# Patient Record
Sex: MALE | Race: WHITE | NOT HISPANIC OR LATINO | Employment: FULL TIME | URBAN - METROPOLITAN AREA
[De-identification: names, ages, dates, MRNs, and addresses within clinical notes are randomized per-mention and may not be internally consistent; named-entity substitution may affect disease eponyms.]

---

## 2017-06-19 ENCOUNTER — TELEPHONE (OUTPATIENT)
Dept: FAMILY MEDICINE | Facility: CLINIC | Age: 62
End: 2017-06-19

## 2017-06-19 NOTE — TELEPHONE ENCOUNTER
New patient appointment scheduled for Wednesday,notified patient with and sob and diffculty breathing patient needs to go to ER for eval and tx. Appointment schedule to est care with dr spring

## 2017-06-19 NOTE — TELEPHONE ENCOUNTER
----- Message from Wojciech Santiago sent at 6/19/2017  4:13 PM CDT -----  Contact: Wife/Ruben Miranda called in regarding the attached patient (-Elliott).  Ruben stated she is an established patient of Dr. Galindo and would like patient to be seen ASAP.  Patient would be a new patient.  Patient has cellulitis in left leg and it is swollen and can barely get a shoe on.  Patient was offered next available and to be put on waiting list but declined.    Ruben's call back number is [phone])

## 2017-06-23 ENCOUNTER — DOCUMENTATION ONLY (OUTPATIENT)
Dept: FAMILY MEDICINE | Facility: CLINIC | Age: 62
End: 2017-06-23

## 2017-06-23 NOTE — PROGRESS NOTES
Pre-Visit Chart Review  For Appointment Scheduled on 06/28/2017    Health Maintenance Due   Topic Date Due    Hepatitis C Screening  1955    Lipid Panel  1955    TETANUS VACCINE  07/23/1971    Colonoscopy  07/23/2003    Zoster Vaccine  07/23/2013

## 2017-06-26 DIAGNOSIS — Z11.59 NEED FOR HEPATITIS C SCREENING TEST: Primary | ICD-10-CM

## 2017-08-18 ENCOUNTER — DOCUMENTATION ONLY (OUTPATIENT)
Dept: FAMILY MEDICINE | Facility: CLINIC | Age: 62
End: 2017-08-18

## 2017-08-18 NOTE — PROGRESS NOTES
Pre-Visit Chart Review  For Appointment Scheduled on 8/25/17.    Health Maintenance Due   Topic Date Due    Hepatitis C Screening  1955    Lipid Panel  1955    TETANUS VACCINE  07/23/1971    Colonoscopy  07/23/2003    Zoster Vaccine  07/23/2013    Influenza Vaccine  08/01/2017

## 2017-08-25 ENCOUNTER — DOCUMENTATION ONLY (OUTPATIENT)
Dept: FAMILY MEDICINE | Facility: CLINIC | Age: 62
End: 2017-08-25

## 2017-08-25 ENCOUNTER — HOSPITAL ENCOUNTER (OUTPATIENT)
Dept: RADIOLOGY | Facility: CLINIC | Age: 62
Discharge: HOME | End: 2017-08-25
Attending: FAMILY MEDICINE
Payer: OTHER GOVERNMENT

## 2017-08-25 ENCOUNTER — OFFICE VISIT (OUTPATIENT)
Dept: FAMILY MEDICINE | Facility: CLINIC | Age: 62
End: 2017-08-25
Payer: OTHER GOVERNMENT

## 2017-08-25 VITALS
HEIGHT: 69 IN | TEMPERATURE: 98 F | BODY MASS INDEX: 28.8 KG/M2 | HEART RATE: 71 BPM | WEIGHT: 194.44 LBS | SYSTOLIC BLOOD PRESSURE: 138 MMHG | DIASTOLIC BLOOD PRESSURE: 80 MMHG

## 2017-08-25 DIAGNOSIS — M79.671 RIGHT FOOT PAIN: ICD-10-CM

## 2017-08-25 DIAGNOSIS — M79.642 PAIN IN BOTH HANDS: ICD-10-CM

## 2017-08-25 DIAGNOSIS — G89.29 CHRONIC NONINTRACTABLE HEADACHE, UNSPECIFIED HEADACHE TYPE: Primary | ICD-10-CM

## 2017-08-25 DIAGNOSIS — I87.2 VENOUS INSUFFICIENCY: ICD-10-CM

## 2017-08-25 DIAGNOSIS — M79.641 PAIN IN BOTH HANDS: ICD-10-CM

## 2017-08-25 DIAGNOSIS — R60.9 EDEMA, UNSPECIFIED TYPE: ICD-10-CM

## 2017-08-25 DIAGNOSIS — R51.9 CHRONIC NONINTRACTABLE HEADACHE, UNSPECIFIED HEADACHE TYPE: Primary | ICD-10-CM

## 2017-08-25 DIAGNOSIS — K44.9 HIATAL HERNIA: ICD-10-CM

## 2017-08-25 DIAGNOSIS — Z12.11 SCREENING FOR COLON CANCER: ICD-10-CM

## 2017-08-25 DIAGNOSIS — K21.9 GASTROESOPHAGEAL REFLUX DISEASE, ESOPHAGITIS PRESENCE NOT SPECIFIED: ICD-10-CM

## 2017-08-25 DIAGNOSIS — Z11.59 NEED FOR HEPATITIS C SCREENING TEST: ICD-10-CM

## 2017-08-25 DIAGNOSIS — I10 ESSENTIAL HYPERTENSION: ICD-10-CM

## 2017-08-25 DIAGNOSIS — M62.08 DIASTASIS RECTI: ICD-10-CM

## 2017-08-25 DIAGNOSIS — M10.9 GOUT, UNSPECIFIED CAUSE, UNSPECIFIED CHRONICITY, UNSPECIFIED SITE: ICD-10-CM

## 2017-08-25 DIAGNOSIS — R07.9 CHEST PAIN, UNSPECIFIED TYPE: ICD-10-CM

## 2017-08-25 PROBLEM — H26.9 LEFT CATARACT: Status: ACTIVE | Noted: 2017-08-25

## 2017-08-25 PROBLEM — H33.22 LEFT RETINAL DETACHMENT: Status: ACTIVE | Noted: 2017-08-25

## 2017-08-25 PROBLEM — M67.40 GANGLION CYST: Status: ACTIVE | Noted: 2017-08-25

## 2017-08-25 PROCEDURE — 73130 X-RAY EXAM OF HAND: CPT | Mod: 50,TC,PO

## 2017-08-25 PROCEDURE — 99999 PR PBB SHADOW E&M-EST. PATIENT-LVL V: CPT | Mod: PBBFAC,,, | Performed by: FAMILY MEDICINE

## 2017-08-25 PROCEDURE — 99215 OFFICE O/P EST HI 40 MIN: CPT | Mod: PBBFAC,25,PO | Performed by: FAMILY MEDICINE

## 2017-08-25 PROCEDURE — 73130 X-RAY EXAM OF HAND: CPT | Mod: 26,50,S$GLB, | Performed by: RADIOLOGY

## 2017-08-25 PROCEDURE — 73630 X-RAY EXAM OF FOOT: CPT | Mod: 26,RT,S$GLB, | Performed by: RADIOLOGY

## 2017-08-25 PROCEDURE — 99204 OFFICE O/P NEW MOD 45 MIN: CPT | Mod: S$PBB,,, | Performed by: FAMILY MEDICINE

## 2017-08-25 PROCEDURE — 3008F BODY MASS INDEX DOCD: CPT | Mod: ,,, | Performed by: FAMILY MEDICINE

## 2017-08-25 PROCEDURE — 3079F DIAST BP 80-89 MM HG: CPT | Mod: ,,, | Performed by: FAMILY MEDICINE

## 2017-08-25 PROCEDURE — 73630 X-RAY EXAM OF FOOT: CPT | Mod: TC,PO,RT

## 2017-08-25 PROCEDURE — 3075F SYST BP GE 130 - 139MM HG: CPT | Mod: ,,, | Performed by: FAMILY MEDICINE

## 2017-08-25 RX ORDER — NEBIVOLOL 10 MG/1
20 TABLET ORAL DAILY
Qty: 180 TABLET | Refills: 3 | Status: SHIPPED | OUTPATIENT
Start: 2017-08-25 | End: 2017-11-08 | Stop reason: SDUPTHER

## 2017-08-25 RX ORDER — HYDROCHLOROTHIAZIDE 12.5 MG/1
12.5 CAPSULE ORAL 2 TIMES DAILY
Qty: 180 CAPSULE | Refills: 3 | Status: SHIPPED | OUTPATIENT
Start: 2017-08-25 | End: 2018-09-03 | Stop reason: SDUPTHER
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
## 2017-09-07 ENCOUNTER — TELEPHONE (OUTPATIENT)
Dept: FAMILY MEDICINE | Facility: CLINIC | Age: 62
End: 2017-09-07

## 2017-09-07 DIAGNOSIS — Z13.820 SCREENING FOR OSTEOPOROSIS: Primary | ICD-10-CM

## 2017-09-07 DIAGNOSIS — M85.80 DECREASED BONE DENSITY: ICD-10-CM

## 2017-09-07 DIAGNOSIS — R74.8 ELEVATED LIVER ENZYMES: ICD-10-CM

## 2017-09-07 DIAGNOSIS — R51.9 CHRONIC NONINTRACTABLE HEADACHE, UNSPECIFIED HEADACHE TYPE: Primary | ICD-10-CM

## 2017-09-07 DIAGNOSIS — M79.643 PAIN OF HAND, UNSPECIFIED LATERALITY: ICD-10-CM

## 2017-09-07 DIAGNOSIS — G89.29 CHRONIC NONINTRACTABLE HEADACHE, UNSPECIFIED HEADACHE TYPE: Primary | ICD-10-CM

## 2017-09-07 NOTE — TELEPHONE ENCOUNTER
Foot xrays demonstrate bunion formation over the right great toe, no evidence of gouty changes.  Needs to let me know if he wants to see podiatry.    Hand xrays demonstrate arthritic changes. However there is also decreaed bone density concerning for osteopenia or osteoporosis. Needs dexa scan.

## 2017-09-07 NOTE — TELEPHONE ENCOUNTER
Everything was  Ordered for his labs, he was to have them done before the mri.     Creatinine ordered.  Also needs all of the other labs done.

## 2017-09-07 NOTE — TELEPHONE ENCOUNTER
----- Message from Kayli Scott sent at 9/6/2017  2:16 PM CDT -----  Pt is scheduled for MRI and needs stat/ labs to check creatinine prior to mri , or patient will have to be rescheduled , if you can help .    Thanking you in advance

## 2017-09-08 ENCOUNTER — HOSPITAL ENCOUNTER (OUTPATIENT)
Dept: RADIOLOGY | Facility: HOSPITAL | Age: 62
Discharge: HOME | End: 2017-09-08
Attending: FAMILY MEDICINE
Payer: OTHER GOVERNMENT

## 2017-09-08 ENCOUNTER — TELEPHONE (OUTPATIENT)
Dept: FAMILY MEDICINE | Facility: CLINIC | Age: 62
End: 2017-09-08

## 2017-09-08 DIAGNOSIS — I87.2 VENOUS INSUFFICIENCY: ICD-10-CM

## 2017-09-08 DIAGNOSIS — R51.9 PERSISTENT HEADACHES: Primary | ICD-10-CM

## 2017-09-08 DIAGNOSIS — M10.9 ACUTE GOUT, UNSPECIFIED CAUSE, UNSPECIFIED SITE: ICD-10-CM

## 2017-09-08 DIAGNOSIS — J01.00 ACUTE MAXILLARY SINUSITIS, RECURRENCE NOT SPECIFIED: ICD-10-CM

## 2017-09-08 DIAGNOSIS — G89.29 CHRONIC NONINTRACTABLE HEADACHE, UNSPECIFIED HEADACHE TYPE: ICD-10-CM

## 2017-09-08 DIAGNOSIS — J34.1 RETENTION CYST OF PARANASAL SINUS: ICD-10-CM

## 2017-09-08 DIAGNOSIS — R51.9 PERSISTENT HEADACHES: ICD-10-CM

## 2017-09-08 DIAGNOSIS — R51.9 CHRONIC NONINTRACTABLE HEADACHE, UNSPECIFIED HEADACHE TYPE: ICD-10-CM

## 2017-09-08 DIAGNOSIS — R60.9 EDEMA, UNSPECIFIED TYPE: ICD-10-CM

## 2017-09-08 DIAGNOSIS — R93.0 ABNORMAL CT SCAN, SINUS: ICD-10-CM

## 2017-09-08 DIAGNOSIS — D69.6 THROMBOCYTOPENIA: Primary | ICD-10-CM

## 2017-09-08 PROCEDURE — 70553 MRI BRAIN STEM W/O & W/DYE: CPT | Mod: TC

## 2017-09-08 PROCEDURE — A9585 GADOBUTROL INJECTION: HCPCS | Performed by: FAMILY MEDICINE

## 2017-09-08 PROCEDURE — 93970 EXTREMITY STUDY: CPT | Mod: 26,,, | Performed by: RADIOLOGY

## 2017-09-08 PROCEDURE — 93970 EXTREMITY STUDY: CPT | Mod: TC

## 2017-09-08 PROCEDURE — 70553 MRI BRAIN STEM W/O & W/DYE: CPT | Mod: 26,,, | Performed by: RADIOLOGY

## 2017-09-08 PROCEDURE — 25500020 PHARM REV CODE 255: Performed by: FAMILY MEDICINE

## 2017-09-08 RX ORDER — COLCHICINE 0.6 MG/1
TABLET ORAL
Qty: 30 TABLET | Refills: 0 | Status: SHIPPED | OUTPATIENT
Start: 2017-09-08 | End: 2018-03-09

## 2017-09-08 RX ORDER — GADOBUTROL 604.72 MG/ML
INJECTION INTRAVENOUS
Status: DISPENSED
Start: 2017-09-08 | End: 2017-09-08

## 2017-09-08 RX ORDER — GADOBUTROL 604.72 MG/ML
8 INJECTION INTRAVENOUS
Status: COMPLETED | OUTPATIENT
Start: 2017-09-08 | End: 2017-09-08

## 2017-09-08 RX ADMIN — GADOBUTROL 8 ML: 604.72 INJECTION INTRAVENOUS at 07:09

## 2017-09-08 NOTE — TELEPHONE ENCOUNTER
Patient notified and states understanding. Patient states he has never seen hematology and never been told he has low platelet level,patient states he is having gout attack patient does not have any medication. Patient needs referral to Dr Yoder

## 2017-09-08 NOTE — TELEPHONE ENCOUNTER
Patient notified and states understanding. Declines podiatry and dexa scan.     Patient wants to see Dr De Guzman for they cyst on his hand patient needs referral has sathya

## 2017-09-08 NOTE — TELEPHONE ENCOUNTER
Patient notified and states understanding, referrals sent to referral center for authorization ,notified patient hematology staff will call to schedule appointment,states understanding

## 2017-09-08 NOTE — TELEPHONE ENCOUNTER
Referrals in for hem/onc and ENT.    Also, I sent in colchicine for gout attack. However, he may need to be started on something to PREVENT gout attacks if he gets these often.

## 2017-09-08 NOTE — TELEPHONE ENCOUNTER
----- Message from FRANCES Peng sent at 9/8/2017  8:45 AM CDT -----  Platelets are low again. Does he see hematology for this?     Thyroid normal.   Electrolytes are normal with the exception of mildly elevated liver function tests. I would like for him to avoid tylenol intake (which is in exerdrin) and alcohol in take & repeat these in 4 weeks.   Uric acid level also mildly elevated- is he having any gouty arthrtic attacks?

## 2017-09-12 ENCOUNTER — TELEPHONE (OUTPATIENT)
Dept: FAMILY MEDICINE | Facility: CLINIC | Age: 62
End: 2017-09-12

## 2017-09-22 ENCOUNTER — OFFICE VISIT (OUTPATIENT)
Dept: HEMATOLOGY/ONCOLOGY | Facility: CLINIC | Age: 62
End: 2017-09-22
Payer: OTHER GOVERNMENT

## 2017-09-22 VITALS
WEIGHT: 198.88 LBS | SYSTOLIC BLOOD PRESSURE: 164 MMHG | DIASTOLIC BLOOD PRESSURE: 90 MMHG | BODY MASS INDEX: 29.46 KG/M2 | TEMPERATURE: 98 F | HEIGHT: 69 IN | HEART RATE: 83 BPM

## 2017-09-22 DIAGNOSIS — D69.6 THROMBOCYTOPENIA: Primary | ICD-10-CM

## 2017-09-22 PROCEDURE — 99244 OFF/OP CNSLTJ NEW/EST MOD 40: CPT | Mod: S$PBB,,, | Performed by: INTERNAL MEDICINE

## 2017-09-22 PROCEDURE — 99213 OFFICE O/P EST LOW 20 MIN: CPT | Mod: PBBFAC,PO | Performed by: INTERNAL MEDICINE

## 2017-09-22 PROCEDURE — 99999 PR PBB SHADOW E&M-EST. PATIENT-LVL III: CPT | Mod: PBBFAC,,, | Performed by: INTERNAL MEDICINE
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
## 2017-09-22 NOTE — LETTER
September 25, 2017      FRANCES Peng  [address]  [facility] [address]           [facility]  [address], [address]  [facility] [address]  Phone: [phone]          Patient: Elliott Gaspar   MR Number: [id number]   YOB: 1955   Date of Visit: 9/22/2017       Dear Dahiana Anna:    Thank you for referring Elliott Gaspar to me for evaluation. Attached you will find relevant portions of my assessment and plan of care.    If you have questions, please do not hesitate to call me. I look forward to following Elliott Gaspar along with you.    Sincerely,        Enclosure  CC:  No Recipients    If you would like to receive this communication electronically, please contact [email or web address] or [phone] to request more information on [employer] access.    For providers and/or their staff who would like to refer a patient to [facility], please contact us through our one-stop-shop provider referral line, Kaia Muro, at [phone].    If you feel you have received this communication in error or would no longer like to receive these types of communications, please e-mail [email or web address]

## 2017-09-26 ENCOUNTER — LAB VISIT (OUTPATIENT)
Dept: LAB | Facility: HOSPITAL | Age: 62
End: 2017-09-26
Attending: INTERNAL MEDICINE
Payer: OTHER GOVERNMENT

## 2017-09-26 ENCOUNTER — HOSPITAL ENCOUNTER (OUTPATIENT)
Dept: RADIOLOGY | Facility: CLINIC | Age: 62
Discharge: HOME | End: 2017-09-26
Attending: INTERNAL MEDICINE
Payer: OTHER GOVERNMENT

## 2017-09-26 DIAGNOSIS — D69.6 THROMBOCYTOPENIA: ICD-10-CM

## 2017-09-26 LAB — FOLATE SERPL-MCNC: 7.6 NG/ML

## 2017-09-26 PROCEDURE — 86022 PLATELET ANTIBODIES: CPT | Mod: 59

## 2017-09-26 PROCEDURE — 76705 ECHO EXAM OF ABDOMEN: CPT | Mod: TC,PO

## 2017-09-26 PROCEDURE — 86023 IMMUNOGLOBULIN ASSAY: CPT

## 2017-09-26 PROCEDURE — 86677 HELICOBACTER PYLORI ANTIBODY: CPT

## 2017-09-26 PROCEDURE — 76705 ECHO EXAM OF ABDOMEN: CPT | Mod: 26,,, | Performed by: RADIOLOGY

## 2017-09-26 PROCEDURE — 83921 ORGANIC ACID SINGLE QUANT: CPT

## 2017-09-26 PROCEDURE — 82746 ASSAY OF FOLIC ACID SERUM: CPT

## 2017-09-26 PROCEDURE — 36415 COLL VENOUS BLD VENIPUNCTURE: CPT

## 2017-09-27 LAB — H PYLORI IGG SERPL QL IA: POSITIVE

## 2017-09-28 LAB — METHYLMALONATE SERPL-SCNC: 0.18 UMOL/L

## 2017-10-02 LAB
HLA AB SER QL IA: NEGATIVE
PA IGG BLD QL FC: NEGATIVE
PLAT GP IA/IIA AB SER QL IA: NEGATIVE
PLAT GP IB/IX AB SER QL IA: NEGATIVE
PLAT GP IIB/IIIA AB SER QL IA: NEGATIVE
PLATELET AB INTERPRETATION: NORMAL
PLT AB: GP IV: NEGATIVE

## 2017-10-05 DIAGNOSIS — Z12.11 COLON CANCER SCREENING: ICD-10-CM

## 2017-10-06 ENCOUNTER — OFFICE VISIT (OUTPATIENT)
Dept: HEMATOLOGY/ONCOLOGY | Facility: CLINIC | Age: 62
End: 2017-10-06
Payer: OTHER GOVERNMENT

## 2017-10-06 VITALS
BODY MASS INDEX: 29.22 KG/M2 | SYSTOLIC BLOOD PRESSURE: 182 MMHG | HEIGHT: 69 IN | DIASTOLIC BLOOD PRESSURE: 89 MMHG | TEMPERATURE: 98 F | HEART RATE: 69 BPM | RESPIRATION RATE: 20 BRPM | WEIGHT: 197.31 LBS

## 2017-10-06 DIAGNOSIS — D69.6 THROMBOCYTOPENIA: ICD-10-CM

## 2017-10-06 DIAGNOSIS — A04.8 H. PYLORI INFECTION: Primary | ICD-10-CM

## 2017-10-06 PROCEDURE — 99999 PR PBB SHADOW E&M-EST. PATIENT-LVL III: CPT | Mod: PBBFAC,,, | Performed by: INTERNAL MEDICINE

## 2017-10-06 PROCEDURE — 99213 OFFICE O/P EST LOW 20 MIN: CPT | Mod: S$PBB,,, | Performed by: INTERNAL MEDICINE

## 2017-10-06 PROCEDURE — 99213 OFFICE O/P EST LOW 20 MIN: CPT | Mod: PBBFAC,PO | Performed by: INTERNAL MEDICINE

## 2017-10-06 RX ORDER — LANSOPRAZOLE, AMOXICILLIN, CLARITHROMYCIN 30-500-500
KIT ORAL
Qty: 1 KIT | Refills: 0 | Status: SHIPPED | OUTPATIENT
Start: 2017-10-06 | End: 2017-10-20
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
## 2017-11-03 ENCOUNTER — LAB VISIT (OUTPATIENT)
Dept: LAB | Facility: HOSPITAL | Age: 62
End: 2017-11-03
Attending: INTERNAL MEDICINE
Payer: OTHER GOVERNMENT

## 2017-11-03 DIAGNOSIS — A04.8 H. PYLORI INFECTION: ICD-10-CM

## 2017-11-03 DIAGNOSIS — D69.6 THROMBOCYTOPENIA: ICD-10-CM

## 2017-11-03 LAB
BASOPHILS # BLD AUTO: 0 K/UL
BASOPHILS NFR BLD: 0.4 %
DIFFERENTIAL METHOD: ABNORMAL
EOSINOPHIL # BLD AUTO: 0.2 K/UL
EOSINOPHIL NFR BLD: 4.3 %
ERYTHROCYTE [DISTWIDTH] IN BLOOD BY AUTOMATED COUNT: 13.2 %
HCT VFR BLD AUTO: 43.7 %
HGB BLD-MCNC: 15.2 G/DL
LYMPHOCYTES # BLD AUTO: 1.6 K/UL
LYMPHOCYTES NFR BLD: 31.9 %
MCH RBC QN AUTO: 30.2 PG
MCHC RBC AUTO-ENTMCNC: 34.8 G/DL
MCV RBC AUTO: 87 FL
MONOCYTES # BLD AUTO: 0.4 K/UL
MONOCYTES NFR BLD: 8.7 %
NEUTROPHILS # BLD AUTO: 2.7 K/UL
NEUTROPHILS NFR BLD: 54.7 %
PLATELET # BLD AUTO: 135 K/UL
PMV BLD AUTO: 7.8 FL
RBC # BLD AUTO: 5.03 M/UL
WBC # BLD AUTO: 4.9 K/UL

## 2017-11-03 PROCEDURE — 85025 COMPLETE CBC W/AUTO DIFF WBC: CPT

## 2017-11-03 PROCEDURE — 86677 HELICOBACTER PYLORI ANTIBODY: CPT

## 2017-11-03 PROCEDURE — 36415 COLL VENOUS BLD VENIPUNCTURE: CPT

## 2017-11-07 ENCOUNTER — DOCUMENTATION ONLY (OUTPATIENT)
Dept: FAMILY MEDICINE | Facility: CLINIC | Age: 62
End: 2017-11-07

## 2017-11-07 LAB — H PYLORI IGG SERPL QL IA: POSITIVE

## 2017-11-07 NOTE — PROGRESS NOTES
Pre-Visit Chart Review  For Appointment Scheduled on 11/8/17.    Health Maintenance Due   Topic Date Due    TETANUS VACCINE  07/23/1971    Colonoscopy  07/23/2003    Zoster Vaccine  07/23/2013    Influenza Vaccine  08/01/2017

## 2017-11-08 ENCOUNTER — TELEPHONE (OUTPATIENT)
Dept: FAMILY MEDICINE | Facility: CLINIC | Age: 62
End: 2017-11-08

## 2017-11-08 ENCOUNTER — OFFICE VISIT (OUTPATIENT)
Dept: FAMILY MEDICINE | Facility: CLINIC | Age: 62
End: 2017-11-08
Payer: OTHER GOVERNMENT

## 2017-11-08 VITALS
HEIGHT: 69 IN | HEART RATE: 72 BPM | SYSTOLIC BLOOD PRESSURE: 148 MMHG | DIASTOLIC BLOOD PRESSURE: 82 MMHG | TEMPERATURE: 98 F | WEIGHT: 194.69 LBS | BODY MASS INDEX: 28.84 KG/M2

## 2017-11-08 DIAGNOSIS — I10 ESSENTIAL HYPERTENSION: Primary | ICD-10-CM

## 2017-11-08 DIAGNOSIS — N48.6 PEYRONIE'S DISEASE: ICD-10-CM

## 2017-11-08 DIAGNOSIS — K43.9 VENTRAL HERNIA WITHOUT OBSTRUCTION OR GANGRENE: ICD-10-CM

## 2017-11-08 PROCEDURE — 99214 OFFICE O/P EST MOD 30 MIN: CPT | Mod: PBBFAC,PO | Performed by: FAMILY MEDICINE

## 2017-11-08 PROCEDURE — 99999 PR PBB SHADOW E&M-EST. PATIENT-LVL IV: CPT | Mod: PBBFAC,,, | Performed by: FAMILY MEDICINE

## 2017-11-08 PROCEDURE — 99214 OFFICE O/P EST MOD 30 MIN: CPT | Mod: S$PBB,,, | Performed by: FAMILY MEDICINE

## 2017-11-08 RX ORDER — HYDROCHLOROTHIAZIDE 25 MG/1
50 TABLET ORAL DAILY
Qty: 60 TABLET | Refills: 11 | Status: CANCELLED | OUTPATIENT
Start: 2017-11-08 | End: 2018-11-08

## 2017-11-08 RX ORDER — NEBIVOLOL 20 MG/1
40 TABLET ORAL DAILY
Qty: 60 TABLET | Refills: 11 | Status: SHIPPED | OUTPATIENT
Start: 2017-11-08 | End: 2018-01-22 | Stop reason: SDUPTHER
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
## 2017-11-10 ENCOUNTER — OFFICE VISIT (OUTPATIENT)
Dept: HEMATOLOGY/ONCOLOGY | Facility: CLINIC | Age: 62
End: 2017-11-10
Payer: OTHER GOVERNMENT

## 2017-11-10 VITALS
RESPIRATION RATE: 18 BRPM | BODY MASS INDEX: 29.29 KG/M2 | WEIGHT: 197.75 LBS | DIASTOLIC BLOOD PRESSURE: 86 MMHG | HEART RATE: 59 BPM | SYSTOLIC BLOOD PRESSURE: 173 MMHG | HEIGHT: 69 IN

## 2017-11-10 DIAGNOSIS — D69.6 THROMBOCYTOPENIA: Primary | ICD-10-CM

## 2017-11-10 DIAGNOSIS — A04.8 H. PYLORI INFECTION: ICD-10-CM

## 2017-11-10 PROCEDURE — 99999 PR PBB SHADOW E&M-EST. PATIENT-LVL III: CPT | Mod: PBBFAC,,, | Performed by: INTERNAL MEDICINE

## 2017-11-10 PROCEDURE — 99213 OFFICE O/P EST LOW 20 MIN: CPT | Mod: PBBFAC,PO | Performed by: INTERNAL MEDICINE

## 2017-11-10 PROCEDURE — 99213 OFFICE O/P EST LOW 20 MIN: CPT | Mod: S$PBB,,, | Performed by: INTERNAL MEDICINE
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
## 2017-11-14 ENCOUNTER — OFFICE VISIT (OUTPATIENT)
Dept: CARDIOLOGY | Facility: CLINIC | Age: 62
End: 2017-11-14
Payer: OTHER GOVERNMENT

## 2017-11-14 VITALS
WEIGHT: 197.56 LBS | BODY MASS INDEX: 29.26 KG/M2 | HEIGHT: 69 IN | SYSTOLIC BLOOD PRESSURE: 178 MMHG | HEART RATE: 64 BPM | DIASTOLIC BLOOD PRESSURE: 92 MMHG

## 2017-11-14 DIAGNOSIS — R60.9 EDEMA, UNSPECIFIED TYPE: ICD-10-CM

## 2017-11-14 DIAGNOSIS — I10 ESSENTIAL HYPERTENSION: ICD-10-CM

## 2017-11-14 PROCEDURE — 99204 OFFICE O/P NEW MOD 45 MIN: CPT | Mod: S$PBB,,, | Performed by: INTERNAL MEDICINE

## 2017-11-14 PROCEDURE — 93005 ELECTROCARDIOGRAM TRACING: CPT | Mod: PBBFAC,PO | Performed by: INTERNAL MEDICINE

## 2017-11-14 PROCEDURE — 99999 PR PBB SHADOW E&M-EST. PATIENT-LVL III: CPT | Mod: PBBFAC,,, | Performed by: INTERNAL MEDICINE

## 2017-11-14 PROCEDURE — 93010 ELECTROCARDIOGRAM REPORT: CPT | Mod: S$PBB,,, | Performed by: INTERNAL MEDICINE

## 2017-11-14 PROCEDURE — 99213 OFFICE O/P EST LOW 20 MIN: CPT | Mod: PBBFAC,PO,25 | Performed by: INTERNAL MEDICINE

## 2017-11-14 RX ORDER — OLMESARTAN MEDOXOMIL 20 MG/1
20 TABLET ORAL DAILY
Qty: 30 TABLET | Refills: 1 | Status: SHIPPED | OUTPATIENT
Start: 2017-11-14 | End: 2017-12-07 | Stop reason: SDUPTHER

## 2017-11-14 NOTE — PROGRESS NOTES
Subjective:    Patient ID:  Elliott Gaspar is a 62 y.o. male who presents for evaluation of Hypertension (elevated bp readings- angiogram 2016 [place] [facility] Dr. Ro)      Pt here for hypertension management. He has had worsening and harder to control BP. He reports no other CV issues and says he had a normal angiogram last year at the [facility]. Most recently his bystolic was doubled to 40 mg daily and HCTZ increased to 25 mg daily due to LE swelling which he believes is due to the bystolic.  He reports having gained some weight and his a self described couch potato. He reports no significant symptoms at present.         Review of Systems   Constitution: Negative for weight gain and weight loss.   HENT: Negative.    Eyes: Negative.    Cardiovascular: Positive for leg swelling. Negative for chest pain, claudication, cyanosis, dyspnea on exertion, irregular heartbeat, near-syncope, orthopnea (no PND) and palpitations.   Respiratory: Negative for cough, hemoptysis, shortness of breath and snoring.    Endocrine: Negative.    Skin: Negative.    Musculoskeletal: Negative for joint pain, muscle cramps, muscle weakness and myalgias.   Gastrointestinal: Negative for diarrhea, hematemesis, nausea and vomiting.   Genitourinary: Negative.    Neurological: Negative for dizziness, focal weakness, light-headedness, loss of balance, numbness, paresthesias and seizures.   Psychiatric/Behavioral: Negative.         Objective:    Physical Exam   Constitutional: He is oriented to person, place, and time. He appears well-developed and well-nourished.   HENT:   Mouth/Throat: Oropharynx is clear and moist.   Eyes: Pupils are equal, round, and reactive to light.   Neck: Normal range of motion. No thyromegaly present.   Cardiovascular: Normal rate, regular rhythm, S1 normal, S2 normal, normal heart sounds, intact distal pulses and normal pulses.   No extrasystoles are present. PMI is not displaced.  Exam reveals no friction rub.    No  murmur heard.  Pulmonary/Chest: Effort normal and breath sounds normal. He has no wheezes. He has no rales. He exhibits no tenderness.   Abdominal: Soft. Bowel sounds are normal. He exhibits no distension and no mass. There is no tenderness.   Musculoskeletal: Normal range of motion. He exhibits no edema.   Neurological: He is alert and oriented to person, place, and time.   Skin: Skin is warm and dry.   Vitals reviewed.      Test(s) Reviewed  I have reviewed the following in detail:  [] Stress test   [] Angiography   [] Echocardiogram   [x] Labs   [x] Other:  ECG       Assessment:       1. Essential hypertension    2. Edema, unspecified type         Plan:       We discussed his BP and that he had a cough on valsartan in the past.   Will try benicar 20 mg daily and if he develops a cough will assume it is a class effect of ARB  We discussed reducing salt/sodium in the diet to help with the LE edema  We discussed diet, exercise and weight loss  F/u 1 month

## 2017-11-14 NOTE — LETTER
November 14, 2017      Pina Galindo MD  [address] [facility] [place] [address]           [facility]  [address] [facility] [address] [place] [address]  Phone: [phone]          Patient: Elliott Gaspar   MR Number: [id number]   YOB: 1955   Date of Visit: 11/14/2017       Dear Dr. Pina Galindo:    Thank you for referring Elliott Gaspar to me for evaluation. Attached you will find relevant portions of my assessment and plan of care.    If you have questions, please do not hesitate to call me. I look forward to following Elliott Gaspar along with you.    Sincerely,    Dov Brand MD    Enclosure  CC:  No Recipients    If you would like to receive this communication electronically, please contact [email or web address] or [phone] to request more information on [employer] Link access.    For providers and/or their staff who would like to refer a patient to [facility], please contact us through our one-stop-shop provider referral line, [facility], at [phone].    If you feel you have received this communication in error or would no longer like to receive these types of communications, please e-mail [email or web address]

## 2017-11-15 ENCOUNTER — OFFICE VISIT (OUTPATIENT)
Dept: ORTHOPEDICS | Facility: CLINIC | Age: 62
End: 2017-11-15
Payer: OTHER GOVERNMENT

## 2017-11-15 ENCOUNTER — HOSPITAL ENCOUNTER (OUTPATIENT)
Dept: RADIOLOGY | Facility: HOSPITAL | Age: 62
Discharge: HOME | End: 2017-11-15
Attending: ORTHOPAEDIC SURGERY
Payer: OTHER GOVERNMENT

## 2017-11-15 VITALS — BODY MASS INDEX: 29.18 KG/M2 | WEIGHT: 197 LBS | HEIGHT: 69 IN

## 2017-11-15 DIAGNOSIS — M25.562 ACUTE PAIN OF LEFT KNEE: Primary | ICD-10-CM

## 2017-11-15 DIAGNOSIS — M94.262 CHONDROMALACIA, KNEE, LEFT: ICD-10-CM

## 2017-11-15 DIAGNOSIS — M25.562 ACUTE PAIN OF LEFT KNEE: ICD-10-CM

## 2017-11-15 PROCEDURE — 73562 X-RAY EXAM OF KNEE 3: CPT | Mod: 26,59,RT, | Performed by: RADIOLOGY

## 2017-11-15 PROCEDURE — 73564 X-RAY EXAM KNEE 4 OR MORE: CPT | Mod: TC,PN,LT

## 2017-11-15 PROCEDURE — 99213 OFFICE O/P EST LOW 20 MIN: CPT | Mod: PBBFAC,25,PN | Performed by: ORTHOPAEDIC SURGERY

## 2017-11-15 PROCEDURE — 99214 OFFICE O/P EST MOD 30 MIN: CPT | Mod: S$PBB,,, | Performed by: ORTHOPAEDIC SURGERY

## 2017-11-15 PROCEDURE — 73564 X-RAY EXAM KNEE 4 OR MORE: CPT | Mod: 26,LT,, | Performed by: RADIOLOGY

## 2017-11-15 PROCEDURE — 99999 PR PBB SHADOW E&M-EST. PATIENT-LVL III: CPT | Mod: PBBFAC,,, | Performed by: ORTHOPAEDIC SURGERY

## 2017-11-15 NOTE — LETTER
November 15, 2017      FRANCES Becker  [address]  [facility] [address]           [address] [address]  Phone: [phone]          Patient: Elliott Gaspar   MR Number: [id number]   YOB: 1955   Date of Visit: 11/15/2017       Dear Saige Wallace:    Thank you for referring Elliott Gaspar to me for evaluation. Attached you will find relevant portions of my assessment and plan of care.    If you have questions, please do not hesitate to call me. I look forward to following Elliott Gaspar along with you.    Sincerely,    Dov Salvador MD    Enclosure  CC:  No Recipients    If you would like to receive this communication electronically, please contact [email or web address] or [phone] to request more information on [employer] Link access.    For providers and/or their staff who would like to refer a patient to [facility], please contact us through our one-stop-shop provider referral line, [facility], at [phone].    If you feel you have received this communication in error or would no longer like to receive these types of communications, please e-mail [email or web address]

## 2017-11-15 NOTE — PROGRESS NOTES
"DATE: 11/15/2017  PATIENT: Elliott Gaspar    Attending Physician: Dov Salvador M.D.    HISTORY:  Elliott Gaspar is a 62 y.o. male who returns for follow up evaluation of  his left knee.  He was seen in 2015 and underwent an MRI which showed bone contusion in the medial tibial plateau.  He states his symptoms have slowly progress.  He notes buckling giving way with certain activities.  He denies any significant swelling.  Pain is reported at 1/10 at rest.  He has had no treatment.  He presents for further evaluation.    PMH/PSH/FamHx/SocHx:  Reviewed and unchanged from prior visit    ROS:  Constitution: Negative for chills, fever, and sweats. Negative for unexplained weight loss.  HENT: Negative for headaches and blurry vision.   Cardiovascular: Negative for chest pain, irregular heartbeat, leg swelling and palpitations.   Respiratory: Negative for cough and shortness of breath.   Gastrointestinal: Negative for abdominal pain, heartburn, nausea and vomiting.   Genitourinary: Negative for bladder incontinence and dysuria.   Musculoskeletal: Negative for systemic arthritis, joint swelling, muscle weakness and myalgias.   Neurological: Negative for numbness.   Psychiatric/Behavioral: Negative for depression.   Endocrine: Negative for polyuria.   Hematologic/Lymphatic: Negative for bleeding disorders.  Skin: Negative for poor wound healing.       EXAM:  Ht 5' 9" (1.753 m)   Wt 89.4 kg (197 lb)   BMI 29.09 kg/m²   Elliott Gaspar is a well developed, well nourished male in no acute distress. Physical examination of the left knee evaluated the following:    Gait and Alignment  Inspection for ecchymosis, swelling, atrophy, or deformity  Inspection for intra-articular and/or bursal effusions  Tenderness to palpation over the bony and soft tissue structures around the knee  Range of Motion and presence of extensor lag/contractures  Sensation and motor strength  Varus/valgus or anterior/posterior/rotatory " instability  Flexion pinch and Sarah's Tests  Patellar alignment/tracking/pain to palpation  Vascular exam to include skin temperature/color/capillary refill    Remarkable findings included:  Normal alignment.  No effusion noted.  Mild medial joint line tenderness.  Range of motion 0-130°.  No instability on exam.  Flexion pinch equivocal.  Sarah's test nonspecific      IMAGING:   X-rays a left knee are performed and personally reviewed today.  No acute fractures or dislocations are noted.  Minimal medial compartment joint space narrowing identified.  There does appear to be a small subchondral cyst in the medial tibial plateau.    ASSESSMENT:  Chondromalacia medial tibial plateau versus medial meniscus tear left knee      PLAN:  The implications of the patient's evolution of symptoms and findings were explained to the patient in detail..  as his symptoms have slowly progressed and x-rays show no significant degenerative changes, I have recommended repeat MRI to evaluate the bone bruising in the medial tibial plateau to see if it is improved, worsen or stay the same.  Further treatment recommendations to follow after the MRI has been performed.  Follow-up after MRI to discuss further treatment recommendations..          This note was dictated using voice recognition software. Please excuse any grammatical or typographical errors.

## 2017-11-17 ENCOUNTER — HOSPITAL ENCOUNTER (OUTPATIENT)
Dept: RADIOLOGY | Facility: HOSPITAL | Age: 62
Discharge: HOME | End: 2017-11-17
Attending: ORTHOPAEDIC SURGERY
Payer: OTHER GOVERNMENT

## 2017-11-17 DIAGNOSIS — M25.562 ACUTE PAIN OF LEFT KNEE: ICD-10-CM

## 2017-11-17 PROCEDURE — 73721 MRI JNT OF LWR EXTRE W/O DYE: CPT | Mod: 26,LT,, | Performed by: RADIOLOGY

## 2017-11-17 PROCEDURE — 73721 MRI JNT OF LWR EXTRE W/O DYE: CPT | Mod: TC,LT

## 2017-11-22 ENCOUNTER — OFFICE VISIT (OUTPATIENT)
Dept: SURGERY | Facility: CLINIC | Age: 62
End: 2017-11-22
Payer: OTHER GOVERNMENT

## 2017-11-22 VITALS
HEART RATE: 57 BPM | WEIGHT: 194.69 LBS | BODY MASS INDEX: 28.75 KG/M2 | DIASTOLIC BLOOD PRESSURE: 73 MMHG | SYSTOLIC BLOOD PRESSURE: 164 MMHG | TEMPERATURE: 98 F

## 2017-11-22 DIAGNOSIS — M62.08 DIASTASIS RECTI: Primary | ICD-10-CM

## 2017-11-22 PROCEDURE — 99203 OFFICE O/P NEW LOW 30 MIN: CPT | Mod: S$PBB,,, | Performed by: SURGERY

## 2017-11-22 PROCEDURE — 99213 OFFICE O/P EST LOW 20 MIN: CPT | Mod: PBBFAC,PO | Performed by: SURGERY

## 2017-11-22 PROCEDURE — 99999 PR PBB SHADOW E&M-EST. PATIENT-LVL III: CPT | Mod: PBBFAC,,, | Performed by: SURGERY

## 2017-11-22 NOTE — LETTER
December 5, 2017      Pina Galindo MD  [address]  [place] [address]           [place] [facility]  [address]  [place] [address]  Phone: [phone]          Patient: Elliott Gaspar   MR Number: [id number]   YOB: 1955   Date of Visit: 11/22/2017       Dear Dr. Pina Galindo:    Thank you for referring Elliott Gaspar to me for evaluation. Attached you will find relevant portions of my assessment and plan of care.    If you have questions, please do not hesitate to call me. I look forward to following Elliott Gaspar along with you.    Sincerely,    Hannah Villalba  CC:  No Recipients    If you would like to receive this communication electronically, please contact [email or web address] or [phone] to request more information on [employer] Link access.    For providers and/or their staff who would like to refer a patient to [facility], please contact us through our one-stop-shop provider referral line, [facility], at [phone].    If you feel you have received this communication in error or would no longer like to receive these types of communications, please e-mail [email or web address]

## 2017-11-29 ENCOUNTER — OFFICE VISIT (OUTPATIENT)
Dept: ORTHOPEDICS | Facility: CLINIC | Age: 62
End: 2017-11-29
Payer: OTHER GOVERNMENT

## 2017-11-29 VITALS — WEIGHT: 197 LBS | BODY MASS INDEX: 29.18 KG/M2 | HEIGHT: 69 IN

## 2017-11-29 DIAGNOSIS — M94.262 CHONDROMALACIA, KNEE, LEFT: Primary | ICD-10-CM

## 2017-11-29 PROCEDURE — 99214 OFFICE O/P EST MOD 30 MIN: CPT | Mod: S$PBB,,, | Performed by: ORTHOPAEDIC SURGERY

## 2017-11-29 PROCEDURE — 99212 OFFICE O/P EST SF 10 MIN: CPT | Mod: PBBFAC,PN | Performed by: ORTHOPAEDIC SURGERY

## 2017-11-29 PROCEDURE — 99999 PR PBB SHADOW E&M-EST. PATIENT-LVL II: CPT | Mod: PBBFAC,,, | Performed by: ORTHOPAEDIC SURGERY

## 2017-11-29 NOTE — PROGRESS NOTES
"DATE: 11/29/2017  PATIENT: Elliott Gaspar    Attending Physician: Dov Salvador M.D.    HISTORY:  Elliott Gaspar is a 62 y.o. male who returns for follow up evaluation of  his left knee.  He didn't go and MRI showed there was still small area of edema in the medial tibial plateau.  However this is decreased from 2015.  He again reports intermittent pain but not on a daily basis.  Pain is reported at 0/10 today.    PMH/PSH/FamHx/SocHx:  Reviewed and unchanged from prior visit    ROS:  Constitution: Negative for chills, fever, and sweats. Negative for unexplained weight loss.  HENT: Negative for headaches and blurry vision.   Cardiovascular: Negative for chest pain, irregular heartbeat, leg swelling and palpitations.   Respiratory: Negative for cough and shortness of breath.   Gastrointestinal: Negative for abdominal pain, heartburn, nausea and vomiting.   Genitourinary: Negative for bladder incontinence and dysuria.   Musculoskeletal: Negative for systemic arthritis, joint swelling, muscle weakness and myalgias.   Neurological: Negative for numbness.   Psychiatric/Behavioral: Negative for depression.   Endocrine: Negative for polyuria.   Hematologic/Lymphatic: Negative for bleeding disorders.  Skin: Negative for poor wound healing.       EXAM:  Ht 5' 9" (1.753 m)   Wt 89.4 kg (197 lb)   BMI 29.09 kg/m²   Elliott Gaspar is a well developed, well nourished male in no acute distress. Physical examination of the left knee evaluated the following:     Gait and Alignment  Inspection for ecchymosis, swelling, atrophy, or deformity  Inspection for intra-articular and/or bursal effusions  Tenderness to palpation over the bony and soft tissue structures around the knee  Range of Motion and presence of extensor lag/contractures  Sensation and motor strength  Varus/valgus or anterior/posterior/rotatory instability  Flexion pinch and Sarah's Tests  Patellar alignment/tracking/pain to palpation  Vascular exam to " include skin temperature/color/capillary refill     Remarkable findings included:  Normal alignment.  No effusion noted.  Mild medial joint line tenderness.  Range of motion 0-130°.  No instability on exam.  Flexion pinch equivocal.  Sarah's test nonspecific       IMAGING:   MRI is personally reviewed and compared to the radiologist's report.  No meniscal tear seen.  There is a much smaller area of edema in the medial tibial plateau.  This appears to be well consolidated and likely secondary to degenerative changes    ASSESSMENT:  Chondromalacia left knee    PLAN:  The implications of the patient's evolution of symptoms and findings were explained to the patient in detail.. I went over the MRI in detail.  I've explained that treatment options depend on the degree of symptoms.  Treatment options discussed included observation, occasional cortisone injections, and arthroscopy. All questions answered and the patient wishes to  observe his symptoms as they're tolerable at the present time.  Should his symptoms worsen, he'll return for treatment recommendations.  Follow-up as needed.          This note was dictated using voice recognition software. Please excuse any grammatical or typographical errors.

## 2017-12-04 ENCOUNTER — DOCUMENTATION ONLY (OUTPATIENT)
Dept: FAMILY MEDICINE | Facility: CLINIC | Age: 62
End: 2017-12-04

## 2017-12-04 ENCOUNTER — OFFICE VISIT (OUTPATIENT)
Dept: UROLOGY | Facility: CLINIC | Age: 62
End: 2017-12-04
Payer: OTHER GOVERNMENT

## 2017-12-04 VITALS
RESPIRATION RATE: 18 BRPM | HEART RATE: 65 BPM | BODY MASS INDEX: 29.36 KG/M2 | HEIGHT: 69 IN | WEIGHT: 198.19 LBS | DIASTOLIC BLOOD PRESSURE: 91 MMHG | SYSTOLIC BLOOD PRESSURE: 161 MMHG

## 2017-12-04 DIAGNOSIS — Z12.5 PROSTATE CANCER SCREENING: ICD-10-CM

## 2017-12-04 DIAGNOSIS — N48.6 PEYRONIE'S DISEASE: Primary | ICD-10-CM

## 2017-12-04 PROCEDURE — 99999 PR PBB SHADOW E&M-EST. PATIENT-LVL III: CPT | Mod: PBBFAC,,, | Performed by: UROLOGY

## 2017-12-04 PROCEDURE — 99244 OFF/OP CNSLTJ NEW/EST MOD 40: CPT | Mod: S$PBB,,, | Performed by: UROLOGY

## 2017-12-04 PROCEDURE — 99213 OFFICE O/P EST LOW 20 MIN: CPT | Mod: PBBFAC,PN | Performed by: UROLOGY

## 2017-12-04 NOTE — Clinical Note
The wrap plan was to have him return in 1 year with PSA prior, and orders were placed as such. However, he has not had a PSA since 2012, and I neglected to get a current screening PSA. Please discussed with patient and schedule (to avoid duplication) a PSA at this time.  We will review results and contact with any further recommendations

## 2017-12-04 NOTE — PROGRESS NOTES
Pre-Visit Chart Review  For Appointment Scheduled on 12/6/17.    Health Maintenance Due   Topic Date Due    Fecal Occult Blood Test (FOBT)/FitKit  1955    TETANUS VACCINE  07/23/1971    Zoster Vaccine  07/23/2013

## 2017-12-04 NOTE — LETTER
December 9, 2017      Pina Galindo MD  [address]  [place] [address]           [place] [facility]  [address] [place] [address] [address]  [place] [address]  Phone: [phone]  Fax: [phone]          Patient: Elliott Gaspar   MR Number: [id number]   YOB: 1955   Date of Visit: 12/4/2017       Dear Dr. Pina Galindo:    Thank you for referring Elliott Gaspar to me for evaluation. Attached you will find relevant portions of my assessment and plan of care.    If you have questions, please do not hesitate to call me. I look forward to following Elliott Gaspar along with you.    Sincerely,    Dov Hanson MD    Enclosure  CC:  No Recipients    If you would like to receive this communication electronically, please contact [email or web address] or [phone] to request more information on [employer] Link access.    For providers and/or their staff who would like to refer a patient to [facility], please contact us through our one-stop-shop provider referral line, [facility], at [phone].    If you feel you have received this communication in error or would no longer like to receive these types of communications, please e-mail [email or web address]
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
## 2017-12-06 ENCOUNTER — TELEPHONE (OUTPATIENT)
Dept: FAMILY MEDICINE | Facility: CLINIC | Age: 62
End: 2017-12-06

## 2017-12-06 ENCOUNTER — OFFICE VISIT (OUTPATIENT)
Dept: FAMILY MEDICINE | Facility: CLINIC | Age: 62
End: 2017-12-06
Payer: OTHER GOVERNMENT

## 2017-12-06 VITALS
DIASTOLIC BLOOD PRESSURE: 87 MMHG | HEIGHT: 69 IN | HEART RATE: 56 BPM | BODY MASS INDEX: 29.29 KG/M2 | WEIGHT: 197.75 LBS | SYSTOLIC BLOOD PRESSURE: 164 MMHG | TEMPERATURE: 98 F

## 2017-12-06 DIAGNOSIS — R10.84 GENERALIZED ABDOMINAL PAIN: ICD-10-CM

## 2017-12-06 DIAGNOSIS — I10 ESSENTIAL HYPERTENSION: Primary | ICD-10-CM

## 2017-12-06 PROCEDURE — 99214 OFFICE O/P EST MOD 30 MIN: CPT | Mod: S$PBB,,, | Performed by: FAMILY MEDICINE

## 2017-12-06 PROCEDURE — 99213 OFFICE O/P EST LOW 20 MIN: CPT | Mod: PBBFAC,PO | Performed by: FAMILY MEDICINE

## 2017-12-06 PROCEDURE — 99999 PR PBB SHADOW E&M-EST. PATIENT-LVL III: CPT | Mod: PBBFAC,,, | Performed by: FAMILY MEDICINE

## 2017-12-07 NOTE — TELEPHONE ENCOUNTER
Please call patient's pharmacy and find out what delay in filling the benicar that dr. colon prescribed is. This was prescribed on 11/14 and he has still not been able to start it.

## 2017-12-07 NOTE — TELEPHONE ENCOUNTER
Spoke to [facility] pharmacy   Patient needs PA on Benicar will forward message to Dr Brand staff

## 2017-12-07 NOTE — TELEPHONE ENCOUNTER
Ok, please let his staff know as it has been over a month and he has not started it and has an appointment with cardiology next week.

## 2017-12-07 NOTE — TELEPHONE ENCOUNTER
This is the first message I have received about this will send to our pharmacy for the PA approval to be obtained.  Patient notified

## 2017-12-08 ENCOUNTER — HOSPITAL ENCOUNTER (OUTPATIENT)
Dept: RADIOLOGY | Facility: HOSPITAL | Age: 62
Discharge: HOME | End: 2017-12-08
Attending: FAMILY MEDICINE
Payer: OTHER GOVERNMENT

## 2017-12-08 DIAGNOSIS — R10.84 GENERALIZED ABDOMINAL PAIN: ICD-10-CM

## 2017-12-08 PROCEDURE — 74176 CT ABD & PELVIS W/O CONTRAST: CPT | Mod: TC

## 2017-12-08 PROCEDURE — 74176 CT ABD & PELVIS W/O CONTRAST: CPT | Mod: 26,,, | Performed by: RADIOLOGY

## 2017-12-08 RX ORDER — OLMESARTAN MEDOXOMIL 20 MG/1
20 TABLET ORAL DAILY
Qty: 30 TABLET | Refills: 5 | Status: SHIPPED | OUTPATIENT
Start: 2017-12-08 | End: 2018-01-22 | Stop reason: SDUPTHER
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
## 2017-12-13 ENCOUNTER — LAB VISIT (OUTPATIENT)
Dept: LAB | Facility: HOSPITAL | Age: 62
End: 2017-12-13
Attending: UROLOGY
Payer: OTHER GOVERNMENT

## 2017-12-13 DIAGNOSIS — Z12.5 PROSTATE CANCER SCREENING: ICD-10-CM

## 2017-12-13 LAB — COMPLEXED PSA SERPL-MCNC: 0.96 NG/ML

## 2017-12-13 PROCEDURE — 84153 ASSAY OF PSA TOTAL: CPT

## 2017-12-13 PROCEDURE — 36415 COLL VENOUS BLD VENIPUNCTURE: CPT

## 2017-12-14 ENCOUNTER — OFFICE VISIT (OUTPATIENT)
Dept: CARDIOLOGY | Facility: CLINIC | Age: 62
End: 2017-12-14
Payer: OTHER GOVERNMENT

## 2017-12-14 VITALS
DIASTOLIC BLOOD PRESSURE: 85 MMHG | HEART RATE: 52 BPM | BODY MASS INDEX: 29.19 KG/M2 | HEIGHT: 69 IN | WEIGHT: 197.06 LBS | SYSTOLIC BLOOD PRESSURE: 168 MMHG

## 2017-12-14 DIAGNOSIS — I10 ESSENTIAL HYPERTENSION: ICD-10-CM

## 2017-12-14 PROCEDURE — 99213 OFFICE O/P EST LOW 20 MIN: CPT | Mod: S$PBB,,, | Performed by: INTERNAL MEDICINE

## 2017-12-14 PROCEDURE — 99213 OFFICE O/P EST LOW 20 MIN: CPT | Mod: PBBFAC,PO | Performed by: INTERNAL MEDICINE

## 2017-12-14 PROCEDURE — 99999 PR PBB SHADOW E&M-EST. PATIENT-LVL III: CPT | Mod: PBBFAC,,, | Performed by: INTERNAL MEDICINE

## 2017-12-14 NOTE — PROGRESS NOTES
Pt here for f/u. Seen a month ago for hypertension management and benicar 20 mg was added.   He apparently had some pre-authorization issues with [facility] and only received the medication yesterday.   Obviously, this visit is not very useful for reassessment.  We discussed starting an exercise program as he had a normal angiogram a year ago.   Will make him a f/u appointment for next month after he has been on the new medication for a while.

## 2017-12-22 ENCOUNTER — TELEPHONE (OUTPATIENT)
Dept: FAMILY MEDICINE | Facility: CLINIC | Age: 62
End: 2017-12-22

## 2017-12-22 NOTE — TELEPHONE ENCOUNTER
----- Message from Pina Galindo MD sent at 12/11/2017  8:03 PM CST -----  CT of abdomen demonstrates fatty liver disease, needs to eat low fat diet, work on weight loss. If abdominal pain persists and he wants to see gi, let me know.

## 2017-12-22 NOTE — TELEPHONE ENCOUNTER
Results given to patient.  Patient verbalized understanding of information given and will call back if abdominal pain persists

## 2018-01-22 ENCOUNTER — OFFICE VISIT (OUTPATIENT)
Dept: CARDIOLOGY | Facility: CLINIC | Age: 63
End: 2018-01-22
Payer: OTHER GOVERNMENT

## 2018-01-22 VITALS
HEART RATE: 70 BPM | WEIGHT: 194.88 LBS | SYSTOLIC BLOOD PRESSURE: 144 MMHG | DIASTOLIC BLOOD PRESSURE: 78 MMHG | BODY MASS INDEX: 28.87 KG/M2 | HEIGHT: 69 IN

## 2018-01-22 DIAGNOSIS — I10 ESSENTIAL HYPERTENSION: Primary | ICD-10-CM

## 2018-01-22 PROCEDURE — 99213 OFFICE O/P EST LOW 20 MIN: CPT | Mod: S$PBB,,, | Performed by: INTERNAL MEDICINE

## 2018-01-22 PROCEDURE — 99213 OFFICE O/P EST LOW 20 MIN: CPT | Mod: PBBFAC,PO | Performed by: INTERNAL MEDICINE

## 2018-01-22 PROCEDURE — 99999 PR PBB SHADOW E&M-EST. PATIENT-LVL III: CPT | Mod: PBBFAC,,, | Performed by: INTERNAL MEDICINE

## 2018-01-22 RX ORDER — OLMESARTAN MEDOXOMIL 40 MG/1
40 TABLET ORAL DAILY
Qty: 90 TABLET | Refills: 3 | Status: SHIPPED | OUTPATIENT
Start: 2018-01-22 | End: 2018-05-18 | Stop reason: SDUPTHER

## 2018-01-22 RX ORDER — NEBIVOLOL 20 MG/1
40 TABLET ORAL DAILY
Qty: 180 TABLET | Refills: 3 | Status: SHIPPED | OUTPATIENT
Start: 2018-01-22 | End: 2018-01-30 | Stop reason: SDUPTHER

## 2018-01-22 NOTE — PROGRESS NOTES
Subjective:    Patient ID:  Elliott Gaspar is a 62 y.o. male who presents for follow-up of Hypertension (1 month follow up)      Since last visit Pt reports no new issues. BP readings at home mostly 150's-160's.         Review of Systems   Constitution: Negative for weight gain and weight loss.   HENT: Negative.    Eyes: Negative.    Cardiovascular: Negative for chest pain, claudication, cyanosis, dyspnea on exertion, irregular heartbeat, leg swelling, near-syncope, orthopnea (no PND), palpitations and syncope.   Respiratory: Negative for cough, hemoptysis, shortness of breath and snoring.    Endocrine: Negative.    Skin: Negative.    Musculoskeletal: Negative for joint pain, muscle cramps, muscle weakness and myalgias.   Gastrointestinal: Negative for diarrhea, hematemesis, nausea and vomiting.   Genitourinary: Negative.    Neurological: Negative for dizziness, focal weakness, light-headedness, loss of balance, numbness, paresthesias and seizures.   Psychiatric/Behavioral: Negative.         Objective:    Physical Exam   Constitutional: He is oriented to person, place, and time. He appears well-developed and well-nourished.   HENT:   Mouth/Throat: Oropharynx is clear and moist.   Eyes: Pupils are equal, round, and reactive to light.   Neck: Normal range of motion. No thyromegaly present.   Cardiovascular: Normal rate, regular rhythm, S1 normal, S2 normal, normal heart sounds, intact distal pulses and normal pulses.   No extrasystoles are present. PMI is not displaced.  Exam reveals no friction rub.    No murmur heard.  Pulmonary/Chest: Effort normal and breath sounds normal. He has no wheezes. He has no rales. He exhibits no tenderness.   Abdominal: Soft. Bowel sounds are normal. He exhibits no distension and no mass. There is no tenderness.   Musculoskeletal: Normal range of motion. He exhibits no edema.   Neurological: He is alert and oriented to person, place, and time.   Skin: Skin is warm and dry.   Vitals  reviewed.        Assessment:       1. Essential hypertension         Plan:       Increase olmesartan to 40 mg daily  Echo  Continue to monitor BP  F/u 4 months

## 2018-01-23 NOTE — TELEPHONE ENCOUNTER
Patient filled a 30 day prescription of bystolic at [facility] on 1/23/18. Patient is requesting a 90 day script be sent to [facility] for the remaining refills. Thanks  Patti [phone]

## 2018-01-26 ENCOUNTER — TELEPHONE (OUTPATIENT)
Dept: FAMILY MEDICINE | Facility: CLINIC | Age: 63
End: 2018-01-26

## 2018-01-26 ENCOUNTER — OFFICE VISIT (OUTPATIENT)
Dept: FAMILY MEDICINE | Facility: CLINIC | Age: 63
End: 2018-01-26
Payer: OTHER GOVERNMENT

## 2018-01-26 ENCOUNTER — TELEPHONE (OUTPATIENT)
Dept: CARDIOLOGY | Facility: CLINIC | Age: 63
End: 2018-01-26

## 2018-01-26 ENCOUNTER — DOCUMENTATION ONLY (OUTPATIENT)
Dept: FAMILY MEDICINE | Facility: CLINIC | Age: 63
End: 2018-01-26

## 2018-01-26 VITALS
HEIGHT: 69 IN | SYSTOLIC BLOOD PRESSURE: 166 MMHG | DIASTOLIC BLOOD PRESSURE: 91 MMHG | HEART RATE: 72 BPM | BODY MASS INDEX: 28.44 KG/M2 | WEIGHT: 192 LBS | TEMPERATURE: 98 F

## 2018-01-26 DIAGNOSIS — I10 ESSENTIAL HYPERTENSION: ICD-10-CM

## 2018-01-26 DIAGNOSIS — B35.4 TINEA CORPORIS: Primary | ICD-10-CM

## 2018-01-26 PROCEDURE — 99213 OFFICE O/P EST LOW 20 MIN: CPT | Mod: S$PBB,,, | Performed by: PHYSICIAN ASSISTANT

## 2018-01-26 PROCEDURE — 99999 PR PBB SHADOW E&M-EST. PATIENT-LVL IV: CPT | Mod: PBBFAC,,, | Performed by: PHYSICIAN ASSISTANT

## 2018-01-26 PROCEDURE — 99214 OFFICE O/P EST MOD 30 MIN: CPT | Mod: PBBFAC,PO | Performed by: PHYSICIAN ASSISTANT

## 2018-01-26 RX ORDER — NYSTATIN AND TRIAMCINOLONE ACETONIDE 100000; 1 [USP'U]/G; MG/G
CREAM TOPICAL 2 TIMES DAILY
Qty: 60 G | Refills: 2 | Status: SHIPPED | OUTPATIENT
Start: 2018-01-26 | End: 2018-03-09

## 2018-01-26 NOTE — TELEPHONE ENCOUNTER
----- Message from Michelle Chandra sent at 1/26/2018 10:04 AM CST -----  Contact: Wife - Elle Gaspar  States that the DOPPLER ECHO that is scheduled for today they had to cancel due to the patient having a bad rash on his arm.  They would like to re-schedule the ECHO in [place].  Please call Elle back at [phone].  Thank you

## 2018-01-26 NOTE — PROGRESS NOTES
Pre-Visit Chart Review  For Appointment Scheduled on 01/26/18    Health Maintenance Due   Topic Date Due    Fecal Occult Blood Test (FOBT)/[employer]  1955    TETANUS VACCINE  07/23/1971    Zoster Vaccine  07/23/2013

## 2018-01-26 NOTE — TELEPHONE ENCOUNTER
----- Message from Michelle Chandra sent at 1/26/2018 10:10 AM CST -----  Contact: Wife - Elle Gaspar  States that the patient is breaking out with a bad rash from one arm and now it's going to another arm and has to be seen today.  They feel that it is URGENT!   They cancelled an ECHO TEST that was scheduled for today because of this rash and can you please call them back, Elle, at [phone].  Thank you

## 2018-01-26 NOTE — PROGRESS NOTES
Subjective:       Patient ID: Elliott Gaspar is a 62 y.o. male.    Chief Complaint: Rash    Rash   This is a new problem. Episode onset: in the past 3 days. The problem has been gradually worsening since onset. Location: bilateral antecubital area. The rash is characterized by blistering, itchiness, pain and redness. Pertinent negatives include no anorexia, congestion, cough, diarrhea, fatigue, fever, shortness of breath or vomiting. Past treatments include nothing. The treatment provided no relief.     Review of Systems   Constitutional: Negative for activity change, appetite change, chills, fatigue and fever.   HENT: Negative for congestion.    Respiratory: Negative for cough, shortness of breath and wheezing.    Cardiovascular: Negative for chest pain and palpitations.   Gastrointestinal: Negative for abdominal pain, anorexia, diarrhea, nausea and vomiting.   Skin: Positive for color change and rash. Negative for pallor and wound.       Objective:      Physical Exam   Constitutional: Vital signs are normal. He appears well-developed and well-nourished. No distress.   Cardiovascular: Normal rate, regular rhythm, S1 normal, S2 normal and normal heart sounds.  Exam reveals no gallop.    No murmur heard.  Pulses:       Radial pulses are 2+ on the right side, and 2+ on the left side.   <2sec cap refill fingers bilat     Pulmonary/Chest: Effort normal and breath sounds normal. No respiratory distress. He has no wheezes. He has no rhonchi.   Skin: Skin is warm and dry. He is not diaphoretic.        Appropriate skin turgor   Psychiatric: He has a normal mood and affect. His speech is normal and behavior is normal. Judgment and thought content normal. Cognition and memory are normal.       Assessment:       1. Tinea corporis    2. Essential hypertension        Plan:       Elliott was seen today for rash.    Diagnoses and all orders for this visit:    Tinea corporis  -     nystatin-triamcinolone (MYCOLOG II) cream; Apply  topically 2 (two) times daily.  Return to clinic if symptoms worsen or do not improve with treatment    Essential hypertension  -Initially elevated; we did not recheck blood pressure due to rash being in antecubital areas bilaterally  -his cardiologist Dr. Amador recently adjusted his mediation on 1/22/18    Patient readiness: acceptance and barriers:none    During the course of the visit the patient was educated and counseled about the following:     Hypertension:   Medication: no change.  Obesity:   Regular aerobic exercise program discussed.    Goals: Hypertension: Reduce Blood Pressure and Obesity: Reduce calorie intake and BMI    Did patient meet goals/outcomes: No    The following self management tools provided: declined    Patient Instructions (the written plan) was given to the patient/family.     Time spent with patient: 20 minutes    Barriers to medications present (no )    Adverse reactions to current medications (no)    Over the counter medications reviewed (Yes)

## 2018-01-30 RX ORDER — NEBIVOLOL 20 MG/1
40 TABLET ORAL DAILY
Qty: 180 TABLET | Refills: 3 | Status: SHIPPED | OUTPATIENT
Start: 2018-01-30 | End: 2019-03-12 | Stop reason: SDUPTHER

## 2018-02-02 ENCOUNTER — LAB VISIT (OUTPATIENT)
Dept: LAB | Facility: HOSPITAL | Age: 63
End: 2018-02-02
Attending: INTERNAL MEDICINE
Payer: OTHER GOVERNMENT

## 2018-02-02 DIAGNOSIS — A04.8 H. PYLORI INFECTION: ICD-10-CM

## 2018-02-02 DIAGNOSIS — D69.6 THROMBOCYTOPENIA: ICD-10-CM

## 2018-02-02 LAB
BASOPHILS # BLD AUTO: 0.01 K/UL
BASOPHILS NFR BLD: 0.2 %
DIFFERENTIAL METHOD: NORMAL
EOSINOPHIL # BLD AUTO: 0.2 K/UL
EOSINOPHIL NFR BLD: 3.6 %
ERYTHROCYTE [DISTWIDTH] IN BLOOD BY AUTOMATED COUNT: 13 %
HCT VFR BLD AUTO: 42.6 %
HGB BLD-MCNC: 15.3 G/DL
LYMPHOCYTES # BLD AUTO: 1.8 K/UL
LYMPHOCYTES NFR BLD: 31.6 %
MCH RBC QN AUTO: 30.3 PG
MCHC RBC AUTO-ENTMCNC: 35.9 G/DL
MCV RBC AUTO: 84 FL
MONOCYTES # BLD AUTO: 0.5 K/UL
MONOCYTES NFR BLD: 9.2 %
NEUTROPHILS # BLD AUTO: 3.1 K/UL
NEUTROPHILS NFR BLD: 55.4 %
PLATELET # BLD AUTO: 152 K/UL
PMV BLD AUTO: 9.6 FL
RBC # BLD AUTO: 5.05 M/UL
WBC # BLD AUTO: 5.57 K/UL

## 2018-02-02 PROCEDURE — 86677 HELICOBACTER PYLORI ANTIBODY: CPT

## 2018-02-02 PROCEDURE — 36415 COLL VENOUS BLD VENIPUNCTURE: CPT | Mod: PO

## 2018-02-02 PROCEDURE — 85025 COMPLETE CBC W/AUTO DIFF WBC: CPT | Mod: PO

## 2018-02-05 LAB — H PYLORI IGG SERPL QL IA: POSITIVE

## 2018-02-07 ENCOUNTER — OFFICE VISIT (OUTPATIENT)
Dept: HEMATOLOGY/ONCOLOGY | Facility: CLINIC | Age: 63
End: 2018-02-07
Payer: OTHER GOVERNMENT

## 2018-02-07 VITALS
WEIGHT: 192.88 LBS | SYSTOLIC BLOOD PRESSURE: 190 MMHG | DIASTOLIC BLOOD PRESSURE: 92 MMHG | HEART RATE: 57 BPM | BODY MASS INDEX: 28.57 KG/M2 | RESPIRATION RATE: 17 BRPM | HEIGHT: 69 IN | TEMPERATURE: 98 F

## 2018-02-07 DIAGNOSIS — Z86.19 HISTORY OF HELICOBACTER PYLORI INFECTION: ICD-10-CM

## 2018-02-07 DIAGNOSIS — Z86.2 HISTORY OF THROMBOCYTOPENIA: Primary | ICD-10-CM

## 2018-02-07 PROCEDURE — 99213 OFFICE O/P EST LOW 20 MIN: CPT | Mod: S$PBB,,, | Performed by: INTERNAL MEDICINE

## 2018-02-07 PROCEDURE — 3008F BODY MASS INDEX DOCD: CPT | Mod: ,,, | Performed by: INTERNAL MEDICINE

## 2018-02-07 PROCEDURE — 99999 PR PBB SHADOW E&M-EST. PATIENT-LVL III: CPT | Mod: PBBFAC,,, | Performed by: INTERNAL MEDICINE

## 2018-02-07 PROCEDURE — 99213 OFFICE O/P EST LOW 20 MIN: CPT | Mod: PBBFAC,PO | Performed by: INTERNAL MEDICINE
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
## 2018-03-06 ENCOUNTER — DOCUMENTATION ONLY (OUTPATIENT)
Dept: FAMILY MEDICINE | Facility: CLINIC | Age: 63
End: 2018-03-06

## 2018-03-06 NOTE — PROGRESS NOTES
Pre-Visit Chart Review  For Appointment Scheduled on 3/9/18.    Health Maintenance Due   Topic Date Due    Fecal Occult Blood Test (FOBT)/[employer]  1955    TETANUS VACCINE  07/23/1971    Zoster Vaccine  07/23/2013

## 2018-03-07 ENCOUNTER — TELEPHONE (OUTPATIENT)
Dept: FAMILY MEDICINE | Facility: CLINIC | Age: 63
End: 2018-03-07

## 2018-03-07 NOTE — TELEPHONE ENCOUNTER
Called pt--Dr. Galindo was reviewing her schedule for Friday. Pt is on at 10 am for a 6 month follow up. Dr. Galindo states that pt is not due for his 6 month follow up until June, as he was last seen in December. Tried rescheduling pt, but pt states that he wants to keep his appt Friday. Pt states that he wants to discuss diverticulitis that was found on last tests and he wants to discuss sinus headaches. Thanks, Luz

## 2018-03-09 ENCOUNTER — OFFICE VISIT (OUTPATIENT)
Dept: FAMILY MEDICINE | Facility: CLINIC | Age: 63
End: 2018-03-09
Payer: OTHER GOVERNMENT

## 2018-03-09 VITALS
HEIGHT: 69 IN | BODY MASS INDEX: 28.84 KG/M2 | SYSTOLIC BLOOD PRESSURE: 158 MMHG | DIASTOLIC BLOOD PRESSURE: 90 MMHG | HEART RATE: 63 BPM | WEIGHT: 194.69 LBS | TEMPERATURE: 98 F

## 2018-03-09 DIAGNOSIS — I10 ESSENTIAL HYPERTENSION: ICD-10-CM

## 2018-03-09 DIAGNOSIS — L91.8 SKIN TAG: Primary | ICD-10-CM

## 2018-03-09 DIAGNOSIS — G89.29 CHRONIC ABDOMINAL PAIN: ICD-10-CM

## 2018-03-09 DIAGNOSIS — B36.0 TINEA VERSICOLOR: ICD-10-CM

## 2018-03-09 DIAGNOSIS — R10.9 CHRONIC ABDOMINAL PAIN: ICD-10-CM

## 2018-03-09 DIAGNOSIS — L82.1 SK (SEBORRHEIC KERATOSIS): ICD-10-CM

## 2018-03-09 PROCEDURE — 99214 OFFICE O/P EST MOD 30 MIN: CPT | Mod: S$PBB,,, | Performed by: FAMILY MEDICINE

## 2018-03-09 PROCEDURE — 99214 OFFICE O/P EST MOD 30 MIN: CPT | Mod: PBBFAC,PO | Performed by: FAMILY MEDICINE

## 2018-03-09 PROCEDURE — 99999 PR PBB SHADOW E&M-EST. PATIENT-LVL IV: CPT | Mod: PBBFAC,,, | Performed by: FAMILY MEDICINE

## 2018-03-09 RX ORDER — TRIAMCINOLONE ACETONIDE 1 MG/G
CREAM TOPICAL
Qty: 80 G | Refills: 3 | Status: SHIPPED | OUTPATIENT
Start: 2018-03-09 | End: 2020-07-21

## 2018-03-09 RX ORDER — KETOCONAZOLE 20 MG/G
CREAM TOPICAL DAILY
Qty: 30 G | Refills: 1 | Status: SHIPPED | OUTPATIENT
Start: 2018-03-09 | End: 2019-05-02

## 2018-03-09 RX ORDER — TRIAMCINOLONE ACETONIDE 1 MG/G
CREAM TOPICAL
Qty: 80 G | Refills: 3 | Status: SHIPPED | OUTPATIENT
Start: 2018-03-09 | End: 2018-03-09 | Stop reason: SDUPTHER
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
## 2018-03-16 ENCOUNTER — OFFICE VISIT (OUTPATIENT)
Dept: DERMATOLOGY | Facility: CLINIC | Age: 63
End: 2018-03-16
Payer: OTHER GOVERNMENT

## 2018-03-16 ENCOUNTER — CLINICAL SUPPORT (OUTPATIENT)
Dept: CARDIOLOGY | Facility: CLINIC | Age: 63
End: 2018-03-16
Attending: INTERNAL MEDICINE
Payer: OTHER GOVERNMENT

## 2018-03-16 VITALS — HEIGHT: 69 IN | WEIGHT: 194 LBS | BODY MASS INDEX: 28.73 KG/M2

## 2018-03-16 DIAGNOSIS — L82.1 SEBORRHEIC KERATOSES: ICD-10-CM

## 2018-03-16 DIAGNOSIS — L21.9 SEBORRHEIC DERMATITIS: Primary | ICD-10-CM

## 2018-03-16 DIAGNOSIS — L81.4 SOLAR LENTIGO: ICD-10-CM

## 2018-03-16 DIAGNOSIS — I10 ESSENTIAL HYPERTENSION: ICD-10-CM

## 2018-03-16 DIAGNOSIS — D18.01 CHERRY ANGIOMA: ICD-10-CM

## 2018-03-16 DIAGNOSIS — L91.8 SKIN TAGS, MULTIPLE ACQUIRED: ICD-10-CM

## 2018-03-16 DIAGNOSIS — D23.9 DERMATOFIBROMA: ICD-10-CM

## 2018-03-16 PROCEDURE — 99999 PR PBB SHADOW E&M-EST. PATIENT-LVL II: CPT | Mod: PBBFAC,,, | Performed by: DERMATOLOGY

## 2018-03-16 PROCEDURE — 11200 RMVL SKIN TAGS UP TO&INC 15: CPT | Mod: CSM,PBBFAC,PO | Performed by: DERMATOLOGY

## 2018-03-16 PROCEDURE — 11200 RMVL SKIN TAGS UP TO&INC 15: CPT | Mod: CSM,S$PBB,, | Performed by: DERMATOLOGY

## 2018-03-16 PROCEDURE — 99213 OFFICE O/P EST LOW 20 MIN: CPT | Mod: 25,S$PBB,, | Performed by: DERMATOLOGY

## 2018-03-16 PROCEDURE — 99212 OFFICE O/P EST SF 10 MIN: CPT | Mod: PBBFAC,PO | Performed by: DERMATOLOGY

## 2018-03-16 PROCEDURE — 93306 TTE W/DOPPLER COMPLETE: CPT | Mod: PBBFAC,PO | Performed by: INTERNAL MEDICINE

## 2018-03-16 RX ORDER — KETOCONAZOLE 20 MG/ML
SHAMPOO, SUSPENSION TOPICAL
Qty: 120 ML | Refills: 5 | Status: SHIPPED | OUTPATIENT
Start: 2018-03-16 | End: 2019-05-02 | Stop reason: ALTCHOICE

## 2018-03-16 RX ORDER — FLUOCINONIDE TOPICAL SOLUTION USP, 0.05% 0.5 MG/ML
SOLUTION TOPICAL
Qty: 60 ML | Refills: 3 | Status: SHIPPED | OUTPATIENT
Start: 2018-03-16 | End: 2019-06-19

## 2018-03-16 NOTE — PROGRESS NOTES
Subjective:       Patient ID:  Elliott Gaspar is a 62 y.o. male who presents for   Chief Complaint   Patient presents with    Skin Check     UBSE    Spot     back      Patient last seen 11/2015 with eczematous dermatitis of shins  Requests UBSE for cancer screening and new complaints  Denies intense sun exposure, no outdoor hobbies          Spot  - Initial  Affected locations: back  Duration: years.  Signs / symptoms: growing and itching  Severity: mild  Timing: constant  Aggravated by: nothing  Relieving factors/Treatments tried: nothing        Review of Systems   Constitutional: Negative for fever, chills, weight loss, weight gain, fatigue and malaise.   Skin: Positive for activity-related sunscreen use and wears hat. Negative for daily sunscreen use.        Objective:    Physical Exam   Constitutional: He appears well-developed and well-nourished. No distress.   Neurological: He is alert and oriented to person, place, and time. He is not disoriented.   Psychiatric: He has a normal mood and affect.   Skin:   Areas Examined (abnormalities noted in diagram):   Scalp / Hair Palpated and Inspected  Head / Face Inspection Performed  Neck Inspection Performed  Chest / Axilla Inspection Performed  Abdomen Inspection Performed  Back Inspection Performed  RUE Inspected  LUE Inspection Performed  Nails and Digits Inspection Performed                   Diagram Legend     Erythematous scaling macule/papule c/w actinic keratosis       Vascular papule c/w angioma      Pigmented verrucoid papule/plaque c/w seborrheic keratosis      Yellow umbilicated papule c/w sebaceous hyperplasia      Irregularly shaped tan macule c/w lentigo     1-2 mm smooth white papules consistent with Milia      Movable subcutaneous cyst with punctum c/w epidermal inclusion cyst      Subcutaneous movable cyst c/w pilar cyst      Firm pink to brown papule c/w dermatofibroma      Pedunculated fleshy papule(s) c/w skin tag(s)      Evenly pigmented  macule c/w junctional nevus     Mildly variegated pigmented, slightly irregular-bordered macule c/w mildly atypical nevus      Flesh colored to evenly pigmented papule c/w intradermal nevus       Pink pearly papule/plaque c/w basal cell carcinoma      Erythematous hyperkeratotic cursted plaque c/w SCC      Surgical scar with no sign of skin cancer recurrence      Open and closed comedones      Inflammatory papules and pustules      Verrucoid papule consistent consistent with wart     Erythematous eczematous patches and plaques     Dystrophic onycholytic nail with subungual debris c/w onychomycosis     Umbilicated papule    Erythematous-base heme-crusted tan verrucoid plaque consistent with inflamed seborrheic keratosis     Erythematous Silvery Scaling Plaque c/w Psoriasis     See annotation      Assessment / Plan:        Seborrheic dermatitis  -     ketoconazole (NIZORAL) 2 % shampoo; Wash hair with medicated shampoo at least 2x/week - let sit on scalp at least 5 minutes prior to rinsing  Dispense: 120 mL; Refill: 5  -     fluocinonide (LIDEX) 0.05 % external solution; AAA scalp qday - bid prn pruritus  Dispense: 60 mL; Refill: 3    Skin tags, multiple acquired  Verbal consent obtained. 14 lesions removed with scissor snip removal after anesthesia with 1% lidocaine with epinephrine. Hemostasis achieved with aluminum chloride and hyfrecation. No complications.  Cosmetic procedure, patient agrees to $232 flat rate    Seborrheic keratoses  These are benign inherited growths without a malignant potential. Reassurance given to patient. No treatment is necessary.     Solar lentigo  This is a benign hyperpigmented sun induced lesion. Daily sun protection will reduce the number of new lesions. Treatment of these benign lesions are considered cosmetic.    Cherry angioma  This is a benign vascular lesion. Reassurance given. No treatment required.     Dermatofibroma  Reassurance    Patient instructed in importance in daily sun  protection of at least spf 30. Sun avoidance and topical protection discussed.   Recommend Elta MD (physician office) COTZ sensitive (available online) for daily use on face and neck.  Patient encouraged to wear hat for all outdoor exposure.   Also discussed sun protective clothing.           Follow-up if symptoms worsen or fail to improve.

## 2018-03-20 LAB
DIASTOLIC DYSFUNCTION: NO
ESTIMATED PA SYSTOLIC PRESSURE: 28
MITRAL VALVE REGURGITATION: NORMAL
RETIRED EF AND QEF - SEE NOTES: 60 (ref 55–65)
TRICUSPID VALVE REGURGITATION: NORMAL

## 2018-03-21 ENCOUNTER — TELEPHONE (OUTPATIENT)
Dept: CARDIOLOGY | Facility: CLINIC | Age: 63
End: 2018-03-21

## 2018-03-21 ENCOUNTER — PATIENT MESSAGE (OUTPATIENT)
Dept: CARDIOLOGY | Facility: CLINIC | Age: 63
End: 2018-03-21

## 2018-03-21 NOTE — TELEPHONE ENCOUNTER
Test(s) Reviewed  I have reviewed the following in detail:  [] Stress test   [] Angiography   [x] Echocardiogram   [] Labs   [] Other:       Call Pt and tell him tests are ok

## 2018-03-22 ENCOUNTER — CLINICAL SUPPORT (OUTPATIENT)
Dept: FAMILY MEDICINE | Facility: CLINIC | Age: 63
End: 2018-03-22
Payer: OTHER GOVERNMENT

## 2018-03-22 ENCOUNTER — TELEPHONE (OUTPATIENT)
Dept: FAMILY MEDICINE | Facility: CLINIC | Age: 63
End: 2018-03-22

## 2018-03-22 VITALS — DIASTOLIC BLOOD PRESSURE: 72 MMHG | HEART RATE: 57 BPM | SYSTOLIC BLOOD PRESSURE: 152 MMHG

## 2018-03-22 PROCEDURE — 99212 OFFICE O/P EST SF 10 MIN: CPT | Mod: PBBFAC,PO | Performed by: FAMILY MEDICINE

## 2018-03-22 PROCEDURE — 99999 PR PBB SHADOW E&M-EST. PATIENT-LVL II: CPT | Mod: PBBFAC,,, | Performed by: FAMILY MEDICINE

## 2018-03-22 NOTE — TELEPHONE ENCOUNTER
----- Message from Vanessa Foreman sent at 3/22/2018 11:28 AM CDT -----  Contact patient to reschedule his nurse visit he states he forgot about the appointment contact # [phone].    Thank you

## 2018-03-22 NOTE — PROGRESS NOTES
Patient came to clinic for bp check.  Manual  right arm 170/84  Manual  Right arm 152/72    Takes medication as prescribed.

## 2018-03-23 ENCOUNTER — TELEPHONE (OUTPATIENT)
Dept: FAMILY MEDICINE | Facility: CLINIC | Age: 63
End: 2018-03-23

## 2018-03-23 NOTE — TELEPHONE ENCOUNTER
Patient came in yesterday for BP check. BP elevated. Needs to follow up with cardiology for medication adjustment.    He also asked about adacel and zostavax, he needs to contact  and find out if they are covered to be administered in the clinic or the pharmacy.

## 2018-05-18 ENCOUNTER — OFFICE VISIT (OUTPATIENT)
Dept: CARDIOLOGY | Facility: CLINIC | Age: 63
End: 2018-05-18
Payer: OTHER GOVERNMENT

## 2018-05-18 VITALS
HEART RATE: 54 BPM | BODY MASS INDEX: 28.84 KG/M2 | SYSTOLIC BLOOD PRESSURE: 166 MMHG | HEIGHT: 69 IN | RESPIRATION RATE: 20 BRPM | DIASTOLIC BLOOD PRESSURE: 86 MMHG | WEIGHT: 194.69 LBS

## 2018-05-18 DIAGNOSIS — I10 ESSENTIAL HYPERTENSION: Primary | ICD-10-CM

## 2018-05-18 PROCEDURE — 99214 OFFICE O/P EST MOD 30 MIN: CPT | Mod: S$PBB,,, | Performed by: INTERNAL MEDICINE

## 2018-05-18 PROCEDURE — 99213 OFFICE O/P EST LOW 20 MIN: CPT | Mod: PBBFAC,PO | Performed by: INTERNAL MEDICINE

## 2018-05-18 PROCEDURE — 99999 PR PBB SHADOW E&M-EST. PATIENT-LVL III: CPT | Mod: PBBFAC,,, | Performed by: INTERNAL MEDICINE

## 2018-05-18 RX ORDER — GLUCOSAMINE/CHONDRO SU A 500-400 MG
1 TABLET ORAL 2 TIMES DAILY
COMMUNITY

## 2018-05-18 RX ORDER — AMLODIPINE BESYLATE 5 MG/1
5 TABLET ORAL DAILY
Qty: 90 TABLET | Refills: 3 | Status: SHIPPED | OUTPATIENT
Start: 2018-05-18 | End: 2018-10-25 | Stop reason: ALTCHOICE

## 2018-05-18 RX ORDER — OLMESARTAN MEDOXOMIL 40 MG/1
40 TABLET ORAL DAILY
Qty: 90 TABLET | Refills: 3 | Status: SHIPPED | OUTPATIENT
Start: 2018-05-18 | End: 2018-10-25 | Stop reason: ALTCHOICE

## 2018-05-18 NOTE — PROGRESS NOTES
Subjective:    Patient ID:  Elliott Gaspar is a 62 y.o. male who presents for follow-up of Hypertension      Pt here for f/u. Last visit we increased his benicar and he reports no significant change. He has no new complaints. He has started to exercise. Feels stressed over his job search.        Review of Systems   Constitution: Negative for weight gain and weight loss.   HENT: Negative.    Eyes: Negative.    Cardiovascular: Negative for chest pain, claudication, cyanosis, dyspnea on exertion, irregular heartbeat, leg swelling, near-syncope, orthopnea (no PND), palpitations and syncope.   Respiratory: Negative for cough, hemoptysis, shortness of breath and snoring.    Endocrine: Negative.    Skin: Negative.    Musculoskeletal: Negative for joint pain, muscle cramps, muscle weakness and myalgias.   Gastrointestinal: Negative for diarrhea, hematemesis, nausea and vomiting.   Genitourinary: Negative.    Neurological: Negative for dizziness, focal weakness, light-headedness, loss of balance, numbness, paresthesias and seizures.   Psychiatric/Behavioral: Negative.         Objective:    Physical Exam   Constitutional: He is oriented to person, place, and time. He appears well-developed and well-nourished.   HENT:   Mouth/Throat: Oropharynx is clear and moist.   Eyes: Pupils are equal, round, and reactive to light.   Neck: Normal range of motion. No thyromegaly present.   Cardiovascular: Normal rate, regular rhythm, S1 normal, S2 normal, normal heart sounds, intact distal pulses and normal pulses.   No extrasystoles are present. PMI is not displaced.  Exam reveals no friction rub.    No murmur heard.  Pulmonary/Chest: Effort normal and breath sounds normal. He has no wheezes. He has no rales. He exhibits no tenderness.   Abdominal: Soft. Bowel sounds are normal. He exhibits no distension and no mass. There is no tenderness.   Musculoskeletal: Normal range of motion. He exhibits no edema.   Neurological: He is alert and  oriented to person, place, and time.   Skin: Skin is warm and dry.   Vitals reviewed.      Test(s) Reviewed  I have reviewed the following in detail:  [] Stress test   [] Angiography   [x] Echocardiogram   [] Labs   [] Other:         Assessment:       1. Essential hypertension         Plan:       Add amlodipine 5 mg daily  Continue with exercise and weight loss  Continue to monitor BP  F/u 6 months

## 2018-06-28 ENCOUNTER — TELEPHONE (OUTPATIENT)
Dept: FAMILY MEDICINE | Facility: CLINIC | Age: 63
End: 2018-06-28

## 2018-06-28 DIAGNOSIS — Z01.00 ROUTINE EYE EXAM: Primary | ICD-10-CM

## 2018-06-28 NOTE — TELEPHONE ENCOUNTER
----- Message from Yadira Piresza sent at 6/28/2018  1:59 PM CDT -----  Contact: Cece coyle/Dr Sheree Devine Opthamologist [phone]  She is requesting a referral he has an appt tomorrow for a routine eye exam, please fax it to: [phone].  Thank you!

## 2018-06-28 NOTE — TELEPHONE ENCOUNTER
Referral faxed.   Referral department notified of authorization  Spoke to caroline yeung she will be leaving for the day and will complete tomorrow

## 2018-08-26 ENCOUNTER — HOSPITAL ENCOUNTER (EMERGENCY)
Facility: HOSPITAL | Age: 63
Discharge: HOME | End: 2018-08-26
Attending: EMERGENCY MEDICINE
Payer: MEDICARE

## 2018-08-26 VITALS
HEIGHT: 69 IN | WEIGHT: 194 LBS | RESPIRATION RATE: 20 BRPM | OXYGEN SATURATION: 96 % | BODY MASS INDEX: 28.73 KG/M2 | TEMPERATURE: 98 F | DIASTOLIC BLOOD PRESSURE: 88 MMHG | HEART RATE: 61 BPM | SYSTOLIC BLOOD PRESSURE: 171 MMHG

## 2018-08-26 DIAGNOSIS — M72.2 PLANTAR FASCIITIS OF LEFT FOOT: Primary | ICD-10-CM

## 2018-08-26 DIAGNOSIS — M79.672 LEFT FOOT PAIN: ICD-10-CM

## 2018-08-26 PROCEDURE — 99283 EMERGENCY DEPT VISIT LOW MDM: CPT

## 2018-08-26 NOTE — ED NOTES
"Presents to the ER with c/o right foot pain that started a few hours ago. Patient states "It feels like I am stepping on rocks." Associated complaints are left leg pain that he associates with two nodules on the back of left leg. Ambulation exacerbates his pain. Denies any fall or injury. Mucous membranes are pink and moist. Skin is warm, dry and intact. Lungs are clear bilaterally, respirations are regular and unlabored. Denies cough, congestion, rhinorrhea or SOB. BS active x4, no tenderness with palpation, abd is soft and not distended. Denies any appetite or activity change. S1S2, capillary refill is < 2 seconds. Denies dysuria, difficulty urinating, frequency, numbness, tingling or weakness. NOEL AUGUSTES    "
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
## 2018-08-26 NOTE — ED NOTES
Upon discharge, patient is AAOx4, no cardiac or respiratory complications. Follow up care reviewed with patient and has been instructed to return to the ER if needed. Patient verbalized understanding and ambulated to the lobby without difficulty. FANNY COHEN

## 2018-09-04 RX ORDER — HYDROCHLOROTHIAZIDE 12.5 MG/1
CAPSULE ORAL
Qty: 180 CAPSULE | Refills: 0 | Status: SHIPPED | OUTPATIENT
Start: 2018-09-04 | End: 2019-03-12 | Stop reason: SDUPTHER

## 2018-09-12 ENCOUNTER — DOCUMENTATION ONLY (OUTPATIENT)
Dept: FAMILY MEDICINE | Facility: CLINIC | Age: 63
End: 2018-09-12

## 2018-09-12 NOTE — PROGRESS NOTES
Pre-Visit Chart Review  For Appointment Scheduled on (date)    Health Maintenance Due   Topic Date Due    Fecal Occult Blood Test (FOBT)/FitKit  1955    TETANUS VACCINE  07/23/1971    Zoster Vaccine  07/23/2013    Pneumococcal (65+) (1 of 2 - PCV13) 07/23/2018    Influenza Vaccine  08/01/2018

## 2018-09-18 ENCOUNTER — OFFICE VISIT (OUTPATIENT)
Dept: FAMILY MEDICINE | Facility: CLINIC | Age: 63
End: 2018-09-18
Payer: MEDICARE

## 2018-09-18 ENCOUNTER — HOSPITAL ENCOUNTER (OUTPATIENT)
Dept: RADIOLOGY | Facility: CLINIC | Age: 63
Discharge: HOME | End: 2018-09-18
Attending: FAMILY MEDICINE
Payer: MEDICARE

## 2018-09-18 ENCOUNTER — TELEPHONE (OUTPATIENT)
Dept: FAMILY MEDICINE | Facility: CLINIC | Age: 63
End: 2018-09-18

## 2018-09-18 ENCOUNTER — DOCUMENTATION ONLY (OUTPATIENT)
Dept: FAMILY MEDICINE | Facility: CLINIC | Age: 63
End: 2018-09-18

## 2018-09-18 VITALS
DIASTOLIC BLOOD PRESSURE: 87 MMHG | WEIGHT: 196.19 LBS | TEMPERATURE: 98 F | SYSTOLIC BLOOD PRESSURE: 150 MMHG | HEIGHT: 69 IN | BODY MASS INDEX: 29.06 KG/M2 | HEART RATE: 68 BPM

## 2018-09-18 DIAGNOSIS — S63.694A OTHER SPRAIN OF RIGHT RING FINGER, INITIAL ENCOUNTER: Primary | ICD-10-CM

## 2018-09-18 DIAGNOSIS — S63.694A OTHER SPRAIN OF RIGHT RING FINGER, INITIAL ENCOUNTER: ICD-10-CM

## 2018-09-18 DIAGNOSIS — G44.201 ACUTE INTRACTABLE TENSION-TYPE HEADACHE: ICD-10-CM

## 2018-09-18 PROCEDURE — 73140 X-RAY EXAM OF FINGER(S): CPT | Mod: 26,RT,S$GLB, | Performed by: RADIOLOGY

## 2018-09-18 PROCEDURE — 99999 PR PBB SHADOW E&M-EST. PATIENT-LVL IV: CPT | Mod: PBBFAC,,, | Performed by: FAMILY MEDICINE

## 2018-09-18 PROCEDURE — 73140 X-RAY EXAM OF FINGER(S): CPT | Mod: TC,FY,PO,RT

## 2018-09-18 PROCEDURE — 99214 OFFICE O/P EST MOD 30 MIN: CPT | Mod: S$PBB,,, | Performed by: FAMILY MEDICINE

## 2018-09-18 PROCEDURE — 99214 OFFICE O/P EST MOD 30 MIN: CPT | Mod: PBBFAC,PO,25 | Performed by: FAMILY MEDICINE

## 2018-09-18 RX ORDER — BUTALBITAL, ACETAMINOPHEN AND CAFFEINE 50; 325; 40 MG/1; MG/1; MG/1
1 TABLET ORAL EVERY 4 HOURS PRN
Qty: 40 TABLET | Refills: 1 | Status: SHIPPED | OUTPATIENT
Start: 2018-09-18 | End: 2018-10-18

## 2018-09-18 NOTE — PROGRESS NOTES
Subjective:       Patient ID: Elliott Gaspar is a 63 y.o. male.    Chief Complaint: Hand Pain (right, ring finger) and Headache    Pt's PCP had to cancel so he was scheduled to see me - New to me patient here for UC visit.  However he has additional c/o headaches for 6 months      Hand Pain    The incident occurred more than 1 week ago. Incident location: [facility]. The injury mechanism was a fall. The pain is present in the right hand (Right ring finger - PIP mainly). The quality of the pain is described as aching. The pain is moderate. The pain has been constant since the incident. Pertinent negatives include no chest pain, numbness or tingling. The symptoms are aggravated by movement, lifting and palpation. He has tried NSAIDs for the symptoms. The treatment provided no relief.   Headache    This is a recurrent problem. Episode onset: 6 months. The problem occurs daily. The problem has been unchanged. The pain is located in the right unilateral, retro-orbital and parietal region. The pain does not radiate. The pain quality is similar to prior headaches. The quality of the pain is described as aching. The pain is moderate. Pertinent negatives include no abdominal pain, blurred vision, drainage, eye pain, eye watering, fever, loss of balance, nausea, numbness, rhinorrhea, scalp tenderness, tingling or weakness. Exacerbated by: eating. Treatments tried: Excedrin Migraine. The treatment provided mild relief.     Review of Systems   Constitutional: Negative for fever.   HENT: Negative for rhinorrhea.    Eyes: Negative for blurred vision and pain.   Respiratory: Negative for shortness of breath.    Cardiovascular: Negative for chest pain.   Gastrointestinal: Negative for abdominal pain and nausea.   Skin: Negative for rash.   Neurological: Positive for headaches. Negative for tingling, speech difficulty, weakness, numbness and loss of balance.   All other systems reviewed and are negative.      Objective:      Physical  Exam   Constitutional: He appears well-developed. No distress.   HENT:   Mouth/Throat: Oropharynx is clear and moist.   Neck: Neck supple.   Cardiovascular: Normal rate and regular rhythm.   No murmur heard.  Pulmonary/Chest: Effort normal and breath sounds normal.   Lymphadenopathy:     He has no cervical adenopathy.   Neurological: He is alert. He has normal strength. No cranial nerve deficit or sensory deficit. He displays a negative Romberg sign.   Reflex Scores:       Patellar reflexes are 2+ on the right side and 2+ on the left side.  Skin: Skin is warm and dry.       Assessment:       1. Other sprain of right ring finger, initial encounter    2. Acute intractable tension-type headache        Plan:       Elliott was seen today for hand pain and headache.    Diagnoses and all orders for this visit:    Other sprain of right ring finger, initial encounter  -     X-Ray Finger 2 or More Views Right; Future    Acute intractable tension-type headache    Other orders  -     butalbital-acetaminophen-caffeine -40 mg (FIORICET, ESGIC) -40 mg per tablet; Take 1 tablet by mouth every 4 (four) hours as needed for Pain.    Push water intake during the day.    F/u with his PCP as needed if headaches don't respond to above.  Splint finger; Xray ordered.

## 2018-09-18 NOTE — TELEPHONE ENCOUNTER
Called pt. Need to reschedule appt with Dr. Galindo today. Dr. Galindo called out sick.  Rescheduled to 10/25/18. Pt also states that he has pain in his ring finger, would like to see someone today. Scheduled with Dr. Nunez. Thanks, Luz

## 2018-09-18 NOTE — PROGRESS NOTES
Pre-Visit Chart Review  For Appointment Scheduled on 09/18/2018    Health Maintenance Due   Topic Date Due    TETANUS VACCINE  07/23/1971    Colonoscopy  07/23/1971    Zoster Vaccine  07/23/2013    Pneumococcal (65+) (1 of 2 - PCV13) 07/23/2018    Influenza Vaccine  08/01/2018

## 2018-10-11 ENCOUNTER — PATIENT OUTREACH (OUTPATIENT)
Dept: ADMINISTRATIVE | Facility: HOSPITAL | Age: 63
End: 2018-10-11

## 2018-10-11 DIAGNOSIS — Z12.11 COLON CANCER SCREENING: Primary | ICD-10-CM

## 2018-10-11 NOTE — LETTER
"October 19, 2018    Elliott Gaspar  [address] [place] [address] [address] [place] [address]             [facility]  [address]  [facility] [address]  Phone: [phone]   Meghna Du LPN To  Frankie Gaspar Sent  10/11/2018  1:44 PM   [facility] is committed to your overall health.  To help you get the most out of each of your visits, we will review your information to make sure you are up to date on all of your recommended tests and/or procedures.       Dr. Pina Galindo MD has found that your chart shows you may be due for a Colorectal Cancer Sceening.       If you have had any of the above done at another facility, please bring the records or information with you so that your record at [facility] will be complete.  If you would like to schedule any of these, please contact the clinic at [phone].          If you have already had your screening, or you have made an appointment for your screening, congratulations!  You're on the road to good health. If you haven't signed up for a colorectal screening please accept this invitation to be screened.       According to the American Cancer Society, colon cancer is the third most common cancer for people in the [place].  A simple screening test "Fit Kit" - done in your own home - can help find colon cancer at an e abril stage when it can be treated, even before any signs or symptoms develop.     Testing for blood in your stool (feces or bowel movement) is the first step. If you have an upcoming visit with your Primary Care Physician you can  a Fit Kit during your visit or you can  a Fit Kit at your Primary Care Clinic prior to your visit.     The Fit Kit includes:     Instructions on how to perform the test   (1) Sheet of tissue paper   (1) Small Absorption pad   (1) Bottle to hold the sample and a small probe to help you take the sample   (1) Small plastic bio-hazard bag   (1) Postage-paid return envelope     Please do your test (the " "instructions will tell you how) and then return your sample in the postage-paid return envelope within 24 hours of collection.     If your test results are negative, you won't need testing again for another year.  If results show you need more testing, we will call you with next steps.     Regular colorectal cancer screening is one of the most powerful weapons for preventing colon cancer.  The website https://www.cancer.org/cancer/colon-rectal-cancer.html can answer many of your questions about this cancer and its prevention.  Just search for "colorectal cancer".     If you are currently taking medication, please bring it with you to your appointment for review.     Also, if you have any type of Advanced Directives, please bring them with you to your office visit so we may scan them into your chart.     Your [facility] Team wants to make sure we help you prevent illness and make the healthiest choices possible.       I wish you continued good health!     Sincerely,     Liz Du LPN Clinical Care Coordinator   [facility] [address] [place] [address]   Phone [phone]   Fax [phone]        "

## 2018-10-22 ENCOUNTER — DOCUMENTATION ONLY (OUTPATIENT)
Dept: FAMILY MEDICINE | Facility: CLINIC | Age: 63
End: 2018-10-22

## 2018-10-22 NOTE — PROGRESS NOTES
Pre-Visit Chart Review  For Appointment Scheduled on 10/25/2018    Health Maintenance Due   Topic Date Due    TETANUS VACCINE  07/23/1971    Colonoscopy  07/23/1971    Zoster Vaccine  07/23/2013    Pneumococcal (65+) (1 of 2 - PCV13) 07/23/2018    Influenza Vaccine  08/01/2018

## 2018-10-25 ENCOUNTER — OFFICE VISIT (OUTPATIENT)
Dept: FAMILY MEDICINE | Facility: CLINIC | Age: 63
End: 2018-10-25
Payer: MEDICARE

## 2018-10-25 VITALS
BODY MASS INDEX: 29.06 KG/M2 | SYSTOLIC BLOOD PRESSURE: 156 MMHG | TEMPERATURE: 98 F | WEIGHT: 196.19 LBS | DIASTOLIC BLOOD PRESSURE: 82 MMHG | HEIGHT: 69 IN | HEART RATE: 55 BPM

## 2018-10-25 DIAGNOSIS — Z23 IMMUNIZATION DUE: ICD-10-CM

## 2018-10-25 DIAGNOSIS — I10 ESSENTIAL HYPERTENSION: Primary | ICD-10-CM

## 2018-10-25 PROCEDURE — 99214 OFFICE O/P EST MOD 30 MIN: CPT | Mod: PBBFAC,PO | Performed by: FAMILY MEDICINE

## 2018-10-25 PROCEDURE — 99999 PR PBB SHADOW E&M-EST. PATIENT-LVL IV: CPT | Mod: PBBFAC,,, | Performed by: FAMILY MEDICINE

## 2018-10-25 PROCEDURE — 99214 OFFICE O/P EST MOD 30 MIN: CPT | Mod: 25,S$PBB,, | Performed by: FAMILY MEDICINE

## 2018-10-25 PROCEDURE — 90670 PCV13 VACCINE IM: CPT | Mod: PBBFAC,PO

## 2018-10-25 RX ORDER — OLMESARTAN MEDOXOMIL 40 MG/1
40 TABLET ORAL DAILY
Qty: 90 TABLET | Refills: 3 | Status: SHIPPED | OUTPATIENT
Start: 2018-10-25 | End: 2019-10-08 | Stop reason: SDUPTHER

## 2018-10-25 RX ORDER — HYDROCHLOROTHIAZIDE 12.5 MG/1
12.5 TABLET ORAL
COMMUNITY
End: 2018-10-25 | Stop reason: ALTCHOICE

## 2018-10-25 RX ORDER — OLMESARTAN MEDOXOMIL 40 MG/1
40 TABLET ORAL DAILY
Qty: 14 TABLET | Refills: 0 | Status: SHIPPED | OUTPATIENT
Start: 2018-10-25 | End: 2018-11-16 | Stop reason: SDUPTHER

## 2018-10-25 RX ORDER — NEBIVOLOL 20 MG/1
20 TABLET ORAL
COMMUNITY
End: 2018-10-25

## 2018-10-25 RX ORDER — CLINDAMYCIN HYDROCHLORIDE 300 MG/1
300 CAPSULE ORAL
COMMUNITY
End: 2018-10-25 | Stop reason: ALTCHOICE

## 2018-10-25 NOTE — PROGRESS NOTES
2 patient identifiers used ( name &  ).  Administered Prevnar 13 IM right deltoid.  Patient tolerated well, no bleeding at insertion site noted.  Pain scale 0/10.  Aseptic technique maintained. Advised patient to remain in clinic for 15 minutes to monitor for reaction.  No AR noted.

## 2018-10-25 NOTE — PATIENT INSTRUCTIONS
Start olmesartan/benicar 40 mg daily, decrease bystolic to 2 tablets or 40 mg total daily.     Can take hydrochlorothiazide/HCTZ 2 tablets daily, can add additional third or 4th tablet as needed for swelling.
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
## 2018-11-16 ENCOUNTER — OFFICE VISIT (OUTPATIENT)
Dept: CARDIOLOGY | Facility: CLINIC | Age: 63
End: 2018-11-16
Payer: MEDICARE

## 2018-11-16 VITALS
SYSTOLIC BLOOD PRESSURE: 138 MMHG | HEART RATE: 76 BPM | BODY MASS INDEX: 29.78 KG/M2 | HEIGHT: 69 IN | DIASTOLIC BLOOD PRESSURE: 84 MMHG | WEIGHT: 201.06 LBS

## 2018-11-16 DIAGNOSIS — I10 ESSENTIAL HYPERTENSION: Primary | ICD-10-CM

## 2018-11-16 PROCEDURE — 99214 OFFICE O/P EST MOD 30 MIN: CPT | Mod: S$PBB,,, | Performed by: INTERNAL MEDICINE

## 2018-11-16 PROCEDURE — 99213 OFFICE O/P EST LOW 20 MIN: CPT | Mod: PBBFAC,PO | Performed by: INTERNAL MEDICINE

## 2018-11-16 PROCEDURE — 99999 PR PBB SHADOW E&M-EST. PATIENT-LVL III: CPT | Mod: PBBFAC,,, | Performed by: INTERNAL MEDICINE

## 2018-11-16 NOTE — PROGRESS NOTES
Subjective:    Patient ID:  Elliott Gaspar is a 63 y.o. male who presents for follow-up of Hypertension (6 month follow up)      Last visit we added amlodipine 5 mg daily. He did not recall and when he got a new med from [facility] he did not take it. He is still stressed at home and BP is up and down. Otherwise no new symptoms.        Review of Systems   Constitution: Negative for weight gain and weight loss.   HENT: Negative.    Eyes: Negative.    Cardiovascular: Negative for chest pain, claudication, cyanosis, dyspnea on exertion, irregular heartbeat, leg swelling, near-syncope, orthopnea (no PND), palpitations and syncope.   Respiratory: Negative for cough, hemoptysis, shortness of breath and snoring.    Endocrine: Negative.    Skin: Negative.    Musculoskeletal: Negative for joint pain, muscle cramps, muscle weakness and myalgias.   Gastrointestinal: Negative for diarrhea, hematemesis, nausea and vomiting.   Genitourinary: Negative.    Neurological: Negative for dizziness, focal weakness, light-headedness, loss of balance, numbness, paresthesias and seizures.   Psychiatric/Behavioral: Negative.         Objective:    Physical Exam   Constitutional: He is oriented to person, place, and time. He appears well-developed and well-nourished.   HENT:   Mouth/Throat: Oropharynx is clear and moist.   Eyes: Pupils are equal, round, and reactive to light.   Neck: Normal range of motion. No thyromegaly present.   Cardiovascular: Normal rate, regular rhythm, S1 normal, S2 normal, normal heart sounds, intact distal pulses and normal pulses.  No extrasystoles are present. PMI is not displaced. Exam reveals no friction rub.   No murmur heard.  Pulmonary/Chest: Effort normal and breath sounds normal. He has no wheezes. He has no rales. He exhibits no tenderness.   Abdominal: Soft. Bowel sounds are normal. He exhibits no distension and no mass. There is no tenderness.   Musculoskeletal: Normal range of motion. He exhibits  no edema.   Neurological: He is alert and oriented to person, place, and time.   Skin: Skin is warm and dry.   Vitals reviewed.      Test(s) Reviewed  I have reviewed the following in detail:  [] Stress test   [] Angiography   [x] Echocardiogram   [] Labs   [] Other:         Assessment:       1. Essential hypertension         Plan:       We discusses his BP  He will continue to monitor and if BP stays elevated he will start the amlodipine we prescribed las visit  F/u 6 months

## 2018-12-01 ENCOUNTER — HOSPITAL ENCOUNTER (EMERGENCY)
Facility: HOSPITAL | Age: 63
Discharge: HOME | End: 2018-12-01
Attending: EMERGENCY MEDICINE
Payer: MEDICARE

## 2018-12-01 VITALS
SYSTOLIC BLOOD PRESSURE: 170 MMHG | BODY MASS INDEX: 29.62 KG/M2 | DIASTOLIC BLOOD PRESSURE: 92 MMHG | RESPIRATION RATE: 16 BRPM | HEIGHT: 69 IN | TEMPERATURE: 98 F | HEART RATE: 78 BPM | OXYGEN SATURATION: 98 % | WEIGHT: 200 LBS

## 2018-12-01 DIAGNOSIS — S61.012A THUMB LACERATION, LEFT, INITIAL ENCOUNTER: Primary | ICD-10-CM

## 2018-12-01 PROCEDURE — 90715 TDAP VACCINE 7 YRS/> IM: CPT | Performed by: EMERGENCY MEDICINE

## 2018-12-01 PROCEDURE — 63600175 PHARM REV CODE 636 W HCPCS: Performed by: EMERGENCY MEDICINE

## 2018-12-01 PROCEDURE — 12001 RPR S/N/AX/GEN/TRNK 2.5CM/<: CPT

## 2018-12-01 PROCEDURE — 99283 EMERGENCY DEPT VISIT LOW MDM: CPT | Mod: 25

## 2018-12-01 PROCEDURE — 90471 IMMUNIZATION ADMIN: CPT | Performed by: EMERGENCY MEDICINE

## 2018-12-01 RX ADMIN — CLOSTRIDIUM TETANI TOXOID ANTIGEN (FORMALDEHYDE INACTIVATED), CORYNEBACTERIUM DIPHTHERIAE TOXOID ANTIGEN (FORMALDEHYDE INACTIVATED), BORDETELLA PERTUSSIS TOXOID ANTIGEN (GLUTARALDEHYDE INACTIVATED), BORDETELLA PERTUSSIS FILAMENTOUS HEMAGGLUTININ ANTIGEN (FORMALDEHYDE INACTIVATED), BORDETELLA PERTUSSIS PERTACTIN ANTIGEN, AND BORDETELLA PERTUSSIS FIMBRIAE 2/3 ANTIGEN 0.5 ML: 5; 2; 2.5; 5; 3; 5 INJECTION, SUSPENSION INTRAMUSCULAR at 10:12

## 2018-12-02 NOTE — ED NOTES
Pt presents to ED c/o laceration to left thumb after cutting it with kitchen knife a few hours ago. Tetanus shot not UTD, last in 2005. 1 cm laceration to thumb noted. Bleeding controlled. AAOx4. Skin otherwise warm, pink, and dry. Ambulatory. Updated on POC and reports understanding. Call bell placed at bedside.
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
## 2018-12-11 DIAGNOSIS — B35.4 TINEA CORPORIS: ICD-10-CM

## 2018-12-11 RX ORDER — NYSTATIN AND TRIAMCINOLONE ACETONIDE 100000; 1 [USP'U]/G; MG/G
CREAM TOPICAL
Qty: 60 G | Refills: 2 | Status: SHIPPED | OUTPATIENT
Start: 2018-12-11 | End: 2019-12-16 | Stop reason: SDUPTHER

## 2018-12-21 ENCOUNTER — LAB VISIT (OUTPATIENT)
Dept: LAB | Facility: HOSPITAL | Age: 63
End: 2018-12-21
Attending: UROLOGY
Payer: MEDICARE

## 2018-12-21 DIAGNOSIS — Z12.5 PROSTATE CANCER SCREENING: ICD-10-CM

## 2018-12-21 LAB — COMPLEXED PSA SERPL-MCNC: 1.3 NG/ML

## 2018-12-21 PROCEDURE — 36415 COLL VENOUS BLD VENIPUNCTURE: CPT | Mod: PO

## 2018-12-21 PROCEDURE — 84153 ASSAY OF PSA TOTAL: CPT

## 2018-12-28 ENCOUNTER — OFFICE VISIT (OUTPATIENT)
Dept: UROLOGY | Facility: CLINIC | Age: 63
End: 2018-12-28
Payer: MEDICARE

## 2018-12-28 ENCOUNTER — APPOINTMENT (OUTPATIENT)
Dept: LAB | Facility: HOSPITAL | Age: 63
End: 2018-12-28
Attending: UROLOGY
Payer: MEDICARE

## 2018-12-28 VITALS
HEART RATE: 59 BPM | DIASTOLIC BLOOD PRESSURE: 78 MMHG | BODY MASS INDEX: 29.39 KG/M2 | RESPIRATION RATE: 18 BRPM | TEMPERATURE: 98 F | HEIGHT: 69 IN | SYSTOLIC BLOOD PRESSURE: 130 MMHG | WEIGHT: 198.44 LBS

## 2018-12-28 DIAGNOSIS — Z87.442 HISTORY OF KIDNEY STONES: ICD-10-CM

## 2018-12-28 DIAGNOSIS — N48.6 PEYRONIE'S DISEASE: Primary | ICD-10-CM

## 2018-12-28 DIAGNOSIS — Z12.5 PROSTATE CANCER SCREENING: ICD-10-CM

## 2018-12-28 DIAGNOSIS — R31.29 MICROHEMATURIA: ICD-10-CM

## 2018-12-28 DIAGNOSIS — F52.21 ERECTILE DISORDER, ACQUIRED, SITUATIONAL, MODERATE: ICD-10-CM

## 2018-12-28 LAB
BACTERIA #/AREA URNS HPF: NORMAL /HPF
BILIRUB SERPL-MCNC: ABNORMAL MG/DL
BLOOD URINE, POC: ABNORMAL
COLOR, POC UA: YELLOW
GLUCOSE UR QL STRIP: ABNORMAL
KETONES UR QL STRIP: ABNORMAL
LEUKOCYTE ESTERASE URINE, POC: ABNORMAL
MICROSCOPIC COMMENT: NORMAL
NITRITE, POC UA: ABNORMAL
PH, POC UA: 7
PROTEIN, POC: ABNORMAL
SPECIFIC GRAVITY, POC UA: 1.01
SQUAMOUS #/AREA URNS HPF: 1 /HPF
UROBILINOGEN, POC UA: ABNORMAL

## 2018-12-28 PROCEDURE — 99215 PR OFFICE/OUTPT VISIT, EST, LEVL V, 40-54 MIN: ICD-10-PCS | Mod: S$PBB,,, | Performed by: UROLOGY

## 2018-12-28 PROCEDURE — 99999 PR PBB SHADOW E&M-EST. PATIENT-LVL III: CPT | Mod: PBBFAC,,, | Performed by: UROLOGY

## 2018-12-28 PROCEDURE — 99213 OFFICE O/P EST LOW 20 MIN: CPT | Mod: PBBFAC,PN | Performed by: UROLOGY

## 2018-12-28 PROCEDURE — 87086 URINE CULTURE/COLONY COUNT: CPT

## 2018-12-28 PROCEDURE — 81000 URINALYSIS NONAUTO W/SCOPE: CPT

## 2018-12-28 PROCEDURE — 81002 URINALYSIS NONAUTO W/O SCOPE: CPT | Mod: PBBFAC,PN | Performed by: UROLOGY

## 2018-12-28 PROCEDURE — 99999 PR PBB SHADOW E&M-EST. PATIENT-LVL III: ICD-10-PCS | Mod: PBBFAC,,, | Performed by: UROLOGY

## 2018-12-28 PROCEDURE — 99215 OFFICE O/P EST HI 40 MIN: CPT | Mod: S$PBB,,, | Performed by: UROLOGY
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
## 2018-12-29 LAB — BACTERIA UR CULT: NORMAL

## 2019-01-05 ENCOUNTER — TELEPHONE (OUTPATIENT)
Dept: UROLOGY | Facility: CLINIC | Age: 64
End: 2019-01-05

## 2019-01-05 DIAGNOSIS — N20.0 KIDNEY STONES: Primary | ICD-10-CM

## 2019-01-05 NOTE — TELEPHONE ENCOUNTER
As per plan at last office visit, given his history of kidney stones and vague right flank pain with red blood cells and white blood cells on urine dip, though in the absence of true lab defined microscopic hematuria with only 1 red blood cell per high-power field, discussed proceeding with KUB and renal ultrasound as a screening measure for stones.    Please arrange the imaging, and I will chart check the results and provide further recommendations

## 2019-01-25 ENCOUNTER — HOSPITAL ENCOUNTER (OUTPATIENT)
Dept: RADIOLOGY | Facility: HOSPITAL | Age: 64
Discharge: HOME | End: 2019-01-25
Attending: UROLOGY
Payer: MEDICARE

## 2019-01-25 DIAGNOSIS — N20.0 KIDNEY STONES: ICD-10-CM

## 2019-01-25 PROCEDURE — 74018 XR ABDOMEN AP 1 VIEW: ICD-10-PCS | Mod: 26,,, | Performed by: RADIOLOGY

## 2019-01-25 PROCEDURE — 76770 US RETROPERITONEAL COMPLETE: ICD-10-PCS | Mod: 26,,, | Performed by: RADIOLOGY

## 2019-01-25 PROCEDURE — 76770 US EXAM ABDO BACK WALL COMP: CPT | Mod: 26,,, | Performed by: RADIOLOGY

## 2019-01-25 PROCEDURE — 76770 US EXAM ABDO BACK WALL COMP: CPT | Mod: TC

## 2019-01-25 PROCEDURE — 74018 RADEX ABDOMEN 1 VIEW: CPT | Mod: 26,,, | Performed by: RADIOLOGY

## 2019-01-25 PROCEDURE — 74018 RADEX ABDOMEN 1 VIEW: CPT | Mod: TC,FY

## 2019-03-12 RX ORDER — HYDROCHLOROTHIAZIDE 12.5 MG/1
CAPSULE ORAL
Qty: 180 CAPSULE | Refills: 0 | Status: SHIPPED | OUTPATIENT
Start: 2019-03-12 | End: 2019-05-02 | Stop reason: SDUPTHER

## 2019-03-13 RX ORDER — NEBIVOLOL HYDROCHLORIDE 20 MG/1
TABLET ORAL
Qty: 180 TABLET | Refills: 3 | Status: SHIPPED | OUTPATIENT
Start: 2019-03-13 | End: 2020-05-11 | Stop reason: SDUPTHER

## 2019-04-17 ENCOUNTER — PATIENT OUTREACH (OUTPATIENT)
Dept: ADMINISTRATIVE | Facility: HOSPITAL | Age: 64
End: 2019-04-17

## 2019-04-17 NOTE — LETTER
"April 26, 2019    Elliott Gaspar  [address] [place] [address]             [facility]  [address]  [facility] [address]  Phone: [phone] Dear [facility] is committed to your overall health and would like to ensure that you are up to date on your recommended test and/or procedures.   Pina Galindo MD  has found that your chart shows you may be due for the following:     COLORECTAL CANCER SCREENING     If you have already had your screening, or you have made an appointment for your screening, congratulations!  You're on the road to good health. If you haven't signed up for a colorectal screening please accept this invitation to be screened.       According to the American Cancer Society, colon cancer is the third most common cancer for people in the [place].  A simple screening test "Fit Kit" - done in your own home - can help find colon cancer at an e abril stage when it can be treated, even before any signs or symptoms develop. THIS IS A YEARLY TEST.     Testing for blood in your stool (feces or bowel movement) is the first step. If you have an upcoming visit with your Primary Care Physician you can  a Fit Kit during your visit or you can  a Fit Kit at your Primary Care Clinic prior to your visit.     The Fit Kit includes:     Instructions on how to perform the test   (1) Sheet of tissue paper   (1) Small Absorption pad   (1) Bottle to hold the sample and a small probe to help you take the sample   (1) Small plastic bio-hazard bag   (1) Postage-paid return envelope     Please do your test (the instructions will tell you how) and then return your sample in the postage-paid return envelope within 24 hours of collection.     If your test results are negative, you won't need testing again for another year.  If results show you need more testing, we will call you with next steps.     Regular colorectal cancer screening is one of the most powerful weapons " "for preventing colon cancer.  The website https://www.cancer.org/cancer/colon-rectal-cancer.html can answer many of your questions about this cancer and its prevention.  Just search for "colorectal cancer".   If you have any questions please call [facility] [phone] for assistance.     If you have had any of the above done at another facility, please let us know so that we may obtain copies from that facility.  If you have a copy of these records, please provide a copy for us to scan into your chart.  You are welcome to request that the report be faxed to us at  ([phone]).     Otherwise, please schedule these appointments at your earliest convenience by calling [phone] or going to [email or web address].     If you have an upcoming scheduled appointment for the item above, please disregard this letter.     Sincerely,   Your [facility]   MD Liz Gerber LPN Clinical Care Coordinator   [facility] [address] [place] [address]   Phone [phone]   Fax [phone]        "

## 2019-05-02 ENCOUNTER — OFFICE VISIT (OUTPATIENT)
Dept: FAMILY MEDICINE | Facility: CLINIC | Age: 64
End: 2019-05-02
Payer: MEDICARE

## 2019-05-02 VITALS
WEIGHT: 196 LBS | DIASTOLIC BLOOD PRESSURE: 72 MMHG | HEIGHT: 69 IN | HEART RATE: 67 BPM | BODY MASS INDEX: 29.03 KG/M2 | SYSTOLIC BLOOD PRESSURE: 134 MMHG | TEMPERATURE: 98 F

## 2019-05-02 DIAGNOSIS — N52.9 ERECTILE DYSFUNCTION, UNSPECIFIED ERECTILE DYSFUNCTION TYPE: ICD-10-CM

## 2019-05-02 DIAGNOSIS — R10.9 CHRONIC ABDOMINAL PAIN: ICD-10-CM

## 2019-05-02 DIAGNOSIS — I10 ESSENTIAL HYPERTENSION: Primary | ICD-10-CM

## 2019-05-02 DIAGNOSIS — G89.29 CHRONIC ABDOMINAL PAIN: ICD-10-CM

## 2019-05-02 PROCEDURE — 99214 PR OFFICE/OUTPT VISIT, EST, LEVL IV, 30-39 MIN: ICD-10-PCS | Mod: S$PBB,,, | Performed by: FAMILY MEDICINE

## 2019-05-02 PROCEDURE — 99213 OFFICE O/P EST LOW 20 MIN: CPT | Mod: PBBFAC,PO | Performed by: FAMILY MEDICINE

## 2019-05-02 PROCEDURE — 99214 OFFICE O/P EST MOD 30 MIN: CPT | Mod: S$PBB,,, | Performed by: FAMILY MEDICINE

## 2019-05-02 PROCEDURE — 99999 PR PBB SHADOW E&M-EST. PATIENT-LVL III: ICD-10-PCS | Mod: PBBFAC,,, | Performed by: FAMILY MEDICINE

## 2019-05-02 PROCEDURE — 99999 PR PBB SHADOW E&M-EST. PATIENT-LVL III: CPT | Mod: PBBFAC,,, | Performed by: FAMILY MEDICINE

## 2019-05-02 RX ORDER — HYDROCHLOROTHIAZIDE 12.5 MG/1
12.5 CAPSULE ORAL 2 TIMES DAILY
Qty: 180 CAPSULE | Refills: 3 | Status: SHIPPED | OUTPATIENT
Start: 2019-05-02 | End: 2020-05-11 | Stop reason: SDUPTHER

## 2019-05-02 RX ORDER — OLMESARTAN MEDOXOMIL 40 MG/1
40 TABLET ORAL DAILY
Qty: 90 TABLET | Refills: 3 | Status: CANCELLED | OUTPATIENT
Start: 2019-05-02 | End: 2020-05-01

## 2019-05-02 RX ORDER — NEBIVOLOL 20 MG/1
TABLET ORAL
Qty: 180 TABLET | Refills: 3 | Status: CANCELLED | OUTPATIENT
Start: 2019-05-02

## 2019-05-02 RX ORDER — AMLODIPINE BESYLATE 5 MG/1
5 TABLET ORAL DAILY
Qty: 90 TABLET | Refills: 3 | Status: SHIPPED | OUTPATIENT
Start: 2019-05-02 | End: 2021-01-22 | Stop reason: SDUPTHER

## 2019-05-02 RX ORDER — SILDENAFIL 100 MG/1
TABLET, FILM COATED ORAL
Qty: 9 TABLET | Refills: 11 | Status: SHIPPED | OUTPATIENT
Start: 2019-05-02 | End: 2020-01-13
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
## 2019-05-02 NOTE — PATIENT INSTRUCTIONS
Continue with current blood pressure medications. Add norvasc/amlodipine if blood pressure consistently greater than 140/90.    Hold bystolic on days when you take the viagra.

## 2019-05-07 ENCOUNTER — LAB VISIT (OUTPATIENT)
Dept: LAB | Facility: HOSPITAL | Age: 64
End: 2019-05-07
Attending: FAMILY MEDICINE
Payer: MEDICARE

## 2019-05-07 DIAGNOSIS — I10 ESSENTIAL HYPERTENSION: ICD-10-CM

## 2019-05-07 LAB
ALBUMIN SERPL BCP-MCNC: 3.7 G/DL (ref 3.5–5.2)
ALP SERPL-CCNC: 75 U/L (ref 55–135)
ALT SERPL W/O P-5'-P-CCNC: 66 U/L (ref 10–44)
ANION GAP SERPL CALC-SCNC: 9 MMOL/L (ref 8–16)
AST SERPL-CCNC: 44 U/L (ref 10–40)
BASOPHILS # BLD AUTO: 0.03 K/UL (ref 0–0.2)
BASOPHILS NFR BLD: 0.5 % (ref 0–1.9)
BILIRUB SERPL-MCNC: 0.9 MG/DL (ref 0.1–1)
BUN SERPL-MCNC: 17 MG/DL (ref 8–23)
CALCIUM SERPL-MCNC: 9.8 MG/DL (ref 8.7–10.5)
CHLORIDE SERPL-SCNC: 102 MMOL/L (ref 95–110)
CHOLEST SERPL-MCNC: 173 MG/DL (ref 120–199)
CHOLEST/HDLC SERPL: 6.4 {RATIO} (ref 2–5)
CO2 SERPL-SCNC: 31 MMOL/L (ref 23–29)
CREAT SERPL-MCNC: 1.1 MG/DL (ref 0.5–1.4)
DIFFERENTIAL METHOD: ABNORMAL
EOSINOPHIL # BLD AUTO: 0.4 K/UL (ref 0–0.5)
EOSINOPHIL NFR BLD: 6.9 % (ref 0–8)
ERYTHROCYTE [DISTWIDTH] IN BLOOD BY AUTOMATED COUNT: 12.7 % (ref 11.5–14.5)
EST. GFR  (AFRICAN AMERICAN): >60 ML/MIN/1.73 M^2
EST. GFR  (NON AFRICAN AMERICAN): >60 ML/MIN/1.73 M^2
GLUCOSE SERPL-MCNC: 106 MG/DL (ref 70–110)
HCT VFR BLD AUTO: 43.6 % (ref 40–54)
HDLC SERPL-MCNC: 27 MG/DL (ref 40–75)
HDLC SERPL: 15.6 % (ref 20–50)
HGB BLD-MCNC: 14.8 G/DL (ref 14–18)
IMM GRANULOCYTES # BLD AUTO: 0.04 K/UL (ref 0–0.04)
IMM GRANULOCYTES NFR BLD AUTO: 0.7 % (ref 0–0.5)
LDLC SERPL CALC-MCNC: 100 MG/DL (ref 63–159)
LYMPHOCYTES # BLD AUTO: 1.6 K/UL (ref 1–4.8)
LYMPHOCYTES NFR BLD: 29.9 % (ref 18–48)
MCH RBC QN AUTO: 30.2 PG (ref 27–31)
MCHC RBC AUTO-ENTMCNC: 33.9 G/DL (ref 32–36)
MCV RBC AUTO: 89 FL (ref 82–98)
MONOCYTES # BLD AUTO: 0.5 K/UL (ref 0.3–1)
MONOCYTES NFR BLD: 9.7 % (ref 4–15)
NEUTROPHILS # BLD AUTO: 2.9 K/UL (ref 1.8–7.7)
NEUTROPHILS NFR BLD: 52.3 % (ref 38–73)
NONHDLC SERPL-MCNC: 146 MG/DL
NRBC BLD-RTO: 0 /100 WBC
PLATELET # BLD AUTO: 136 K/UL (ref 150–350)
PMV BLD AUTO: 10.7 FL (ref 9.2–12.9)
POTASSIUM SERPL-SCNC: 4.1 MMOL/L (ref 3.5–5.1)
PROT SERPL-MCNC: 6.4 G/DL (ref 6–8.4)
RBC # BLD AUTO: 4.9 M/UL (ref 4.6–6.2)
SODIUM SERPL-SCNC: 142 MMOL/L (ref 136–145)
TRIGL SERPL-MCNC: 230 MG/DL (ref 30–150)
TSH SERPL DL<=0.005 MIU/L-ACNC: 3 UIU/ML (ref 0.4–4)
WBC # BLD AUTO: 5.49 K/UL (ref 3.9–12.7)

## 2019-05-07 PROCEDURE — 80053 COMPREHEN METABOLIC PANEL: CPT

## 2019-05-07 PROCEDURE — 84443 ASSAY THYROID STIM HORMONE: CPT

## 2019-05-07 PROCEDURE — 80061 LIPID PANEL: CPT

## 2019-05-07 PROCEDURE — 85025 COMPLETE CBC W/AUTO DIFF WBC: CPT

## 2019-05-07 PROCEDURE — 36415 COLL VENOUS BLD VENIPUNCTURE: CPT | Mod: PO

## 2019-06-19 ENCOUNTER — OFFICE VISIT (OUTPATIENT)
Dept: CARDIOLOGY | Facility: CLINIC | Age: 64
End: 2019-06-19
Payer: MEDICARE

## 2019-06-19 VITALS
WEIGHT: 195.75 LBS | HEIGHT: 69 IN | DIASTOLIC BLOOD PRESSURE: 74 MMHG | HEART RATE: 56 BPM | SYSTOLIC BLOOD PRESSURE: 130 MMHG | BODY MASS INDEX: 28.99 KG/M2

## 2019-06-19 DIAGNOSIS — I10 ESSENTIAL HYPERTENSION: Primary | ICD-10-CM

## 2019-06-19 PROCEDURE — 99999 PR PBB SHADOW E&M-EST. PATIENT-LVL III: CPT | Mod: PBBFAC,,, | Performed by: INTERNAL MEDICINE

## 2019-06-19 PROCEDURE — 99999 PR PBB SHADOW E&M-EST. PATIENT-LVL III: ICD-10-PCS | Mod: PBBFAC,,, | Performed by: INTERNAL MEDICINE

## 2019-06-19 PROCEDURE — 99213 OFFICE O/P EST LOW 20 MIN: CPT | Mod: PBBFAC,PO | Performed by: INTERNAL MEDICINE

## 2019-06-19 PROCEDURE — 99214 PR OFFICE/OUTPT VISIT, EST, LEVL IV, 30-39 MIN: ICD-10-PCS | Mod: S$PBB,,, | Performed by: INTERNAL MEDICINE

## 2019-06-19 PROCEDURE — 99214 OFFICE O/P EST MOD 30 MIN: CPT | Mod: S$PBB,,, | Performed by: INTERNAL MEDICINE

## 2019-06-19 RX ORDER — FUROSEMIDE 20 MG/1
20 TABLET ORAL DAILY PRN
Qty: 30 TABLET | Refills: 3 | Status: SHIPPED | OUTPATIENT
Start: 2019-06-19 | End: 2019-12-10

## 2019-06-19 NOTE — PROGRESS NOTES
Subjective:    Patient ID:  Elliott Gaspar is a 63 y.o. male who presents for follow-up of Hypertension (6 mo f/u)      Pt here for f/u. Since last visit he started on the amlodipine 5 mg daily and is pleased with his BP control. He has noted some increased LE swelling which goes down over night.       Review of Systems   Constitution: Negative for weight gain and weight loss.   HENT: Negative.    Eyes: Negative.    Cardiovascular: Negative for chest pain, claudication, cyanosis, dyspnea on exertion, irregular heartbeat, leg swelling, near-syncope, orthopnea (no PND), palpitations and syncope.   Respiratory: Negative for cough, hemoptysis, shortness of breath and snoring.    Endocrine: Negative.    Skin: Negative.    Musculoskeletal: Negative for joint pain, muscle cramps, muscle weakness and myalgias.   Gastrointestinal: Negative for diarrhea, hematemesis, nausea and vomiting.   Genitourinary: Negative.    Neurological: Negative for dizziness, focal weakness, light-headedness, loss of balance, numbness, paresthesias and seizures.   Psychiatric/Behavioral: Negative.         Objective:    Physical Exam   Constitutional: He is oriented to person, place, and time. He appears well-developed and well-nourished.   HENT:   Mouth/Throat: Oropharynx is clear and moist.   Eyes: Pupils are equal, round, and reactive to light.   Neck: Normal range of motion. No thyromegaly present.   Cardiovascular: Normal rate, regular rhythm, S1 normal, S2 normal, normal heart sounds, intact distal pulses and normal pulses.  No extrasystoles are present. PMI is not displaced. Exam reveals no friction rub.   No murmur heard.  Pulmonary/Chest: Effort normal and breath sounds normal. He has no wheezes. He has no rales. He exhibits no tenderness.   Abdominal: Soft. Bowel sounds are normal. He exhibits no distension and no mass. There is no tenderness.   Musculoskeletal: Normal range of motion. He exhibits edema (1-2+).   Neurological: He is  alert and oriented to person, place, and time.   Skin: Skin is warm and dry.   Vitals reviewed.      Test(s) Reviewed  I have reviewed the following in detail:  [] Stress test   [] Angiography   [x] Echocardiogram   [] Labs   [] Other:         Assessment:       1. Essential hypertension         Plan:       Pt's BP well controlled   We discussed his LE swelling and that it may be partly due to the amlodipine  He wishes to continue with the med  Add furosemide 20 mg as needed  We discussed reducing dietary sodium and using compression hose  F/u 6 months or sooner if swelling worsens

## 2019-06-21 ENCOUNTER — OFFICE VISIT (OUTPATIENT)
Dept: GASTROENTEROLOGY | Facility: CLINIC | Age: 64
End: 2019-06-21
Payer: MEDICARE

## 2019-06-21 VITALS
WEIGHT: 195.31 LBS | HEART RATE: 63 BPM | SYSTOLIC BLOOD PRESSURE: 126 MMHG | DIASTOLIC BLOOD PRESSURE: 73 MMHG | BODY MASS INDEX: 28.84 KG/M2

## 2019-06-21 DIAGNOSIS — R79.9 ABNORMAL FINDING OF BLOOD CHEMISTRY: ICD-10-CM

## 2019-06-21 DIAGNOSIS — R10.84 GENERALIZED ABDOMINAL PAIN: Primary | ICD-10-CM

## 2019-06-21 DIAGNOSIS — R79.89 ELEVATED LFTS: ICD-10-CM

## 2019-06-21 DIAGNOSIS — D69.6 THROMBOCYTOPENIA: ICD-10-CM

## 2019-06-21 PROCEDURE — 99205 OFFICE O/P NEW HI 60 MIN: CPT | Mod: S$PBB,,, | Performed by: INTERNAL MEDICINE

## 2019-06-21 PROCEDURE — 99213 OFFICE O/P EST LOW 20 MIN: CPT | Mod: PBBFAC,PO | Performed by: INTERNAL MEDICINE

## 2019-06-21 PROCEDURE — 99205 PR OFFICE/OUTPT VISIT, NEW, LEVL V, 60-74 MIN: ICD-10-PCS | Mod: S$PBB,,, | Performed by: INTERNAL MEDICINE

## 2019-06-21 PROCEDURE — 99999 PR PBB SHADOW E&M-EST. PATIENT-LVL III: CPT | Mod: PBBFAC,,, | Performed by: INTERNAL MEDICINE

## 2019-06-21 PROCEDURE — 99999 PR PBB SHADOW E&M-EST. PATIENT-LVL III: ICD-10-PCS | Mod: PBBFAC,,, | Performed by: INTERNAL MEDICINE
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
## 2019-06-21 NOTE — LETTER
June 21, 2019      Pina Galindo MD  [address]  [place] [address]           [place] [facility]  [address]  [place] [address]  Phone: [phone]          Patient: Elliott Gaspar   MR Number: [id number]   YOB: 1955   Date of Visit: 6/21/2019       Dear Dr. Pina Galindo:    Thank you for referring Elliott Gaspar to me for evaluation. Attached you will find relevant portions of my assessment and plan of care.    If you have questions, please do not hesitate to call me. I look forward to following Elliott Gaspar along with you.    Sincerely,    Shelley Lyons MD    Enclosure  CC:  No Recipients    If you would like to receive this communication electronically, please contact [email or web address] or [phone] to request more information on [facility] access.    For providers and/or their staff who would like to refer a patient to [facility], please contact us through our one-stop-shop provider referral line, [facility], at [phone].    If you feel you have received this communication in error or would no longer like to receive these types of communications, please e-mail [email or web address]

## 2019-06-27 DIAGNOSIS — R10.84 GENERALIZED ABDOMINAL PAIN: Primary | ICD-10-CM

## 2019-06-28 ENCOUNTER — HOSPITAL ENCOUNTER (OUTPATIENT)
Dept: RADIOLOGY | Facility: HOSPITAL | Age: 64
Discharge: HOME | End: 2019-06-28
Attending: INTERNAL MEDICINE
Payer: MEDICARE

## 2019-06-28 DIAGNOSIS — R10.84 GENERALIZED ABDOMINAL PAIN: ICD-10-CM

## 2019-06-28 PROCEDURE — 74176 CT ABD & PELVIS W/O CONTRAST: CPT | Mod: TC

## 2019-06-28 PROCEDURE — 74176 CT ABDOMEN PELVIS WITHOUT CONTRAST: ICD-10-PCS | Mod: 26,,, | Performed by: RADIOLOGY

## 2019-06-28 PROCEDURE — 74176 CT ABD & PELVIS W/O CONTRAST: CPT | Mod: 26,,, | Performed by: RADIOLOGY

## 2019-09-12 DIAGNOSIS — M79.643 PAIN OF HAND, UNSPECIFIED LATERALITY: Primary | ICD-10-CM

## 2019-09-16 ENCOUNTER — HOSPITAL ENCOUNTER (OUTPATIENT)
Dept: RADIOLOGY | Facility: HOSPITAL | Age: 64
Discharge: HOME | End: 2019-09-16
Attending: ORTHOPAEDIC SURGERY
Payer: MEDICARE

## 2019-09-16 ENCOUNTER — NURSE TRIAGE (OUTPATIENT)
Dept: ADMINISTRATIVE | Facility: CLINIC | Age: 64
End: 2019-09-16

## 2019-09-16 ENCOUNTER — OFFICE VISIT (OUTPATIENT)
Dept: ORTHOPEDICS | Facility: CLINIC | Age: 64
End: 2019-09-16
Payer: MEDICARE

## 2019-09-16 VITALS
DIASTOLIC BLOOD PRESSURE: 70 MMHG | BODY MASS INDEX: 28.88 KG/M2 | WEIGHT: 195 LBS | SYSTOLIC BLOOD PRESSURE: 122 MMHG | HEART RATE: 66 BPM | HEIGHT: 69 IN

## 2019-09-16 DIAGNOSIS — M79.643 PAIN OF HAND, UNSPECIFIED LATERALITY: ICD-10-CM

## 2019-09-16 DIAGNOSIS — M72.0 DUPUYTREN'S CONTRACTURE OF BOTH HANDS: Primary | ICD-10-CM

## 2019-09-16 PROCEDURE — 20550 NJX 1 TENDON SHEATH/LIGAMENT: CPT | Mod: PBBFAC,PN | Performed by: ORTHOPAEDIC SURGERY

## 2019-09-16 PROCEDURE — 99999 PR PBB SHADOW E&M-EST. PATIENT-LVL III: ICD-10-PCS | Mod: PBBFAC,,, | Performed by: ORTHOPAEDIC SURGERY

## 2019-09-16 PROCEDURE — 73130 X-RAY EXAM OF HAND: CPT | Mod: 26,LT,, | Performed by: RADIOLOGY

## 2019-09-16 PROCEDURE — 73130 X-RAY EXAM OF HAND: CPT | Mod: TC,PN,LT

## 2019-09-16 PROCEDURE — 99203 OFFICE O/P NEW LOW 30 MIN: CPT | Mod: 25,S$PBB,, | Performed by: ORTHOPAEDIC SURGERY

## 2019-09-16 PROCEDURE — 99203 PR OFFICE/OUTPT VISIT, NEW, LEVL III, 30-44 MIN: ICD-10-PCS | Mod: 25,S$PBB,, | Performed by: ORTHOPAEDIC SURGERY

## 2019-09-16 PROCEDURE — 73130 XR HAND COMPLETE 3 VIEW LEFT: ICD-10-PCS | Mod: 26,LT,, | Performed by: RADIOLOGY

## 2019-09-16 PROCEDURE — 20550 TENDON SHEATH: L RING MCP: ICD-10-PCS | Mod: S$PBB,F3,, | Performed by: ORTHOPAEDIC SURGERY

## 2019-09-16 PROCEDURE — 99213 OFFICE O/P EST LOW 20 MIN: CPT | Mod: PBBFAC,25,PN | Performed by: ORTHOPAEDIC SURGERY

## 2019-09-16 PROCEDURE — 99999 PR PBB SHADOW E&M-EST. PATIENT-LVL III: CPT | Mod: PBBFAC,,, | Performed by: ORTHOPAEDIC SURGERY

## 2019-09-16 RX ORDER — TRIAMCINOLONE ACETONIDE 40 MG/ML
40 INJECTION, SUSPENSION INTRA-ARTICULAR; INTRAMUSCULAR
Status: DISCONTINUED | OUTPATIENT
Start: 2019-09-16 | End: 2019-09-16 | Stop reason: HOSPADM

## 2019-09-16 RX ADMIN — TRIAMCINOLONE ACETONIDE 40 MG: 40 INJECTION, SUSPENSION INTRA-ARTICULAR; INTRAMUSCULAR at 08:09
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
## 2019-09-16 NOTE — TELEPHONE ENCOUNTER
Reason for Disposition   Nursing judgment    Protocols used: ST NO PROTOCOL CALL: SICK ADULT-A-[place]    Elliott states he had injection to his left hand trigger finger by Dr Richardson.  Today.  Would like a call back from Dr Richardson or his nurse re: numbness and tingling of three fingers up to his elbow.  No change in color, can move all digits and wrist / arm.  Just wants to know if this is ok.  Message to Dr Richardson.  Please contact Elliott directly with any additional care advice.   Per [facility] triage protocol, recommend call back to patient within one hour, to [phone].

## 2019-09-16 NOTE — PROCEDURES
Tendon Sheath: L ring MCP  Date/Time: 9/16/2019 8:35 AM  Performed by: Rodrigo Richardson MD  Authorized by: Rodrigo Richardson MD     Consent Done?: Yes (Verbal)  Timeout: prior to procedure the correct patient, procedure, and site was verified   Indications:  Pain  Site marked: the procedure site was marked    Timeout: prior to procedure the correct patient, procedure, and site was verified    Location:  Ring finger  Site:  L ring MCP  Prep: patient was prepped and draped in usual sterile fashion    Ultrasonic guidance for needle placement?: No    Needle size:  22 G  Approach:  Volar  Medications:  40 mg triamcinolone acetonide 40 mg/mL  Patient tolerance:  Patient tolerated the procedure well with no immediate complications

## 2019-09-29 ENCOUNTER — HOSPITAL ENCOUNTER (OUTPATIENT)
Facility: HOSPITAL | Age: 64
Discharge: HOME | End: 2019-09-30
Attending: EMERGENCY MEDICINE | Admitting: INTERNAL MEDICINE
Payer: MEDICARE

## 2019-09-29 DIAGNOSIS — R07.9 CHEST PAIN: ICD-10-CM

## 2019-09-29 DIAGNOSIS — I10 ESSENTIAL HYPERTENSION: ICD-10-CM

## 2019-09-29 DIAGNOSIS — I20.0 UNSTABLE ANGINA: Primary | ICD-10-CM

## 2019-09-29 DIAGNOSIS — R07.9 CHEST PAIN IN ADULT: ICD-10-CM

## 2019-09-29 PROBLEM — R60.0 LEG EDEMA: Status: ACTIVE | Noted: 2019-09-29

## 2019-09-29 LAB
ALBUMIN SERPL BCP-MCNC: 4.1 G/DL (ref 3.5–5.2)
ALP SERPL-CCNC: 71 U/L (ref 55–135)
ALT SERPL W/O P-5'-P-CCNC: 68 U/L (ref 10–44)
ANION GAP SERPL CALC-SCNC: 12 MMOL/L (ref 8–16)
AST SERPL-CCNC: 37 U/L (ref 10–40)
BASOPHILS # BLD AUTO: 0.01 K/UL (ref 0–0.2)
BASOPHILS NFR BLD: 0.2 % (ref 0–1.9)
BILIRUB SERPL-MCNC: 1 MG/DL (ref 0.1–1)
BUN SERPL-MCNC: 16 MG/DL (ref 8–23)
CALCIUM SERPL-MCNC: 9.8 MG/DL (ref 8.7–10.5)
CHLORIDE SERPL-SCNC: 102 MMOL/L (ref 95–110)
CO2 SERPL-SCNC: 30 MMOL/L (ref 23–29)
CREAT SERPL-MCNC: 1.2 MG/DL (ref 0.5–1.4)
DIFFERENTIAL METHOD: NORMAL
EOSINOPHIL # BLD AUTO: 0.1 K/UL (ref 0–0.5)
EOSINOPHIL NFR BLD: 1.3 % (ref 0–8)
ERYTHROCYTE [DISTWIDTH] IN BLOOD BY AUTOMATED COUNT: 12.7 % (ref 11.5–14.5)
EST. GFR  (AFRICAN AMERICAN): >60 ML/MIN/1.73 M^2
EST. GFR  (NON AFRICAN AMERICAN): >60 ML/MIN/1.73 M^2
GLUCOSE SERPL-MCNC: 111 MG/DL (ref 70–110)
HCT VFR BLD AUTO: 43.5 % (ref 40–54)
HGB BLD-MCNC: 14.6 G/DL (ref 14–18)
IMM GRANULOCYTES # BLD AUTO: 0.02 K/UL (ref 0–0.04)
LYMPHOCYTES # BLD AUTO: 1.3 K/UL (ref 1–4.8)
LYMPHOCYTES NFR BLD: 24.5 % (ref 18–48)
MCH RBC QN AUTO: 30.2 PG (ref 27–31)
MCHC RBC AUTO-ENTMCNC: 33.6 G/DL (ref 32–36)
MCV RBC AUTO: 90 FL (ref 82–98)
MONOCYTES # BLD AUTO: 0.3 K/UL (ref 0.3–1)
MONOCYTES NFR BLD: 6.1 % (ref 4–15)
NEUTROPHILS # BLD AUTO: 3.6 K/UL (ref 1.8–7.7)
NEUTROPHILS NFR BLD: 67.5 % (ref 38–73)
NRBC BLD-RTO: 0 /100 WBC
PLATELET # BLD AUTO: 152 K/UL (ref 150–350)
PMV BLD AUTO: 9.9 FL (ref 9.2–12.9)
POTASSIUM SERPL-SCNC: 3.7 MMOL/L (ref 3.5–5.1)
PROT SERPL-MCNC: 6.5 G/DL (ref 6–8.4)
RBC # BLD AUTO: 4.84 M/UL (ref 4.6–6.2)
SODIUM SERPL-SCNC: 144 MMOL/L (ref 136–145)
TROPONIN I SERPL DL<=0.01 NG/ML-MCNC: 0.01 NG/ML (ref 0–0.03)
TROPONIN I SERPL DL<=0.01 NG/ML-MCNC: <0.006 NG/ML (ref 0–0.03)
WBC # BLD AUTO: 5.39 K/UL (ref 3.9–12.7)

## 2019-09-29 PROCEDURE — 94760 N-INVAS EAR/PLS OXIMETRY 1: CPT

## 2019-09-29 PROCEDURE — 84484 ASSAY OF TROPONIN QUANT: CPT

## 2019-09-29 PROCEDURE — G0378 HOSPITAL OBSERVATION PER HR: HCPCS

## 2019-09-29 PROCEDURE — 99285 EMERGENCY DEPT VISIT HI MDM: CPT | Mod: 25

## 2019-09-29 PROCEDURE — 84484 ASSAY OF TROPONIN QUANT: CPT | Mod: 91

## 2019-09-29 PROCEDURE — 36415 COLL VENOUS BLD VENIPUNCTURE: CPT

## 2019-09-29 PROCEDURE — 93005 ELECTROCARDIOGRAM TRACING: CPT

## 2019-09-29 PROCEDURE — 80053 COMPREHEN METABOLIC PANEL: CPT

## 2019-09-29 PROCEDURE — 85025 COMPLETE CBC W/AUTO DIFF WBC: CPT

## 2019-09-29 RX ORDER — ASPIRIN 325 MG
325 TABLET ORAL DAILY
Status: DISCONTINUED | OUTPATIENT
Start: 2019-09-30 | End: 2019-09-30 | Stop reason: HOSPADM

## 2019-09-29 RX ORDER — NITROGLYCERIN 0.4 MG/1
0.4 TABLET SUBLINGUAL EVERY 5 MIN PRN
Status: DISCONTINUED | OUTPATIENT
Start: 2019-09-29 | End: 2019-09-30 | Stop reason: HOSPADM

## 2019-09-29 RX ORDER — AMLODIPINE BESYLATE 5 MG/1
5 TABLET ORAL DAILY
Status: DISCONTINUED | OUTPATIENT
Start: 2019-09-30 | End: 2019-09-30 | Stop reason: HOSPADM

## 2019-09-29 RX ORDER — OLMESARTAN MEDOXOMIL 20 MG/1
40 TABLET ORAL DAILY
Status: DISCONTINUED | OUTPATIENT
Start: 2019-09-30 | End: 2019-09-30 | Stop reason: HOSPADM

## 2019-09-29 RX ORDER — SODIUM CHLORIDE 0.9 % (FLUSH) 0.9 %
10 SYRINGE (ML) INJECTION
Status: DISCONTINUED | OUTPATIENT
Start: 2019-09-29 | End: 2019-09-30 | Stop reason: HOSPADM

## 2019-09-29 RX ORDER — ACETAMINOPHEN 325 MG/1
650 TABLET ORAL EVERY 4 HOURS PRN
Status: DISCONTINUED | OUTPATIENT
Start: 2019-09-29 | End: 2019-09-30 | Stop reason: HOSPADM

## 2019-09-29 RX ORDER — HYDROCHLOROTHIAZIDE 25 MG/1
25 TABLET ORAL DAILY
Status: DISCONTINUED | OUTPATIENT
Start: 2019-09-30 | End: 2019-09-30 | Stop reason: HOSPADM

## 2019-09-29 RX ORDER — ENOXAPARIN SODIUM 100 MG/ML
40 INJECTION SUBCUTANEOUS EVERY 24 HOURS
Status: DISCONTINUED | OUTPATIENT
Start: 2019-09-29 | End: 2019-09-30 | Stop reason: HOSPADM

## 2019-09-29 NOTE — ASSESSMENT & PLAN NOTE
The patient has had lower extremity edema for a couple of years.  His cardiologist is aware of this.  He has not been told that he has congestive heart failure.
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
## 2019-09-29 NOTE — ED NOTES
Pt awake alert oriented reports chest pain intermittent since yesterday denies n/v/d, denies SOB, denies abd pain

## 2019-09-29 NOTE — HPI
The patient is a 64-year-old gentleman who is after 4 episodes of chest pain over the weekend.  They have occurred while he was exerting himself.  He describes the pain as a pinching sensation on the left side of the chest with radiation down the left arm.  The pain only lasted a couple of minutes.  It was not associated with shortness of breath, nausea, diaphoresis, cough or pleurisy.  The patient does not have a history of coronary disease and had a heart catheterization in 2012 which was normal.    He does not have a history of peptic ulcer disease, gastritis, DVT or PE.    He does not smoke and does not have diabetes mellitus or hyperlipidemia.
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
## 2019-09-29 NOTE — PLAN OF CARE
POC reviewed with patient. Patient verbalizes understanding. Safety and tele maintained since admission. Patient sitting up in chair. Call light within reach.
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
## 2019-09-29 NOTE — ASSESSMENT & PLAN NOTE
The patient will be admitted to a telemetry unit.  We will cycle his cardiac enzymes and he will undergo risk stratification with a stress echocardiogram in the morning.

## 2019-09-30 VITALS
HEIGHT: 69 IN | RESPIRATION RATE: 18 BRPM | OXYGEN SATURATION: 98 % | DIASTOLIC BLOOD PRESSURE: 91 MMHG | WEIGHT: 189.81 LBS | HEART RATE: 75 BPM | TEMPERATURE: 96 F | SYSTOLIC BLOOD PRESSURE: 152 MMHG | BODY MASS INDEX: 28.11 KG/M2

## 2019-09-30 LAB
BSA FOR ECHO PROCEDURE: 2.05 M2
CHOLEST SERPL-MCNC: 206 MG/DL (ref 120–199)
CHOLEST/HDLC SERPL: 5.7 {RATIO} (ref 2–5)
CV ECHO LV RWT: 0.47 CM
CV STRESS BASE HR: 69 BPM
DIASTOLIC BLOOD PRESSURE: 99 MMHG
ECHO LV POSTERIOR WALL: 0.97 CM (ref 0.6–1.1)
FRACTIONAL SHORTENING: 40 % (ref 28–44)
HDLC SERPL-MCNC: 36 MG/DL (ref 40–75)
HDLC SERPL: 17.5 % (ref 20–50)
INTERVENTRICULAR SEPTUM: 0.96 CM (ref 0.6–1.1)
LDLC SERPL CALC-MCNC: 127.6 MG/DL (ref 63–159)
LEFT INTERNAL DIMENSION IN SYSTOLE: 2.45 CM (ref 2.1–4)
LEFT VENTRICLE MASS INDEX: 62 G/M2
LEFT VENTRICULAR INTERNAL DIMENSION IN DIASTOLE: 4.1 CM (ref 3.5–6)
LEFT VENTRICULAR MASS: 125.68 G
NONHDLC SERPL-MCNC: 170 MG/DL
OHS CV CPX 1 MINUTE RECOVERY HEART RATE: 127 BPM
OHS CV CPX 85 PERCENT MAX PREDICTED HEART RATE MALE: 131
OHS CV CPX ESTIMATED METS: 10
OHS CV CPX MAX PREDICTED HEART RATE: 154
OHS CV CPX PATIENT IS FEMALE: 0
OHS CV CPX PATIENT IS MALE: 1
OHS CV CPX PEAK DIASTOLIC BLOOD PRESSURE: 71 MMHG
OHS CV CPX PEAK HEAR RATE: 146 BPM
OHS CV CPX PEAK RATE PRESSURE PRODUCT: NORMAL
OHS CV CPX PEAK SYSTOLIC BLOOD PRESSURE: 182 MMHG
OHS CV CPX PERCENT MAX PREDICTED HEART RATE ACHIEVED: 95
OHS CV CPX RATE PRESSURE PRODUCT PRESENTING: NORMAL
STRESS ANGINA INDEX: 1
STRESS DUKE TREADMILL SCORE: -1
STRESS ECHO POST EXERCISE DUR MIN: 7 MINUTES
STRESS ECHO POST EXERCISE DUR SEC: 31 SECONDS
STRESS ST DEPRESSION: 1 MM
SYSTOLIC BLOOD PRESSURE: 155 MMHG
TRIGL SERPL-MCNC: 212 MG/DL (ref 30–150)

## 2019-09-30 PROCEDURE — 25000003 PHARM REV CODE 250: Performed by: INTERNAL MEDICINE

## 2019-09-30 PROCEDURE — 94760 N-INVAS EAR/PLS OXIMETRY 1: CPT

## 2019-09-30 PROCEDURE — 36415 COLL VENOUS BLD VENIPUNCTURE: CPT

## 2019-09-30 PROCEDURE — G0378 HOSPITAL OBSERVATION PER HR: HCPCS

## 2019-09-30 PROCEDURE — 80061 LIPID PANEL: CPT

## 2019-09-30 RX ADMIN — ASPIRIN 325 MG ORAL TABLET 325 MG: 325 PILL ORAL at 09:09

## 2019-09-30 RX ADMIN — HYDROCHLOROTHIAZIDE 25 MG: 25 TABLET ORAL at 09:09

## 2019-09-30 RX ADMIN — OLMESARTAN MEDOXOMIL 40 MG: 20 TABLET, FILM COATED ORAL at 09:09

## 2019-09-30 RX ADMIN — AMLODIPINE BESYLATE 5 MG: 5 TABLET ORAL at 09:09

## 2019-09-30 NOTE — NURSING
Pt requesting vitals to be done at 2 am when next troponin is due for sleep promotion. Pat is also requesting Enoxaparin. Stressed to patient the importance of the medication to prevent blood clots and patient still denies. Will continue to monitor.

## 2019-09-30 NOTE — NURSING
Met with Dr. Vizcarra and pt in pt's room. Dr. Vizcarra told pt his recommendations and pt stated he wanted to go home, no heart cath, and would rather change his diet and begin exercising over taking medications. Pt asked for information about diet and exercise recommendations.     Notified Dr. Ferreira of conversation with pt and Dr. Vizcarra. Okay to proceed with discharge.
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
## 2019-09-30 NOTE — PLAN OF CARE
Patient alert and oriented resting in bed. NAD. Denies pain or SOB. VSS. NPO after MN. Normal SR on monitor. Plan of care reviewed with patient. Verbalizes understanding.Call light in reach. Pt free from fall or injury. Will monitor.

## 2019-09-30 NOTE — DISCHARGE SUMMARY
[facility] Hospital Medicine  Discharge Summary      Patient Name: Elliott Gaspar  MRN: [id number]  Admission Date: 9/29/2019  Hospital Length of Stay: 0 days  Discharge Date and Time:  09/30/2019 1:15 PM  Attending Physician: Lisa Ferreira MD   Discharging Provider: Lisa Ferreira MD  Primary Care Provider: Leatha New MD      HPI:   The patient is a 64-year-old gentleman who is after 4 episodes of chest pain over the weekend.  They have occurred while he was exerting himself.  He describes the pain as a pinching sensation on the left side of the chest with radiation down the left arm.  The pain only lasted a couple of minutes.  It was not associated with shortness of breath, nausea, diaphoresis, cough or pleurisy.  The patient does not have a history of coronary disease and had a heart catheterization in 2012 which was normal.    He does not have a history of peptic ulcer disease, gastritis, DVT or PE.    He does not smoke and does not have diabetes mellitus or hyperlipidemia.    * No surgery found *      Hospital Course:   Patient was monitored on telemetry medical floor, serial cardiac enzymes remained negative. Patient was evaluated by cardiologist and had further cardiac workup as mentioned below, which was positive for EKG changes on exercise stress echo.  Patient declined left heart catheterization offered by Dr. Vizcarra.  Patient also declined use of lipid-lowering agents.  Patient understands the risks related to not undergoing further cardiac workup which include ongoing angina symptoms, heart attack or even cardiac death.  Patient agreed for lifestyle modification including weight loss status GA and daily exercise plan along with low-cholesterol diet follow-up.  Patient is given information regarding Dr. Vizcarra follow-up in case if he changes his mind.       Consults:   Consults (From admission, onward)        Status Ordering Provider     Inpatient consult to Cardiology  Once     Provider:   Denae Vizcarra MD    Completed SULTAN, AQIB        Final Active Diagnoses:    Diagnosis Date Noted POA    PRINCIPAL PROBLEM:  Unstable angina [I20.0] 09/29/2019 Yes    Leg edema [R60.0] 09/29/2019 Yes    Essential hypertension [I10] 04/20/2015 Yes      Problems Resolved During this Admission:       Discharged Condition: good    Disposition: Home or Self Care    Follow Up:  Follow-up Information     Denae Vizcarra MD In 2 weeks.    Specialties:  Cardiovascular Disease, Cardiology  Contact information:  [address] [place] [address]  [address]  [facility]  [facility] [id number]  [phone]                 Patient Instructions:      Diet Cardiac     Other restrictions (specify):   Order Comments: PLEASE OBSERVE FALL PRECAUTIONS     Call MD for:   Order Comments: For worsening symptoms, chest pain, shortness of breath, increased abdominal pain, high grade fever, stroke or stroke like symptoms, immediately go to the nearest Emergency Room or call 911 as soon as possible.       Significant Diagnostic Studies: Labs:   CMP   Recent Labs   Lab 09/29/19  1450      K 3.7      CO2 30*   *   BUN 16   CREATININE 1.2   CALCIUM 9.8   PROT 6.5   ALBUMIN 4.1   BILITOT 1.0   ALKPHOS 71   AST 37   ALT 68*   ANIONGAP 12   ESTGFRAFRICA >60   EGFRNONAA >60   , CBC   Recent Labs   Lab 09/29/19  1450   WBC 5.39   HGB 14.6   HCT 43.5       and Troponin   Recent Labs   Lab 09/29/19 2007   TROPONINI <0.006       Echocardiogram exercise stress test:  · The stress echo portion of this study is negative for myocardial ischemia.  · The EKG portion of this study is positive for myocardial ischemia (downslop ST depression in anterolateral leads).  · Duke treadmill score -1, indicates intermediate risk.  · The patient's exercise capacity was average.  · Pt complained of non limiting chest pain during treadmill test.    Medications:  Reconciled Home Medications:      Medication List      CONTINUE taking these medications    amLODIPine  5 MG tablet  Commonly known as:  NORVASC  Take 1 tablet (5 mg total) by mouth once daily.     b complex vitamins capsule  Take 1 capsule by mouth once daily.     BYSTOLIC 20 mg Tab  Generic drug:  nebivolol  TAKE 2 TABLETS (40 MG) ONCE DAILY     furosemide 20 MG tablet  Commonly known as:  LASIX  Take 1 tablet (20 mg total) by mouth daily as needed.     glucosamine-chondroitin 500-400 mg tablet  Take 1 tablet by mouth 2 (two) times daily.     hydroCHLOROthiazide 12.5 mg capsule  Commonly known as:  MICROZIDE  Take 1 capsule (12.5 mg total) by mouth 2 (two) times daily.     nystatin-triamcinolone cream  Commonly known as:  MYCOLOG II  APPLY  CREAM TOPICALLY TWICE DAILY     olmesartan 40 MG tablet  Commonly known as:  BENICAR  Take 1 tablet (40 mg total) by mouth once daily. For blood pressure     sildenafil 100 MG tablet  Commonly known as:  VIAGRA  1 tablet PO 30 minutes as needed prior to sexual activity     triamcinolone acetonide 0.1% 0.1 % cream  Commonly known as:  KENALOG  Apply to itchy rash on legs or back twice a day as needed.            Indwelling Lines/Drains at time of discharge:   Lines/Drains/Airways     None                 Time spent on the discharge of patient: 28 minutes  Patient was seen and examined on the date of discharge and determined to be suitable for discharge.         Lisa Ferreira MD  Department of Hospital Medicine  [facility]

## 2019-09-30 NOTE — PLAN OF CARE
09/30/19 1401   Final Note   Assessment Type Final Discharge Note   Anticipated Discharge Disposition Home   Hospital Follow Up  Appt(s) scheduled? Yes

## 2019-09-30 NOTE — PLAN OF CARE
CM met with pt and pt's spouse bedside to complete discharge assessment. Pt verified information as correct on facesheet. Denies POA/LW. PCP is Dr. New. Pharm is [facility] on [address]. Pt denies hh/hd. DME- CPAP. Pt reports being independent with all ADLs and is able to drive himself to appts. DC plan is home with no needs. CM following.        09/30/19 1015   Discharge Assessment   Assessment Type Discharge Planning Assessment   Confirmed/corrected address and phone number on facesheet? Yes   Assessment information obtained from? Patient   Communicated expected length of stay with patient/caregiver yes   Prior to hospitilization cognitive status: Alert/Oriented   Prior to hospitalization functional status: Independent   Current cognitive status: Alert/Oriented   Current Functional Status: Independent   Lives With spouse   Able to Return to Prior Arrangements yes   Is patient able to care for self after discharge? Yes   Patient's perception of discharge disposition home or selfcare   Readmission Within the Last 30 Days no previous admission in last 30 days   Patient currently being followed by outpatient case management? No   Patient currently receives any other outside agency services? No   Equipment Currently Used at Home none   Do you have any problems affording any of your prescribed medications? No   Is the patient taking medications as prescribed? yes   Does the patient have transportation home? Yes   Transportation Anticipated family or friend will provide   Does the patient receive services at the [facility]? No   Discharge Plan A Home   DME Needed Upon Discharge  none   Patient/Family in Agreement with Plan yes

## 2019-09-30 NOTE — PLAN OF CARE
09/29/19 2000   Patient Assessment/Suction   Level of Consciousness (AVPU) alert   PRE-TX-O2   O2 Device (Oxygen Therapy) room air   SpO2 96 %   Pulse Oximetry Type Intermittent   $ Pulse Oximetry - Single Charge Pulse Oximetry - Single   Pulse 72   Resp 16

## 2019-09-30 NOTE — NURSING
Stress echo completed with Dr. Vizcarra at bedside. Report to follow. Patient back to room 205B and nurse notified.

## 2019-09-30 NOTE — NURSING
D/C instructions provided and explained to pt and pt's spouse, understanding of D/C instructions verbalized. IV removed, catheter intact. Tolerated well. Telemetry monitor removed and returned to monitor room. VSS. Pt denies complaints. Transported off unit via wheelchair.

## 2019-09-30 NOTE — HOSPITAL COURSE
Patient was monitored on telemetry medical floor, serial cardiac enzymes remained negative. Patient was evaluated by cardiologist and had further cardiac workup as mentioned below, which was positive for EKG changes on exercise stress echo.  Patient declined left heart catheterization offered by Dr. Vizcarra.  Patient also declined use of lipid-lowering agents.  Patient understands the risks related to not undergoing further cardiac workup which include ongoing angina symptoms, heart attack or even cardiac death.  Patient agreed for lifestyle modification including weight loss status GA and daily exercise plan along with low-cholesterol diet follow-up.  Patient is given information regarding Dr. Vizcarra follow-up in case if he changes his mind.

## 2019-10-08 ENCOUNTER — OFFICE VISIT (OUTPATIENT)
Dept: FAMILY MEDICINE | Facility: CLINIC | Age: 64
End: 2019-10-08
Payer: MEDICARE

## 2019-10-08 VITALS
SYSTOLIC BLOOD PRESSURE: 122 MMHG | BODY MASS INDEX: 28.73 KG/M2 | OXYGEN SATURATION: 96 % | DIASTOLIC BLOOD PRESSURE: 60 MMHG | RESPIRATION RATE: 16 BRPM | TEMPERATURE: 98 F | HEIGHT: 69 IN | HEART RATE: 58 BPM | WEIGHT: 194 LBS

## 2019-10-08 DIAGNOSIS — Z09 HOSPITAL DISCHARGE FOLLOW-UP: Primary | ICD-10-CM

## 2019-10-08 DIAGNOSIS — I20.0 UNSTABLE ANGINA: ICD-10-CM

## 2019-10-08 DIAGNOSIS — R73.09 ELEVATED GLUCOSE: ICD-10-CM

## 2019-10-08 DIAGNOSIS — I10 ESSENTIAL HYPERTENSION: ICD-10-CM

## 2019-10-08 PROCEDURE — 99999 PR PBB SHADOW E&M-EST. PATIENT-LVL IV: ICD-10-PCS | Mod: PBBFAC,,, | Performed by: NURSE PRACTITIONER

## 2019-10-08 PROCEDURE — 99214 OFFICE O/P EST MOD 30 MIN: CPT | Mod: S$PBB,,, | Performed by: NURSE PRACTITIONER

## 2019-10-08 PROCEDURE — 99999 PR PBB SHADOW E&M-EST. PATIENT-LVL IV: CPT | Mod: PBBFAC,,, | Performed by: NURSE PRACTITIONER

## 2019-10-08 PROCEDURE — 99214 PR OFFICE/OUTPT VISIT, EST, LEVL IV, 30-39 MIN: ICD-10-PCS | Mod: S$PBB,,, | Performed by: NURSE PRACTITIONER

## 2019-10-08 PROCEDURE — 99214 OFFICE O/P EST MOD 30 MIN: CPT | Mod: PBBFAC,PO | Performed by: NURSE PRACTITIONER

## 2019-10-08 RX ORDER — OLMESARTAN MEDOXOMIL 40 MG/1
40 TABLET ORAL DAILY
Qty: 90 TABLET | Refills: 1 | Status: SHIPPED | OUTPATIENT
Start: 2019-10-08 | End: 2020-07-08 | Stop reason: SDUPTHER

## 2019-10-08 NOTE — PROGRESS NOTES
Subjective:       Patient ID: Elliott Gaspar is a 64 y.o. male.    Chief Complaint: Follow-up ([facility] )    Elliott Gaspar, 64 year old patient of Dr. New, who presents today at hospital follow up appointment.    Pt seen today is new to me.      Hospital records, including laboratory, radiologic, and other tests performed during the stay, have been obtained and reviewed.  Pt was discharged from the hospital on 9/29/2019.  Appointment is within 8 days of the discharge.    Pt was hospitalized 1 days for unstable angina, leg edema, hypertension reason(s).  Factors preceding the reason for hospitalization were chest pain.  Pt's status with regard to this problem is good.  He has been sedentary for the past 8 months. He endorses he needs to improve his diet. He feels well overall. No acute complaints.   HPI:   The patient is a 64-year-old gentleman who is after 4 episodes of chest pain over the weekend.  They have occurred while he was exerting himself.  He describes the pain as a pinching sensation on the left side of the chest with radiation down the left arm.  The pain only lasted a couple of minutes.  It was not associated with shortness of breath, nausea, diaphoresis, cough or pleurisy.  The patient does not have a history of coronary disease and had a heart catheterization in 2012 which was normal.     He does not have a history of peptic ulcer disease, gastritis, DVT or PE.     He does not smoke and does not have diabetes mellitus or hyperlipidemia.     * No surgery found *       Hospital Course:   Patient was monitored on telemetry medical floor, serial cardiac enzymes remained negative. Patient was evaluated by cardiologist and had further cardiac workup as mentioned below, which was positive for EKG changes on exercise stress echo.  Patient declined left heart catheterization offered by Dr. Vizcarra.  Patient also declined use of lipid-lowering agents.  Patient understands the risks related to not undergoing  further cardiac workup which include ongoing angina symptoms, heart attack or even cardiac death.  Patient agreed for lifestyle modification including weight loss status GA and daily exercise plan along with low-cholesterol diet follow-up.  Patient is given information regarding Dr. Vizcarra follow-up in case if he changes his mind.      Follow Up:  Follow-up Information       Denae Vizcarra MD In 2 weeks.   Specialties:  Cardiovascular Disease, Cardiology  Contact information:  [address]  [address]  [facility]  [place] [id number] [phone]     Patient Instructions:      Diet Cardiac     Other restrictions (specify):  Order Comments: PLEASE OBSERVE FALL PRECAUTIONS     Call MD for:  Order Comments: For worsening symptoms, chest pain, shortness of breath, increased abdominal pain, high grade fever, stroke or stroke like symptoms, immediately go to the nearest Emergency Room or call 911 as soon as possible.        Significant Diagnostic Studies: Labs:   CMP   Recent Labs  Lab 09/29/19  1450    K 3.7    CO2 30*  *  BUN 16  CREATININE 1.2  CALCIUM 9.8  PROT 6.5  ALBUMIN 4.1  BILITOT 1.0  ALKPHOS 71  AST 37  ALT 68*  ANIONGAP 12  ESTGFRAFRICA >60  EGFRNONAA >60  , CBC   Recent Labs  Lab 09/29/19  1450  WBC 5.39  HGB 14.6  HCT 43.5     and Troponin   Recent Labs  Lab 09/29/19 2007  TROPONINI <0.006        Echocardiogram exercise stress test:  · The stress echo portion of this study is negative for myocardial ischemia.  · The EKG portion of this study is positive for myocardial ischemia (downslop ST depression in anterolateral leads).  · Duke treadmill score -1, indicates intermediate risk.  · The patient's exercise capacity was average.  · Pt complained of non limiting chest pain during treadmill test.     Medications:  Reconciled Home Medications:      Medication List     CONTINUE taking these medications   amLODIPine 5 MG tablet  Commonly known as:  NORVASC  Take 1 tablet (5 mg total)  by mouth once daily.     b complex vitamins capsule  Take 1 capsule by mouth once daily.     BYSTOLIC 20 mg Tab  Generic drug:  nebivolol  TAKE 2 TABLETS (40 MG) ONCE DAILY     furosemide 20 MG tablet  Commonly known as:  LASIX  Take 1 tablet (20 mg total) by mouth daily as needed.     glucosamine-chondroitin 500-400 mg tablet  Take 1 tablet by mouth 2 (two) times daily.     hydroCHLOROthiazide 12.5 mg capsule  Commonly known as:  MICROZIDE  Take 1 capsule (12.5 mg total) by mouth 2 (two) times daily.     nystatin-triamcinolone cream  Commonly known as:  MYCOLOG II  APPLY  CREAM TOPICALLY TWICE DAILY     olmesartan 40 MG tablet  Commonly known as:  BENICAR  Take 1 tablet (40 mg total) by mouth once daily. For blood pressure     sildenafil 100 MG tablet  Commonly known as:  VIAGRA  1 tablet PO 30 minutes as needed prior to sexual activity     triamcinolone acetonide 0.1% 0.1 % cream  Commonly known as:  KENALOG  Apply to itchy rash on legs or back twice a day as needed.            Review of Systems   Constitutional: Negative for chills, fatigue and fever.   HENT: Negative for congestion, ear pain, sinus pressure and sore throat.    Respiratory: Negative for shortness of breath and wheezing.    Cardiovascular: Negative for chest pain and leg swelling.   Gastrointestinal: Negative for abdominal distention and abdominal pain.   Genitourinary: Negative for dysuria and flank pain.   Skin: Negative for color change and pallor.   Neurological: Negative for light-headedness, numbness and headaches.       Objective:      Physical Exam   Constitutional: He is oriented to person, place, and time. He appears well-developed and well-nourished.   HENT:   Head: Normocephalic and atraumatic.   Right Ear: External ear normal.   Left Ear: External ear normal.   Eyes: Pupils are equal, round, and reactive to light. Conjunctivae and EOM are normal.   Neck: Normal range of motion. Neck supple.   Cardiovascular: Normal rate and regular  rhythm.   Pulmonary/Chest: Effort normal and breath sounds normal.   Abdominal: Soft. Bowel sounds are normal.   Musculoskeletal: Normal range of motion.   Neurological: He is alert and oriented to person, place, and time.   Skin: Skin is warm and dry.   Nursing note and vitals reviewed.      Assessment:       1. Hospital discharge follow-up    2. Essential hypertension    3. Unstable angina    4. Elevated glucose    5. BMI 28.0-28.9,adult        Plan:       Elliott was seen today for follow-up.    Diagnoses and all orders for this visit:    Hospital discharge follow-up  -     Comprehensive metabolic panel; Future    Essential hypertension  -     olmesartan (BENICAR) 40 MG tablet; Take 1 tablet (40 mg total) by mouth once daily. For blood pressure  -     Comprehensive metabolic panel; Future    Unstable angina  -     Comprehensive metabolic panel; Future    Elevated glucose  -     Hemoglobin A1c; Future    BMI 28.0-28.9,adult  -     Comprehensive metabolic panel; Future  -     Hemoglobin A1c; Future  Weight loss recommended with well balanced diet and portion controlled meals and increased activity      Patient scheduled for cardiology and pcp follow up. Discussed worsening signs/symptoms and when to return to clinic or go to ED.   Patient expresses understanding and agrees with treatment plan.

## 2019-10-08 NOTE — PATIENT INSTRUCTIONS

## 2019-10-11 ENCOUNTER — OFFICE VISIT (OUTPATIENT)
Dept: CARDIOLOGY | Facility: CLINIC | Age: 64
End: 2019-10-11
Payer: MEDICARE

## 2019-10-11 ENCOUNTER — LAB VISIT (OUTPATIENT)
Dept: LAB | Facility: HOSPITAL | Age: 64
End: 2019-10-11
Attending: PHYSICIAN ASSISTANT
Payer: MEDICARE

## 2019-10-11 VITALS
HEIGHT: 69 IN | WEIGHT: 192.69 LBS | BODY MASS INDEX: 28.54 KG/M2 | SYSTOLIC BLOOD PRESSURE: 133 MMHG | HEART RATE: 76 BPM | DIASTOLIC BLOOD PRESSURE: 74 MMHG

## 2019-10-11 DIAGNOSIS — Z09 HOSPITAL DISCHARGE FOLLOW-UP: ICD-10-CM

## 2019-10-11 DIAGNOSIS — I10 ESSENTIAL HYPERTENSION: Primary | ICD-10-CM

## 2019-10-11 DIAGNOSIS — I20.0 UNSTABLE ANGINA: ICD-10-CM

## 2019-10-11 DIAGNOSIS — I10 ESSENTIAL HYPERTENSION: ICD-10-CM

## 2019-10-11 DIAGNOSIS — R73.09 ELEVATED GLUCOSE: ICD-10-CM

## 2019-10-11 LAB
ALBUMIN SERPL BCP-MCNC: 4.3 G/DL (ref 3.5–5.2)
ALP SERPL-CCNC: 74 U/L (ref 55–135)
ALT SERPL W/O P-5'-P-CCNC: 78 U/L (ref 10–44)
ANION GAP SERPL CALC-SCNC: 10 MMOL/L (ref 8–16)
AST SERPL-CCNC: 43 U/L (ref 10–40)
BILIRUB SERPL-MCNC: 0.8 MG/DL (ref 0.1–1)
BUN SERPL-MCNC: 22 MG/DL (ref 8–23)
CALCIUM SERPL-MCNC: 10.1 MG/DL (ref 8.7–10.5)
CHLORIDE SERPL-SCNC: 104 MMOL/L (ref 95–110)
CO2 SERPL-SCNC: 29 MMOL/L (ref 23–29)
CREAT SERPL-MCNC: 1.2 MG/DL (ref 0.5–1.4)
EST. GFR  (AFRICAN AMERICAN): >60 ML/MIN/1.73 M^2
EST. GFR  (NON AFRICAN AMERICAN): >60 ML/MIN/1.73 M^2
ESTIMATED AVG GLUCOSE: 100 MG/DL (ref 68–131)
GLUCOSE SERPL-MCNC: 89 MG/DL (ref 70–110)
HBA1C MFR BLD HPLC: 5.1 % (ref 4–5.6)
POTASSIUM SERPL-SCNC: 3.8 MMOL/L (ref 3.5–5.1)
PROT SERPL-MCNC: 7.2 G/DL (ref 6–8.4)
SODIUM SERPL-SCNC: 143 MMOL/L (ref 136–145)

## 2019-10-11 PROCEDURE — 36415 COLL VENOUS BLD VENIPUNCTURE: CPT | Mod: PO

## 2019-10-11 PROCEDURE — 99999 PR PBB SHADOW E&M-EST. PATIENT-LVL III: CPT | Mod: PBBFAC,,, | Performed by: INTERNAL MEDICINE

## 2019-10-11 PROCEDURE — 83036 HEMOGLOBIN GLYCOSYLATED A1C: CPT

## 2019-10-11 PROCEDURE — 99214 OFFICE O/P EST MOD 30 MIN: CPT | Mod: S$PBB,,, | Performed by: INTERNAL MEDICINE

## 2019-10-11 PROCEDURE — 99999 PR PBB SHADOW E&M-EST. PATIENT-LVL III: ICD-10-PCS | Mod: PBBFAC,,, | Performed by: INTERNAL MEDICINE

## 2019-10-11 PROCEDURE — 80053 COMPREHEN METABOLIC PANEL: CPT

## 2019-10-11 PROCEDURE — 99214 PR OFFICE/OUTPT VISIT, EST, LEVL IV, 30-39 MIN: ICD-10-PCS | Mod: S$PBB,,, | Performed by: INTERNAL MEDICINE

## 2019-10-11 PROCEDURE — 99213 OFFICE O/P EST LOW 20 MIN: CPT | Mod: PBBFAC,PO | Performed by: INTERNAL MEDICINE

## 2019-10-11 NOTE — PROGRESS NOTES
Subjective:    Patient ID:  Elliott Gaspar is a 64 y.o. male who presents for follow-up of Chest Pain (Er follow up)      Pt reports he was under a lot of stress and was having chest pains. He was seen in the ER in [place] and had an exercise stress echo which was normal. He reports symptoms have resolved and his stress level is reduced with finding a job.       Review of Systems   Constitution: Negative for weight gain and weight loss.   HENT: Negative.    Eyes: Negative.    Cardiovascular: Negative for chest pain, claudication, cyanosis, dyspnea on exertion, irregular heartbeat, leg swelling, near-syncope, orthopnea (no PND), palpitations and syncope.   Respiratory: Negative for cough, hemoptysis, shortness of breath and snoring.    Endocrine: Negative.    Skin: Negative.    Musculoskeletal: Negative for joint pain, muscle cramps, muscle weakness and myalgias.   Gastrointestinal: Negative for diarrhea, hematemesis, nausea and vomiting.   Genitourinary: Negative.    Neurological: Negative for dizziness, focal weakness, light-headedness, loss of balance, numbness, paresthesias and seizures.   Psychiatric/Behavioral: Negative.         Objective:    Physical Exam   Constitutional: He is oriented to person, place, and time. He appears well-developed and well-nourished.   HENT:   Mouth/Throat: Oropharynx is clear and moist.   Eyes: Pupils are equal, round, and reactive to light.   Neck: Normal range of motion. No thyromegaly present.   Cardiovascular: Normal rate, regular rhythm, S1 normal, S2 normal, normal heart sounds, intact distal pulses and normal pulses.  No extrasystoles are present. PMI is not displaced. Exam reveals no friction rub.   No murmur heard.  Pulmonary/Chest: Effort normal and breath sounds normal. He has no wheezes. He has no rales. He exhibits no tenderness.   Abdominal: Soft. Bowel sounds are normal. He exhibits no distension and no mass. There is no tenderness.   Musculoskeletal: Normal range of  motion. He exhibits no edema.   Neurological: He is alert and oriented to person, place, and time.   Skin: Skin is warm and dry.   Vitals reviewed.      Test(s) Reviewed  I have reviewed the following in detail:  [x] Stress test   [] Angiography   [x] Echocardiogram   [] Labs   [] Other:         Assessment:       1. Essential hypertension         Plan:       We discussed his symptoms which have resolved and we discussed his normal Stress echo imaging  Will continue present Rx  F/u 4 months

## 2019-11-19 ENCOUNTER — IMMUNIZATION (OUTPATIENT)
Dept: FAMILY MEDICINE | Facility: CLINIC | Age: 64
End: 2019-11-19
Payer: MEDICARE

## 2019-11-19 PROCEDURE — 90662 IIV NO PRSV INCREASED AG IM: CPT | Mod: PBBFAC,PO

## 2019-11-27 ENCOUNTER — TELEPHONE (OUTPATIENT)
Dept: UROLOGY | Facility: CLINIC | Age: 64
End: 2019-11-27

## 2019-11-27 NOTE — TELEPHONE ENCOUNTER
He is having some problems he would like to discuss b/c in public store.  Does not want to be scheduled further out, but will be available sooner.  Please approve sooner appt.

## 2019-12-02 NOTE — TELEPHONE ENCOUNTER
Scheduled.  Left message on recorder of date/time of appt.  Left message to call back to confirm.

## 2019-12-10 ENCOUNTER — OFFICE VISIT (OUTPATIENT)
Dept: FAMILY MEDICINE | Facility: CLINIC | Age: 64
End: 2019-12-10
Payer: MEDICARE

## 2019-12-10 VITALS
HEIGHT: 69 IN | TEMPERATURE: 98 F | DIASTOLIC BLOOD PRESSURE: 64 MMHG | WEIGHT: 193.81 LBS | SYSTOLIC BLOOD PRESSURE: 118 MMHG | BODY MASS INDEX: 28.71 KG/M2 | HEART RATE: 75 BPM | OXYGEN SATURATION: 96 %

## 2019-12-10 DIAGNOSIS — L03.116 CELLULITIS OF LEFT LOWER EXTREMITY: Primary | ICD-10-CM

## 2019-12-10 DIAGNOSIS — J32.1 CHRONIC FRONTAL SINUSITIS: ICD-10-CM

## 2019-12-10 PROCEDURE — 99213 OFFICE O/P EST LOW 20 MIN: CPT | Mod: PBBFAC,PO | Performed by: FAMILY MEDICINE

## 2019-12-10 PROCEDURE — 99214 OFFICE O/P EST MOD 30 MIN: CPT | Mod: S$PBB,,, | Performed by: FAMILY MEDICINE

## 2019-12-10 PROCEDURE — 99214 PR OFFICE/OUTPT VISIT, EST, LEVL IV, 30-39 MIN: ICD-10-PCS | Mod: S$PBB,,, | Performed by: FAMILY MEDICINE

## 2019-12-10 PROCEDURE — 1159F MED LIST DOCD IN RCRD: CPT | Mod: ,,, | Performed by: FAMILY MEDICINE

## 2019-12-10 PROCEDURE — 1159F PR MEDICATION LIST DOCUMENTED IN MEDICAL RECORD: ICD-10-PCS | Mod: ,,, | Performed by: FAMILY MEDICINE

## 2019-12-10 PROCEDURE — 1125F PR PAIN SEVERITY QUANTIFIED, PAIN PRESENT: ICD-10-PCS | Mod: ,,, | Performed by: FAMILY MEDICINE

## 2019-12-10 PROCEDURE — 1125F AMNT PAIN NOTED PAIN PRSNT: CPT | Mod: ,,, | Performed by: FAMILY MEDICINE

## 2019-12-10 PROCEDURE — 99999 PR PBB SHADOW E&M-EST. PATIENT-LVL III: ICD-10-PCS | Mod: PBBFAC,,, | Performed by: FAMILY MEDICINE

## 2019-12-10 PROCEDURE — 99999 PR PBB SHADOW E&M-EST. PATIENT-LVL III: CPT | Mod: PBBFAC,,, | Performed by: FAMILY MEDICINE

## 2019-12-10 RX ORDER — CLINDAMYCIN HYDROCHLORIDE 300 MG/1
300 CAPSULE ORAL 3 TIMES DAILY
Qty: 30 CAPSULE | Refills: 0 | Status: SHIPPED | OUTPATIENT
Start: 2019-12-10 | End: 2019-12-20

## 2019-12-10 RX ORDER — FLUTICASONE PROPIONATE 50 MCG
1 SPRAY, SUSPENSION (ML) NASAL DAILY
Qty: 1 BOTTLE | Refills: 11 | Status: SHIPPED | OUTPATIENT
Start: 2019-12-10 | End: 2020-05-29 | Stop reason: SDUPTHER
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
## 2019-12-16 DIAGNOSIS — B35.4 TINEA CORPORIS: ICD-10-CM

## 2019-12-16 RX ORDER — NYSTATIN AND TRIAMCINOLONE ACETONIDE 100000; 1 [USP'U]/G; MG/G
CREAM TOPICAL
Qty: 60 G | Refills: 2 | Status: SHIPPED | OUTPATIENT
Start: 2019-12-16 | End: 2022-01-27 | Stop reason: SDUPTHER

## 2019-12-27 ENCOUNTER — PATIENT MESSAGE (OUTPATIENT)
Dept: FAMILY MEDICINE | Facility: CLINIC | Age: 64
End: 2019-12-27
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
## 2020-01-13 ENCOUNTER — LAB VISIT (OUTPATIENT)
Dept: LAB | Facility: HOSPITAL | Age: 65
End: 2020-01-13
Attending: UROLOGY
Payer: MEDICARE

## 2020-01-13 ENCOUNTER — OFFICE VISIT (OUTPATIENT)
Dept: UROLOGY | Facility: CLINIC | Age: 65
End: 2020-01-13
Payer: MEDICARE

## 2020-01-13 VITALS — HEIGHT: 69 IN | BODY MASS INDEX: 28.98 KG/M2 | WEIGHT: 195.69 LBS

## 2020-01-13 DIAGNOSIS — N48.6 PEYRONIE'S DISEASE: ICD-10-CM

## 2020-01-13 DIAGNOSIS — F52.21 ERECTILE DISORDER, ACQUIRED, SITUATIONAL, MODERATE: Primary | ICD-10-CM

## 2020-01-13 DIAGNOSIS — R31.0 GROSS HEMATURIA: ICD-10-CM

## 2020-01-13 DIAGNOSIS — Z12.5 PROSTATE CANCER SCREENING: ICD-10-CM

## 2020-01-13 DIAGNOSIS — Z87.442 HISTORY OF KIDNEY STONES: ICD-10-CM

## 2020-01-13 LAB — COMPLEXED PSA SERPL-MCNC: 1.1 NG/ML (ref 0–4)

## 2020-01-13 PROCEDURE — 36415 COLL VENOUS BLD VENIPUNCTURE: CPT

## 2020-01-13 PROCEDURE — 99999 PR PBB SHADOW E&M-EST. PATIENT-LVL III: CPT | Mod: PBBFAC,,, | Performed by: UROLOGY

## 2020-01-13 PROCEDURE — 1159F MED LIST DOCD IN RCRD: CPT | Mod: ,,, | Performed by: UROLOGY

## 2020-01-13 PROCEDURE — 88112 CYTOPATH CELL ENHANCE TECH: CPT | Mod: 26,,, | Performed by: PATHOLOGY

## 2020-01-13 PROCEDURE — 1159F PR MEDICATION LIST DOCUMENTED IN MEDICAL RECORD: ICD-10-PCS | Mod: ,,, | Performed by: UROLOGY

## 2020-01-13 PROCEDURE — 99213 OFFICE O/P EST LOW 20 MIN: CPT | Mod: PBBFAC,25,PN | Performed by: UROLOGY

## 2020-01-13 PROCEDURE — 1126F AMNT PAIN NOTED NONE PRSNT: CPT | Mod: ,,, | Performed by: UROLOGY

## 2020-01-13 PROCEDURE — 99215 PR OFFICE/OUTPT VISIT, EST, LEVL V, 40-54 MIN: ICD-10-PCS | Mod: S$PBB,,, | Performed by: UROLOGY

## 2020-01-13 PROCEDURE — 99999 PR PBB SHADOW E&M-EST. PATIENT-LVL III: ICD-10-PCS | Mod: PBBFAC,,, | Performed by: UROLOGY

## 2020-01-13 PROCEDURE — 99215 OFFICE O/P EST HI 40 MIN: CPT | Mod: S$PBB,,, | Performed by: UROLOGY

## 2020-01-13 PROCEDURE — 88112 PR  CYTOPATH, CELL ENHANCE TECH: ICD-10-PCS | Mod: 26,,, | Performed by: PATHOLOGY

## 2020-01-13 PROCEDURE — 87086 URINE CULTURE/COLONY COUNT: CPT

## 2020-01-13 PROCEDURE — 1126F PR PAIN SEVERITY QUANTIFIED, NO PAIN PRESENT: ICD-10-PCS | Mod: ,,, | Performed by: UROLOGY

## 2020-01-13 PROCEDURE — 88112 CYTOPATH CELL ENHANCE TECH: CPT | Performed by: PATHOLOGY

## 2020-01-13 PROCEDURE — 84153 ASSAY OF PSA TOTAL: CPT

## 2020-01-13 RX ORDER — TADALAFIL 20 MG/1
20 TABLET ORAL
Qty: 9 TABLET | Refills: 9 | Status: SHIPPED | OUTPATIENT
Start: 2020-01-13 | End: 2022-01-27 | Stop reason: SDUPTHER

## 2020-01-15 LAB — BACTERIA UR CULT: NORMAL

## 2020-01-17 LAB — FINAL PATHOLOGIC DIAGNOSIS: NORMAL

## 2020-01-20 DIAGNOSIS — R31.0 GROSS HEMATURIA: ICD-10-CM

## 2020-01-23 ENCOUNTER — PATIENT OUTREACH (OUTPATIENT)
Dept: ADMINISTRATIVE | Facility: HOSPITAL | Age: 65
End: 2020-01-23

## 2020-01-23 NOTE — LETTER
February 4, 2020    [address] [place] [address]             [facility]  [address]  [facility] [address]  Phone: [phone] [address] [place] [address]     Dear, Elliott Gaspar     [facility] is committed to your overall health.  To help you get the most out of each of your visits, we will review your information to make sure you are up to date on all of your recommended tests and/or procedures.  Leatha New MD  has found that your chart shows you may be due for the following:     Colonoscopy   Pneumococcal Vaccine (65+ Low/Medium Risk)(2 of 2 - PPSV23)     If you have had any of the above done at another facility, please bring the records with you or Fax them to [phone] so that your record at [facility] will be complete. If you have not had any of these tests or procedures done recently and would like to complete this testing ,  please call [phone] or send a message through your [facility] portal to your provider's office.     If you have an upcoming scheduled appointment for the above test and/or procedures, please disregard this letter.     If you are currently taking medication, please bring it with you to your appointment for review.     Thank you for letting us care for you,     Kenton Ibanez, Care Coordinator   [facility]   Phone: [phone]   Fax: [phone]

## 2020-01-31 ENCOUNTER — DOCUMENTATION ONLY (OUTPATIENT)
Dept: FAMILY MEDICINE | Facility: CLINIC | Age: 65
End: 2020-01-31

## 2020-01-31 NOTE — PROGRESS NOTES
Pre-Visit Chart Review  For Appointment Scheduled on 2/6/2020    Health Maintenance Due   Topic Date Due    Colonoscopy  07/23/1973    Pneumococcal Vaccine (65+ Low/Medium Risk) (2 of 2 - PPSV23) 10/25/2019

## 2020-02-05 ENCOUNTER — PATIENT OUTREACH (OUTPATIENT)
Dept: ADMINISTRATIVE | Facility: OTHER | Age: 65
End: 2020-02-05

## 2020-02-05 DIAGNOSIS — Z12.11 COLON CANCER SCREENING: Primary | ICD-10-CM

## 2020-02-06 ENCOUNTER — OFFICE VISIT (OUTPATIENT)
Dept: FAMILY MEDICINE | Facility: CLINIC | Age: 65
End: 2020-02-06
Payer: MEDICARE

## 2020-02-06 ENCOUNTER — DOCUMENTATION ONLY (OUTPATIENT)
Dept: FAMILY MEDICINE | Facility: CLINIC | Age: 65
End: 2020-02-06

## 2020-02-06 VITALS
HEIGHT: 69 IN | BODY MASS INDEX: 29.06 KG/M2 | TEMPERATURE: 99 F | HEART RATE: 62 BPM | WEIGHT: 196.19 LBS | SYSTOLIC BLOOD PRESSURE: 118 MMHG | DIASTOLIC BLOOD PRESSURE: 64 MMHG | OXYGEN SATURATION: 96 %

## 2020-02-06 DIAGNOSIS — Z12.11 SCREEN FOR COLON CANCER: ICD-10-CM

## 2020-02-06 DIAGNOSIS — K21.9 GASTROESOPHAGEAL REFLUX DISEASE, ESOPHAGITIS PRESENCE NOT SPECIFIED: ICD-10-CM

## 2020-02-06 DIAGNOSIS — Z86.19 HISTORY OF HELICOBACTER PYLORI INFECTION: Primary | ICD-10-CM

## 2020-02-06 PROCEDURE — 99999 PR PBB SHADOW E&M-EST. PATIENT-LVL III: CPT | Mod: PBBFAC,,, | Performed by: FAMILY MEDICINE

## 2020-02-06 PROCEDURE — 99214 PR OFFICE/OUTPT VISIT, EST, LEVL IV, 30-39 MIN: ICD-10-PCS | Mod: S$PBB,,, | Performed by: FAMILY MEDICINE

## 2020-02-06 PROCEDURE — 99214 OFFICE O/P EST MOD 30 MIN: CPT | Mod: S$PBB,,, | Performed by: FAMILY MEDICINE

## 2020-02-06 PROCEDURE — 99999 PR PBB SHADOW E&M-EST. PATIENT-LVL III: ICD-10-PCS | Mod: PBBFAC,,, | Performed by: FAMILY MEDICINE

## 2020-02-06 PROCEDURE — 99213 OFFICE O/P EST LOW 20 MIN: CPT | Mod: PBBFAC,PO | Performed by: FAMILY MEDICINE

## 2020-02-06 RX ORDER — PANTOPRAZOLE SODIUM 40 MG/1
40 TABLET, DELAYED RELEASE ORAL DAILY
Qty: 30 TABLET | Refills: 1 | Status: SHIPPED | OUTPATIENT
Start: 2020-02-06 | End: 2020-07-21
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
## 2020-02-06 NOTE — PATIENT INSTRUCTIONS
Pantoprazole tablets  What is this medicine?  PANTOPRAZOLE (pan TOE pra zole) prevents the production of acid in the stomach. It is used to treat gastroesophageal reflux disease (GERD), inflammation of the esophagus, and Zollinger-Joe syndrome.  How should I use this medicine?  Take this medicine by mouth. Swallow the tablets whole with a drink of water. Follow the directions on the prescription label. Do not crush, break, or chew. Take your medicine at regular intervals. Do not take your medicine more often than directed.  Talk to your pediatrician regarding the use of this medicine in children. While this drug may be prescribed for children as young as 5 years for selected conditions, precautions do apply.  What side effects may I notice from receiving this medicine?  Side effects that you should report to your doctor or health care professional as soon as possible:  · allergic reactions like skin rash, itching or hives, swelling of the face, lips, or tongue  · bone, muscle or joint pain  · breathing problems  · chest pain or chest tightness  · dark yellow or brown urine  · dizziness  · fast, irregular heartbeat  · feeling faint or lightheaded  · fever or sore throat  · muscle spasm  · palpitations  · rash on cheeks or arms that gets worse in the sun  · redness, blistering, peeling or loosening of the skin, including inside the mouth  · seizures  · tremors  · unusual bleeding or bruising  · unusually weak or tired  · yellowing of the eyes or skin  Side effects that usually do not require medical attention (Report these to your doctor or health care professional if they continue or are bothersome.):  · constipation  · diarrhea  · dry mouth  · headache  · nausea  What may interact with this medicine?  Do not take this medicine with any of the following medications:  · atazanavir  · nelfinavir  This medicine may also interact with the following medications:  · ampicillin  · delavirdine  · erlotinib  · iron  salts  · medicines for fungal infections like ketoconazole, itraconazole and voriconazole  · methotrexate  · mycophenolate mofetil  · warfarin  What if I miss a dose?  If you miss a dose, take it as soon as you can. If it is almost time for your next dose, take only that dose. Do not take double or extra doses.  Where should I keep my medicine?  Keep out of the reach of children.  Store at room temperature between 15 and 30 degrees C (59 and 86 degrees F). Protect from light and moisture. Throw away any unused medicine after the expiration date.  What should I tell my health care provider before I take this medicine?  They need to know if you have any of these conditions:  · liver disease  · low levels of magnesium in the blood  · lupus  · an unusual or allergic reaction to omeprazole, lansoprazole, pantoprazole, rabeprazole, other medicines, foods, dyes, or preservatives  · pregnant or trying to get pregnant  · breast-feeding  What should I watch for while using this medicine?  It can take several days before your stomach pain gets better. Check with your doctor or health care professional if your condition does not start to get better, or if it gets worse.  You may need blood work done while you are taking this medicine.  NOTE:This sheet is a summary. It may not cover all possible information. If you have questions about this medicine, talk to your doctor, pharmacist, or health care provider. Copyright© 2017 [employer]

## 2020-02-07 ENCOUNTER — OFFICE VISIT (OUTPATIENT)
Dept: CARDIOLOGY | Facility: CLINIC | Age: 65
End: 2020-02-07
Payer: MEDICARE

## 2020-02-07 VITALS
DIASTOLIC BLOOD PRESSURE: 75 MMHG | SYSTOLIC BLOOD PRESSURE: 125 MMHG | HEART RATE: 78 BPM | HEIGHT: 69 IN | BODY MASS INDEX: 29.42 KG/M2 | WEIGHT: 198.63 LBS

## 2020-02-07 DIAGNOSIS — I10 ESSENTIAL HYPERTENSION: Primary | ICD-10-CM

## 2020-02-07 PROCEDURE — 99213 OFFICE O/P EST LOW 20 MIN: CPT | Mod: PBBFAC,PO | Performed by: INTERNAL MEDICINE

## 2020-02-07 PROCEDURE — 99999 PR PBB SHADOW E&M-EST. PATIENT-LVL III: CPT | Mod: PBBFAC,,, | Performed by: INTERNAL MEDICINE

## 2020-02-07 PROCEDURE — 99214 PR OFFICE/OUTPT VISIT, EST, LEVL IV, 30-39 MIN: ICD-10-PCS | Mod: S$PBB,,, | Performed by: INTERNAL MEDICINE

## 2020-02-07 PROCEDURE — 99999 PR PBB SHADOW E&M-EST. PATIENT-LVL III: ICD-10-PCS | Mod: PBBFAC,,, | Performed by: INTERNAL MEDICINE

## 2020-02-07 PROCEDURE — 99214 OFFICE O/P EST MOD 30 MIN: CPT | Mod: S$PBB,,, | Performed by: INTERNAL MEDICINE

## 2020-02-07 NOTE — PROGRESS NOTES
Subjective:    Patient ID:  Elliott Gaspar is a 64 y.o. male who presents for follow-up of Follow-up (4mth) and Hypertension      Pt here for f/u. Since last visit he reports he is doing well. He denies any chest pain, SOB, PND, orthopnea. He denies any palpitations, dizziness, syncope or pre-syncope. He denies exertional symptoms. He denies claudication, lower extremity edema.      Review of Systems   Constitution: Negative for weight gain and weight loss.   HENT: Negative.    Eyes: Negative.    Cardiovascular: Negative for chest pain, claudication, cyanosis, dyspnea on exertion, irregular heartbeat, leg swelling, near-syncope, orthopnea (no PND), palpitations and syncope.   Respiratory: Negative for cough, hemoptysis, shortness of breath and snoring.    Endocrine: Negative.    Skin: Negative.    Musculoskeletal: Negative for joint pain, muscle cramps, muscle weakness and myalgias.   Gastrointestinal: Negative for diarrhea, hematemesis, nausea and vomiting.   Genitourinary: Negative.    Neurological: Negative for dizziness, focal weakness, light-headedness, loss of balance, numbness, paresthesias and seizures.   Psychiatric/Behavioral: Negative.         Objective:    Physical Exam   Constitutional: He is oriented to person, place, and time. He appears well-developed and well-nourished.   HENT:   Mouth/Throat: Oropharynx is clear and moist.   Eyes: Pupils are equal, round, and reactive to light.   Neck: Normal range of motion. No thyromegaly present.   Cardiovascular: Normal rate, regular rhythm, S1 normal, S2 normal, normal heart sounds, intact distal pulses and normal pulses.  No extrasystoles are present. PMI is not displaced. Exam reveals no friction rub.   No murmur heard.  Pulmonary/Chest: Effort normal and breath sounds normal. He has no wheezes. He has no rales. He exhibits no tenderness.   Abdominal: Soft. Bowel sounds are normal. He exhibits no distension and no mass. There is no tenderness.    Musculoskeletal: Normal range of motion. He exhibits no edema.   Neurological: He is alert and oriented to person, place, and time.   Skin: Skin is warm and dry.   Vitals reviewed.        Assessment:       1. Essential hypertension         Plan:       Pt doing well   BP well controlled  Continue present Rx  F/u 6 months

## 2020-05-05 ENCOUNTER — PATIENT MESSAGE (OUTPATIENT)
Dept: ADMINISTRATIVE | Facility: HOSPITAL | Age: 65
End: 2020-05-05

## 2020-05-10 ENCOUNTER — PATIENT MESSAGE (OUTPATIENT)
Dept: CARDIOLOGY | Facility: CLINIC | Age: 65
End: 2020-05-10

## 2020-05-11 DIAGNOSIS — I10 ESSENTIAL HYPERTENSION: Primary | ICD-10-CM

## 2020-05-11 RX ORDER — NEBIVOLOL 20 MG/1
TABLET ORAL
Qty: 180 TABLET | Refills: 3 | Status: SHIPPED | OUTPATIENT
Start: 2020-05-11 | End: 2021-01-22 | Stop reason: SDUPTHER

## 2020-05-11 RX ORDER — HYDROCHLOROTHIAZIDE 12.5 MG/1
12.5 CAPSULE ORAL 2 TIMES DAILY
Qty: 180 CAPSULE | Refills: 3 | Status: SHIPPED | OUTPATIENT
Start: 2020-05-11 | End: 2021-01-22 | Stop reason: SDUPTHER

## 2020-05-29 DIAGNOSIS — J32.1 CHRONIC FRONTAL SINUSITIS: ICD-10-CM

## 2020-05-29 RX ORDER — FLUTICASONE PROPIONATE 50 MCG
1 SPRAY, SUSPENSION (ML) NASAL DAILY
Qty: 18.2 ML | Refills: 3 | Status: SHIPPED | OUTPATIENT
Start: 2020-05-29 | End: 2021-07-20 | Stop reason: SDUPTHER

## 2020-06-30 DIAGNOSIS — I10 ESSENTIAL HYPERTENSION: ICD-10-CM

## 2020-06-30 RX ORDER — OLMESARTAN MEDOXOMIL 40 MG/1
40 TABLET ORAL DAILY
Qty: 90 TABLET | Refills: 1 | OUTPATIENT
Start: 2020-06-30 | End: 2021-06-30

## 2020-07-08 DIAGNOSIS — I10 ESSENTIAL HYPERTENSION: ICD-10-CM

## 2020-07-08 RX ORDER — OLMESARTAN MEDOXOMIL 40 MG/1
40 TABLET ORAL DAILY
Qty: 90 TABLET | Refills: 1 | Status: SHIPPED | OUTPATIENT
Start: 2020-07-08 | End: 2021-01-22 | Stop reason: SDUPTHER

## 2020-07-16 DIAGNOSIS — M25.569 KNEE PAIN, UNSPECIFIED CHRONICITY, UNSPECIFIED LATERALITY: Primary | ICD-10-CM

## 2020-07-17 ENCOUNTER — PATIENT OUTREACH (OUTPATIENT)
Dept: ADMINISTRATIVE | Facility: OTHER | Age: 65
End: 2020-07-17

## 2020-07-17 DIAGNOSIS — Z71.89 COMPLEX CARE COORDINATION: ICD-10-CM

## 2020-07-20 ENCOUNTER — HOSPITAL ENCOUNTER (OUTPATIENT)
Dept: RADIOLOGY | Facility: HOSPITAL | Age: 65
Discharge: HOME | End: 2020-07-20
Attending: ORTHOPAEDIC SURGERY
Payer: MEDICARE

## 2020-07-20 ENCOUNTER — OFFICE VISIT (OUTPATIENT)
Dept: ORTHOPEDICS | Facility: CLINIC | Age: 65
End: 2020-07-20
Payer: MEDICARE

## 2020-07-20 VITALS
WEIGHT: 198.63 LBS | SYSTOLIC BLOOD PRESSURE: 132 MMHG | BODY MASS INDEX: 29.42 KG/M2 | DIASTOLIC BLOOD PRESSURE: 80 MMHG | HEART RATE: 62 BPM | HEIGHT: 69 IN | TEMPERATURE: 99 F

## 2020-07-20 VITALS — RESPIRATION RATE: 18 BRPM | BODY MASS INDEX: 29.33 KG/M2 | WEIGHT: 198 LBS | TEMPERATURE: 98 F | HEIGHT: 69 IN

## 2020-07-20 DIAGNOSIS — M72.0 DUPUYTREN'S CONTRACTURE OF BOTH HANDS: Primary | ICD-10-CM

## 2020-07-20 DIAGNOSIS — M25.551 RIGHT HIP PAIN: Primary | ICD-10-CM

## 2020-07-20 DIAGNOSIS — M17.10 ARTHRITIS OF KNEE: ICD-10-CM

## 2020-07-20 DIAGNOSIS — M25.551 RIGHT HIP PAIN: ICD-10-CM

## 2020-07-20 DIAGNOSIS — M25.569 KNEE PAIN, UNSPECIFIED CHRONICITY, UNSPECIFIED LATERALITY: ICD-10-CM

## 2020-07-20 DIAGNOSIS — G56.02 CARPAL TUNNEL SYNDROME ON LEFT: ICD-10-CM

## 2020-07-20 PROCEDURE — 99214 OFFICE O/P EST MOD 30 MIN: CPT | Mod: 25,S$PBB,, | Performed by: ORTHOPAEDIC SURGERY

## 2020-07-20 PROCEDURE — 99214 PR OFFICE/OUTPT VISIT, EST, LEVL IV, 30-39 MIN: ICD-10-PCS | Mod: 25,S$PBB,, | Performed by: ORTHOPAEDIC SURGERY

## 2020-07-20 PROCEDURE — 73562 X-RAY EXAM OF KNEE 3: CPT | Mod: TC,PN,LT,59

## 2020-07-20 PROCEDURE — 99203 OFFICE O/P NEW LOW 30 MIN: CPT | Mod: S$PBB,,, | Performed by: ORTHOPAEDIC SURGERY

## 2020-07-20 PROCEDURE — 99999 PR PBB SHADOW E&M-EST. PATIENT-LVL IV: ICD-10-PCS | Mod: PBBFAC,,, | Performed by: ORTHOPAEDIC SURGERY

## 2020-07-20 PROCEDURE — 99213 OFFICE O/P EST LOW 20 MIN: CPT | Mod: PBBFAC,25,27,PN | Performed by: ORTHOPAEDIC SURGERY

## 2020-07-20 PROCEDURE — 73562 X-RAY EXAM OF KNEE 3: CPT | Mod: 26,59,LT, | Performed by: RADIOLOGY

## 2020-07-20 PROCEDURE — 20610 LARGE JOINT ASPIRATION/INJECTION: R KNEE: ICD-10-PCS | Mod: S$PBB,RT,, | Performed by: ORTHOPAEDIC SURGERY

## 2020-07-20 PROCEDURE — 73502 X-RAY EXAM HIP UNI 2-3 VIEWS: CPT | Mod: 26,RT,, | Performed by: RADIOLOGY

## 2020-07-20 PROCEDURE — 99999 PR PBB SHADOW E&M-EST. PATIENT-LVL III: ICD-10-PCS | Mod: PBBFAC,,, | Performed by: ORTHOPAEDIC SURGERY

## 2020-07-20 PROCEDURE — 99999 PR PBB SHADOW E&M-EST. PATIENT-LVL IV: CPT | Mod: PBBFAC,,, | Performed by: ORTHOPAEDIC SURGERY

## 2020-07-20 PROCEDURE — 73502 X-RAY EXAM HIP UNI 2-3 VIEWS: CPT | Mod: TC,PN,RT

## 2020-07-20 PROCEDURE — 73562 XR KNEE ORTHO RIGHT WITH FLEXION: ICD-10-PCS | Mod: 26,59,LT, | Performed by: RADIOLOGY

## 2020-07-20 PROCEDURE — 20610 DRAIN/INJ JOINT/BURSA W/O US: CPT | Mod: PBBFAC,PN | Performed by: ORTHOPAEDIC SURGERY

## 2020-07-20 PROCEDURE — 73502 XR HIP 2 VIEW RIGHT: ICD-10-PCS | Mod: 26,RT,, | Performed by: RADIOLOGY

## 2020-07-20 PROCEDURE — 99203 PR OFFICE/OUTPT VISIT, NEW, LEVL III, 30-44 MIN: ICD-10-PCS | Mod: S$PBB,,, | Performed by: ORTHOPAEDIC SURGERY

## 2020-07-20 PROCEDURE — 73564 X-RAY EXAM KNEE 4 OR MORE: CPT | Mod: 26,RT,, | Performed by: RADIOLOGY

## 2020-07-20 PROCEDURE — 99214 OFFICE O/P EST MOD 30 MIN: CPT | Mod: PBBFAC,PN,25 | Performed by: ORTHOPAEDIC SURGERY

## 2020-07-20 PROCEDURE — 99999 PR PBB SHADOW E&M-EST. PATIENT-LVL III: CPT | Mod: PBBFAC,,, | Performed by: ORTHOPAEDIC SURGERY

## 2020-07-20 PROCEDURE — 73564 XR KNEE ORTHO RIGHT WITH FLEXION: ICD-10-PCS | Mod: 26,RT,, | Performed by: RADIOLOGY

## 2020-07-20 RX ADMIN — TRIAMCINOLONE ACETONIDE 40 MG: 40 INJECTION, SUSPENSION INTRA-ARTICULAR; INTRAMUSCULAR at 03:07
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
## 2020-07-21 ENCOUNTER — OFFICE VISIT (OUTPATIENT)
Dept: CARDIOLOGY | Facility: CLINIC | Age: 65
End: 2020-07-21
Payer: MEDICARE

## 2020-07-21 VITALS
SYSTOLIC BLOOD PRESSURE: 130 MMHG | HEIGHT: 69 IN | HEART RATE: 72 BPM | DIASTOLIC BLOOD PRESSURE: 62 MMHG | BODY MASS INDEX: 29.78 KG/M2 | WEIGHT: 201.06 LBS

## 2020-07-21 DIAGNOSIS — R60.0 BILATERAL LEG EDEMA: ICD-10-CM

## 2020-07-21 DIAGNOSIS — I10 ESSENTIAL HYPERTENSION: Primary | ICD-10-CM

## 2020-07-21 PROCEDURE — 99999 PR PBB SHADOW E&M-EST. PATIENT-LVL III: CPT | Mod: PBBFAC,,, | Performed by: INTERNAL MEDICINE

## 2020-07-21 PROCEDURE — 99214 OFFICE O/P EST MOD 30 MIN: CPT | Mod: S$PBB,,, | Performed by: INTERNAL MEDICINE

## 2020-07-21 PROCEDURE — 99999 PR PBB SHADOW E&M-EST. PATIENT-LVL III: ICD-10-PCS | Mod: PBBFAC,,, | Performed by: INTERNAL MEDICINE

## 2020-07-21 PROCEDURE — 99213 OFFICE O/P EST LOW 20 MIN: CPT | Mod: PBBFAC,PO | Performed by: INTERNAL MEDICINE

## 2020-07-21 PROCEDURE — 99214 PR OFFICE/OUTPT VISIT, EST, LEVL IV, 30-39 MIN: ICD-10-PCS | Mod: S$PBB,,, | Performed by: INTERNAL MEDICINE

## 2020-07-21 NOTE — PROGRESS NOTES
Subjective:    Patient ID:  Elliott Gaspar is a 64 y.o. male who presents for follow-up of Hypertension (follow up)      Pt here for f/u. He reports LE swelling which is intermittent. He otherwise feels well and has no other complaints. His weight is up ~3# since last visit.      Review of Systems   Constitution: Negative for weight gain and weight loss.   HENT: Negative.    Eyes: Negative.    Cardiovascular: Positive for leg swelling. Negative for chest pain, claudication, cyanosis, dyspnea on exertion, irregular heartbeat, near-syncope, orthopnea (no PND) and palpitations.   Respiratory: Negative for cough, hemoptysis, shortness of breath and snoring.    Endocrine: Negative.    Skin: Negative.    Musculoskeletal: Negative for joint pain, muscle cramps, muscle weakness and myalgias.   Gastrointestinal: Negative for diarrhea, hematemesis, nausea and vomiting.   Genitourinary: Negative.    Neurological: Negative for dizziness, focal weakness, light-headedness, loss of balance, numbness, paresthesias and seizures.   Psychiatric/Behavioral: Negative.         Objective:    Physical Exam   Constitutional: He is oriented to person, place, and time. He appears well-developed and well-nourished.   HENT:   Mouth/Throat: Oropharynx is clear and moist.   Eyes: Pupils are equal, round, and reactive to light.   Neck: Normal range of motion. No thyromegaly present.   Cardiovascular: Normal rate, regular rhythm, S1 normal, S2 normal, normal heart sounds, intact distal pulses and normal pulses.  No extrasystoles are present. PMI is not displaced. Exam reveals no friction rub.   No murmur heard.  Pulmonary/Chest: Effort normal and breath sounds normal. He has no wheezes. He has no rales. He exhibits no tenderness.   Abdominal: Soft. Bowel sounds are normal. He exhibits no distension and no mass. There is no abdominal tenderness.   Musculoskeletal: Normal range of motion.         General: No edema.   Neurological: He is alert and  oriented to person, place, and time.   Skin: Skin is warm and dry.   Vitals reviewed.      Test(s) Reviewed  I have reviewed the following in detail:  [] Stress test   [] Angiography   [] Echocardiogram   [x] Labs   [] Other:         Assessment:       1. Essential hypertension    2. Bilateral leg edema         Plan:       We discussed his LE edema  We discussed reducing dietary sodium as an initial step  Will consider reducing or stopping amlodipine but he has been on this med for a while and the edema is intermittent  Can also consider increasing diuretic if needed  F/u 6 months

## 2020-07-22 RX ORDER — TRIAMCINOLONE ACETONIDE 40 MG/ML
40 INJECTION, SUSPENSION INTRA-ARTICULAR; INTRAMUSCULAR
Status: DISCONTINUED | OUTPATIENT
Start: 2020-07-20 | End: 2020-07-22 | Stop reason: HOSPADM

## 2020-07-22 NOTE — PROCEDURES
Large Joint Aspiration/Injection: R knee    Date/Time: 7/20/2020 3:30 PM  Performed by: Rodrigo Richardson MD  Authorized by: Rodrigo Richardson MD     Consent Done?:  Yes (Verbal)  Indications:  Pain  Site marked: the procedure site was marked    Timeout: prior to procedure the correct patient, procedure, and site was verified    Local anesthetic:  Lidocaine 1% without epinephrine and bupivacaine 0.25% without epinephrine  Anesthetic total (ml):  6      Details:  Needle Size:  20 G  Approach:  Anterolateral  Location:  Knee  Site:  R knee  Medications:  40 mg triamcinolone acetonide 40 mg/mL  Patient tolerance:  Patient tolerated the procedure well with no immediate complications

## 2020-07-30 ENCOUNTER — TELEPHONE (OUTPATIENT)
Dept: ORTHOPEDICS | Facility: CLINIC | Age: 65
End: 2020-07-30

## 2020-07-30 NOTE — TELEPHONE ENCOUNTER
Informed pt his Xiaflex Benefits Investigation has been completed.  Per the Investigation pt has no Copay for the drug or for the procedure codes.  Pt has no out of pocket expense with his co-insurance.  I asked pt if he would like me to order his medication and schedule appointments for Xiaflex treatment.  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  Informed pt that I will speak to the therapist and call him back with more information.    Per Delaney Glasgow in OT:  Custom splint will be fashioned to allow thumb, index, and middle finger to be able to perform tasks.  Ring and small fingers will in splint.  Pt will wear splint all day for 3 weeks then will wear splint at night for 6 weeks.

## 2020-08-03 ENCOUNTER — TELEPHONE (OUTPATIENT)
Dept: ORTHOPEDICS | Facility: CLINIC | Age: 65
End: 2020-08-03

## 2020-08-03 NOTE — TELEPHONE ENCOUNTER
Pt asked if Xiaflex treatment can be performed on Tuesday/Thursday.  Informed pt Dr. Nicolas is only in clinic on Mondays and Wednesdays and the Xiaflex would need to be scheduled for these days.  Pt stated he would like to hold off on ordering Xiaflex until he speaks to his boss.  Pt requested that I call him back on 8/4 in the afternoon to discuss scheduling and ordering Xiaflex.

## 2020-08-03 NOTE — TELEPHONE ENCOUNTER
----- Message from Branden Lopes sent at 8/3/2020  3:55 PM CDT -----  Regarding: returning, call again  Contact: pt

## 2020-08-04 NOTE — TELEPHONE ENCOUNTER
Pt stated he wanted Xiaflex ordered.  Pt verbalized understanding that Xiaflex cannot be returned and he will be billed for the medication whether he receives it or not.  Billing will go through [facility].  Pt scheduled 8/24/20 at 4:20 PM for injection.  Pt scheduled 8/26/20 at 2:20 PM for manipulation.  Pt agreed to above appointment dates and times.

## 2020-08-05 DIAGNOSIS — M72.0 DUPUYTREN'S CONTRACTURE OF BOTH HANDS: Primary | ICD-10-CM

## 2020-08-21 ENCOUNTER — PATIENT OUTREACH (OUTPATIENT)
Dept: ADMINISTRATIVE | Facility: OTHER | Age: 65
End: 2020-08-21

## 2020-08-21 NOTE — PROGRESS NOTES
LINKS immunization registry updated  Care Everywhere updated  Health Maintenance updated  Chart reviewed for overdue Proactive [facility] ([facility]) health maintenance testing (CRS, Breast Ca, Diabetic Eye Exam)   Orders entered:N/A

## 2020-08-24 ENCOUNTER — OFFICE VISIT (OUTPATIENT)
Dept: ORTHOPEDICS | Facility: CLINIC | Age: 65
End: 2020-08-24
Payer: MEDICARE

## 2020-08-24 VITALS
HEIGHT: 69 IN | HEART RATE: 67 BPM | BODY MASS INDEX: 29.78 KG/M2 | DIASTOLIC BLOOD PRESSURE: 76 MMHG | WEIGHT: 201.06 LBS | SYSTOLIC BLOOD PRESSURE: 144 MMHG

## 2020-08-24 DIAGNOSIS — M72.0 DUPUYTREN'S CONTRACTURE OF LEFT HAND: Primary | ICD-10-CM

## 2020-08-24 PROCEDURE — 99213 PR OFFICE/OUTPT VISIT, EST, LEVL III, 20-29 MIN: ICD-10-PCS | Mod: S$PBB,25,, | Performed by: ORTHOPAEDIC SURGERY

## 2020-08-24 PROCEDURE — 99999 PR PBB SHADOW E&M-EST. PATIENT-LVL III: ICD-10-PCS | Mod: PBBFAC,,, | Performed by: ORTHOPAEDIC SURGERY

## 2020-08-24 PROCEDURE — 99213 OFFICE O/P EST LOW 20 MIN: CPT | Mod: S$PBB,25,, | Performed by: ORTHOPAEDIC SURGERY

## 2020-08-24 PROCEDURE — 20527 NJX NZM PALMAR FASCIAL CORD: CPT | Mod: S$PBB,LT,, | Performed by: ORTHOPAEDIC SURGERY

## 2020-08-24 PROCEDURE — 20527 PR INJ DUPUYTREN CORD W/ENZYME: ICD-10-PCS | Mod: S$PBB,LT,, | Performed by: ORTHOPAEDIC SURGERY

## 2020-08-24 PROCEDURE — 20527 NJX NZM PALMAR FASCIAL CORD: CPT | Mod: PBBFAC,PN | Performed by: ORTHOPAEDIC SURGERY

## 2020-08-24 PROCEDURE — 99213 OFFICE O/P EST LOW 20 MIN: CPT | Mod: PBBFAC,PN,25 | Performed by: ORTHOPAEDIC SURGERY

## 2020-08-24 PROCEDURE — 99999 PR PBB SHADOW E&M-EST. PATIENT-LVL III: CPT | Mod: PBBFAC,,, | Performed by: ORTHOPAEDIC SURGERY

## 2020-08-24 RX ADMIN — COLLAGENASE CLOSTRIDIUM HISTOLYTICUM 0.58 MG: KIT at 05:08

## 2020-08-24 NOTE — PROGRESS NOTES
Mr Gaspar returns to clinic today.  Has a history of left ring finger Dupuytren's contracture.  He is here today for Xiaflex injection.  He states that he is not having pain but continuing to have dysfunction with straightening the finger    Physical exam:  Examination left hand and ring finger reveals that there is an obvious Dupuytren's cord noted.  There is contracture of the MCP joint of the ring finger.  This is approximately 40 or 45 degree contracture.  He has no significant PIP contracture.  He does have sensation intact in the median radial ulnar distribution.  He has a 2+ radial pulse    Assessment:  Left ring finger Dupuytren's contracture    Plan:    1.  I did have a discussion with the patient about the process as well as the risks associated with the injection.  After discussion of the risks and benefits the patient has provided verbal informed consent to proceed with the Xiaflex injection.  I did discuss the risks of tendon rupture.    2.  After informed consent was obtained the left hand was prepped sterilely.  0.58 mg of Xiaflex was injected in a fan-shaped injection directly into the cord in the palm of the hand.  Care was taken to stay superficial to the flexor tendon sheath.  The patient tolerated the injection well.    3.  I did give him instructions for ibuprofen for pain as well as ice for edema.  Patient states an understanding of the possibility of swelling bruising and soreness to the hand    4.  Will follow up with me in 2 days for the manipulation

## 2020-08-26 ENCOUNTER — OFFICE VISIT (OUTPATIENT)
Dept: ORTHOPEDICS | Facility: CLINIC | Age: 65
End: 2020-08-26
Payer: MEDICARE

## 2020-08-26 ENCOUNTER — CLINICAL SUPPORT (OUTPATIENT)
Dept: REHABILITATION | Facility: HOSPITAL | Age: 65
End: 2020-08-26
Attending: ORTHOPAEDIC SURGERY
Payer: MEDICARE

## 2020-08-26 VITALS
WEIGHT: 201.06 LBS | DIASTOLIC BLOOD PRESSURE: 98 MMHG | HEART RATE: 66 BPM | SYSTOLIC BLOOD PRESSURE: 156 MMHG | BODY MASS INDEX: 29.78 KG/M2 | HEIGHT: 69 IN

## 2020-08-26 DIAGNOSIS — M72.0 DUPUYTREN'S CONTRACTURE OF BOTH HANDS: ICD-10-CM

## 2020-08-26 DIAGNOSIS — M25.642 STIFFNESS OF FINGER JOINT OF LEFT HAND: Primary | ICD-10-CM

## 2020-08-26 DIAGNOSIS — M72.0 DUPUYTREN'S CONTRACTURE OF BOTH HANDS: Primary | ICD-10-CM

## 2020-08-26 PROCEDURE — L3913 HFO W/O JOINTS CF: HCPCS | Mod: PO

## 2020-08-26 PROCEDURE — 99499 UNLISTED E&M SERVICE: CPT | Mod: S$PBB,,, | Performed by: ORTHOPAEDIC SURGERY

## 2020-08-26 PROCEDURE — 99999 PR PBB SHADOW E&M-EST. PATIENT-LVL III: ICD-10-PCS | Mod: PBBFAC,,, | Performed by: ORTHOPAEDIC SURGERY

## 2020-08-26 PROCEDURE — 97110 THERAPEUTIC EXERCISES: CPT | Mod: PO

## 2020-08-26 PROCEDURE — 99499 NO LOS: ICD-10-PCS | Mod: S$PBB,,, | Performed by: ORTHOPAEDIC SURGERY

## 2020-08-26 PROCEDURE — 26341 MANIPULAT PALM CORD POST INJ: CPT | Mod: S$PBB,F3,, | Performed by: ORTHOPAEDIC SURGERY

## 2020-08-26 PROCEDURE — 97166 OT EVAL MOD COMPLEX 45 MIN: CPT | Mod: PO

## 2020-08-26 PROCEDURE — 99213 OFFICE O/P EST LOW 20 MIN: CPT | Mod: PBBFAC,PN | Performed by: ORTHOPAEDIC SURGERY

## 2020-08-26 PROCEDURE — 99999 PR PBB SHADOW E&M-EST. PATIENT-LVL III: CPT | Mod: PBBFAC,,, | Performed by: ORTHOPAEDIC SURGERY

## 2020-08-26 PROCEDURE — 26341 PR MANIPULAT PALM CORD POST INJ: ICD-10-PCS | Mod: S$PBB,F3,, | Performed by: ORTHOPAEDIC SURGERY

## 2020-08-26 PROCEDURE — 26341 MANIPULAT PALM CORD POST INJ: CPT | Mod: PBBFAC,PN | Performed by: ORTHOPAEDIC SURGERY

## 2020-08-26 RX ORDER — LIDOCAINE HYDROCHLORIDE 10 MG/ML
6 INJECTION INFILTRATION; PERINEURAL
Status: COMPLETED | OUTPATIENT
Start: 2020-08-26 | End: 2020-08-26

## 2020-08-26 RX ADMIN — LIDOCAINE HYDROCHLORIDE 6 ML: 10 INJECTION, SOLUTION INFILTRATION; PERINEURAL at 03:08
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
## 2020-09-09 ENCOUNTER — OFFICE VISIT (OUTPATIENT)
Dept: ORTHOPEDICS | Facility: CLINIC | Age: 65
End: 2020-09-09
Payer: MEDICARE

## 2020-09-09 VITALS
SYSTOLIC BLOOD PRESSURE: 153 MMHG | HEIGHT: 69 IN | HEART RATE: 60 BPM | BODY MASS INDEX: 29.78 KG/M2 | DIASTOLIC BLOOD PRESSURE: 87 MMHG | WEIGHT: 201.06 LBS

## 2020-09-09 DIAGNOSIS — M72.0 DUPUYTREN'S CONTRACTURE OF BOTH HANDS: Primary | ICD-10-CM

## 2020-09-09 PROCEDURE — 99213 OFFICE O/P EST LOW 20 MIN: CPT | Mod: S$PBB,,, | Performed by: ORTHOPAEDIC SURGERY

## 2020-09-09 PROCEDURE — 99999 PR PBB SHADOW E&M-EST. PATIENT-LVL III: ICD-10-PCS | Mod: PBBFAC,,, | Performed by: ORTHOPAEDIC SURGERY

## 2020-09-09 PROCEDURE — 99999 PR PBB SHADOW E&M-EST. PATIENT-LVL III: CPT | Mod: PBBFAC,,, | Performed by: ORTHOPAEDIC SURGERY

## 2020-09-09 PROCEDURE — 99213 OFFICE O/P EST LOW 20 MIN: CPT | Mod: PBBFAC,PN | Performed by: ORTHOPAEDIC SURGERY

## 2020-09-09 PROCEDURE — 99213 PR OFFICE/OUTPT VISIT, EST, LEVL III, 20-29 MIN: ICD-10-PCS | Mod: S$PBB,,, | Performed by: ORTHOPAEDIC SURGERY

## 2020-09-12 NOTE — PROGRESS NOTES
Mr Gaspar returns to clinic today.  He is approximately 2 weeks status post left ring finger Xiaflex injection.  States that he still has a small amount of swelling and a small amount of pain but that he has continued to be able to fully extend and flex the finger which is significantly improved from the pre-injection state.    Physical exam:  Examination the left hand reveals that there are no open wounds.  He has minimal edema.  There is still very mild tenderness to palpation in the palm the hand.  He is able make a full composite fist and fully extend the fingers without a contracture noted.  He does report intact sensation in the median radial ulnar distribution and capillary refill is less than 2 sec in all digits.    Assessment:  Status post left hand Xiaflex injection    Plan:    1.  Patient is allowed to increase activity as tolerated    2.  He will wean out of the custom splint    3.  Will follow up with me on a p.r.n. basis

## 2020-09-29 ENCOUNTER — PATIENT MESSAGE (OUTPATIENT)
Dept: OTHER | Facility: OTHER | Age: 65
End: 2020-09-29

## 2020-10-05 ENCOUNTER — PATIENT MESSAGE (OUTPATIENT)
Dept: ADMINISTRATIVE | Facility: HOSPITAL | Age: 65
End: 2020-10-05

## 2020-12-11 ENCOUNTER — PATIENT MESSAGE (OUTPATIENT)
Dept: OTHER | Facility: OTHER | Age: 65
End: 2020-12-11

## 2020-12-29 ENCOUNTER — IMMUNIZATION (OUTPATIENT)
Dept: PRIMARY CARE CLINIC | Facility: CLINIC | Age: 65
End: 2020-12-29
Payer: MEDICARE

## 2020-12-29 DIAGNOSIS — Z23 NEED FOR VACCINATION: ICD-10-CM

## 2020-12-29 PROCEDURE — 0001A COVID-19, MRNA, LNP-S, PF, 30 MCG/0.3 ML DOSE VACCINE: CPT | Mod: CV19,S$GLB,, | Performed by: FAMILY MEDICINE

## 2020-12-29 PROCEDURE — 0001A COVID-19, MRNA, LNP-S, PF, 30 MCG/0.3 ML DOSE VACCINE: ICD-10-PCS | Mod: CV19,S$GLB,, | Performed by: FAMILY MEDICINE

## 2020-12-29 PROCEDURE — 91300 COVID-19, MRNA, LNP-S, PF, 30 MCG/0.3 ML DOSE VACCINE: CPT | Mod: S$GLB,,, | Performed by: FAMILY MEDICINE

## 2020-12-29 PROCEDURE — 91300 COVID-19, MRNA, LNP-S, PF, 30 MCG/0.3 ML DOSE VACCINE: ICD-10-PCS | Mod: S$GLB,,, | Performed by: FAMILY MEDICINE

## 2021-01-04 ENCOUNTER — PATIENT MESSAGE (OUTPATIENT)
Dept: ADMINISTRATIVE | Facility: HOSPITAL | Age: 66
End: 2021-01-04

## 2021-01-19 ENCOUNTER — PATIENT OUTREACH (OUTPATIENT)
Dept: ADMINISTRATIVE | Facility: OTHER | Age: 66
End: 2021-01-19

## 2021-01-19 ENCOUNTER — IMMUNIZATION (OUTPATIENT)
Dept: PRIMARY CARE CLINIC | Facility: CLINIC | Age: 66
End: 2021-01-19
Payer: MEDICARE

## 2021-01-19 DIAGNOSIS — Z23 NEED FOR VACCINATION: Primary | ICD-10-CM

## 2021-01-19 PROCEDURE — 91300 COVID-19, MRNA, LNP-S, PF, 30 MCG/0.3 ML DOSE VACCINE: CPT | Mod: S$GLB,,, | Performed by: FAMILY MEDICINE

## 2021-01-19 PROCEDURE — 0002A COVID-19, MRNA, LNP-S, PF, 30 MCG/0.3 ML DOSE VACCINE: ICD-10-PCS | Mod: S$GLB,,, | Performed by: FAMILY MEDICINE

## 2021-01-19 PROCEDURE — 0002A COVID-19, MRNA, LNP-S, PF, 30 MCG/0.3 ML DOSE VACCINE: CPT | Mod: S$GLB,,, | Performed by: FAMILY MEDICINE

## 2021-01-19 PROCEDURE — 91300 COVID-19, MRNA, LNP-S, PF, 30 MCG/0.3 ML DOSE VACCINE: ICD-10-PCS | Mod: S$GLB,,, | Performed by: FAMILY MEDICINE

## 2021-01-22 ENCOUNTER — OFFICE VISIT (OUTPATIENT)
Dept: CARDIOLOGY | Facility: CLINIC | Age: 66
End: 2021-01-22
Payer: MEDICARE

## 2021-01-22 VITALS
BODY MASS INDEX: 29.68 KG/M2 | HEART RATE: 63 BPM | DIASTOLIC BLOOD PRESSURE: 78 MMHG | SYSTOLIC BLOOD PRESSURE: 128 MMHG | WEIGHT: 200.38 LBS | HEIGHT: 69 IN

## 2021-01-22 DIAGNOSIS — I10 ESSENTIAL HYPERTENSION: ICD-10-CM

## 2021-01-22 PROCEDURE — 99214 PR OFFICE/OUTPT VISIT, EST, LEVL IV, 30-39 MIN: ICD-10-PCS | Mod: S$PBB,,, | Performed by: INTERNAL MEDICINE

## 2021-01-22 PROCEDURE — 99214 OFFICE O/P EST MOD 30 MIN: CPT | Mod: S$PBB,,, | Performed by: INTERNAL MEDICINE

## 2021-01-22 PROCEDURE — 99213 OFFICE O/P EST LOW 20 MIN: CPT | Mod: PBBFAC,PO | Performed by: INTERNAL MEDICINE

## 2021-01-22 PROCEDURE — 99999 PR PBB SHADOW E&M-EST. PATIENT-LVL III: CPT | Mod: PBBFAC,,, | Performed by: INTERNAL MEDICINE

## 2021-01-22 PROCEDURE — 99999 PR PBB SHADOW E&M-EST. PATIENT-LVL III: ICD-10-PCS | Mod: PBBFAC,,, | Performed by: INTERNAL MEDICINE

## 2021-01-22 RX ORDER — AMLODIPINE BESYLATE 5 MG/1
5 TABLET ORAL DAILY
Qty: 90 TABLET | Refills: 3 | Status: SHIPPED | OUTPATIENT
Start: 2021-01-22 | End: 2021-07-20

## 2021-01-22 RX ORDER — OLMESARTAN MEDOXOMIL 40 MG/1
40 TABLET ORAL DAILY
Qty: 90 TABLET | Refills: 3 | Status: SHIPPED | OUTPATIENT
Start: 2021-01-22 | End: 2021-07-20

## 2021-01-22 RX ORDER — NEBIVOLOL HYDROCHLORIDE 20 MG/1
TABLET ORAL
Qty: 180 TABLET | Refills: 3 | Status: SHIPPED | OUTPATIENT
Start: 2021-01-22 | End: 2021-07-20 | Stop reason: SDUPTHER

## 2021-01-22 RX ORDER — HYDROCHLOROTHIAZIDE 12.5 MG/1
12.5 CAPSULE ORAL 2 TIMES DAILY
Qty: 180 CAPSULE | Refills: 3 | Status: SHIPPED | OUTPATIENT
Start: 2021-01-22 | End: 2021-07-20 | Stop reason: SDUPTHER

## 2021-02-23 ENCOUNTER — OFFICE VISIT (OUTPATIENT)
Dept: FAMILY MEDICINE | Facility: CLINIC | Age: 66
End: 2021-02-23
Payer: MEDICARE

## 2021-02-23 VITALS
RESPIRATION RATE: 16 BRPM | HEIGHT: 69 IN | DIASTOLIC BLOOD PRESSURE: 80 MMHG | TEMPERATURE: 98 F | SYSTOLIC BLOOD PRESSURE: 134 MMHG | OXYGEN SATURATION: 98 % | WEIGHT: 196.44 LBS | BODY MASS INDEX: 29.09 KG/M2 | HEART RATE: 63 BPM

## 2021-02-23 DIAGNOSIS — J02.9 SORE THROAT: ICD-10-CM

## 2021-02-23 DIAGNOSIS — J01.01 ACUTE RECURRENT MAXILLARY SINUSITIS: Primary | ICD-10-CM

## 2021-02-23 DIAGNOSIS — J30.2 SEASONAL ALLERGIC RHINITIS, UNSPECIFIED TRIGGER: ICD-10-CM

## 2021-02-23 DIAGNOSIS — R05.9 COUGH: ICD-10-CM

## 2021-02-23 DIAGNOSIS — K76.0 FATTY LIVER DISEASE, NONALCOHOLIC: ICD-10-CM

## 2021-02-23 PROCEDURE — 99213 PR OFFICE/OUTPT VISIT, EST, LEVL III, 20-29 MIN: ICD-10-PCS | Mod: S$PBB,,, | Performed by: STUDENT IN AN ORGANIZED HEALTH CARE EDUCATION/TRAINING PROGRAM

## 2021-02-23 PROCEDURE — 99999 PR PBB SHADOW E&M-EST. PATIENT-LVL IV: ICD-10-PCS | Mod: PBBFAC,,, | Performed by: STUDENT IN AN ORGANIZED HEALTH CARE EDUCATION/TRAINING PROGRAM

## 2021-02-23 PROCEDURE — 99999 PR PBB SHADOW E&M-EST. PATIENT-LVL IV: CPT | Mod: PBBFAC,,, | Performed by: STUDENT IN AN ORGANIZED HEALTH CARE EDUCATION/TRAINING PROGRAM

## 2021-02-23 PROCEDURE — 99213 OFFICE O/P EST LOW 20 MIN: CPT | Mod: S$PBB,,, | Performed by: STUDENT IN AN ORGANIZED HEALTH CARE EDUCATION/TRAINING PROGRAM

## 2021-02-23 PROCEDURE — 99214 OFFICE O/P EST MOD 30 MIN: CPT | Mod: PBBFAC,PO | Performed by: STUDENT IN AN ORGANIZED HEALTH CARE EDUCATION/TRAINING PROGRAM

## 2021-02-23 RX ORDER — CETIRIZINE HYDROCHLORIDE 10 MG/1
10 TABLET ORAL DAILY
Qty: 30 TABLET | Refills: 0 | Status: SHIPPED | OUTPATIENT
Start: 2021-02-23 | End: 2021-03-25

## 2021-02-23 RX ORDER — DOXYCYCLINE HYCLATE 100 MG
100 TABLET ORAL 2 TIMES DAILY
Qty: 20 TABLET | Refills: 0 | Status: SHIPPED | OUTPATIENT
Start: 2021-02-23 | End: 2021-03-05

## 2021-02-23 RX ORDER — PROMETHAZINE HYDROCHLORIDE AND DEXTROMETHORPHAN HYDROBROMIDE 6.25; 15 MG/5ML; MG/5ML
5 SYRUP ORAL EVERY 4 HOURS PRN
Qty: 118 ML | Refills: 1 | Status: SHIPPED | OUTPATIENT
Start: 2021-02-23 | End: 2021-03-05

## 2021-02-23 RX ORDER — CELECOXIB 200 MG/1
200 CAPSULE ORAL 2 TIMES DAILY PRN
Qty: 28 CAPSULE | Refills: 0 | Status: SHIPPED | OUTPATIENT
Start: 2021-02-23 | End: 2021-03-09

## 2021-02-26 ENCOUNTER — TELEPHONE (OUTPATIENT)
Dept: FAMILY MEDICINE | Facility: CLINIC | Age: 66
End: 2021-02-26

## 2021-02-26 ENCOUNTER — HOSPITAL ENCOUNTER (EMERGENCY)
Facility: HOSPITAL | Age: 66
Discharge: HOME | End: 2021-02-26
Attending: EMERGENCY MEDICINE
Payer: MEDICARE

## 2021-02-26 VITALS
SYSTOLIC BLOOD PRESSURE: 160 MMHG | HEART RATE: 81 BPM | HEIGHT: 69 IN | BODY MASS INDEX: 29.62 KG/M2 | DIASTOLIC BLOOD PRESSURE: 86 MMHG | RESPIRATION RATE: 20 BRPM | WEIGHT: 200 LBS | OXYGEN SATURATION: 98 %

## 2021-02-26 DIAGNOSIS — J01.00 ACUTE MAXILLARY SINUSITIS, RECURRENCE NOT SPECIFIED: Primary | ICD-10-CM

## 2021-02-26 DIAGNOSIS — J20.9 ACUTE BRONCHITIS DUE TO INFECTION: ICD-10-CM

## 2021-02-26 LAB
INFLUENZA A, MOLECULAR: NEGATIVE
INFLUENZA B, MOLECULAR: NEGATIVE
SARS-COV-2 RDRP RESP QL NAA+PROBE: NEGATIVE
SPECIMEN SOURCE: NORMAL

## 2021-02-26 PROCEDURE — 87502 INFLUENZA DNA AMP PROBE: CPT

## 2021-02-26 PROCEDURE — 99284 EMERGENCY DEPT VISIT MOD MDM: CPT | Mod: 25

## 2021-02-26 PROCEDURE — U0002 COVID-19 LAB TEST NON-CDC: HCPCS

## 2021-02-26 RX ORDER — CETIRIZINE HYDROCHLORIDE 10 MG/1
10 TABLET ORAL DAILY
Qty: 30 TABLET | Refills: 0 | Status: SHIPPED | OUTPATIENT
Start: 2021-02-26 | End: 2021-07-20

## 2021-02-26 RX ORDER — FLUTICASONE PROPIONATE 50 MCG
2 SPRAY, SUSPENSION (ML) NASAL 2 TIMES DAILY PRN
Qty: 15 G | Refills: 0 | Status: SHIPPED | OUTPATIENT
Start: 2021-02-26 | End: 2022-01-27 | Stop reason: SDUPTHER

## 2021-02-26 RX ORDER — ALBUTEROL SULFATE 90 UG/1
1-2 AEROSOL, METERED RESPIRATORY (INHALATION) EVERY 6 HOURS PRN
Qty: 18 G | Refills: 0 | Status: SHIPPED | OUTPATIENT
Start: 2021-02-26 | End: 2021-07-20

## 2021-02-26 RX ORDER — METHYLPREDNISOLONE 4 MG/1
TABLET ORAL
Qty: 1 PACKAGE | Refills: 0 | Status: SHIPPED | OUTPATIENT
Start: 2021-02-26 | End: 2021-03-19

## 2021-03-31 ENCOUNTER — PATIENT MESSAGE (OUTPATIENT)
Dept: FAMILY MEDICINE | Facility: CLINIC | Age: 66
End: 2021-03-31

## 2021-04-01 ENCOUNTER — PES CALL (OUTPATIENT)
Dept: ADMINISTRATIVE | Facility: CLINIC | Age: 66
End: 2021-04-01

## 2021-04-05 ENCOUNTER — PATIENT MESSAGE (OUTPATIENT)
Dept: ADMINISTRATIVE | Facility: HOSPITAL | Age: 66
End: 2021-04-05

## 2021-05-02 ENCOUNTER — PATIENT MESSAGE (OUTPATIENT)
Dept: ADMINISTRATIVE | Facility: HOSPITAL | Age: 66
End: 2021-05-02

## 2021-06-30 DIAGNOSIS — Z12.11 COLON CANCER SCREENING: ICD-10-CM

## 2021-07-07 ENCOUNTER — PATIENT MESSAGE (OUTPATIENT)
Dept: ADMINISTRATIVE | Facility: HOSPITAL | Age: 66
End: 2021-07-07

## 2021-07-20 ENCOUNTER — OFFICE VISIT (OUTPATIENT)
Dept: FAMILY MEDICINE | Facility: CLINIC | Age: 66
End: 2021-07-20
Payer: MEDICARE

## 2021-07-20 VITALS
WEIGHT: 192.88 LBS | DIASTOLIC BLOOD PRESSURE: 70 MMHG | HEART RATE: 69 BPM | BODY MASS INDEX: 28.57 KG/M2 | OXYGEN SATURATION: 97 % | HEIGHT: 69 IN | SYSTOLIC BLOOD PRESSURE: 124 MMHG | TEMPERATURE: 99 F

## 2021-07-20 DIAGNOSIS — I10 ESSENTIAL HYPERTENSION: ICD-10-CM

## 2021-07-20 DIAGNOSIS — N52.8 OTHER MALE ERECTILE DYSFUNCTION: ICD-10-CM

## 2021-07-20 DIAGNOSIS — Z12.5 SCREENING FOR PROSTATE CANCER: ICD-10-CM

## 2021-07-20 DIAGNOSIS — J01.01 ACUTE RECURRENT MAXILLARY SINUSITIS: Primary | ICD-10-CM

## 2021-07-20 PROCEDURE — 99214 OFFICE O/P EST MOD 30 MIN: CPT | Mod: S$PBB,,, | Performed by: FAMILY MEDICINE

## 2021-07-20 PROCEDURE — 99999 PR PBB SHADOW E&M-EST. PATIENT-LVL III: CPT | Mod: PBBFAC,,, | Performed by: FAMILY MEDICINE

## 2021-07-20 PROCEDURE — 99999 PR PBB SHADOW E&M-EST. PATIENT-LVL III: ICD-10-PCS | Mod: PBBFAC,,, | Performed by: FAMILY MEDICINE

## 2021-07-20 PROCEDURE — 99213 OFFICE O/P EST LOW 20 MIN: CPT | Mod: PBBFAC,PN | Performed by: FAMILY MEDICINE

## 2021-07-20 PROCEDURE — 99214 PR OFFICE/OUTPT VISIT, EST, LEVL IV, 30-39 MIN: ICD-10-PCS | Mod: S$PBB,,, | Performed by: FAMILY MEDICINE

## 2021-07-20 RX ORDER — HYDROCHLOROTHIAZIDE 12.5 MG/1
12.5 CAPSULE ORAL 2 TIMES DAILY
Qty: 180 CAPSULE | Refills: 3 | Status: SHIPPED | OUTPATIENT
Start: 2021-07-20 | End: 2022-01-27

## 2021-07-20 RX ORDER — CEFUROXIME AXETIL 250 MG/1
250 TABLET ORAL 2 TIMES DAILY
Qty: 20 TABLET | Refills: 0 | Status: SHIPPED | OUTPATIENT
Start: 2021-07-20 | End: 2021-08-19 | Stop reason: ALTCHOICE

## 2021-07-20 RX ORDER — NEBIVOLOL HYDROCHLORIDE 20 MG/1
TABLET ORAL
Qty: 180 TABLET | Refills: 3 | Status: SHIPPED | OUTPATIENT
Start: 2021-07-20 | End: 2022-01-27 | Stop reason: SDUPTHER

## 2021-07-22 ENCOUNTER — PATIENT OUTREACH (OUTPATIENT)
Dept: ADMINISTRATIVE | Facility: HOSPITAL | Age: 66
End: 2021-07-22

## 2021-07-31 ENCOUNTER — LAB VISIT (OUTPATIENT)
Dept: LAB | Facility: HOSPITAL | Age: 66
End: 2021-07-31
Attending: FAMILY MEDICINE
Payer: MEDICARE

## 2021-07-31 DIAGNOSIS — N52.8 OTHER MALE ERECTILE DYSFUNCTION: ICD-10-CM

## 2021-07-31 DIAGNOSIS — Z12.5 SCREENING FOR PROSTATE CANCER: ICD-10-CM

## 2021-07-31 DIAGNOSIS — I10 ESSENTIAL HYPERTENSION: ICD-10-CM

## 2021-07-31 LAB
ALBUMIN SERPL BCP-MCNC: 3.7 G/DL (ref 3.5–5.2)
ALP SERPL-CCNC: 96 U/L (ref 55–135)
ALT SERPL W/O P-5'-P-CCNC: 105 U/L (ref 10–44)
ANION GAP SERPL CALC-SCNC: 12 MMOL/L (ref 8–16)
AST SERPL-CCNC: 58 U/L (ref 10–40)
BASOPHILS # BLD AUTO: 0.02 K/UL (ref 0–0.2)
BASOPHILS NFR BLD: 0.3 % (ref 0–1.9)
BILIRUB SERPL-MCNC: 0.9 MG/DL (ref 0.1–1)
BUN SERPL-MCNC: 16 MG/DL (ref 8–23)
CALCIUM SERPL-MCNC: 9.2 MG/DL (ref 8.7–10.5)
CHLORIDE SERPL-SCNC: 103 MMOL/L (ref 95–110)
CHOLEST SERPL-MCNC: 180 MG/DL (ref 120–199)
CHOLEST/HDLC SERPL: 6.4 {RATIO} (ref 2–5)
CO2 SERPL-SCNC: 27 MMOL/L (ref 23–29)
COMPLEXED PSA SERPL-MCNC: 1.5 NG/ML (ref 0–4)
CREAT SERPL-MCNC: 1.1 MG/DL (ref 0.5–1.4)
DIFFERENTIAL METHOD: ABNORMAL
EOSINOPHIL # BLD AUTO: 0.3 K/UL (ref 0–0.5)
EOSINOPHIL NFR BLD: 4.3 % (ref 0–8)
ERYTHROCYTE [DISTWIDTH] IN BLOOD BY AUTOMATED COUNT: 12.6 % (ref 11.5–14.5)
EST. GFR  (AFRICAN AMERICAN): >60 ML/MIN/1.73 M^2
EST. GFR  (NON AFRICAN AMERICAN): >60 ML/MIN/1.73 M^2
ESTRADIOL SERPL-MCNC: 20 PG/ML (ref 11–44)
GLUCOSE SERPL-MCNC: 118 MG/DL (ref 70–110)
HCT VFR BLD AUTO: 43.4 % (ref 40–54)
HDLC SERPL-MCNC: 28 MG/DL (ref 40–75)
HDLC SERPL: 15.6 % (ref 20–50)
HGB BLD-MCNC: 14.7 G/DL (ref 14–18)
IMM GRANULOCYTES # BLD AUTO: 0.04 K/UL (ref 0–0.04)
IMM GRANULOCYTES NFR BLD AUTO: 0.7 % (ref 0–0.5)
LDLC SERPL CALC-MCNC: 95.8 MG/DL (ref 63–159)
LYMPHOCYTES # BLD AUTO: 1.7 K/UL (ref 1–4.8)
LYMPHOCYTES NFR BLD: 29.7 % (ref 18–48)
MCH RBC QN AUTO: 29.8 PG (ref 27–31)
MCHC RBC AUTO-ENTMCNC: 33.9 G/DL (ref 32–36)
MCV RBC AUTO: 88 FL (ref 82–98)
MONOCYTES # BLD AUTO: 0.6 K/UL (ref 0.3–1)
MONOCYTES NFR BLD: 9.9 % (ref 4–15)
NEUTROPHILS # BLD AUTO: 3.2 K/UL (ref 1.8–7.7)
NEUTROPHILS NFR BLD: 55.1 % (ref 38–73)
NONHDLC SERPL-MCNC: 152 MG/DL
NRBC BLD-RTO: 0 /100 WBC
PLATELET # BLD AUTO: 126 K/UL (ref 150–450)
PMV BLD AUTO: 9.7 FL (ref 9.2–12.9)
POTASSIUM SERPL-SCNC: 3.5 MMOL/L (ref 3.5–5.1)
PROT SERPL-MCNC: 6.3 G/DL (ref 6–8.4)
RBC # BLD AUTO: 4.94 M/UL (ref 4.6–6.2)
SODIUM SERPL-SCNC: 142 MMOL/L (ref 136–145)
TESTOST SERPL-MCNC: 778 NG/DL (ref 304–1227)
TRIGL SERPL-MCNC: 281 MG/DL (ref 30–150)
WBC # BLD AUTO: 5.75 K/UL (ref 3.9–12.7)

## 2021-07-31 PROCEDURE — 80061 LIPID PANEL: CPT | Performed by: FAMILY MEDICINE

## 2021-07-31 PROCEDURE — 85025 COMPLETE CBC W/AUTO DIFF WBC: CPT | Performed by: FAMILY MEDICINE

## 2021-07-31 PROCEDURE — 82670 ASSAY OF TOTAL ESTRADIOL: CPT | Performed by: FAMILY MEDICINE

## 2021-07-31 PROCEDURE — 84403 ASSAY OF TOTAL TESTOSTERONE: CPT | Performed by: FAMILY MEDICINE

## 2021-07-31 PROCEDURE — 84402 ASSAY OF FREE TESTOSTERONE: CPT | Performed by: FAMILY MEDICINE

## 2021-07-31 PROCEDURE — 80053 COMPREHEN METABOLIC PANEL: CPT | Performed by: FAMILY MEDICINE

## 2021-07-31 PROCEDURE — 84153 ASSAY OF PSA TOTAL: CPT | Performed by: FAMILY MEDICINE

## 2021-08-03 LAB — TESTOST FREE SERPL-MCNC: 7.6 PG/ML

## 2021-08-19 ENCOUNTER — OFFICE VISIT (OUTPATIENT)
Dept: FAMILY MEDICINE | Facility: CLINIC | Age: 66
End: 2021-08-19
Payer: MEDICARE

## 2021-08-19 VITALS
BODY MASS INDEX: 29.09 KG/M2 | DIASTOLIC BLOOD PRESSURE: 76 MMHG | OXYGEN SATURATION: 98 % | HEART RATE: 71 BPM | TEMPERATURE: 98 F | WEIGHT: 196.44 LBS | HEIGHT: 69 IN | SYSTOLIC BLOOD PRESSURE: 136 MMHG

## 2021-08-19 DIAGNOSIS — K76.0 FATTY LIVER DISEASE, NONALCOHOLIC: Primary | ICD-10-CM

## 2021-08-19 DIAGNOSIS — D69.6 THROMBOCYTOPENIA: ICD-10-CM

## 2021-08-19 DIAGNOSIS — E78.2 MIXED HYPERLIPIDEMIA: ICD-10-CM

## 2021-08-19 PROCEDURE — 99214 OFFICE O/P EST MOD 30 MIN: CPT | Mod: PBBFAC,PN,25 | Performed by: FAMILY MEDICINE

## 2021-08-19 PROCEDURE — 99999 PR PBB SHADOW E&M-EST. PATIENT-LVL IV: CPT | Mod: PBBFAC,,, | Performed by: FAMILY MEDICINE

## 2021-08-19 PROCEDURE — G0009 ADMIN PNEUMOCOCCAL VACCINE: HCPCS | Mod: PBBFAC,PN

## 2021-08-19 PROCEDURE — 99214 OFFICE O/P EST MOD 30 MIN: CPT | Mod: S$PBB,,, | Performed by: FAMILY MEDICINE

## 2021-08-19 PROCEDURE — 90732 PPSV23 VACC 2 YRS+ SUBQ/IM: CPT | Mod: PBBFAC,PN

## 2021-08-19 PROCEDURE — 99214 PR OFFICE/OUTPT VISIT, EST, LEVL IV, 30-39 MIN: ICD-10-PCS | Mod: S$PBB,,, | Performed by: FAMILY MEDICINE

## 2021-08-19 PROCEDURE — 99999 PR PBB SHADOW E&M-EST. PATIENT-LVL IV: ICD-10-PCS | Mod: PBBFAC,,, | Performed by: FAMILY MEDICINE

## 2021-08-19 RX ORDER — COLCHICINE 0.6 MG/1
TABLET ORAL
Qty: 60 TABLET | Refills: 3 | Status: SHIPPED | OUTPATIENT
Start: 2021-08-19

## 2021-09-10 ENCOUNTER — TELEPHONE (OUTPATIENT)
Dept: DERMATOLOGY | Facility: CLINIC | Age: 66
End: 2021-09-10

## 2021-10-07 ENCOUNTER — PATIENT MESSAGE (OUTPATIENT)
Dept: ADMINISTRATIVE | Facility: HOSPITAL | Age: 66
End: 2021-10-07

## 2021-11-12 ENCOUNTER — TELEPHONE (OUTPATIENT)
Dept: ORTHOPEDICS | Facility: CLINIC | Age: 66
End: 2021-11-12
Payer: MEDICARE

## 2021-11-17 DIAGNOSIS — M25.562 LEFT KNEE PAIN, UNSPECIFIED CHRONICITY: Primary | ICD-10-CM

## 2021-11-18 ENCOUNTER — OFFICE VISIT (OUTPATIENT)
Dept: ORTHOPEDICS | Facility: CLINIC | Age: 66
End: 2021-11-18
Payer: MEDICARE

## 2021-11-18 ENCOUNTER — PATIENT OUTREACH (OUTPATIENT)
Dept: ADMINISTRATIVE | Facility: OTHER | Age: 66
End: 2021-11-18
Payer: MEDICARE

## 2021-11-18 ENCOUNTER — HOSPITAL ENCOUNTER (OUTPATIENT)
Dept: RADIOLOGY | Facility: HOSPITAL | Age: 66
Discharge: HOME | End: 2021-11-18
Attending: ORTHOPAEDIC SURGERY
Payer: MEDICARE

## 2021-11-18 VITALS — BODY MASS INDEX: 29.03 KG/M2 | WEIGHT: 196 LBS | RESPIRATION RATE: 18 BRPM | HEIGHT: 69 IN

## 2021-11-18 DIAGNOSIS — M17.10 ARTHRITIS OF KNEE: ICD-10-CM

## 2021-11-18 DIAGNOSIS — M25.562 LEFT KNEE PAIN, UNSPECIFIED CHRONICITY: Primary | ICD-10-CM

## 2021-11-18 DIAGNOSIS — M25.562 LEFT KNEE PAIN, UNSPECIFIED CHRONICITY: ICD-10-CM

## 2021-11-18 PROCEDURE — 99213 PR OFFICE/OUTPT VISIT, EST, LEVL III, 20-29 MIN: ICD-10-PCS | Mod: 25,S$PBB,, | Performed by: ORTHOPAEDIC SURGERY

## 2021-11-18 PROCEDURE — 99999 PR PBB SHADOW E&M-EST. PATIENT-LVL III: ICD-10-PCS | Mod: PBBFAC,,, | Performed by: ORTHOPAEDIC SURGERY

## 2021-11-18 PROCEDURE — 99213 OFFICE O/P EST LOW 20 MIN: CPT | Mod: PBBFAC,PN,25 | Performed by: ORTHOPAEDIC SURGERY

## 2021-11-18 PROCEDURE — 20610 LARGE JOINT ASPIRATION/INJECTION: L KNEE: ICD-10-PCS | Mod: S$PBB,LT,, | Performed by: ORTHOPAEDIC SURGERY

## 2021-11-18 PROCEDURE — 99999 PR PBB SHADOW E&M-EST. PATIENT-LVL III: CPT | Mod: PBBFAC,,, | Performed by: ORTHOPAEDIC SURGERY

## 2021-11-18 PROCEDURE — 20610 DRAIN/INJ JOINT/BURSA W/O US: CPT | Mod: PBBFAC,PN | Performed by: ORTHOPAEDIC SURGERY

## 2021-11-18 PROCEDURE — 99213 OFFICE O/P EST LOW 20 MIN: CPT | Mod: 25,S$PBB,, | Performed by: ORTHOPAEDIC SURGERY

## 2021-11-18 RX ORDER — TRIAMCINOLONE ACETONIDE 40 MG/ML
40 INJECTION, SUSPENSION INTRA-ARTICULAR; INTRAMUSCULAR
Status: DISCONTINUED | OUTPATIENT
Start: 2021-11-18 | End: 2021-11-18 | Stop reason: HOSPADM

## 2021-11-18 RX ADMIN — TRIAMCINOLONE ACETONIDE 40 MG: 40 INJECTION, SUSPENSION INTRA-ARTICULAR; INTRAMUSCULAR at 10:11

## 2021-12-01 DIAGNOSIS — Z12.11 COLON CANCER SCREENING: ICD-10-CM

## 2021-12-02 ENCOUNTER — OFFICE VISIT (OUTPATIENT)
Dept: SPINE | Facility: CLINIC | Age: 66
End: 2021-12-02
Payer: MEDICARE

## 2021-12-02 VITALS — BODY MASS INDEX: 29.03 KG/M2 | HEIGHT: 69 IN | WEIGHT: 196 LBS

## 2021-12-02 DIAGNOSIS — M54.50 ACUTE BILATERAL LOW BACK PAIN WITHOUT SCIATICA: Primary | ICD-10-CM

## 2021-12-02 DIAGNOSIS — M54.9 DORSALGIA, UNSPECIFIED: ICD-10-CM

## 2021-12-02 PROCEDURE — 99204 OFFICE O/P NEW MOD 45 MIN: CPT | Mod: S$GLB,,, | Performed by: PHYSICAL MEDICINE & REHABILITATION

## 2021-12-02 PROCEDURE — 99204 PR OFFICE/OUTPT VISIT, NEW, LEVL IV, 45-59 MIN: ICD-10-PCS | Mod: S$GLB,,, | Performed by: PHYSICAL MEDICINE & REHABILITATION

## 2021-12-14 ENCOUNTER — HOSPITAL ENCOUNTER (OUTPATIENT)
Dept: RADIOLOGY | Facility: HOSPITAL | Age: 66
Discharge: HOME | End: 2021-12-14
Attending: PHYSICAL MEDICINE & REHABILITATION
Payer: MEDICARE

## 2021-12-14 DIAGNOSIS — M54.9 DORSALGIA, UNSPECIFIED: ICD-10-CM

## 2021-12-14 PROCEDURE — 72148 MRI LUMBAR SPINE W/O DYE: CPT | Mod: TC,PO

## 2021-12-19 ENCOUNTER — PATIENT OUTREACH (OUTPATIENT)
Dept: ADMINISTRATIVE | Facility: OTHER | Age: 66
End: 2021-12-19
Payer: MEDICARE

## 2021-12-20 ENCOUNTER — OFFICE VISIT (OUTPATIENT)
Dept: SPINE | Facility: CLINIC | Age: 66
End: 2021-12-20
Payer: MEDICARE

## 2021-12-20 DIAGNOSIS — M54.50 ACUTE BILATERAL LOW BACK PAIN WITHOUT SCIATICA: Primary | ICD-10-CM

## 2021-12-20 PROCEDURE — 99213 PR OFFICE/OUTPT VISIT, EST, LEVL III, 20-29 MIN: ICD-10-PCS | Mod: S$GLB,,, | Performed by: PHYSICAL MEDICINE & REHABILITATION

## 2021-12-20 PROCEDURE — 99213 OFFICE O/P EST LOW 20 MIN: CPT | Mod: S$GLB,,, | Performed by: PHYSICAL MEDICINE & REHABILITATION

## 2021-12-20 RX ORDER — METHYLPREDNISOLONE 4 MG/1
TABLET ORAL
Qty: 1 EACH | Refills: 0 | Status: SHIPPED | OUTPATIENT
Start: 2021-12-20 | End: 2022-01-10

## 2022-01-22 ENCOUNTER — LAB VISIT (OUTPATIENT)
Dept: LAB | Facility: HOSPITAL | Age: 67
End: 2022-01-22
Attending: FAMILY MEDICINE
Payer: MEDICARE

## 2022-01-22 DIAGNOSIS — E78.2 MIXED HYPERLIPIDEMIA: ICD-10-CM

## 2022-01-22 DIAGNOSIS — K76.0 FATTY LIVER DISEASE, NONALCOHOLIC: ICD-10-CM

## 2022-01-22 DIAGNOSIS — D69.6 THROMBOCYTOPENIA: ICD-10-CM

## 2022-01-22 LAB
ALBUMIN SERPL BCP-MCNC: 3.9 G/DL (ref 3.5–5.2)
ALP SERPL-CCNC: 94 U/L (ref 55–135)
ALT SERPL W/O P-5'-P-CCNC: 85 U/L (ref 10–44)
ANION GAP SERPL CALC-SCNC: 13 MMOL/L (ref 8–16)
AST SERPL-CCNC: 45 U/L (ref 10–40)
BASOPHILS # BLD AUTO: 0.01 K/UL (ref 0–0.2)
BASOPHILS NFR BLD: 0.1 % (ref 0–1.9)
BILIRUB SERPL-MCNC: 1.1 MG/DL (ref 0.1–1)
BUN SERPL-MCNC: 15 MG/DL (ref 8–23)
CALCIUM SERPL-MCNC: 9.6 MG/DL (ref 8.7–10.5)
CHLORIDE SERPL-SCNC: 101 MMOL/L (ref 95–110)
CHOLEST SERPL-MCNC: 193 MG/DL (ref 120–199)
CHOLEST/HDLC SERPL: 5.8 {RATIO} (ref 2–5)
CO2 SERPL-SCNC: 28 MMOL/L (ref 23–29)
CREAT SERPL-MCNC: 1.2 MG/DL (ref 0.5–1.4)
DIFFERENTIAL METHOD: ABNORMAL
EOSINOPHIL # BLD AUTO: 0.3 K/UL (ref 0–0.5)
EOSINOPHIL NFR BLD: 4.6 % (ref 0–8)
ERYTHROCYTE [DISTWIDTH] IN BLOOD BY AUTOMATED COUNT: 12.6 % (ref 11.5–14.5)
EST. GFR  (AFRICAN AMERICAN): >60 ML/MIN/1.73 M^2
EST. GFR  (NON AFRICAN AMERICAN): >60 ML/MIN/1.73 M^2
GLUCOSE SERPL-MCNC: 121 MG/DL (ref 70–110)
HCT VFR BLD AUTO: 45.5 % (ref 40–54)
HDLC SERPL-MCNC: 33 MG/DL (ref 40–75)
HDLC SERPL: 17.1 % (ref 20–50)
HGB BLD-MCNC: 15.5 G/DL (ref 14–18)
IMM GRANULOCYTES # BLD AUTO: 0.04 K/UL (ref 0–0.04)
IMM GRANULOCYTES NFR BLD AUTO: 0.6 % (ref 0–0.5)
LDLC SERPL CALC-MCNC: 108 MG/DL (ref 63–159)
LYMPHOCYTES # BLD AUTO: 1.8 K/UL (ref 1–4.8)
LYMPHOCYTES NFR BLD: 27.1 % (ref 18–48)
MCH RBC QN AUTO: 29.9 PG (ref 27–31)
MCHC RBC AUTO-ENTMCNC: 34.1 G/DL (ref 32–36)
MCV RBC AUTO: 88 FL (ref 82–98)
MONOCYTES # BLD AUTO: 0.6 K/UL (ref 0.3–1)
MONOCYTES NFR BLD: 9.5 % (ref 4–15)
NEUTROPHILS # BLD AUTO: 3.9 K/UL (ref 1.8–7.7)
NEUTROPHILS NFR BLD: 58.1 % (ref 38–73)
NONHDLC SERPL-MCNC: 160 MG/DL
NRBC BLD-RTO: 0 /100 WBC
PLATELET # BLD AUTO: 150 K/UL (ref 150–450)
PMV BLD AUTO: 9.9 FL (ref 9.2–12.9)
POTASSIUM SERPL-SCNC: 3.4 MMOL/L (ref 3.5–5.1)
PROT SERPL-MCNC: 6.7 G/DL (ref 6–8.4)
RBC # BLD AUTO: 5.18 M/UL (ref 4.6–6.2)
SODIUM SERPL-SCNC: 142 MMOL/L (ref 136–145)
TRIGL SERPL-MCNC: 260 MG/DL (ref 30–150)
WBC # BLD AUTO: 6.71 K/UL (ref 3.9–12.7)

## 2022-01-22 PROCEDURE — 36415 COLL VENOUS BLD VENIPUNCTURE: CPT | Performed by: FAMILY MEDICINE

## 2022-01-22 PROCEDURE — 80053 COMPREHEN METABOLIC PANEL: CPT | Performed by: FAMILY MEDICINE

## 2022-01-22 PROCEDURE — 80061 LIPID PANEL: CPT | Performed by: FAMILY MEDICINE

## 2022-01-22 PROCEDURE — 85025 COMPLETE CBC W/AUTO DIFF WBC: CPT | Performed by: FAMILY MEDICINE

## 2022-01-25 NOTE — PROGRESS NOTES
Spoke with patient in regards to his results. Patient informed of providers message. Patient verbalized understanding.    Patient is scheduled to re-establish with Dr. Nelson on 01/27/2022.

## 2022-01-27 ENCOUNTER — DOCUMENTATION ONLY (OUTPATIENT)
Dept: FAMILY MEDICINE | Facility: CLINIC | Age: 67
End: 2022-01-27
Payer: MEDICARE

## 2022-01-27 ENCOUNTER — OFFICE VISIT (OUTPATIENT)
Dept: FAMILY MEDICINE | Facility: CLINIC | Age: 67
End: 2022-01-27
Payer: MEDICARE

## 2022-01-27 VITALS
BODY MASS INDEX: 29.71 KG/M2 | RESPIRATION RATE: 16 BRPM | WEIGHT: 200.63 LBS | DIASTOLIC BLOOD PRESSURE: 80 MMHG | HEIGHT: 69 IN | SYSTOLIC BLOOD PRESSURE: 134 MMHG | HEART RATE: 67 BPM | TEMPERATURE: 98 F | OXYGEN SATURATION: 96 %

## 2022-01-27 DIAGNOSIS — J32.9 SINUSITIS, UNSPECIFIED CHRONICITY, UNSPECIFIED LOCATION: ICD-10-CM

## 2022-01-27 DIAGNOSIS — R94.5 ABNORMAL RESULTS OF LIVER FUNCTION STUDIES: ICD-10-CM

## 2022-01-27 DIAGNOSIS — B35.4 TINEA CORPORIS: ICD-10-CM

## 2022-01-27 DIAGNOSIS — Z12.11 COLON CANCER SCREENING: ICD-10-CM

## 2022-01-27 DIAGNOSIS — I10 ESSENTIAL HYPERTENSION: Primary | ICD-10-CM

## 2022-01-27 DIAGNOSIS — J01.01 ACUTE RECURRENT MAXILLARY SINUSITIS: ICD-10-CM

## 2022-01-27 DIAGNOSIS — K76.0 FATTY LIVER DISEASE, NONALCOHOLIC: ICD-10-CM

## 2022-01-27 DIAGNOSIS — N52.8 OTHER MALE ERECTILE DYSFUNCTION: ICD-10-CM

## 2022-01-27 DIAGNOSIS — R79.9 ABNORMAL FINDING OF BLOOD CHEMISTRY, UNSPECIFIED: ICD-10-CM

## 2022-01-27 DIAGNOSIS — M10.9 GOUTY ARTHRITIS: ICD-10-CM

## 2022-01-27 PROCEDURE — 99999 PR PBB SHADOW E&M-EST. PATIENT-LVL IV: ICD-10-PCS | Mod: PBBFAC,,, | Performed by: STUDENT IN AN ORGANIZED HEALTH CARE EDUCATION/TRAINING PROGRAM

## 2022-01-27 PROCEDURE — 99214 PR OFFICE/OUTPT VISIT, EST, LEVL IV, 30-39 MIN: ICD-10-PCS | Mod: S$PBB,,, | Performed by: STUDENT IN AN ORGANIZED HEALTH CARE EDUCATION/TRAINING PROGRAM

## 2022-01-27 PROCEDURE — 99999 PR PBB SHADOW E&M-EST. PATIENT-LVL IV: CPT | Mod: PBBFAC,,, | Performed by: STUDENT IN AN ORGANIZED HEALTH CARE EDUCATION/TRAINING PROGRAM

## 2022-01-27 PROCEDURE — 99214 OFFICE O/P EST MOD 30 MIN: CPT | Mod: PBBFAC,PO | Performed by: STUDENT IN AN ORGANIZED HEALTH CARE EDUCATION/TRAINING PROGRAM

## 2022-01-27 PROCEDURE — 99214 OFFICE O/P EST MOD 30 MIN: CPT | Mod: S$PBB,,, | Performed by: STUDENT IN AN ORGANIZED HEALTH CARE EDUCATION/TRAINING PROGRAM

## 2022-01-27 RX ORDER — NEBIVOLOL 20 MG/1
TABLET ORAL
Qty: 180 TABLET | Refills: 3 | Status: SHIPPED | OUTPATIENT
Start: 2022-01-27 | End: 2022-10-18

## 2022-01-27 RX ORDER — LOSARTAN POTASSIUM 100 MG/1
100 TABLET ORAL DAILY
Qty: 90 TABLET | Refills: 1 | Status: SHIPPED | OUTPATIENT
Start: 2022-01-27 | End: 2022-07-28

## 2022-01-27 RX ORDER — TADALAFIL 20 MG/1
20 TABLET ORAL
Qty: 7 TABLET | Refills: 12 | Status: SHIPPED | OUTPATIENT
Start: 2022-01-27 | End: 2022-12-28 | Stop reason: SDUPTHER

## 2022-01-27 RX ORDER — FLUTICASONE PROPIONATE 50 MCG
2 SPRAY, SUSPENSION (ML) NASAL 2 TIMES DAILY PRN
Qty: 15 G | Refills: 0 | Status: SHIPPED | OUTPATIENT
Start: 2022-01-27 | End: 2022-04-27

## 2022-01-27 RX ORDER — METHYLPREDNISOLONE 4 MG/1
TABLET ORAL
Qty: 1 EACH | Refills: 0 | Status: CANCELLED | OUTPATIENT
Start: 2022-01-27 | End: 2022-02-17

## 2022-01-27 RX ORDER — HYDROCHLOROTHIAZIDE 12.5 MG/1
12.5 CAPSULE ORAL 2 TIMES DAILY
Qty: 180 CAPSULE | Refills: 3 | Status: CANCELLED | OUTPATIENT
Start: 2022-01-27

## 2022-01-27 RX ORDER — DOXYCYCLINE HYCLATE 100 MG
100 TABLET ORAL 2 TIMES DAILY
Qty: 20 TABLET | Refills: 0 | Status: CANCELLED | OUTPATIENT
Start: 2022-01-27 | End: 2022-02-06

## 2022-01-27 RX ORDER — NYSTATIN AND TRIAMCINOLONE ACETONIDE 100000; 1 [USP'U]/G; MG/G
CREAM TOPICAL
Qty: 60 G | Refills: 2 | Status: SHIPPED | OUTPATIENT
Start: 2022-01-27 | End: 2024-01-22 | Stop reason: SDUPTHER

## 2022-01-27 RX ORDER — CETIRIZINE HYDROCHLORIDE 10 MG/1
10 TABLET ORAL DAILY
Qty: 90 TABLET | Refills: 0 | Status: SHIPPED | OUTPATIENT
Start: 2022-01-27 | End: 2022-12-15

## 2022-01-27 RX ORDER — ALLOPURINOL 100 MG/1
100 TABLET ORAL DAILY
Qty: 90 TABLET | Refills: 0 | Status: SHIPPED | OUTPATIENT
Start: 2022-01-27 | End: 2022-04-27

## 2022-01-27 NOTE — PROGRESS NOTES
[facility] Note    Subjective:    Chief Complaint:   Chief Complaint   Patient presents with    [facility]    Sinus Problem    Hypertension       274}    66 y.o. male presents for multiple issues.     The HPI and pertinent ROS is included in the Diagnostic Impression Remarks section at the end of the note. Please see below for further details.     The following portions of the patient's history were reviewed and updated as appropriate: allergies, current medications, past family history, past medical history, past social history, past surgical history and problem list.    He  has a past medical history of Contact lens/glasses fitting, Deviated septum, Fatty liver disease, nonalcoholic, Hypertension, Hypokalemia (4/18/2015), Kidney stone, Persistent headaches, Peyronie's disease, and Thrombocytopenia.  He  has a past surgical history that includes Hernia repair; Hernia repair (2008); Sinus surgery (2012); Nasal septum surgery; and Cardiac catheterization (2015).    He  reports that he quit smoking about 50 years ago. His smoking use included cigarettes and cigars. He has a 9.00 pack-year smoking history. He has never used smokeless tobacco. He reports that he does not drink alcohol and does not use drugs.  He family history includes Alzheimer's disease in his maternal grandmother; Atrial fibrillation in his mother; Heart disease in his father and mother; Heart failure in his father; Hypertension in his father; Psoriasis in his father and paternal grandmother.    Review of patient's allergies indicates:   Allergen Reactions    Morphine Other (See Comments)     Family HX-mother went into anaphylactic shock    Contrast media      Family history of renal failure with iodinated contrast    Demerol [meperidine]     Morphine Other (See Comments)     pts mother had anaphylaxis from Morphine so he does not want to take       Physical Examination  /80 (BP Location: Right arm, Patient Position:  "Sitting, BP Method: Large (Manual))   Pulse 67   Temp 97.9 °F (36.6 °C) (Oral)   Resp 16   Ht 5' 9" (1.753 m)   Wt 91 kg (200 lb 9.9 oz)   SpO2 96%   BMI 29.63 kg/m²    274}  Wt Readings from Last 3 Encounters:   01/27/22 91 kg (200 lb 9.9 oz)   12/02/21 88.9 kg (195 lb 15.8 oz)   11/18/21 88.9 kg (196 lb)     BP Readings from Last 3 Encounters:   01/27/22 134/80   08/19/21 136/76   07/20/21 124/70                Estimated body mass index is 29.63 kg/m² as calculated from the following:    Height as of this encounter: 5' 9" (1.753 m).    Weight as of this encounter: 91 kg (200 lb 9.9 oz).     General appearance: alert, cooperative, no distress  Neck: no thyromegaly, no neck stiffness  Lungs: clear to auscultation, no wheezes, rales or rhonchi, symmetric air entry  Heart: normal rate, regular rhythm, normal S1, S2, no murmurs, rubs, clicks or gallops  Abdomen: soft, nontender, nondistended, no rigidity, rebound, or guarding.   Back: no point tenderness over spine  Extremities: peripheral pulses normal, no unilateral leg swelling or calf tenderness   Neurological:alert, oriented, normal speech, no new focal findings or movement disorder noted from baseline    Data reviewed 274}  Previous medical records reviewed and summarized in HPI.   Health Maintenance Due   Topic Date Due    Colorectal Cancer Screening  Never done    Shingles Vaccine (1 of 2) Never done    COVID-19 Vaccine (3 - Booster for Pfizer series) 07/19/2021         Laboratory  274}  I have reviewed old labs below:  Lab Results   Component Value Date    WBC 6.71 01/22/2022    HGB 15.5 01/22/2022    HCT 45.5 01/22/2022    MCV 88 01/22/2022     01/22/2022     01/22/2022    K 3.4 (L) 01/22/2022     01/22/2022    CALCIUM 9.6 01/22/2022    PHOS 2.9 04/18/2015    CO2 28 01/22/2022     (H) 01/22/2022    BUN 15 01/22/2022    CREATININE 1.2 01/22/2022    ANIONGAP 13 01/22/2022    ESTGFRAFRICA >60 01/22/2022    EGFRNONAA >60 " "01/22/2022    PROT 6.7 01/22/2022    ALBUMIN 3.9 01/22/2022    BILITOT 1.1 (H) 01/22/2022    ALKPHOS 94 01/22/2022    ALT 85 (H) 01/22/2022    AST 45 (H) 01/22/2022    INR 1.0 06/28/2019    CHOL 193 01/22/2022    TRIG 260 (H) 01/22/2022    HDL 33 (L) 01/22/2022    LDLCALC 108.0 01/22/2022    TSH 3.003 05/07/2019    PSA 1.5 07/31/2021    HGBA1C 5.1 10/11/2019     Lab reviewed by me: Particular labs of significance that I will monitor, workup, or treat to improve are mentioned below in diagnostic impression remarks.  :[id number]}274}  Imaging/EKG: I have reviewed the pertinent results/findings and my personal findings are noted below in diagnostic impression remarks.  274}    Assessment/Plan  Elliott Gaspar is a 66 y.o. male who presents to clinic with:    1. Essential hypertension    2. Tinea corporis    3. Acute recurrent maxillary sinusitis    4. Fatty liver disease, nonalcoholic    5. Gouty arthritis    6. Sinusitis, unspecified chronicity, unspecified location    7. Other male erectile dysfunction    8. Abnormal finding of blood chemistry, unspecified    9. Colon cancer screening        Diagnostic Impression Remarks + HPI     Documentation entered by me for this encounter may have been done in part using speech-recognition technology. Although I have made an effort to ensure accuracy, "sound like" errors may exist and should be interpreted in context.      Hypertension-well controlled on Bystolic and hydrochlorothiazide but he does have gout along with history low-potassium thus will stop hydrochlorothiazide replace with losartan which will not only help his potassium but improve his gout   Gouty arthritis-needs improvement is taking colchicine will add on allopurinol since his flare has resolved will check uric acid and stop hydrochlorothiazide recommended try to limit seafood and will increase allopurinol based on uric acid levels in the future   Sinusitis-needs improvement will start antihistamine continue " "Flonase will send antibiotic is not improved   Erectile dysfunction-stable on Cialis will continue   Fatty liver disease-elevated liver enzymes recommend limit alcohol healthy diet monitor   Tinea pedis-will send antifungal   Maintenance-will send in fit test for colon cancer screening no family history    This is the extent of the patient's complaints at this present time. He denies chest pain upon exertion, dyspnea, nausea, vomiting, diaphoresis, and syncope. No pleuritic chest pain, unilateral leg swelling, calf tenderness, or calf pain.     Elliott will return to clinic in a few months for further workup and reassessment or sooner as needed. He was instructed to call the clinic or go to the emergency department if his symptoms do not improve, worsens, or if new symptoms develop. As we discussed that symptoms could worsen over the next 24 hours he was advised that if any increased swelling, pain, or numbness arise to go immediately to the ED. Patient knows to call any time if an emergency arises. Shared decision making occurred and he verbalized understanding in agreement with this plan.     274}    BMI Goal    Counseled patient on his ideal body weight, health consequences of being obese and current recommendations including weekly exercise and a heart healthy diet.  He is aware that ideal BMI < 25  Estimated body mass index is 29.63 kg/m² as calculated from the following:    Height as of this encounter: 5' 9" (1.753 m).    Weight as of this encounter: 91 kg (200 lb 9.9 oz).     He was counseled about the importance of healthy dietary habits as well as routine physical activity and exercise for better health outcomes. I also discussed the importance of cancer screening.     Medication Monitoring    In today's visit, monitoring for drug toxicity was accomplished. Proper use of medications was also discussed.     I discussed imaging findings, diagnosis, possibilities, treatment options, medications, risks, and " benefits. He had many questions regarding the options and long-term effects. All questions were answered. He expressed understanding after counseling regarding the diagnosis and recommendations. He was capable and demonstrated competence with understanding of these options. Shared decision making was performed resulting in him choosing the current treatment plan. Patient handout was given with instructions and recommendations. Advised the patient that if they become pregnant to alert us immediately to assess for medication changes.     I also discussed the importance of close follow up to discuss labs, change or modify his medications if needed, monitor side effects, and further evaluation of medical problems.     Additional workup planned: see labs ordered below.    See below for labs and meds ordered with associated diagnosis      1. Essential hypertension  - nebivoloL (BYSTOLIC) 20 mg Tab; TAKE 2 TABLETS (40 MG) ONCE DAILY  Dispense: 180 tablet; Refill: 3  - losartan (COZAAR) 100 MG tablet; Take 1 tablet (100 mg total) by mouth once daily.  Dispense: 90 tablet; Refill: 1    2. Tinea corporis  - nystatin-triamcinolone (MYCOLOG II) cream; APPLY  CREAM TOPICALLY TWICE DAILY  Dispense: 60 g; Refill: 2    3. Acute recurrent maxillary sinusitis    4. Fatty liver disease, nonalcoholic    5. Gouty arthritis  - Uric Acid; Future  - allopurinoL (ZYLOPRIM) 100 MG tablet; Take 1 tablet (100 mg total) by mouth once daily.  Dispense: 90 tablet; Refill: 0    6. Sinusitis, unspecified chronicity, unspecified location  - fluticasone propionate (FLONASE) 50 mcg/actuation nasal spray; 2 sprays (100 mcg total) by Each Nostril route 2 (two) times daily as needed for Rhinitis.  Dispense: 15 g; Refill: 0  - cetirizine (ZYRTEC) 10 MG tablet; Take 1 tablet (10 mg total) by mouth once daily.  Dispense: 90 tablet; Refill: 0    7. Other male erectile dysfunction  - tadalafiL (CIALIS) 20 MG Tab; Take 1 tablet (20 mg total) by mouth as needed  (intercourse).  Dispense: 7 tablet; Refill: 12    8. Abnormal finding of blood chemistry, unspecified  - Hemoglobin A1C; Future    9. Colon cancer screening  - Fecal Immunochemical Test (iFOBT); Future    Medication List with Changes/Refills   New Medications    ALLOPURINOL (ZYLOPRIM) 100 MG TABLET    Take 1 tablet (100 mg total) by mouth once daily.    CETIRIZINE (ZYRTEC) 10 MG TABLET    Take 1 tablet (10 mg total) by mouth once daily.    LOSARTAN (COZAAR) 100 MG TABLET    Take 1 tablet (100 mg total) by mouth once daily.   Current Medications    B COMPLEX VITAMINS CAPSULE    Take 1 capsule by mouth once daily.    COLCHICINE (COLCRYS) 0.6 MG TABLET    Use as directed    GLUCOSAMINE-CHONDROITIN 500-400 MG TABLET    Take 1 tablet by mouth 2 (two) times daily.   Changed and/or Refilled Medications    Modified Medication Previous Medication    FLUTICASONE PROPIONATE (FLONASE) 50 MCG/ACTUATION NASAL SPRAY fluticasone propionate (FLONASE) 50 mcg/actuation nasal spray       2 sprays (100 mcg total) by Each Nostril route 2 (two) times daily as needed for Rhinitis.    2 sprays (100 mcg total) by Each Nostril route 2 (two) times daily as needed for Rhinitis.    NEBIVOLOL (BYSTOLIC) 20 MG TAB nebivoloL (BYSTOLIC) 20 mg Tab       TAKE 2 TABLETS (40 MG) ONCE DAILY    TAKE 2 TABLETS (40 MG) ONCE DAILY    NYSTATIN-TRIAMCINOLONE (MYCOLOG II) CREAM nystatin-triamcinolone (MYCOLOG II) cream       APPLY  CREAM TOPICALLY TWICE DAILY    APPLY  CREAM TOPICALLY TWICE DAILY    TADALAFIL (CIALIS) 20 MG TAB tadalafil (CIALIS) 20 MG Tab       Take 1 tablet (20 mg total) by mouth as needed (intercourse).    Take 1 tablet (20 mg total) by mouth as needed (intercourse).   Discontinued Medications    CETIRIZINE (ZYRTEC) 10 MG TABLET    Take 1 tablet (10 mg total) by mouth once daily.    HYDROCHLOROTHIAZIDE (MICROZIDE) 12.5 MG CAPSULE    Take 1 capsule (12.5 mg total) by mouth 2 (two) times daily.     Modified Medications    Modified Medication  Previous Medication    FLUTICASONE PROPIONATE (FLONASE) 50 MCG/ACTUATION NASAL SPRAY fluticasone propionate (FLONASE) 50 mcg/actuation nasal spray       2 sprays (100 mcg total) by Each Nostril route 2 (two) times daily as needed for Rhinitis.    2 sprays (100 mcg total) by Each Nostril route 2 (two) times daily as needed for Rhinitis.    NEBIVOLOL (BYSTOLIC) 20 MG TAB nebivoloL (BYSTOLIC) 20 mg Tab       TAKE 2 TABLETS (40 MG) ONCE DAILY    TAKE 2 TABLETS (40 MG) ONCE DAILY    NYSTATIN-TRIAMCINOLONE (MYCOLOG II) CREAM nystatin-triamcinolone (MYCOLOG II) cream       APPLY  CREAM TOPICALLY TWICE DAILY    APPLY  CREAM TOPICALLY TWICE DAILY    TADALAFIL (CIALIS) 20 MG TAB tadalafil (CIALIS) 20 MG Tab       Take 1 tablet (20 mg total) by mouth as needed (intercourse).    Take 1 tablet (20 mg total) by mouth as needed (intercourse).       Jasiel Nelson MD   274}  01/27/2022     This note was completed with dictation software and grammatical errors may exist.    If you are due for any health screening(s) below please notify me so we can arrange them to be ordered and scheduled to maintain your health.     Health Maintenance Due   Topic Date Due    Colorectal Cancer Screening  Never done    Shingles Vaccine (1 of 2) Never done    COVID-19 Vaccine (3 - Booster for Pfizer series) 07/19/2021

## 2022-01-27 NOTE — PATIENT INSTRUCTIONS
January 27, 2022        Elliott Gaspar  [address] [place] [address]       Dear Elliott:    Your [facility] wants to make sure we help you prevent illness and make the healthiest choices possible.    Our records show you are due for colon cancer screening.  If you have already had your screening, or you have made an appointment for your screening, congratulations!  Youre on the road to good health. If you havent signed up for a colorectal screening please accept this invitation to be screened.      According to the American Cancer Society, colon cancer is the third most common cancer for people in the [place].  A simple screening test Fit Kit - done in your own home - can help find colon cancer at an early stage when it can be treated, even before any signs or symptoms develop.     Testing for blood in your stool (feces or bowel movement) is the first step. If you have an upcoming visit with your Primary Care Physician you can  a Fit Kit during your visit, or you can  a Fit Kit at your Primary Care Clinic prior to your visit.    The Fit Kit includes:     Instructions on how to perform the test   (1) Sheet of tissue paper   (1) Small Absorption pad   (1) Bottle to hold the sample and a small probe to help you take the sample   (1) Small plastic bio-hazard bag   (1) Postage-paid return envelope    Please do your test (the instructions will tell you how) and then return your sample in the postage-paid return envelope within 24 hours of collection.     If your test results are negative, you wont need testing again for another year.  If results show you need more testing, we will call you with next steps.    Regular colorectal cancer screening is one of the most powerful weapons for preventing colon cancer.  The website https://www.cancer.org/cancer/colon-rectal-cancer.html can answer many of your questions about this cancer and its prevention.  Just search  "for colorectal cancer.    I wish you continued good health!    Sincerely,    Tyler Solis MD  Medical Director        Please read below to learn more on healthy choices, screenings, and useful information related to your health.      "Learning is the only thing the mind never exhausts, never fears, and never regrets." - Rory Ngo    Table of Contents:     1. Overdue health maintenance   2. Cancer prevention  3. Explanation on the different components of your blood work and interpretation  4. Frequently asked questions     Byron Gallagher, if you are due for any health screening(s) below please notify me so we can arrange them to be ordered and scheduled to maintain your health.   Health Maintenance Due   Topic    Colorectal Cancer Screening     Shingles Vaccine (1 of 2)    COVID-19 Vaccine (3 - Booster for Pfizer series)           Colon Cancer Screening    Of cancers affecting both men and women, colorectal cancer is the third leading cancer killer in the [place]. But it doesnt have to be. Screening can prevent colorectal cancer or find it at an early stage when treatment often leads to a cure.    A colonoscopy is the preferred test for detecting colon cancer. It is needed only once every 10 years if results are negative. While sedated, a flexible, lighted tube with a tiny camera is inserted into the rectum and advanced through the colon to look for cancers. An alternative screening test that is used at home and returned to the lab may also be used. It detects hidden blood in bowel movements which could indicate cancer in the colon. If results are positive, you will need a colonoscopy to determine if the blood is a sign of cancer. This type of follow up (diagnostic) colonoscopy usually requires additional copays as required by your insurance provider. Please contact your PCP if you have any questions.    Although you are still overdue for this important screening, due to the COVID-19 pandemic, " we recommend scheduling it in the near future.                      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 Her discovery, at age 15, of a compound that stops fruit-fly brain cells from dying was regarded as a step toward curing Alzheimer's. Now she aims to find better ways to treat -- and avoid -- cancer. [1].     One goal as your physician is to prevent cancer from affecting you. As Chirag Pardo once said, He who cures a disease may be the skillfullest, but he that prevents it is the safest physician. Some cancer screenings can detect precancerous cells and prevent cancer from ever happening. I believe that staying up with your cancer screenings is very important and thus I apologize if I seem persistent on recommending them.    Why should you choose to get screened for cancer? One simple reason is because you are important. You matter and deserve to have the best health so you can fulfill your great potential.     Colon Cancer screening - Most colon cancers can be prevented by screening. In most cases a polyp in the colon can grow and is not cancerous at first, but it can become cancerous years later. A polyp is like a seed that can grow into a weed if it is left in the soil. If the seed is detected and removed, no weed sprouts. A FIT test/Cologuard test and colonoscopy can detect precancerous polyps and lead to the prevention of cancer. A polyp is just like a seed that can be removed before the roots take hold. A colonoscopy can remove these polyps and eliminate the chance that these polyps turn into cancer.     Cervical Cancer screening- A PAP smear can detect precancerous cells that can become cervical cancer and can lead to procedures to remove them. It is very important to get a PAP smear as investing these few minutes can help prevent a lot of trouble down the road. If you want to do the  "most you can do to spend more time with your family and friends', cancer screenings can help with that goal.     Breast Cancer screening- Mammograms can detect breast cancer before it has spread and early detection allows the ability to remove the lesion prior to any spread. It is similar to a small rotten spot on fruit. If the spoiled part is removed quickly it can preserve the rest of the fruit, but if it is left alone it will corrupt the whole peach.     Smoking Cessation- "Smoking is like a chimney. The tar builds up and causes a back draft which leads to a cough. Smoking is like sitting in a car with a blocked exhaust system." Smoking can increase your risk for lung, mouth, throat, nose, esophagus, bladder, kidney, ureter, pancreas, stomach, liver, cervix, ovary, bowel, and leukemia [2]. If you have smoked in the past, you might meet the criteria for a CT lung cancer screening.     Lung Cancer Screening - "The U.S. Preventive Services Task Force (USPSTF) recommendsexternal icon yearly lung cancer screening with LDCT for people who--have a 20 pack-year or more smoking history, and smoke now or have quit within the past 15 years, and are between 50 and 80 years old. A pack-year is smoking an average of one pack of cigarettes per day for one year. For example, a person could have a 20 pack-year history by smoking one pack a day for 20 years or two packs a day for 10 years." [3]    Hypertension - Common high blood pressure meds may lower colorectal cancer risk-[employer], July 6, 2020 - Hypertension Journal Report Medications commonly prescribed to treat high blood pressure may also reduce patients' colorectal cancer risk, according to new research published today in Hypertension, an American Heart Association journal." [4].     Low HbA1c - "Higher HbA1c levels (within both the non?diabetic and diabetic ranges) were associated with the risk of colorectal cancer (Model 2; P linear?=?0.009), especially for colon " "cancer." [5].    A healthy diet, exercise, limitation of alcohol use, certain infections, avoidance of radiation/environmental toxins, and staying lean lowers your cancer risk [6].     I want to empower you to make informed choices for your health. I have a listed below the most common blood components that we check for when you get blood work. I discuss what each component measures along with common reasons for why they can be abnormal. If you are interested in understanding your blood work better, please read the lab explanation attached below.     Explanation of Lab Results    Please note: This information is included as a reference to help you better understand your lab results and is not be used for diagnosis.     Lipid Panel:  Cholesterol is a measure of cardiac risk and stroke risk. A lipid panel measures total cholesterol and provides readings which are broken down into 3 subsections: Triglycerides, HDL and LDL.    Total Cholesterol: Your total blood cholesterol is a measure of LDL cholesterol, HDL cholesterol, and other lipid components.  If your individual lipid level components are in the normal range and you have an elevated total cholesterol level we are generally not to concerned since we primarily look at the LDL cholesterol which is considered the bad cholesterol which can lead to heart attacks and strokes.  Your calculated cardiac risk is the most important factor in deciding if you would benefit from a cholesterol-lowering medication that the total cholesterol level.    Triglycerides are most diet and weight sensitive. They are affected easily by lifestyle changes. Ideally, this reading should be less than 150, although if the remainder of the cholesterol panel is within normal limits, we will tolerate upwards of 250-300. For the most part, if triglycerides are the only part of your cholesterol panel reading as 'abnormal', it is best to lose weight and modify your diet, including less saturated fat " and less simple sugars. We only start medication to lower this is the level is greater than 500 since this can increase ones risk for pancreatitis. This is not the cholesterol type that leads to heart attacks.     HDL is your 'good cholesterol.' Ideally, the reading should be over 40 and is particularly helpful when it is greater than 60. Research indicates that high HDL is extremely protective; and if your HDL level is greater than 60, we tolerate far worse LDL or triglyceride levels. For the most part, HDL is responsive to aerobic activity and ideal weight. We do not have medicines that increase the HDL reading very easily.    LDL is your 'bad cholesterol.' Our goals depend on how many cardiac risk factors you have.     High LDL: If you are diabetic or have had a heart attack, we like the LDL level to be less than 100. If you have 2 cardiac risk factors (such as diabetes, hypertension, family history, a low HDL and smoking), we like your LDL to be less than 130. If you have 0-2 cardiac risk factors, we like your LDL level to be less than 160, but we may not necessarily initiate medications to 190 unless you cannot lower it. The latest guidelines recommend to consider taking a statin only if your ASCVD cardiac risk score is at least greater than 7.5% and a strong recommendation if your risk score is greater than 10%.     Low LDL: Normal or low LDL is considered good and having an LDL below the normal range is not of a concern.     Complete Blood Count (CBC):    White blood cells: These are infection fighting cells. Mild fluctuations may represent minor illness at the time of blood draw. Marked fluctuations can represent immune diseases. This can also be elevated from smoking.      High WBC: Elevation in wbc can commonly be caused by an infection, steroid use, or any cause of inflammation such as many rheumatologic diseases, surgery, or trauma.       Low WBC: Many different things can cause this such as  infections, medications, rheumatologic disorders, malignancies, nutritional deficiencies, and a normal variant in some individuals. If this occurs it is common practice to rule out a few viruses such as HIV, Hep C, B12, folate along with a a peripheral blood smear to look for abnormalities. If you are otherwise healthy with no concerning symptoms and the workup is negative we usually monitor it with yearly blood work.  If there are other abnormal cell line abnormalities sometimes we refer to hematology to further evaluate.    Hemoglobin: A key indicator for anemia. Ranges depend on age. If you are significantly out of this range, we may need to talk with you.     High Hemoglobin: This can be elevated in some blood disorders, sleep apnea, chronic lung disease, kidney disease, testosterone use, dehydration, smoking, along with some other rare causes.      Low Hemoglobin: Many things can lead to this but the most common cause is an iron deficiency in females who lose blood during their periods, decreased absorption due to antacids, poor diet, sickle cell, anemia of chronic disease, malignancy, bleeding from the gut, along with some other less common causes. If you are a young female who has periods it is usually assumed that this is from that blood loss unless other symptoms or abnormal blood work is present. If you are above 45 and have an iron deficiency it is always recommended to get a colonoscopy to ensure that there is no lesion causing blood loss and to exclude malignancy.     Hematocrit: Another way of looking at anemia, much like your hemoglobin. If you are significantly out of this range, we may need to talk with you.    MCV: This looks at the size of your red blood cells.      High MCV:  A large number may indicate a B-12 deficiency, folate deficiency, alcohol use, liver disease, medication-induced phenomenon, or a need for further workup.     Low MCV: A small number may imply iron anemia or genetic  disorder such as alpha-thalassemia. We may check iron studies called to see if you are low.    MCH: Much like MCV above.    Platelets: These are clotting factors. We tolerate a broad range in this. If your numbers are less than 100, we may need to work it up.      High Platelet Count: If your numbers are elevated, this could represent ongoing inflammation within the body which can be caused by any infection, illness, iron deficiency, or other malignancy. We usually recheck it to monitor for improvement and if it does not resolve will work it up with more blood work.      Low Platelet Count: Many things can cause this such as infections, alcohol use, medications, liver disease, vitamin deficiencies, aspleenia, and some other rare blood disorders. If it persists many times we refer to hematology if a cause is not identified.     Iron:  This is not a reliable blood marker since this fluctuates throughout the day and if you are fasting many times your iron level in your blood is low but this does not necessarily mean you have a deficiency.  There are more reliable ways to measure your iron stores and these are discussed below.    Transferrin:  Many things can cause an abnormal result and this is not as important as some other labs mentioned below.    TIBC:  This is the total iron-binding capacity and this measures the total amount of iron that can be bound by proteins in the blood.  If you have an elevated TIBC this is a good marker that you could be low on iron and this is useful blood marker.  A simple way to think of this is seats in a car. The TIBC is the number of open seats that iron can sit in. If you have a high TIBC (number of open seats) that means you have a low iron level.     Ferritin:  A low ferritin is almost always caused from an iron deficiency.  A normal ferritin level does not need necessarily exclude an iron deficiency since many inflammatory conditions such as kidney disease, diabetes, arthritis,  and obesity can raise this level hiding an iron deficiency.  If you are healthy with no inflammatory conditions this is a very reliable test for detecting an iron deficiency since nothing else lowers your ferritin level except a low iron level. Keep in mind that you can have an iron deficiency if your ferritin level is normal but your TIBC is elevated.  If you have a low iron level the next step is always to identify why you have a low iron level.    CMP (Comprehensive Metabolic Panel):  Broadly, this is a test of organ function including the kidneys, liver, and electrolyte levels.    Glucose: This is primarily looking for diabetes. We like your blood glucose level to be less than 100 when fasting. Readings of 100-125 indicate what we call 'pre-diabetes' or 'glucose intolerance.' This does not necessarily indicate diabetes, but we may check another test called a hemoglobin A1C for confirmation. This level puts you at great risk for becoming a true diabetic and we would encourage the reduction of simple sugars and processed white flour as well as appropriate weight loss. If this number is greater than 125, it is likely you are diabetic; we will get an additional hemoglobin A1C test and will likely schedule you for an appointment. If you notice that your blood glucose is above 125 and we have not scheduled a follow-up appointment, please call us. Patients who are known diabetics can have readings greater than 125.    Urea Nitrogen: This is a kidney function and maybe elevated because of mild dehydration or because of excessive muscle breakdown from aggressive exercise habits.    Creatinine: This also is a kidney function test. It may be mildly elevated if you have particularly large muscle bulk or taking a supplement like creatine. It is related to the GFR. It is a muscle product that we track to look at your kidney function. If this is elevated and new, we may need to talk with you. If you have had this mildly  elevated in the past, it is likely that we will just track it to ensure that it does not worsen quickly. Some medications may gently worsen this, namely blood pressure pills called ace inhibitors. To some degree, we will permit levels of up to 1.6.    Sodium: This is essentially the concentration of salt within the body. This may be mildly low because of dehydration, overhydration, diuretics, .    Potassium: This is an electrolyte that can cause muscular cramping or cardiac difficulties. It is sometimes lowered by diuretic medications.    Chloride: For the most part, this is only relevant if the other electrolytes are abnormal.    CO2: This is a function of acid balance within the body. For the most part, mild abnormalities are not important and may represent a starvation or dehydration state when blood was drawn. Many medications can change this level as well such as diuretics, acetazolamide, calcium carbonate, laxatives, aspirin, and many others.     High CO2: Many things can cause this which includes dehydration, sleep apnea, and COPD.      Low CO2: Many things can lead to a low level and if you are generally healthy we are usually  not concerned. Diarrhea, renal disease, diabetes, and many medications can cause this.     Anion Gap: Only relevant if your CO2 is abnormal.    Calcium: This is not related to dietary intake of calcium. It may fluctuate gently based on the amount of protein within your body. If it is above 10.9, we may need to do additional testing. Many times if low the albumin level is low and once the corrected calcium level is calculated the calcium is in a normal range.     Total protein: This looks at protein within the body. Markedly elevated levels can represent an immune response and may require further workup.    Albumin: This may be elevated because of particularly high level of fitness. If it is markedly suppressed, it may represent organ dysfunction, particularly in the liver or  kidneys.    AST and ALT: These are enzymes in the liver and if elevated can indicate liver damage.      Elevated AST/ALT: Many things can caused elevated liver enzymes and the most common reason is viral infections, alcohol use, medications, supplements, autoimmune, along with some other rare causes. One of the most common reasons for a mild elevation in liver enzymes (less than 3 times the upper limit) is fatty liver disease. Many individuals who have extra fat in their diet store this in the liver and this can build up and cause a mild elevation. This is usually diagnosed with a liver ultrasound and exclusion of other causes. The only treatment for this is diet and exercise along with avoidance of liver toxic medications and alcohol. It is standard of care to rule our viral hepatitis and get imaging of the liver if elevated. This is monitored and if I feel concerned will refer to hepatology.     Alk Phos: Part of liver function or bones     High Alk Phos: elevated levels may indicate a liver injury or obstruction of bile flow. Elevated levels can also be seen in vitamin D deficiency, drug induced, or bone disorders.      Low Alk Phos: In most cases having a low Alkaline Phosphatase enzyme activity is not due to any disease and is simply a normal variant.  Having an elevated Alk Phos is more concerning and associated with many diseases and thus I would not be overly concerned with a low level which is seen in many health individuals. The reasons for a low serum alkaline phosphatase activity were reviewed in a 1-yr retrospective study and in this study it was found that no cause was found in most cases.  Low activity values were recorded in several individuals in the absence of any obvious cause. This would suggest that the definition of the lower limit of the reference range for alkaline phosphatase is arbitrary, thus limiting the use of low serum activity as a marker of disease.  In some cases micronutrients  like Zinc (Zn) and Magnesium (Mg) are causes of low ALP activity and you can take zinc or magnesium supplements. Unfortunately, the blood work to measure zinc and magnesium levels are unreliable and not very accurate since it does not test for the intracellular zinc or magnesium levels.     Kenji MAYFIELD, Sherlyn YANG, Matthew CANO. Clinical significance of a low serum alkaline phosphatase. [facility] J Med. 1992 Feb;40(1-2):9-14. PMID: [id number].  Fady BRANTLEY S, Alvaro B, Milana I, Behera S, Fady S. Low Alkaline Phosphatase (ALP) In Adult Population an Indicator of Zinc (Zn) and Magnesium (Mg) Deficiency. Curr Res Nutr Food Sci 2017;5(3). doi : http://dx.doi.org/[email or web address]    Total Bilirubin: This is also part of liver function.     High T Bili: If you have right upper quadrant pain an elevation in total bilirubin can be caused by a gallbladder stone that is blocking the biliary tract that leads to your gastrointestinal tract. If you have fever and right upper quadrant pain this can sometimes be elevated when your gallbladder is infected and most individuals have nausea with vomiting associated with it. If you have no symptoms and are otherwise healthy this can be caused by Gilbert syndrome which is a benign normal variant. There are other causes such as some anemias but there would be abnormal blood counts.      Low T Bili: Nothing to be concerned about.     Thyroid Stimulating Hormone (TSH): This reading is an indicator of your thyroid function. The thyroid regulates energy levels throughout the entire body, affecting almost every organ system. This is an inverse relationship so a high number actually presents a low thyroid. If this is abnormal, we will often check an additional lab called a Free T4 to evaluate this more carefully. Borderline elevations, those of 5-10, can be watched or worked up further. Please do not take the supplement Biotin for at least a week prior to getting your TSH checked since this can  "lead to false measurement levels.     Prostate Specific Antigen (PSA): (Men only.) This is a prostate cancer screening test, and is no longer a routine screening test. Levels are truly a function of age. Being less than 4 is typical for someone more in their 60s. If you are young, it should probably be less than 3. A higher PSA result does not necessarily mean that you have cancer, but may indicate a need for a discussion with your provider. Options include observation to look at rate of rise or prostate biopsy. Not only is the absolute value important, but how much it has changed from previous years. Please ensure that there is not a dramatic rise from previous years.    Please Note: This information is included as a reference to help you better understand your lab results and is not be used for diagnosis.    Frequently asked questions    When should I take my blood pressure medication?    The latest studies show that taking your blood pressure medication at night is the best time. A recent study showed that this prevents more heart attacks and strokes. See the answer below from [place].     "Q. I've taken blood pressure medicines every morning for many years, and they keep my pressure under control. Recently, my doctor recommended taking them at bedtime, instead. Does that make sense?    A. It actually does make sense -- based on recent research. For many years, there have been at least three theoretical reasons for taking blood pressure medicines before bedtime. First, a body system that strongly affects blood pressure, called the renin-angiotensin system, has its peak activity during sleep. Second, circadian rhythms cause differences in the body chemistry at night compared with daytime. Third, most heart attacks occur in the morning, before medicines taken in the morning have a chance to "kick in."" [6].     When should I take my cholesterol medication?    It used to be recommended to take your cholesterol " "medication at night since the original statins that lowered cholesterol did not last 24 hours and most cholesterol synthesis is done at night. The long acting statins such as atorvastatin and rosuvastatin last 24 hours so they can be taken any time during the day. Simvastatin, pravastatin, fluvastatin, and lovastatin are shorter acting and should be taken at bed time.     Can supplements affect my blood work?    Yes they can. A very important supplement to not take for at least a week prior to your blood work is biotin. "Biotin supplement use is common and can lead to the false measurement of thyroid hormone in commonly used assays." [8.]    What are conditions that should not be addressed during a virtual visit?    There are some conditions that should not be evaluated via a virtual visit since optimal care is impossible. Chest pain, shortness of breath, lung conditions, abdominal pain, and any neurological complaint such as headache, dizziness, numbness ect.         When will I get commentary of my blood work?    I review all blood work that you get and I will send out commentary on this via [employer] within 72 hours. In most cases you will get a message from me sooner, but many times not all of the blood work is completed thus I usually wait until all results have returned. If there is a critical abnormality you should be contacted the same day you got blood work.     How frequently do I need to have visits to get controlled substances?    It is standard protocol to have a visit every 3 months if you are taking scheduled substances such as ADHD medications, psychiatric medications, and pain medications. This is to ensure your safety and monitor for any side effects.     When should I bring prior to a visit if I want to lose weight?    I recommend that you make a food diary for a week and fill out what you ate each day. You can bring this form in to your visit and I can look over it to suggest changes that you can " make.     Which over the counter medications should I avoid if I have decreased kidney function?    NSAIDs which includes ibuprofen (Advil, Motrin, Nuprin), naproxen (Aleve), meloxicam (Mobic) and diclofenac(Zorvolex, Voltaren) and ketorolac (Toradol) can damage your kidneys if you take this long term.Tylenol does not affect your kidney and thus is safe as long as you don't have liver disease.     Is there an [facility] pharmacy?     Yes! The [facility] is located on the first floor of the [facility]. The address is listed below. You can get curbside pickup if you call their number at [phone]. One of the many benefits of using the [facility] pharmacy is that the pharmacists can contact me directly if a cheaper alternative is available to save you money. They also see your note to know more about what the medication was prescribed for. I recommend this pharmacy since communication with me is quick in case any confusion arises on your medications.     [address] [place] [address].  [address] [place] [id number]  Phone: [phone]    Hours:  Monday - Friday  8:00 a.m. - 5:30 a.m.    Why is it not in my best interest to call in order to get an antibiotic?    Medicine is a complex field and many times the correct diagnosis is critical in order to provide the correct care. One of the most important goals of a healthcare provider is to ensure that no dangerous condition is masquerading as a mild illness. Specific questions are very important to obtain during an examination that provide a wealth of information to understand your illness. Health care providers are trained to investigate for signs that can be dangerous to your health. Messaging or calling the office in order to get an antibiotic can be very dangerous.     For example, many urinary tract infections can lead to an infection in the kidney that can result in a serious blood stream infection that can lead to hospitalization if not recognized. A cough can be caused by  many different things and not necessarily an infection. It is not uncommon that one assumes a cough is from an infection when it is actually caused by a blood clot in the lungs. This can lead to death. Determining your risk can only be performed after a thorough history and examination. A few sentences through e-mail is not enough.     What are some common symptoms that should be evaluated by the emergency department and not by phone or e-mail?    This does not include every symptom, but common examples of symptoms that should prompt one to go to the emergency department are chest pain, chest pressure, shortness of breath, difficulty breathing, abdominal pain, weakness or numbness or an extremity, sudden weakness or drooping on one side of the body, speaking difficulty, unusual or bad headache (particularly if it started suddenly), head injury, confusion, seizure, passing out, lightheaded, pain in arm or jaw, suddenly not able to speak, see, walk, or move, dizziness, neck stiffness, suicidal thoughts, testicular pain, cuts and wounds, severe pain, along with many others. This is not an inclusive list.     Outside Records    It is common to have an colonoscopy, mammogram, PAP smear, or eye exam done outside the [facility] system. Many times we do not get the records automatically sent to us. Please call your provider's office to notify them to fax us your records so that we can have the most up to date information. Your provider will review your outside results in order to provide you with the complete care that you deserve. We appreciate that you decided to choose us to be serving on your healthcare team and the more information we have about your health is essential.          Wishing you good health,     Jasiel Nelson MD      References:  1-https://www.[employer]/speakers/simeon_joses  2-https://www.[employer].com.au/news/gckov-sew-14-cancers-that-can-bz-gchkxe-kf-smoking/  3-https://www.cdc.gov/cancer/lung/basic_info/screening.htm  4-https://newsroom.heart.org/news/common-hypertension-medications-may-reduce-colorectal-cancer-risk  5-Ninao A, Lorin M, Maheshada N, et al. High hemoglobin A1c levels within the non-diabetic range are associated with the risk of all cancers. Int J Cancer. 2016;138(7):5307-5921. doi:10.1002/ijc.25497  6-https://www.health.[place].edu/newsletter_article/the-10-commandments-of-cancer-prevention  7-https://www.health.[place].edu/diseases-and-conditions/should-i-take-blood-pressure-medications-at-night  8-Gina LEYVA et al 2018 Prevalence of biotin supplement usage in outpatients and plasma biotin concentrations in patients presenting to the emergency department. Clin Biochem. Epub 2018 Jul 20. PMID: [id number].

## 2022-01-29 ENCOUNTER — LAB VISIT (OUTPATIENT)
Dept: LAB | Facility: HOSPITAL | Age: 67
End: 2022-01-29
Attending: STUDENT IN AN ORGANIZED HEALTH CARE EDUCATION/TRAINING PROGRAM
Payer: MEDICARE

## 2022-01-29 DIAGNOSIS — M10.9 GOUTY ARTHRITIS: ICD-10-CM

## 2022-01-29 DIAGNOSIS — R79.9 ABNORMAL FINDING OF BLOOD CHEMISTRY, UNSPECIFIED: ICD-10-CM

## 2022-01-29 DIAGNOSIS — R94.5 ABNORMAL RESULTS OF LIVER FUNCTION STUDIES: ICD-10-CM

## 2022-01-29 LAB
ESTIMATED AVG GLUCOSE: 103 MG/DL (ref 68–131)
HBA1C MFR BLD: 5.2 % (ref 4–5.6)
URATE SERPL-MCNC: 9.2 MG/DL (ref 3.4–7)

## 2022-01-29 PROCEDURE — 83036 HEMOGLOBIN GLYCOSYLATED A1C: CPT | Performed by: STUDENT IN AN ORGANIZED HEALTH CARE EDUCATION/TRAINING PROGRAM

## 2022-01-29 PROCEDURE — 36415 COLL VENOUS BLD VENIPUNCTURE: CPT | Mod: PO | Performed by: STUDENT IN AN ORGANIZED HEALTH CARE EDUCATION/TRAINING PROGRAM

## 2022-01-29 PROCEDURE — 84550 ASSAY OF BLOOD/URIC ACID: CPT | Performed by: STUDENT IN AN ORGANIZED HEALTH CARE EDUCATION/TRAINING PROGRAM

## 2022-01-29 PROCEDURE — 80074 ACUTE HEPATITIS PANEL: CPT | Performed by: STUDENT IN AN ORGANIZED HEALTH CARE EDUCATION/TRAINING PROGRAM

## 2022-02-01 LAB
HAV IGM SERPL QL IA: NEGATIVE
HBV CORE IGM SERPL QL IA: NEGATIVE
HBV SURFACE AG SERPL QL IA: NEGATIVE
HCV AB SERPL QL IA: NEGATIVE

## 2022-03-09 ENCOUNTER — PATIENT OUTREACH (OUTPATIENT)
Dept: ADMINISTRATIVE | Facility: HOSPITAL | Age: 67
End: 2022-03-09
Payer: MEDICARE

## 2022-03-09 ENCOUNTER — PATIENT MESSAGE (OUTPATIENT)
Dept: ADMINISTRATIVE | Facility: HOSPITAL | Age: 67
End: 2022-03-09
Payer: MEDICARE

## 2022-05-03 NOTE — TELEPHONE ENCOUNTER
Hospital Physician Pre-Op Orders    Patient Name: Dolly Auguste      MRN: [id number]   : 1934 Sex: female Surgeon: Trey Hansen. PCP: Damon Avila MD   Patient Phone #: [phone] (cell)   Work Comp:  NO   Age: 78year old   Surgery Date: 2022 Arrival Time: 8:30 a.m. Surgery Time: 10:30  a.m. ALLERGIES:   Allergen Reactions   â¢ Nabumetone DIZZINESS   â¢ Cefdinir NAUSEA   â¢ Diclofenac CONSTIPATION   â¢ Latex HIVES   â¢ Naproxen Photosensitivity     Flushed in face   â¢ Pollen Extract Runny Nose     Other reaction(s): ITCHING, WATERY EYES, RUNNY NOSE   â¢ Sulfa Antibiotics DIARRHEA and Nausea & Vomiting        Additional Information:   [facility]     Procedure(s) with Diagnosis Code(s): Cardiology: Pacemaker Generator Change - Dual 12597; Diagnosis Complete AV block due to AV mónica ablation I97.190, I44.2 ,Pacemaker battery depletion Z45.010    Consent to Read: Pacemaker Generator Change    Current Outpatient Medications   Medication Sig   â¢ metoPROLOL succinate (Toprol XL) 25 MG 24 hr tablet Take 0.5 tablets by mouth daily. â¢ citalopram (CeleXA) 10 MG tablet Take 1 tablet by mouth 2 times daily. â¢ levothyroxine 125 MCG tablet TAKE 1 TABLET EVERY DAY   â¢ HYDROcodone-acetaminophen (NORCO) 7.5-325 MG per tablet Take 1 tablet by mouth 2 times daily as needed for Pain. Do not start before 2022. â¢ mirabegron ER (MYRBETRIQ) 50 MG 24 hr tablet Take 1 tablet by mouth nightly. â¢ ASPIRIN 81 PO Take 81 mg by mouth daily. bid for 6 weeks, then resume daily   â¢ cyanocobalamin 1000 MCG tablet Take 1,000 mcg by mouth daily. â¢ Calcium Carbonate (CALCIUM 600 PO) Take 600 mg by mouth daily. â¢ naLOXone (NARCAN) 4 MG/0.1ML nasal spray Call 911. [place] the content of 1 device into 1 nostril. May repeat with 2nd device in alternate nostril if no response in 2-3 minutes. â¢ pregabalin (LYRICA) 50 MG capsule Take 1 capsule by mouth 2 times daily. Patient is out of this medication and it went to the wrong provider dr colon to be filled im sending a request to him       â¢ fluticasone (Flonase Allergy Relief) 50 MCG/ACT nasal spray Spray 2 sprays in each nostril daily. â¢ loratadine (Claritin) 10 MG tablet Take 1 tablet by mouth daily as needed for Allergies. â¢ polyethylene glycol (GLYCOLAX, MIRALAX) packet Take 17 g by mouth daily. No current facility-administered medications for this visit. Physician Orders:   Cardiology Device Orders NPO  Anesthesia:  MAC   IV: 0.9 NS   @ TKO Rate  Antibiotics: Ancef 1 gram IVPB on call to cath lab - If patients weight is greater than 70 kg give Ancef 2 gram IVPB on call to lab  Antibiotics: (If allergic to PCN) Vancomycin 1 gram IVPB given over one hour prior to procedure.    Please insert IV  in same side as device  Consent to be signed for above procedure    Comments: scheduled with Ravindra Peña 5/3/2022    : Chaparrita Salvador   Date: 5/12/2022       MD Signature: Electronically signed by:  Sierra Paulino MD 5/12/2022    Dictation #: [id number]

## 2022-05-09 DIAGNOSIS — J32.8 OTHER CHRONIC SINUSITIS: Primary | ICD-10-CM

## 2022-05-12 ENCOUNTER — HOSPITAL ENCOUNTER (OUTPATIENT)
Dept: RADIOLOGY | Facility: HOSPITAL | Age: 67
Discharge: HOME | End: 2022-05-12
Attending: OTOLARYNGOLOGY
Payer: MEDICARE

## 2022-05-12 DIAGNOSIS — J32.8 OTHER CHRONIC SINUSITIS: ICD-10-CM

## 2022-05-12 PROCEDURE — 70486 CT MAXILLOFACIAL W/O DYE: CPT | Mod: TC,PO

## 2022-05-18 ENCOUNTER — OFFICE VISIT (OUTPATIENT)
Dept: FAMILY MEDICINE | Facility: CLINIC | Age: 67
End: 2022-05-18
Payer: MEDICARE

## 2022-05-18 VITALS
BODY MASS INDEX: 29.84 KG/M2 | OXYGEN SATURATION: 96 % | DIASTOLIC BLOOD PRESSURE: 80 MMHG | HEART RATE: 65 BPM | HEIGHT: 69 IN | WEIGHT: 201.5 LBS | SYSTOLIC BLOOD PRESSURE: 164 MMHG

## 2022-05-18 DIAGNOSIS — L03.119 CELLULITIS OF LOWER EXTREMITY, UNSPECIFIED LATERALITY: Primary | ICD-10-CM

## 2022-05-18 DIAGNOSIS — I10 ESSENTIAL HYPERTENSION: ICD-10-CM

## 2022-05-18 PROCEDURE — 99999 PR PBB SHADOW E&M-EST. PATIENT-LVL III: CPT | Mod: PBBFAC,,, | Performed by: FAMILY MEDICINE

## 2022-05-18 PROCEDURE — 99999 PR PBB SHADOW E&M-EST. PATIENT-LVL III: ICD-10-PCS | Mod: PBBFAC,,, | Performed by: FAMILY MEDICINE

## 2022-05-18 PROCEDURE — 99213 OFFICE O/P EST LOW 20 MIN: CPT | Mod: PBBFAC,PO | Performed by: FAMILY MEDICINE

## 2022-05-18 PROCEDURE — 99214 PR OFFICE/OUTPT VISIT, EST, LEVL IV, 30-39 MIN: ICD-10-PCS | Mod: S$PBB,,, | Performed by: FAMILY MEDICINE

## 2022-05-18 PROCEDURE — 99214 OFFICE O/P EST MOD 30 MIN: CPT | Mod: S$PBB,,, | Performed by: FAMILY MEDICINE

## 2022-05-18 RX ORDER — DOXYCYCLINE 100 MG/1
100 CAPSULE ORAL EVERY 12 HOURS
Qty: 20 CAPSULE | Refills: 0 | Status: SHIPPED | OUTPATIENT
Start: 2022-05-18 | End: 2022-07-28

## 2022-05-18 NOTE — PROGRESS NOTES
"Subjective:       Patient ID: Elliott Gaspar is a 66 y.o. male.    Chief Complaint: No chief complaint on file.    New to me patient here for UC visit.  CC- cellulitis legs - R>L have more swelling than at baseline and are red.  Hs had same issue in past. No fever or nauasea, some fatigue.    Drinks approx 48 oz water; does not salt restrict; works at home at desk and "not very active" in general.  Systolic BP elevated today; hurrying for appt.     Review of Systems   Constitutional: Negative for fever.   Respiratory: Negative for shortness of breath.    Cardiovascular: Positive for leg swelling. Negative for chest pain.   Gastrointestinal: Negative for abdominal pain and nausea.   Skin: Negative for rash.   Neurological: Negative for numbness.   All other systems reviewed and are negative.      Objective:      Physical Exam  Constitutional:       General: He is not in acute distress.     Appearance: He is well-developed.   Cardiovascular:      Rate and Rhythm: Normal rate and regular rhythm.      Heart sounds: No murmur heard.    No gallop.   Pulmonary:      Effort: Pulmonary effort is normal.      Breath sounds: Normal breath sounds.   Musculoskeletal:      Cervical back: Neck supple.      Right lower leg: Edema (3 plus) present.      Left lower leg: Edema (2 plus) present.   Lymphadenopathy:      Cervical: No cervical adenopathy.   Skin:     General: Skin is warm and dry.      Comments: Mild erythema to both lower legs, not hot.         Assessment:       1. Cellulitis of lower extremity, unspecified laterality    2. Essential hypertension        Plan:       Cellulitis of lower extremity, unspecified laterality  -     doxycycline (VIBRAMYCIN) 100 MG Cap; Take 1 capsule (100 mg total) by mouth every 12 (twelve) hours.  Dispense: 20 capsule; Refill: 0    Essential hypertension      Patient Instructions   AROM frequently during your work day  Increase water intake to 64 oz a day.  Elevate legs above heart level - " twice a day times 30 minutes  Avoid any/all salt.  Compression socks.

## 2022-05-18 NOTE — PATIENT INSTRUCTIONS
AROM frequently during your work day  Increase water intake to 64 oz a day.  Elevate legs above heart level - twice a day times 30 minutes  Avoid any/all salt.  Compression socks.

## 2022-05-31 ENCOUNTER — PATIENT MESSAGE (OUTPATIENT)
Dept: ADMINISTRATIVE | Facility: HOSPITAL | Age: 67
End: 2022-05-31
Payer: MEDICARE

## 2022-05-31 DIAGNOSIS — Z12.11 SCREENING FOR COLON CANCER: ICD-10-CM

## 2022-06-06 ENCOUNTER — TELEPHONE (OUTPATIENT)
Dept: FAMILY MEDICINE | Facility: CLINIC | Age: 67
End: 2022-06-06
Payer: MEDICARE

## 2022-06-10 ENCOUNTER — TELEPHONE (OUTPATIENT)
Dept: FAMILY MEDICINE | Facility: CLINIC | Age: 67
End: 2022-06-10
Payer: MEDICARE

## 2022-06-10 NOTE — LETTER
Taisa 10, 2022        Elliott Gaspar  [address] [place] [address]       Dear Elliott:    Your [facility] wants to make sure we help you prevent illness and make the healthiest choices possible.    Our records show you are due for colon cancer screening.  If you have already had your screening, or you have made an appointment for your screening, congratulations!  Youre on the road to good health. If you havent signed up for a colorectal screening please accept this invitation to be screened.      According to the American Cancer Society, colon cancer is the third most common cancer for people in the [place].  A simple screening test Fit Kit - done in your own home - can help find colon cancer at an early stage when it can be treated, even before any signs or symptoms develop.     Testing for blood in your stool (feces or bowel movement) is the first step. If you have an upcoming visit with your Primary Care Physician you can  a Fit Kit during your visit, or you can  a Fit Kit at your Primary Care Clinic prior to your visit.    The Fit Kit includes:     Instructions on how to perform the test   (1) Sheet of tissue paper   (1) Small Absorption pad   (1) Bottle to hold the sample and a small probe to help you take the sample   (1) Small plastic bio-hazard bag   (1) Postage-paid return envelope    Please do your test (the instructions will tell you how) and then return your sample in the postage-paid return envelope within 24 hours of collection.     If your test results are negative, you wont need testing again for another year.  If results show you need more testing, we will call you with next steps.    Regular colorectal cancer screening is one of the most powerful weapons for preventing colon cancer.  The website https://www.cancer.org/cancer/colon-rectal-cancer.html can answer many of your questions about this cancer and its prevention.  Just search for  colorectal cancer.    I wish you continued good health!    Sincerely,    Jasiel Nelson MD

## 2022-06-10 NOTE — TELEPHONE ENCOUNTER
----- Message from Jasiel Nelson MD sent at 6/9/2022  1:27 PM CDT -----  Regarding: Please call patient to recommend sending back FIT kit  Patient is due for colon cancer screening. Please call patient to recommend that they send back FIT kit for colon cancer screening. Thanks!

## 2022-06-16 ENCOUNTER — PATIENT OUTREACH (OUTPATIENT)
Dept: ADMINISTRATIVE | Facility: HOSPITAL | Age: 67
End: 2022-06-16
Payer: MEDICARE

## 2022-06-16 NOTE — PROGRESS NOTES
Spoke to patient. Fit Kit received. Encouraged patient to collect specimen and return kit. Patient pleasant.

## 2022-07-28 ENCOUNTER — OFFICE VISIT (OUTPATIENT)
Dept: FAMILY MEDICINE | Facility: CLINIC | Age: 67
End: 2022-07-28
Payer: MEDICARE

## 2022-07-28 VITALS
RESPIRATION RATE: 16 BRPM | HEIGHT: 69 IN | OXYGEN SATURATION: 97 % | WEIGHT: 198.44 LBS | TEMPERATURE: 100 F | HEART RATE: 84 BPM | SYSTOLIC BLOOD PRESSURE: 138 MMHG | BODY MASS INDEX: 29.39 KG/M2 | DIASTOLIC BLOOD PRESSURE: 82 MMHG

## 2022-07-28 DIAGNOSIS — J06.9 UPPER RESPIRATORY TRACT INFECTION, UNSPECIFIED TYPE: ICD-10-CM

## 2022-07-28 DIAGNOSIS — U07.1 COVID: ICD-10-CM

## 2022-07-28 DIAGNOSIS — R09.81 NASAL CONGESTION: ICD-10-CM

## 2022-07-28 DIAGNOSIS — M10.9 GOUTY ARTHRITIS: ICD-10-CM

## 2022-07-28 DIAGNOSIS — E87.6 HYPOKALEMIA: ICD-10-CM

## 2022-07-28 DIAGNOSIS — I10 ESSENTIAL HYPERTENSION: Primary | ICD-10-CM

## 2022-07-28 DIAGNOSIS — R94.5 ABNORMAL RESULTS OF LIVER FUNCTION STUDIES: ICD-10-CM

## 2022-07-28 DIAGNOSIS — U07.1 COVID-19 VIRUS DETECTED: ICD-10-CM

## 2022-07-28 LAB
CTP QC/QA: YES
INFLUENZA A, MOLECULAR: NEGATIVE
INFLUENZA B, MOLECULAR: NEGATIVE
SARS-COV-2 RDRP RESP QL NAA+PROBE: POSITIVE
SPECIMEN SOURCE: NORMAL

## 2022-07-28 PROCEDURE — U0002 COVID-19 LAB TEST NON-CDC: HCPCS | Mod: PBBFAC,PO | Performed by: STUDENT IN AN ORGANIZED HEALTH CARE EDUCATION/TRAINING PROGRAM

## 2022-07-28 PROCEDURE — 99214 OFFICE O/P EST MOD 30 MIN: CPT | Mod: S$PBB,CR,, | Performed by: STUDENT IN AN ORGANIZED HEALTH CARE EDUCATION/TRAINING PROGRAM

## 2022-07-28 PROCEDURE — 99999 PR PBB SHADOW E&M-EST. PATIENT-LVL III: CPT | Mod: PBBFAC,CR,, | Performed by: STUDENT IN AN ORGANIZED HEALTH CARE EDUCATION/TRAINING PROGRAM

## 2022-07-28 PROCEDURE — 87502 INFLUENZA DNA AMP PROBE: CPT | Mod: PO | Performed by: STUDENT IN AN ORGANIZED HEALTH CARE EDUCATION/TRAINING PROGRAM

## 2022-07-28 PROCEDURE — 99213 OFFICE O/P EST LOW 20 MIN: CPT | Mod: PBBFAC,PO | Performed by: STUDENT IN AN ORGANIZED HEALTH CARE EDUCATION/TRAINING PROGRAM

## 2022-07-28 PROCEDURE — 99214 PR OFFICE/OUTPT VISIT, EST, LEVL IV, 30-39 MIN: ICD-10-PCS | Mod: S$PBB,CR,, | Performed by: STUDENT IN AN ORGANIZED HEALTH CARE EDUCATION/TRAINING PROGRAM

## 2022-07-28 PROCEDURE — 99999 PR PBB SHADOW E&M-EST. PATIENT-LVL III: ICD-10-PCS | Mod: PBBFAC,CR,, | Performed by: STUDENT IN AN ORGANIZED HEALTH CARE EDUCATION/TRAINING PROGRAM

## 2022-07-28 RX ORDER — OXYMETAZOLINE HCL 0.05 %
1 SPRAY, NON-AEROSOL (ML) NASAL 2 TIMES DAILY
Qty: 22 ML | Refills: 0 | Status: SHIPPED | OUTPATIENT
Start: 2022-07-28 | End: 2022-07-31

## 2022-07-28 NOTE — PROGRESS NOTES
[facility] Note    Subjective:    The HPI and pertinent ROS is included in the Diagnostic Impression Remarks section at the end of the note. Please see below for further details. Chief complaint is at end of note.     Medication List with Changes/Refills   New Medications    NIRMATRELVIR-RITONAVIR 300 MG (150 MG X 2)-100 MG COPACKAGED TABLETS (EUA)    Take 3 tablets by mouth 2 (two) times daily for 5 days. Each dose contains 2 nirmatrelvir (pink tablets) and 1 ritonavir (white tablet). Take all 3 tablets together    OXYMETAZOLINE (AFRIN, OXYMETAZOLINE,) 0.05 % NASAL SPRAY    1 spray by Nasal route 2 (two) times daily. for 3 days   Current Medications    B COMPLEX VITAMINS CAPSULE    Take 1 capsule by mouth once daily.    CETIRIZINE (ZYRTEC) 10 MG TABLET    Take 1 tablet (10 mg total) by mouth once daily.    COLCHICINE (COLCRYS) 0.6 MG TABLET    Use as directed    GLUCOSAMINE-CHONDROITIN 500-400 MG TABLET    Take 1 tablet by mouth 2 (two) times daily.    NEBIVOLOL (BYSTOLIC) 20 MG TAB    TAKE 2 TABLETS (40 MG) ONCE DAILY    NYSTATIN-TRIAMCINOLONE (MYCOLOG II) CREAM    APPLY  CREAM TOPICALLY TWICE DAILY    TADALAFIL (CIALIS) 20 MG TAB    Take 1 tablet (20 mg total) by mouth as needed (intercourse).   Discontinued Medications    DOXYCYCLINE (VIBRAMYCIN) 100 MG CAP    Take 1 capsule (100 mg total) by mouth every 12 (twelve) hours.    LOSARTAN (COZAAR) 100 MG TABLET    Take 1 tablet (100 mg total) by mouth once daily.     Modified Medications    No medications on file       Data reviewed 274}  Previous medical records reviewed and summarized in plan section at end of note.      If you are due for any health screening(s) below please notify me so we can arrange them to be ordered and scheduled to maintain your health.     Tests to Keep You Healthy    Colon Cancer Screening: ORDERED  Blood Pressure <= 139/89: NO    The following portions of the patient's history were reviewed and updated as appropriate:  allergies, current medications, past family history, past medical history, past social history, past surgical history and problem list.    He  has a past medical history of Contact lens/glasses fitting, Deviated septum, Fatty liver disease, nonalcoholic, Hypertension, Hypokalemia (4/18/2015), Kidney stone, Persistent headaches, Peyronie's disease, and Thrombocytopenia.  He  has a past surgical history that includes Hernia repair; Hernia repair (2008); Sinus surgery (2012); Nasal septum surgery; and Cardiac catheterization (2015).    He  reports that he quit smoking about 50 years ago. His smoking use included cigarettes and cigars. He has a 9.00 pack-year smoking history. He has never used smokeless tobacco. He reports that he does not drink alcohol and does not use drugs.  He family history includes Alzheimer's disease in his maternal grandmother; Atrial fibrillation in his mother; Heart disease in his father and mother; Heart failure in his father; Hypertension in his father; Psoriasis in his father and paternal grandmother.    Review of patient's allergies indicates:   Allergen Reactions    Morphine Other (See Comments)     Family HX-mother went into anaphylactic shock    Contrast media      Family history of renal failure with iodinated contrast    Demerol [meperidine]     Morphine Other (See Comments)     pts mother had anaphylaxis from Morphine so he does not want to take       Tobacco Use: Medium Risk    Smoking Tobacco Use: Former Smoker    Smokeless Tobacco Use: Never Used     Physical Examination  General appearance: alert, cooperative, no distress  Neck: no thyromegaly, no neck stiffness  Lungs: clear to auscultation, no wheezes, rales or rhonchi, symmetric air entry  Heart: normal rate, regular rhythm, normal S1, S2, no murmurs, rubs, clicks or gallops  Abdomen: soft, nontender, nondistended, no rigidity, rebound, or guarding.   Back: no point tenderness over spine  Extremities: peripheral pulses  "normal, no unilateral leg swelling or calf tenderness   Neurological:alert, oriented, normal speech, no new focal findings or movement disorder noted from baseline    BP Readings from Last 3 Encounters:   07/28/22 138/82   05/18/22 (!) 164/80   01/27/22 134/80     Wt Readings from Last 3 Encounters:   07/28/22 90 kg (198 lb 6.6 oz)   05/18/22 91.4 kg (201 lb 8 oz)   01/27/22 91 kg (200 lb 9.9 oz)     /82 (BP Location: Right arm, Patient Position: Sitting, BP Method: Large (Manual))   Pulse 84   Temp 100.2 °F (37.9 °C) (Oral)   Resp 16   Ht 5' 9" (1.753 m)   Wt 90 kg (198 lb 6.6 oz)   SpO2 97%   BMI 29.30 kg/m²    274}  Laboratory: I have reviewed old labs below:    274}    Lab Results   Component Value Date    WBC 6.71 01/22/2022    HGB 15.5 01/22/2022    HCT 45.5 01/22/2022    MCV 88 01/22/2022     01/22/2022     01/22/2022    K 3.4 (L) 01/22/2022     01/22/2022    CALCIUM 9.6 01/22/2022    PHOS 2.9 04/18/2015    CO2 28 01/22/2022     (H) 01/22/2022    BUN 15 01/22/2022    CREATININE 1.2 01/22/2022    ANIONGAP 13 01/22/2022    ESTGFRAFRICA >60 01/22/2022    EGFRNONAA >60 01/22/2022    PROT 6.7 01/22/2022    ALBUMIN 3.9 01/22/2022    BILITOT 1.1 (H) 01/22/2022    ALKPHOS 94 01/22/2022    ALT 85 (H) 01/22/2022    AST 45 (H) 01/22/2022    INR 1.0 06/28/2019    CHOL 193 01/22/2022    TRIG 260 (H) 01/22/2022    HDL 33 (L) 01/22/2022    LDLCALC 108.0 01/22/2022    TSH 3.003 05/07/2019    PSA 1.5 07/31/2021    HGBA1C 5.2 01/29/2022     Lab reviewed by me: Particular labs of significance that I will monitor, workup, or treat to improve are mentioned below in diagnostic impression remarks.    Imaging/EKG: I have reviewed the pertinent results and my findings are noted in remarks.  274}    CC:   Chief Complaint   Patient presents with    Follow-up    URI    Cough    Hypertension        274}    Assessment/Plan  Elliott Gaspar is a 67 y.o. male who presents to clinic with:  1. " "Essential hypertension    2. Nasal congestion    3. Upper respiratory tract infection, unspecified type    4. Abnormal results of liver function studies    5. Gouty arthritis    6. COVID    7. Hypokalemia       274}  Diagnostic Impression Remarks + HPI     Documentation entered by me for this encounter may have been done in part using speech-recognition technology. Although I have made an effort to ensure accuracy, "sound like" errors may exist and should be interpreted in context.      COVID-had symptoms starting yesterday has not productive cough myalgias fatigue associated with nasal congestion and his wife was sick recently.  No chest pain or shortness of breath no leg swelling.  No nausea vomiting or diarrhea.  Is tolerating oral fluids had positive COVID test today will send in Paxil of id he is not take colchicine only as needed thus recommend do not take both of these with these to gather and will send a nasal spray monitor symptoms recommend have high potassium foods will recheck blood work in a week   Elevated liver enzymes-etiology unclear recommend avoid alcohol recheck within a week or 2   Hypertension well controlled continue current meds   Hypokalemia-control uncertain repeat blood work will replace if low could consider replacing beta-blocker with a medication like an Ace or Arb which can increased potassium but will hold off until he is healthy again       This is the extent of the patient's concerns at this present time. He did not feel chest pain upon exertion, dyspnea, nausea, vomiting, diaphoresis, or syncope. No pleuritic chest pain, unilateral leg swelling, calf tenderness, or calf pain. Elliott will return to clinic in a few months for further workup and reassessment or sooner as needed. He was instructed to call the clinic or go to the emergency department if his symptoms do not improve, worsens, or if new symptoms develop. As we discussed that symptoms could worsen over the next 24 " hours he was advised that if any increased swelling, pain, or numbness arise to go immediately to the ED. Patient knows to call any time if an emergency arises. Shared decision making occurred and he verbalized understanding in agreement with this plan. I discussed imaging findings, diagnosis, possibilities, treatment options, medications, risks, and benefits. He had many questions regarding the options and long-term effects. All questions were answered. He expressed understanding after counseling regarding the diagnosis and recommendations. He was capable and demonstrated competence with understanding of these options. Shared decision making was performed resulting in him choosing the current treatment plan. Patient handout was given with instructions and recommendations. Advised the patient that if they become pregnant to alert us immediately to assess for medication changes. I also discussed the importance of close follow up to discuss labs, change or modify his medications if needed, monitor side effects, and further evaluation of medical problems.     Additional workup planned: see labs ordered below.    See below for labs and meds ordered with associated diagnosis      1. Essential hypertension    2. Nasal congestion  - oxymetazoline (AFRIN, OXYMETAZOLINE,) 0.05 % nasal spray; 1 spray by Nasal route 2 (two) times daily. for 3 days  Dispense: 22 mL; Refill: 0    3. Upper respiratory tract infection, unspecified type  - POCT COVID-19 Rapid Screening; Future    4. Abnormal results of liver function studies  - Comprehensive Metabolic Panel; Future  - Magnesium; Future    5. Gouty arthritis    6. COVID  - nirmatrelvir-ritonavir 300 mg (150 mg x 2)-100 mg copackaged tablets (EUA); Take 3 tablets by mouth 2 (two) times daily for 5 days. Each dose contains 2 nirmatrelvir (pink tablets) and 1 ritonavir (white tablet). Take all 3 tablets together  Dispense: 30 tablet; Refill: 0    7. Hypokalemia      Jasiel Nelson MD    274}    Tests to Keep You Healthy    Colon Cancer Screening: ORDERED  Blood Pressure <= 139/89: NO

## 2022-08-08 ENCOUNTER — TELEPHONE (OUTPATIENT)
Dept: FAMILY MEDICINE | Facility: CLINIC | Age: 67
End: 2022-08-08
Payer: MEDICARE

## 2022-08-08 ENCOUNTER — OFFICE VISIT (OUTPATIENT)
Dept: FAMILY MEDICINE | Facility: CLINIC | Age: 67
End: 2022-08-08
Payer: MEDICARE

## 2022-08-08 VITALS
BODY MASS INDEX: 28.84 KG/M2 | HEART RATE: 90 BPM | OXYGEN SATURATION: 97 % | HEIGHT: 69 IN | WEIGHT: 194.69 LBS | SYSTOLIC BLOOD PRESSURE: 144 MMHG | RESPIRATION RATE: 18 BRPM | DIASTOLIC BLOOD PRESSURE: 78 MMHG | TEMPERATURE: 99 F

## 2022-08-08 DIAGNOSIS — R06.02 SOB (SHORTNESS OF BREATH): ICD-10-CM

## 2022-08-08 DIAGNOSIS — J01.80 OTHER SUBACUTE SINUSITIS: ICD-10-CM

## 2022-08-08 DIAGNOSIS — R05.9 COUGH: Primary | ICD-10-CM

## 2022-08-08 PROCEDURE — 99999 PR PBB SHADOW E&M-EST. PATIENT-LVL IV: ICD-10-PCS | Mod: PBBFAC,,, | Performed by: NURSE PRACTITIONER

## 2022-08-08 PROCEDURE — 99214 OFFICE O/P EST MOD 30 MIN: CPT | Mod: PBBFAC,PO | Performed by: NURSE PRACTITIONER

## 2022-08-08 PROCEDURE — 99999 PR PBB SHADOW E&M-EST. PATIENT-LVL IV: CPT | Mod: PBBFAC,,, | Performed by: NURSE PRACTITIONER

## 2022-08-08 PROCEDURE — 99213 OFFICE O/P EST LOW 20 MIN: CPT | Mod: S$PBB,,, | Performed by: NURSE PRACTITIONER

## 2022-08-08 PROCEDURE — 99213 PR OFFICE/OUTPT VISIT, EST, LEVL III, 20-29 MIN: ICD-10-PCS | Mod: S$PBB,,, | Performed by: NURSE PRACTITIONER

## 2022-08-08 RX ORDER — AMOXICILLIN AND CLAVULANATE POTASSIUM 875; 125 MG/1; MG/1
1 TABLET, FILM COATED ORAL 2 TIMES DAILY
Qty: 14 TABLET | Refills: 0 | Status: SHIPPED | OUTPATIENT
Start: 2022-08-08 | End: 2022-08-15

## 2022-08-08 RX ORDER — GUAIFENESIN/DEXTROMETHORPHAN 100-10MG/5
5 SYRUP ORAL EVERY 6 HOURS PRN
Qty: 473 ML | Refills: 0 | Status: SHIPPED | OUTPATIENT
Start: 2022-08-08 | End: 2022-08-12

## 2022-08-08 RX ORDER — ALBUTEROL SULFATE 90 UG/1
2 AEROSOL, METERED RESPIRATORY (INHALATION) EVERY 6 HOURS PRN
Qty: 18 G | Refills: 0 | Status: SHIPPED | OUTPATIENT
Start: 2022-08-08 | End: 2022-12-15

## 2022-08-08 NOTE — PROGRESS NOTES
Subjective:       Patient ID: Elliott Gaspar is a 67 y.o. male.    Chief Complaint: Cough and Nasal Congestion    HPI   66 y/o male patient with medical problems listed below presents for sinus pressure, nasal congestion, postnasal dripping, rhinorrhea with yellowish mucus, and productive cough with clear whitish phlegm since 7/27. States took mucinex yesterday with minimal relief. Patient states took doxycycline at the end of June and the beginning of July this year for sinus infection. Patient follows private ENT Dr. Philipp Tubbs. Patient states cough is mainly dry and off and on brings up the phlegm. Associated with SOB but denies chest pain, nausea, fever, chills, generalized body ache.      Of chart, Patient was tested positive for Covid-19 on 7/28 and provided Paxlovid for 5 days.   Hx of Nasal septum surgery done 3 years ago    Patient Active Problem List   Diagnosis    BPH (benign prostatic hypertrophy)    Penile curvature, acquired    History of thrombocytopenia    Essential hypertension    Deviated septum    Peyronie's disease    Gouty arthritis    Ganglion cyst    Left retinal detachment    Left cataract    Hiatal hernia    History of Helicobacter pylori infection    Dupuytren's contracture of both hands    Leg edema    Stiffness of finger joint of left hand    Colon cancer screening        Review of patient's allergies indicates:   Allergen Reactions    Morphine Other (See Comments)     Family HX-mother went into anaphylactic shock    Contrast media      Family history of renal failure with iodinated contrast    Demerol [meperidine]     Morphine Other (See Comments)     pts mother had anaphylaxis from Morphine so he does not want to take       Past Surgical History:   Procedure Laterality Date    CARDIAC CATHETERIZATION  2015    NSA of coronaries    HERNIA REPAIR      groin bilat    HERNIA REPAIR  2008    Dr. Kincaid    NASAL SEPTUM SURGERY      SINUS SURGERY  2012           Current Outpatient Medications:     b complex vitamins capsule, Take 1 capsule by mouth once daily., Disp: , Rfl:     cetirizine (ZYRTEC) 10 MG tablet, Take 1 tablet (10 mg total) by mouth once daily., Disp: 90 tablet, Rfl: 0    colchicine (COLCRYS) 0.6 mg tablet, Use as directed, Disp: 60 tablet, Rfl: 3    glucosamine-chondroitin 500-400 mg tablet, Take 1 tablet by mouth 2 (two) times daily., Disp: , Rfl:     nebivoloL (BYSTOLIC) 20 mg Tab, TAKE 2 TABLETS (40 MG) ONCE DAILY, Disp: 180 tablet, Rfl: 3    nystatin-triamcinolone (MYCOLOG II) cream, APPLY  CREAM TOPICALLY TWICE DAILY, Disp: 60 g, Rfl: 2    tadalafiL (CIALIS) 20 MG Tab, Take 1 tablet (20 mg total) by mouth as needed (intercourse)., Disp: 7 tablet, Rfl: 12    albuterol (PROVENTIL HFA) 90 mcg/actuation inhaler, Inhale 2 puffs into the lungs every 6 (six) hours as needed for Wheezing. Rescue, Disp: 18 g, Rfl: 0    amoxicillin-clavulanate 875-125mg (AUGMENTIN) 875-125 mg per tablet, Take 1 tablet by mouth 2 (two) times daily. for 7 days, Disp: 14 tablet, Rfl: 0    dextromethorphan-guaiFENesin  mg/5 ml (ROBITUSSIN-DM)  mg/5 mL liquid, Take 5 mLs by mouth every 6 (six) hours as needed (cough)., Disp: 473 mL, Rfl: 0    Lab Results   Component Value Date    WBC 6.71 01/22/2022    HGB 15.5 01/22/2022    HCT 45.5 01/22/2022     01/22/2022    CHOL 193 01/22/2022    TRIG 260 (H) 01/22/2022    HDL 33 (L) 01/22/2022    ALT 85 (H) 01/22/2022    AST 45 (H) 01/22/2022     01/22/2022    K 3.4 (L) 01/22/2022     01/22/2022    CREATININE 1.2 01/22/2022    BUN 15 01/22/2022    CO2 28 01/22/2022    TSH 3.003 05/07/2019    PSA 1.5 07/31/2021    INR 1.0 06/28/2019    HGBA1C 5.2 01/29/2022     Above labs reviewed    Review of Systems   Constitutional: Negative for chills and fever.   HENT: Positive for congestion, postnasal drip, rhinorrhea, sinus pressure and sinus pain. Negative for sore throat.    Respiratory: Positive for cough  "and shortness of breath. Negative for chest tightness and wheezing.    Cardiovascular: Negative for chest pain and palpitations.   Gastrointestinal: Negative for abdominal pain.   Neurological: Negative for dizziness and headaches.       Objective:   BP (!) 144/78 (BP Location: Right arm, Patient Position: Sitting, BP Method: Medium (Manual))   Pulse 90   Temp 98.5 °F (36.9 °C) (Oral)   Resp 18   Ht 5' 9" (1.753 m)   Wt 88.3 kg (194 lb 10.7 oz)   SpO2 97%   BMI 28.75 kg/m²       Physical Exam  Vitals reviewed.   Constitutional:       General: He is not in acute distress.     Appearance: Normal appearance.   HENT:      Mouth/Throat:      Mouth: Mucous membranes are moist.   Cardiovascular:      Rate and Rhythm: Normal rate and regular rhythm.      Pulses: Normal pulses.      Heart sounds: Normal heart sounds.   Pulmonary:      Effort: Pulmonary effort is normal.      Breath sounds: Normal breath sounds.   Chest:      Chest wall: No deformity, swelling or edema.   Abdominal:      General: Abdomen is flat. Bowel sounds are normal.      Palpations: Abdomen is soft.   Musculoskeletal:      Cervical back: Normal range of motion.   Skin:     General: Skin is warm and dry.   Neurological:      General: No focal deficit present.      Mental Status: He is alert.   Psychiatric:         Mood and Affect: Mood normal.         Behavior: Behavior normal.         Assessment:       1. Cough    2. Other subacute sinusitis    3. SOB (shortness of breath)        Plan:       1. Cough  - dextromethorphan-guaiFENesin  mg/5 ml (ROBITUSSIN-DM)  mg/5 mL liquid; Take 5 mLs by mouth every 6 (six) hours as needed (cough).  Dispense: 473 mL; Refill: 0    2. Other subacute sinusitis  - +Tenderness in frontal and maxillary sinus on exam  - amoxicillin-clavulanate 875-125mg (AUGMENTIN) 875-125 mg per tablet; Take 1 tablet by mouth 2 (two) times daily. for 7 days  Dispense: 14 tablet; Refill: 0  - Encouraged to have adequate " hydration  - Advised to follow ENT     3. SOB (shortness of breath)  - CTAB on exam  - albuterol (PROVENTIL HFA) 90 mcg/actuation inhaler; Inhale 2 puffs into the lungs every 6 (six) hours as needed for Wheezing. Rescue  Dispense: 18 g; Refill: 0  - advised to call us if no improvement or worsening symptoms  - s&s discussed with patient when to seek emergent care    Jannette BAEZ

## 2022-08-08 NOTE — TELEPHONE ENCOUNTER
----- Message from Casa Patel sent at 8/8/2022  7:22 AM CDT -----  Contact: pt's wife Elle at [phone]  Type:  Same Day Appointment Request    Caller is requesting a same day appointment.  Caller declined first available appointment listed below.      Name of Caller:  pt's wife Elle  When is the first available appointment?  N/A  Symptoms:  congestion and cough with yellow flem  Best Call Back Number:  [phone]  Additional Information: pt's wife Elle is calling the office to see if her  can be worked in to be seen today due to him having congestion and cough with yellow flem but no appts come up in the system. Please call back and advise.

## 2022-09-13 ENCOUNTER — PATIENT MESSAGE (OUTPATIENT)
Dept: FAMILY MEDICINE | Facility: CLINIC | Age: 67
End: 2022-09-13
Payer: MEDICARE

## 2022-09-13 DIAGNOSIS — M10.9 GOUTY ARTHRITIS: ICD-10-CM

## 2022-09-13 DIAGNOSIS — Z12.5 ENCOUNTER FOR PROSTATE CANCER SCREENING: ICD-10-CM

## 2022-09-13 DIAGNOSIS — R79.9 ABNORMAL FINDING OF BLOOD CHEMISTRY, UNSPECIFIED: ICD-10-CM

## 2022-09-13 DIAGNOSIS — I10 ESSENTIAL HYPERTENSION: Primary | ICD-10-CM

## 2022-09-13 DIAGNOSIS — E78.2 MIXED HYPERLIPIDEMIA: ICD-10-CM

## 2022-09-13 DIAGNOSIS — E87.6 HYPOKALEMIA: ICD-10-CM

## 2022-09-13 DIAGNOSIS — Z00.00 ENCOUNTER FOR PREVENTIVE HEALTH EXAMINATION: ICD-10-CM

## 2022-09-27 ENCOUNTER — PATIENT MESSAGE (OUTPATIENT)
Dept: FAMILY MEDICINE | Facility: CLINIC | Age: 67
End: 2022-09-27
Payer: MEDICARE

## 2022-10-03 ENCOUNTER — PATIENT MESSAGE (OUTPATIENT)
Dept: ADMINISTRATIVE | Facility: HOSPITAL | Age: 67
End: 2022-10-03
Payer: MEDICARE

## 2022-10-17 ENCOUNTER — PATIENT MESSAGE (OUTPATIENT)
Dept: FAMILY MEDICINE | Facility: CLINIC | Age: 67
End: 2022-10-17
Payer: MEDICARE

## 2022-10-17 DIAGNOSIS — I10 ESSENTIAL HYPERTENSION: Primary | ICD-10-CM

## 2022-10-18 DIAGNOSIS — I10 ESSENTIAL HYPERTENSION: ICD-10-CM

## 2022-10-18 RX ORDER — NEBIVOLOL 20 MG/1
20 TABLET ORAL 2 TIMES DAILY
Qty: 180 TABLET | Refills: 3 | Status: SHIPPED | OUTPATIENT
Start: 2022-10-18 | End: 2023-10-03 | Stop reason: SDUPTHER

## 2022-10-18 RX ORDER — NEBIVOLOL 20 MG/1
20 TABLET ORAL DAILY
Qty: 90 TABLET | Refills: 1 | Status: SHIPPED | OUTPATIENT
Start: 2022-10-18 | End: 2022-10-18 | Stop reason: SDUPTHER

## 2022-10-18 NOTE — TELEPHONE ENCOUNTER
Last Nebivolol was sent for once daily. Pt states he has been taking twice daily. Please advise and send in corrected script if necessary

## 2022-10-18 NOTE — TELEPHONE ENCOUNTER
No new care gaps identified.  [facility] Embedded Care Gaps. Reference number: [id number]. 10/18/2022   11:26:58 AM ANNAT

## 2022-10-18 NOTE — TELEPHONE ENCOUNTER
----- Message from Radha Dent sent at 10/18/2022 10:08 AM CDT -----  Contact: John  Type:  Pharmacy Calling to Clarify an RX    Name of Caller: John    Pharmacy Name: [facility]    Prescription Name: nebivoloL (BYSTOLIC) 20 mg Tab    What do they need to clarify?: states does not match patient's previous history of patient's dosage and quantity; calling to confirm if Rx was entered correctly; please advise      Additional Information: [phone] ext [phone] John - avail until 3pm today

## 2022-10-23 ENCOUNTER — OFFICE VISIT (OUTPATIENT)
Dept: URGENT CARE | Facility: CLINIC | Age: 67
End: 2022-10-23
Payer: MEDICARE

## 2022-10-23 VITALS
HEART RATE: 60 BPM | WEIGHT: 194 LBS | RESPIRATION RATE: 18 BRPM | OXYGEN SATURATION: 97 % | TEMPERATURE: 97 F | SYSTOLIC BLOOD PRESSURE: 194 MMHG | DIASTOLIC BLOOD PRESSURE: 90 MMHG | HEIGHT: 69 IN | BODY MASS INDEX: 28.73 KG/M2

## 2022-10-23 DIAGNOSIS — J32.9 BACTERIAL SINUSITIS: Primary | ICD-10-CM

## 2022-10-23 DIAGNOSIS — B96.89 BACTERIAL SINUSITIS: Primary | ICD-10-CM

## 2022-10-23 PROCEDURE — 99214 PR OFFICE/OUTPT VISIT, EST, LEVL IV, 30-39 MIN: ICD-10-PCS | Mod: 25,S$GLB,, | Performed by: NURSE PRACTITIONER

## 2022-10-23 PROCEDURE — 99214 OFFICE O/P EST MOD 30 MIN: CPT | Mod: 25,S$GLB,, | Performed by: NURSE PRACTITIONER

## 2022-10-23 PROCEDURE — 96372 PR INJECTION,THERAP/PROPH/DIAG2ST, IM OR SUBCUT: ICD-10-PCS | Mod: S$GLB,,, | Performed by: NURSE PRACTITIONER

## 2022-10-23 PROCEDURE — 96372 THER/PROPH/DIAG INJ SC/IM: CPT | Mod: S$GLB,,, | Performed by: NURSE PRACTITIONER

## 2022-10-23 RX ORDER — DEXAMETHASONE SODIUM PHOSPHATE 4 MG/ML
8 INJECTION, SOLUTION INTRA-ARTICULAR; INTRALESIONAL; INTRAMUSCULAR; INTRAVENOUS; SOFT TISSUE
Status: COMPLETED | OUTPATIENT
Start: 2022-10-23 | End: 2022-10-23

## 2022-10-23 RX ORDER — DOXYCYCLINE 100 MG/1
100 CAPSULE ORAL 2 TIMES DAILY
Qty: 14 CAPSULE | Refills: 0 | Status: SHIPPED | OUTPATIENT
Start: 2022-10-23 | End: 2022-10-30

## 2022-10-23 RX ADMIN — DEXAMETHASONE SODIUM PHOSPHATE 8 MG: 4 INJECTION, SOLUTION INTRA-ARTICULAR; INTRALESIONAL; INTRAMUSCULAR; INTRAVENOUS; SOFT TISSUE at 12:10

## 2022-10-23 NOTE — PROGRESS NOTES
"Subjective:       Patient ID: Elliott Gaspar is a 67 y.o. male.    Vitals:  height is 5' 9" (1.753 m) and weight is 88 kg (194 lb). His oral temperature is 97.1 °F (36.2 °C). His blood pressure is 194/90 (abnormal) and his pulse is 60. His respiration is 18 and oxygen saturation is 97%.     Chief Complaint: Sinus Problem    Sinus Problem  This is a new problem. The current episode started 1 to 4 weeks ago (3 weeks ago). There has been no fever. Associated symptoms include congestion, headaches and sinus pressure. (Post nasal drip) Treatments tried: zyrtec.     Constitution: Negative for fever.   HENT:  Positive for congestion and sinus pressure.    Gastrointestinal:  Negative for abdominal pain.   Neurological:  Positive for headaches.   Psychiatric/Behavioral: Negative.       Objective:      Past Medical History:   Diagnosis Date    Contact lens/glasses fitting     wears contacts    Deviated septum     Fatty liver disease, nonalcoholic     Negative Hep A, B, C in past    Hypertension     Hypokalemia 4/18/2015    Kidney stone     Persistent headaches     related to sinus congestion    Peyronie's disease     Thrombocytopenia     Variable; low normal; Hematology eval in past and no specific f/u advised       Past Surgical History:   Procedure Laterality Date    CARDIAC CATHETERIZATION  2015    NSA of coronaries    HERNIA REPAIR      groin bilat    HERNIA REPAIR  2008    Dr. Kincaid    NASAL SEPTUM SURGERY      SINUS SURGERY  2012       Family History   Problem Relation Age of Onset    Heart disease Mother     Atrial fibrillation Mother     Heart disease Father     Hypertension Father     Heart failure Father     Psoriasis Father     Psoriasis Paternal Grandmother     Alzheimer's disease Maternal Grandmother     Melanoma Neg Hx     Lupus Neg Hx     Eczema Neg Hx        Social History     Socioeconomic History    Marital status:    Tobacco Use    Smoking status: Former     Packs/day: 3.00     Years: 3.00     " Pack years: 9.00     Types: Cigarettes, Cigars     Quit date:      Years since quittin.8    Smokeless tobacco: Never    Tobacco comments:     quit age 18   Substance and Sexual Activity    Alcohol use: No    Drug use: No    Sexual activity: Yes     Partners: Female   Social History Narrative    ** Merged History Encounter **            Current Outpatient Medications   Medication Sig Dispense Refill    b complex vitamins capsule Take 1 capsule by mouth once daily.      colchicine (COLCRYS) 0.6 mg tablet Use as directed 60 tablet 3    glucosamine-chondroitin 500-400 mg tablet Take 1 tablet by mouth 2 (two) times daily.      nebivoloL (BYSTOLIC) 20 mg Tab Take 20 mg by mouth 2 (two) times a day. 180 tablet 3    nystatin-triamcinolone (MYCOLOG II) cream APPLY  CREAM TOPICALLY TWICE DAILY 60 g 2    albuterol (PROVENTIL HFA) 90 mcg/actuation inhaler Inhale 2 puffs into the lungs every 6 (six) hours as needed for Wheezing. Rescue 18 g 0    cetirizine (ZYRTEC) 10 MG tablet Take 1 tablet (10 mg total) by mouth once daily. 90 tablet 0    CHEST CONGESTION RELIEF DM  mg/5 mL Liqd TAKE 5 ML BY MOUTH EVERY 6 HOURS AS NEEDED FOR COUGH 473 mL 0    doxycycline (VIBRAMYCIN) 100 MG Cap Take 1 capsule (100 mg total) by mouth 2 (two) times daily. for 7 days 14 capsule 0    tadalafiL (CIALIS) 20 MG Tab Take 1 tablet (20 mg total) by mouth as needed (intercourse). 7 tablet 12     Current Facility-Administered Medications   Medication Dose Route Frequency Provider Last Rate Last Admin    dexAMETHasone injection 8 mg  8 mg Intramuscular 1 time in Clinic/HOD Jasbir Gould NP           Review of patient's allergies indicates:   Allergen Reactions    Morphine Other (See Comments)     Family HX-mother went into anaphylactic shock    Contrast media      Family history of renal failure with iodinated contrast    Demerol [meperidine]     Morphine Other (See Comments)     pts mother had anaphylaxis from Morphine so he does not  want to take       Physical Exam   Constitutional: He is oriented to person, place, and time.   HENT:   Head: Normocephalic.   Ears:   Right Ear: Tympanic membrane and ear canal normal.   Left Ear: Tympanic membrane and ear canal normal.   Mouth/Throat: No oropharyngeal exudate or posterior oropharyngeal erythema.      Comments: Cobblestone oropharynx   Eyes: Conjunctivae are normal.   Cardiovascular: Normal rate.   Pulmonary/Chest: Effort normal and breath sounds normal.   Abdominal: Normal appearance.   Neurological: no focal deficit. He is alert and oriented to person, place, and time.   Psychiatric: His behavior is normal. Mood, judgment and thought content normal.   Nursing note and vitals reviewed.      Assessment:       1. Bacterial sinusitis          Plan:         Bacterial sinusitis    Other orders  -     dexAMETHasone injection 8 mg  -     doxycycline (VIBRAMYCIN) 100 MG Cap; Take 1 capsule (100 mg total) by mouth 2 (two) times daily. for 7 days  Dispense: 14 capsule; Refill: 0       Mr Gaspar presents for >2 weeks worsening of sinus pressure/pain, PND and productive rhinorrhea/mucus without relief from antihistamine. As symptoms worsening and >2 weeks will treat for bacterial sinusitis with IM steroid, doxycycline and discussed OTC mucinex without DM due to elevated BP. PCP FU advised, return instructions discussed.

## 2022-12-13 ENCOUNTER — TELEPHONE (OUTPATIENT)
Dept: FAMILY MEDICINE | Facility: CLINIC | Age: 67
End: 2022-12-13
Payer: MEDICARE

## 2022-12-13 ENCOUNTER — PATIENT MESSAGE (OUTPATIENT)
Dept: FAMILY MEDICINE | Facility: CLINIC | Age: 67
End: 2022-12-13
Payer: MEDICARE

## 2022-12-13 NOTE — TELEPHONE ENCOUNTER
"Patient reports shoulder pain, states has been lifting heavy boxes, as he is moving into a new house. Requesting to schedule an appointment with Dr. Nunez only. States "I only want to see Dr. Nunez. I want to see someone I am familiar with."  Advised patient first available with Dr. Nunez is on 12-15-22. Offered appointment today in [facility], and tomorrow in [facility] with a different provider. Patient stated he wanted to schedule appointment on 12-15-22 with Dr. Nunez. Appointment scheduled as per patient request. Patient agreed to appointment date, time, and location.    "

## 2022-12-13 NOTE — TELEPHONE ENCOUNTER
----- Message from Lisbeth Worthy sent at 12/13/2022 11:53 AM CST -----  Contact: Patient  Type:  Patient Call          Who Called:patient         Does the patient know what this is regarding?: Requesting a tamera back for a same day appt ; pt said he have severe pain near his shoulder bone ; please advise           Would the patient rather a call back or a response via [facility]? Call           Best Call Back Number: [phone]             Additional Information:

## 2022-12-15 ENCOUNTER — OFFICE VISIT (OUTPATIENT)
Dept: FAMILY MEDICINE | Facility: CLINIC | Age: 67
End: 2022-12-15
Payer: MEDICARE

## 2022-12-15 VITALS
SYSTOLIC BLOOD PRESSURE: 160 MMHG | HEART RATE: 64 BPM | DIASTOLIC BLOOD PRESSURE: 90 MMHG | HEIGHT: 69 IN | WEIGHT: 194.44 LBS | RESPIRATION RATE: 14 BRPM | OXYGEN SATURATION: 97 % | TEMPERATURE: 98 F | BODY MASS INDEX: 28.8 KG/M2

## 2022-12-15 DIAGNOSIS — S46.911A STRAIN OF RIGHT SHOULDER, INITIAL ENCOUNTER: ICD-10-CM

## 2022-12-15 DIAGNOSIS — S46.811A TRAPEZIUS STRAIN, RIGHT, INITIAL ENCOUNTER: Primary | ICD-10-CM

## 2022-12-15 PROCEDURE — 99213 OFFICE O/P EST LOW 20 MIN: CPT | Mod: S$PBB,,, | Performed by: FAMILY MEDICINE

## 2022-12-15 PROCEDURE — 99999 PR PBB SHADOW E&M-EST. PATIENT-LVL IV: ICD-10-PCS | Mod: PBBFAC,,, | Performed by: FAMILY MEDICINE

## 2022-12-15 PROCEDURE — 99999 PR PBB SHADOW E&M-EST. PATIENT-LVL IV: CPT | Mod: PBBFAC,,, | Performed by: FAMILY MEDICINE

## 2022-12-15 PROCEDURE — 99213 PR OFFICE/OUTPT VISIT, EST, LEVL III, 20-29 MIN: ICD-10-PCS | Mod: S$PBB,,, | Performed by: FAMILY MEDICINE

## 2022-12-15 PROCEDURE — 99214 OFFICE O/P EST MOD 30 MIN: CPT | Mod: PBBFAC,PO | Performed by: FAMILY MEDICINE

## 2022-12-15 RX ORDER — PREDNISONE 20 MG/1
TABLET ORAL
Qty: 10 TABLET | Refills: 0 | Status: SHIPPED | OUTPATIENT
Start: 2022-12-15 | End: 2023-02-16

## 2022-12-15 RX ORDER — METHOCARBAMOL 500 MG/1
500 TABLET, FILM COATED ORAL 4 TIMES DAILY
Qty: 40 TABLET | Refills: 0 | Status: SHIPPED | OUTPATIENT
Start: 2022-12-15 | End: 2022-12-25

## 2022-12-15 NOTE — PATIENT INSTRUCTIONS
Heat to areas of pain 3 times a day.    Can resume Aleve after Prednisone and contact Dr Nelson for Ortho referral if not resolved in 2 weeks.    Tylenol is OK to add for pain relief.

## 2022-12-15 NOTE — PROGRESS NOTES
Subjective:       Patient ID: Elliott Gaspar is a 67 y.o. male.    Chief Complaint: Pain (R shoulder and collar bone pain that radiates towards neck and down the arm x 2.5 weeks - injured during a move - aleve gives some relief )    Patient here for UC visit.  Onset in past 2.5 weeks - pain in right shoulder/neck/collar bone.  Onset after moving (furniture/boxes).  Notable at right SCM joint and trap from base of neck to shoulder and ant shoulder areas.  Pain can radiate to elbow.  No numbness or weakness in fingers.      Pain  Pertinent negatives include no abdominal pain, chest pain, fever, nausea, numbness or rash. Review of Systems   Constitutional:  Negative for fever.   Respiratory:  Negative for shortness of breath.    Cardiovascular:  Negative for chest pain.        BP elevated - had been out of meds since moved - resuming meds now.   Gastrointestinal:  Negative for abdominal pain and nausea.   Skin:  Negative for rash.   Neurological:  Negative for numbness.   All other systems reviewed and are negative.    Objective:      Physical Exam  Vitals reviewed.   Constitutional:       General: He is not in acute distress.     Appearance: He is well-developed.   Cardiovascular:      Rate and Rhythm: Normal rate and regular rhythm.      Pulses:           Radial pulses are 2+ on the right side.      Heart sounds: No murmur heard.  Pulmonary:      Effort: Pulmonary effort is normal.      Breath sounds: Normal breath sounds.   Musculoskeletal:        Arms:    Neurological:      Mental Status: He is alert.      Motor: Motor function is intact.      Comments: Full and equal  b/l.       Assessment:       1. Trapezius strain, right, initial encounter    2. Strain of right shoulder, initial encounter        Plan:       Trapezius strain, right, initial encounter  -     predniSONE (DELTASONE) 20 MG tablet; One BID for 3 days then once a day orally  Dispense: 10 tablet; Refill: 0  -     methocarbamoL (ROBAXIN) 500 MG  Tab; Take 1 tablet (500 mg total) by mouth 4 (four) times daily. for 10 days  Dispense: 40 tablet; Refill: 0    Strain of right shoulder, initial encounter      Patient Instructions   Heat to areas of pain 3 times a day.    Can resume Aleve after Prednisone and contact Dr Nelson for Ortho referral if not resolved in 2 weeks.    Tylenol is OK to add for pain relief.

## 2022-12-28 ENCOUNTER — PATIENT MESSAGE (OUTPATIENT)
Dept: FAMILY MEDICINE | Facility: CLINIC | Age: 67
End: 2022-12-28
Payer: MEDICARE

## 2022-12-28 ENCOUNTER — TELEPHONE (OUTPATIENT)
Dept: ORTHOPEDICS | Facility: CLINIC | Age: 67
End: 2022-12-28
Payer: MEDICARE

## 2022-12-28 DIAGNOSIS — N52.8 OTHER MALE ERECTILE DYSFUNCTION: ICD-10-CM

## 2022-12-28 RX ORDER — TADALAFIL 20 MG/1
20 TABLET ORAL
Qty: 7 TABLET | Refills: 12 | Status: SHIPPED | OUTPATIENT
Start: 2022-12-28 | End: 2022-12-30 | Stop reason: SDUPTHER

## 2022-12-28 NOTE — TELEPHONE ENCOUNTER
Care Due:                  Date            Visit Type   Department     Provider  --------------------------------------------------------------------------------                                SAME DAY -                              ESTABLISHED   [facility]  Last Visit: 12-      PATIENT      MEDICINE       Boston Nunez                              EP -                              PRIMARY      [facility]  Next Visit: 02-      CARE ([facility])   MEDICINE       Jasiel Nelson                                                            Last  Test          Frequency    Reason                     Performed    Due Date  --------------------------------------------------------------------------------    Cr..........  12 months..  colchicine...............  01- 01-    Uric Acid...  12 months..  colchicine...............  01- 01-    [facility] Embedded Care Gaps. Reference number: [id number]. 12/28/2022   3:52:01 PM CST

## 2022-12-29 ENCOUNTER — TELEPHONE (OUTPATIENT)
Dept: ORTHOPEDICS | Facility: CLINIC | Age: 67
End: 2022-12-29
Payer: MEDICARE

## 2022-12-29 ENCOUNTER — TELEPHONE (OUTPATIENT)
Dept: FAMILY MEDICINE | Facility: CLINIC | Age: 67
End: 2022-12-29
Payer: MEDICARE

## 2022-12-29 DIAGNOSIS — N52.8 OTHER MALE ERECTILE DYSFUNCTION: ICD-10-CM

## 2022-12-29 NOTE — TELEPHONE ENCOUNTER
----- Message from Yoko Dee sent at 12/29/2022  4:18 PM CST -----  Regarding: pharmacy auth  Name of Who is Calling: JOYCELYN LUONG [[id number]]    What is the request in detail: legih nicolas called. Per insurance they are needing clearer directions. Once daily or one every 3 days. Needs to state max number of tablets.     [id number] [place] - [address] [place] [address]  Phone: [phone] Fax: [phone]      Can the clinic reply by [facility]: no       What Number to Call Back if not in [facility]:

## 2022-12-30 RX ORDER — TADALAFIL 20 MG/1
20 TABLET ORAL DAILY PRN
Qty: 7 TABLET | Refills: 12 | Status: SHIPPED | OUTPATIENT
Start: 2022-12-30 | End: 2023-10-03 | Stop reason: SDUPTHER

## 2023-01-06 ENCOUNTER — TELEPHONE (OUTPATIENT)
Dept: ORTHOPEDICS | Facility: CLINIC | Age: 68
End: 2023-01-06
Payer: MEDICARE

## 2023-01-24 DIAGNOSIS — M25.511 RIGHT SHOULDER PAIN, UNSPECIFIED CHRONICITY: Primary | ICD-10-CM

## 2023-01-27 ENCOUNTER — HOSPITAL ENCOUNTER (OUTPATIENT)
Dept: RADIOLOGY | Facility: HOSPITAL | Age: 68
Discharge: HOME | End: 2023-01-27
Attending: UROLOGY
Payer: MEDICARE

## 2023-01-27 ENCOUNTER — OFFICE VISIT (OUTPATIENT)
Dept: UROLOGY | Facility: CLINIC | Age: 68
End: 2023-01-27
Payer: MEDICARE

## 2023-01-27 VITALS
WEIGHT: 191 LBS | DIASTOLIC BLOOD PRESSURE: 88 MMHG | RESPIRATION RATE: 18 BRPM | HEART RATE: 64 BPM | BODY MASS INDEX: 28.29 KG/M2 | HEIGHT: 69 IN | SYSTOLIC BLOOD PRESSURE: 186 MMHG

## 2023-01-27 DIAGNOSIS — N52.1 ERECTILE DYSFUNCTION DUE TO DISEASES CLASSIFIED ELSEWHERE: Primary | ICD-10-CM

## 2023-01-27 DIAGNOSIS — Z87.442 HISTORY OF KIDNEY STONES: ICD-10-CM

## 2023-01-27 DIAGNOSIS — N48.6 PEYRONIE'S DISEASE: ICD-10-CM

## 2023-01-27 DIAGNOSIS — Z87.898 HISTORY OF GROSS HEMATURIA: ICD-10-CM

## 2023-01-27 LAB
BACTERIA #/AREA URNS HPF: ABNORMAL /HPF
BILIRUBIN, UA POC OHS: NEGATIVE
BLOOD, UA POC OHS: ABNORMAL
CLARITY, UA POC OHS: CLEAR
COLOR, UA POC OHS: YELLOW
GLUCOSE, UA POC OHS: NEGATIVE
KETONES, UA POC OHS: NEGATIVE
LEUKOCYTES, UA POC OHS: NEGATIVE
MICROSCOPIC COMMENT: ABNORMAL
NITRITE, UA POC OHS: NEGATIVE
PH, UA POC OHS: 5.5
PROTEIN, UA POC OHS: NEGATIVE
RBC #/AREA URNS HPF: 3 /HPF (ref 0–4)
SPECIFIC GRAVITY, UA POC OHS: 1.02
SQUAMOUS #/AREA URNS HPF: 1 /HPF
UROBILINOGEN, UA POC OHS: 0.2
WBC #/AREA URNS HPF: 9 /HPF (ref 0–5)

## 2023-01-27 PROCEDURE — 99999 PR PBB SHADOW E&M-EST. PATIENT-LVL III: ICD-10-PCS | Mod: PBBFAC,,, | Performed by: UROLOGY

## 2023-01-27 PROCEDURE — 88112 CYTOPATH CELL ENHANCE TECH: CPT | Performed by: PATHOLOGY

## 2023-01-27 PROCEDURE — 74018 XR ABDOMEN AP 1 VIEW: ICD-10-PCS | Mod: 26,,, | Performed by: RADIOLOGY

## 2023-01-27 PROCEDURE — 99999 PR PBB SHADOW E&M-EST. PATIENT-LVL III: CPT | Mod: PBBFAC,,, | Performed by: UROLOGY

## 2023-01-27 PROCEDURE — 88112 CYTOPATH CELL ENHANCE TECH: CPT | Mod: 26,,, | Performed by: PATHOLOGY

## 2023-01-27 PROCEDURE — 81003 URINALYSIS AUTO W/O SCOPE: CPT | Mod: PBBFAC,PN | Performed by: UROLOGY

## 2023-01-27 PROCEDURE — 74018 RADEX ABDOMEN 1 VIEW: CPT | Mod: 26,,, | Performed by: RADIOLOGY

## 2023-01-27 PROCEDURE — 99204 PR OFFICE/OUTPT VISIT, NEW, LEVL IV, 45-59 MIN: ICD-10-PCS | Mod: S$PBB,,, | Performed by: UROLOGY

## 2023-01-27 PROCEDURE — 88112 PR  CYTOPATH, CELL ENHANCE TECH: ICD-10-PCS | Mod: 26,,, | Performed by: PATHOLOGY

## 2023-01-27 PROCEDURE — 99213 OFFICE O/P EST LOW 20 MIN: CPT | Mod: PBBFAC,PN | Performed by: UROLOGY

## 2023-01-27 PROCEDURE — 99204 OFFICE O/P NEW MOD 45 MIN: CPT | Mod: S$PBB,,, | Performed by: UROLOGY

## 2023-01-27 PROCEDURE — 74018 RADEX ABDOMEN 1 VIEW: CPT | Mod: TC,FY
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
## 2023-01-31 LAB
FINAL PATHOLOGIC DIAGNOSIS: ABNORMAL
Lab: ABNORMAL

## 2023-02-01 ENCOUNTER — PATIENT MESSAGE (OUTPATIENT)
Dept: FAMILY MEDICINE | Facility: CLINIC | Age: 68
End: 2023-02-01
Payer: MEDICARE

## 2023-02-02 ENCOUNTER — LAB VISIT (OUTPATIENT)
Dept: LAB | Facility: HOSPITAL | Age: 68
End: 2023-02-02
Attending: STUDENT IN AN ORGANIZED HEALTH CARE EDUCATION/TRAINING PROGRAM
Payer: MEDICARE

## 2023-02-02 ENCOUNTER — OFFICE VISIT (OUTPATIENT)
Dept: ORTHOPEDICS | Facility: CLINIC | Age: 68
End: 2023-02-02
Payer: MEDICARE

## 2023-02-02 ENCOUNTER — HOSPITAL ENCOUNTER (OUTPATIENT)
Dept: RADIOLOGY | Facility: HOSPITAL | Age: 68
Discharge: HOME | End: 2023-02-02
Attending: ORTHOPAEDIC SURGERY
Payer: MEDICARE

## 2023-02-02 VITALS — RESPIRATION RATE: 18 BRPM | HEIGHT: 69 IN | WEIGHT: 191 LBS | BODY MASS INDEX: 28.29 KG/M2

## 2023-02-02 DIAGNOSIS — E87.6 HYPOKALEMIA: ICD-10-CM

## 2023-02-02 DIAGNOSIS — Z12.5 ENCOUNTER FOR PROSTATE CANCER SCREENING: ICD-10-CM

## 2023-02-02 DIAGNOSIS — M25.511 RIGHT SHOULDER PAIN, UNSPECIFIED CHRONICITY: Primary | ICD-10-CM

## 2023-02-02 DIAGNOSIS — M25.511 RIGHT SHOULDER PAIN, UNSPECIFIED CHRONICITY: ICD-10-CM

## 2023-02-02 DIAGNOSIS — E78.2 MIXED HYPERLIPIDEMIA: ICD-10-CM

## 2023-02-02 DIAGNOSIS — M10.9 GOUTY ARTHRITIS: ICD-10-CM

## 2023-02-02 DIAGNOSIS — Z00.00 ENCOUNTER FOR PREVENTIVE HEALTH EXAMINATION: ICD-10-CM

## 2023-02-02 DIAGNOSIS — R31.0 GROSS HEMATURIA: Primary | ICD-10-CM

## 2023-02-02 DIAGNOSIS — R79.9 ABNORMAL FINDING OF BLOOD CHEMISTRY, UNSPECIFIED: ICD-10-CM

## 2023-02-02 DIAGNOSIS — M89.8X1 PAIN OF RIGHT SCAPULA: Primary | ICD-10-CM

## 2023-02-02 DIAGNOSIS — M89.8X1 PAIN OF RIGHT SCAPULA: ICD-10-CM

## 2023-02-02 LAB
ALBUMIN SERPL BCP-MCNC: 3.8 G/DL (ref 3.5–5.2)
ALP SERPL-CCNC: 122 U/L (ref 55–135)
ALT SERPL W/O P-5'-P-CCNC: 51 U/L (ref 10–44)
ANION GAP SERPL CALC-SCNC: 9 MMOL/L (ref 8–16)
AST SERPL-CCNC: 37 U/L (ref 10–40)
BASOPHILS # BLD AUTO: 0.02 K/UL (ref 0–0.2)
BASOPHILS NFR BLD: 0.4 % (ref 0–1.9)
BILIRUB SERPL-MCNC: 0.8 MG/DL (ref 0.1–1)
BUN SERPL-MCNC: 16 MG/DL (ref 8–23)
CALCIUM SERPL-MCNC: 9.4 MG/DL (ref 8.7–10.5)
CHLORIDE SERPL-SCNC: 103 MMOL/L (ref 95–110)
CHOLEST SERPL-MCNC: 209 MG/DL (ref 120–199)
CHOLEST/HDLC SERPL: 7 {RATIO} (ref 2–5)
CO2 SERPL-SCNC: 29 MMOL/L (ref 23–29)
COMPLEXED PSA SERPL-MCNC: 1.4 NG/ML (ref 0–4)
CREAT SERPL-MCNC: 1 MG/DL (ref 0.5–1.4)
DIFFERENTIAL METHOD: ABNORMAL
EOSINOPHIL # BLD AUTO: 0.3 K/UL (ref 0–0.5)
EOSINOPHIL NFR BLD: 6.8 % (ref 0–8)
ERYTHROCYTE [DISTWIDTH] IN BLOOD BY AUTOMATED COUNT: 12.9 % (ref 11.5–14.5)
EST. GFR  (NO RACE VARIABLE): >60 ML/MIN/1.73 M^2
ESTIMATED AVG GLUCOSE: 105 MG/DL (ref 68–131)
GLUCOSE SERPL-MCNC: 112 MG/DL (ref 70–110)
HBA1C MFR BLD: 5.3 % (ref 4–5.6)
HCT VFR BLD AUTO: 44 % (ref 40–54)
HDLC SERPL-MCNC: 30 MG/DL (ref 40–75)
HDLC SERPL: 14.4 % (ref 20–50)
HGB BLD-MCNC: 14.7 G/DL (ref 14–18)
IMM GRANULOCYTES # BLD AUTO: 0.03 K/UL (ref 0–0.04)
IMM GRANULOCYTES NFR BLD AUTO: 0.6 % (ref 0–0.5)
LDLC SERPL CALC-MCNC: 136.6 MG/DL (ref 63–159)
LYMPHOCYTES # BLD AUTO: 1.4 K/UL (ref 1–4.8)
LYMPHOCYTES NFR BLD: 29.5 % (ref 18–48)
MAGNESIUM SERPL-MCNC: 2.4 MG/DL (ref 1.6–2.6)
MCH RBC QN AUTO: 29.6 PG (ref 27–31)
MCHC RBC AUTO-ENTMCNC: 33.4 G/DL (ref 32–36)
MCV RBC AUTO: 89 FL (ref 82–98)
MONOCYTES # BLD AUTO: 0.5 K/UL (ref 0.3–1)
MONOCYTES NFR BLD: 10.2 % (ref 4–15)
NEUTROPHILS # BLD AUTO: 2.5 K/UL (ref 1.8–7.7)
NEUTROPHILS NFR BLD: 52.5 % (ref 38–73)
NONHDLC SERPL-MCNC: 179 MG/DL
NRBC BLD-RTO: 0 /100 WBC
PLATELET # BLD AUTO: 120 K/UL (ref 150–450)
PMV BLD AUTO: 10 FL (ref 9.2–12.9)
POTASSIUM SERPL-SCNC: 3.6 MMOL/L (ref 3.5–5.1)
PROT SERPL-MCNC: 6.6 G/DL (ref 6–8.4)
RBC # BLD AUTO: 4.97 M/UL (ref 4.6–6.2)
SODIUM SERPL-SCNC: 141 MMOL/L (ref 136–145)
TRIGL SERPL-MCNC: 212 MG/DL (ref 30–150)
URATE SERPL-MCNC: 8.3 MG/DL (ref 3.4–7)
WBC # BLD AUTO: 4.82 K/UL (ref 3.9–12.7)

## 2023-02-02 PROCEDURE — 83735 ASSAY OF MAGNESIUM: CPT | Performed by: STUDENT IN AN ORGANIZED HEALTH CARE EDUCATION/TRAINING PROGRAM

## 2023-02-02 PROCEDURE — 73030 XR SHOULDER TRAUMA 3 VIEW RIGHT: ICD-10-PCS | Mod: 26,RT,, | Performed by: RADIOLOGY

## 2023-02-02 PROCEDURE — 84550 ASSAY OF BLOOD/URIC ACID: CPT | Performed by: STUDENT IN AN ORGANIZED HEALTH CARE EDUCATION/TRAINING PROGRAM

## 2023-02-02 PROCEDURE — 99214 PR OFFICE/OUTPT VISIT, EST, LEVL IV, 30-39 MIN: ICD-10-PCS | Mod: S$PBB,,, | Performed by: ORTHOPAEDIC SURGERY

## 2023-02-02 PROCEDURE — 80053 COMPREHEN METABOLIC PANEL: CPT | Performed by: STUDENT IN AN ORGANIZED HEALTH CARE EDUCATION/TRAINING PROGRAM

## 2023-02-02 PROCEDURE — 73030 X-RAY EXAM OF SHOULDER: CPT | Mod: TC,PN,RT

## 2023-02-02 PROCEDURE — 99999 PR PBB SHADOW E&M-EST. PATIENT-LVL II: ICD-10-PCS | Mod: PBBFAC,,, | Performed by: ORTHOPAEDIC SURGERY

## 2023-02-02 PROCEDURE — 99212 OFFICE O/P EST SF 10 MIN: CPT | Mod: PBBFAC,PN | Performed by: ORTHOPAEDIC SURGERY

## 2023-02-02 PROCEDURE — 73030 X-RAY EXAM OF SHOULDER: CPT | Mod: 26,RT,, | Performed by: RADIOLOGY

## 2023-02-02 PROCEDURE — 83036 HEMOGLOBIN GLYCOSYLATED A1C: CPT | Performed by: STUDENT IN AN ORGANIZED HEALTH CARE EDUCATION/TRAINING PROGRAM

## 2023-02-02 PROCEDURE — 99214 OFFICE O/P EST MOD 30 MIN: CPT | Mod: S$PBB,,, | Performed by: ORTHOPAEDIC SURGERY

## 2023-02-02 PROCEDURE — 99999 PR PBB SHADOW E&M-EST. PATIENT-LVL II: CPT | Mod: PBBFAC,,, | Performed by: ORTHOPAEDIC SURGERY

## 2023-02-02 PROCEDURE — 84153 ASSAY OF PSA TOTAL: CPT | Performed by: STUDENT IN AN ORGANIZED HEALTH CARE EDUCATION/TRAINING PROGRAM

## 2023-02-02 PROCEDURE — 36415 COLL VENOUS BLD VENIPUNCTURE: CPT | Performed by: STUDENT IN AN ORGANIZED HEALTH CARE EDUCATION/TRAINING PROGRAM

## 2023-02-02 PROCEDURE — 80061 LIPID PANEL: CPT | Performed by: STUDENT IN AN ORGANIZED HEALTH CARE EDUCATION/TRAINING PROGRAM

## 2023-02-02 PROCEDURE — 85025 COMPLETE CBC W/AUTO DIFF WBC: CPT | Performed by: STUDENT IN AN ORGANIZED HEALTH CARE EDUCATION/TRAINING PROGRAM
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
## 2023-02-03 ENCOUNTER — LAB VISIT (OUTPATIENT)
Dept: LAB | Facility: HOSPITAL | Age: 68
End: 2023-02-03
Attending: STUDENT IN AN ORGANIZED HEALTH CARE EDUCATION/TRAINING PROGRAM
Payer: MEDICARE

## 2023-02-03 ENCOUNTER — TELEPHONE (OUTPATIENT)
Dept: UROLOGY | Facility: CLINIC | Age: 68
End: 2023-02-03
Payer: MEDICARE

## 2023-02-03 ENCOUNTER — OFFICE VISIT (OUTPATIENT)
Dept: FAMILY MEDICINE | Facility: CLINIC | Age: 68
End: 2023-02-03
Payer: MEDICARE

## 2023-02-03 VITALS
BODY MASS INDEX: 28.73 KG/M2 | WEIGHT: 194 LBS | RESPIRATION RATE: 16 BRPM | TEMPERATURE: 99 F | OXYGEN SATURATION: 98 % | DIASTOLIC BLOOD PRESSURE: 80 MMHG | HEIGHT: 69 IN | SYSTOLIC BLOOD PRESSURE: 138 MMHG | HEART RATE: 62 BPM

## 2023-02-03 DIAGNOSIS — R94.5 ABNORMAL RESULTS OF LIVER FUNCTION STUDIES: ICD-10-CM

## 2023-02-03 DIAGNOSIS — I10 ESSENTIAL HYPERTENSION: ICD-10-CM

## 2023-02-03 DIAGNOSIS — Z91.89 FRAMINGHAM CARDIAC RISK >20% IN NEXT 10 YEARS: ICD-10-CM

## 2023-02-03 DIAGNOSIS — K76.0 FATTY LIVER DISEASE, NONALCOHOLIC: ICD-10-CM

## 2023-02-03 DIAGNOSIS — D69.6 THROMBOCYTOPENIA: ICD-10-CM

## 2023-02-03 DIAGNOSIS — Z00.00 HEALTHCARE MAINTENANCE: Primary | ICD-10-CM

## 2023-02-03 DIAGNOSIS — Z12.11 COLON CANCER SCREENING: ICD-10-CM

## 2023-02-03 PROCEDURE — 84466 ASSAY OF TRANSFERRIN: CPT | Performed by: STUDENT IN AN ORGANIZED HEALTH CARE EDUCATION/TRAINING PROGRAM

## 2023-02-03 PROCEDURE — 83921 ORGANIC ACID SINGLE QUANT: CPT | Performed by: STUDENT IN AN ORGANIZED HEALTH CARE EDUCATION/TRAINING PROGRAM

## 2023-02-03 PROCEDURE — 99999 PR PBB SHADOW E&M-EST. PATIENT-LVL IV: CPT | Mod: PBBFAC,,, | Performed by: STUDENT IN AN ORGANIZED HEALTH CARE EDUCATION/TRAINING PROGRAM

## 2023-02-03 PROCEDURE — 82728 ASSAY OF FERRITIN: CPT | Performed by: STUDENT IN AN ORGANIZED HEALTH CARE EDUCATION/TRAINING PROGRAM

## 2023-02-03 PROCEDURE — 82746 ASSAY OF FOLIC ACID SERUM: CPT | Performed by: STUDENT IN AN ORGANIZED HEALTH CARE EDUCATION/TRAINING PROGRAM

## 2023-02-03 PROCEDURE — 99214 PR OFFICE/OUTPT VISIT, EST, LEVL IV, 30-39 MIN: ICD-10-PCS | Mod: S$PBB,,, | Performed by: STUDENT IN AN ORGANIZED HEALTH CARE EDUCATION/TRAINING PROGRAM

## 2023-02-03 PROCEDURE — 99214 OFFICE O/P EST MOD 30 MIN: CPT | Mod: PBBFAC,PO | Performed by: STUDENT IN AN ORGANIZED HEALTH CARE EDUCATION/TRAINING PROGRAM

## 2023-02-03 PROCEDURE — 99214 OFFICE O/P EST MOD 30 MIN: CPT | Mod: S$PBB,,, | Performed by: STUDENT IN AN ORGANIZED HEALTH CARE EDUCATION/TRAINING PROGRAM

## 2023-02-03 PROCEDURE — 99999 PR PBB SHADOW E&M-EST. PATIENT-LVL IV: ICD-10-PCS | Mod: PBBFAC,,, | Performed by: STUDENT IN AN ORGANIZED HEALTH CARE EDUCATION/TRAINING PROGRAM

## 2023-02-03 NOTE — PROGRESS NOTES
[facility] Note    Subjective:    The HPI and pertinent ROS is included in the Diagnostic Impression Remarks section at the end of the note. Please see below for further details. Chief complaint is at end of note.     Frankie is a pleasant intelligent patient who is here for evaluation.     Modified Medications    No medications on file       Data reviewed 274}  Previous medical records reviewed and summarized in plan section at end of note.      If you are due for any health screening(s) below please notify me so we can arrange them to be ordered and scheduled. Most healthy patients at your age complete them, but you are free to accept or refuse. If you can't do it, I'll definitely understand. If you can, I'd certainly appreciate it!     Tests to Keep You Healthy    Colon Cancer Screening: ORDERED  Last Blood Pressure <= 139/89 (2/3/2023): Yes      The following portions of the patient's history were reviewed and updated as appropriate: allergies, current medications, past family history, past medical history, past social history, past surgical history and problem list.    He  has a past medical history of Contact lens/glasses fitting, Deviated septum, Fatty liver disease, nonalcoholic, Hypertension, Hypokalemia (4/18/2015), Kidney stone, Persistent headaches, Peyronie's disease, and Thrombocytopenia.  He  has a past surgical history that includes Hernia repair; Hernia repair (2008); Sinus surgery (2012); Nasal septum surgery; and Cardiac catheterization (2015).    He  reports that he quit smoking about 51 years ago. His smoking use included cigarettes and cigars. He has a 9.00 pack-year smoking history. He has been exposed to tobacco smoke. He has never used smokeless tobacco. He reports that he does not drink alcohol and does not use drugs.  He family history includes Alzheimer's disease in his maternal grandmother; Atrial fibrillation in his mother; Heart disease in his father and mother; Heart  "failure in his father; Hypertension in his father; Psoriasis in his father and paternal grandmother.    Review of patient's allergies indicates:   Allergen Reactions    Morphine Other (See Comments)     Family HX-mother went into anaphylactic shock    Contrast media      Family history of renal failure with iodinated contrast    Demerol [meperidine]     Morphine Other (See Comments)     pts mother had anaphylaxis from Morphine so he does not want to take       Tobacco Use: Medium Risk    Smoking Tobacco Use: Former    Smokeless Tobacco Use: Never    Passive Exposure: Past     Physical Examination  General appearance: alert, cooperative, no distress  Neck: no thyromegaly, no neck stiffness  Lungs: clear to auscultation, no wheezes, rales or rhonchi, symmetric air entry  Heart: normal rate, regular rhythm, normal S1, S2, no murmurs, rubs, clicks or gallops  Abdomen: soft, nontender, nondistended, no rigidity, rebound, or guarding.   Back: no point tenderness over spine  Extremities: peripheral pulses normal, no unilateral leg swelling or calf tenderness   Neurological:alert, oriented, normal speech, no new focal findings or movement disorder noted from baseline    BP Readings from Last 3 Encounters:   02/03/23 138/80   01/27/23 (!) 186/88   12/15/22 (!) 160/90     Wt Readings from Last 3 Encounters:   02/03/23 88 kg (194 lb 0.1 oz)   02/02/23 86.6 kg (191 lb)   01/27/23 86.6 kg (191 lb)     /80 (BP Location: Right arm, Patient Position: Sitting, BP Method: Large (Manual))   Pulse 62   Temp 98.6 °F (37 °C) (Oral)   Resp 16   Ht 5' 9" (1.753 m)   Wt 88 kg (194 lb 0.1 oz)   SpO2 98%   BMI 28.65 kg/m²    274}  Laboratory: I have reviewed old labs below:    274}    Lab Results   Component Value Date    WBC 4.82 02/02/2023    HGB 14.7 02/02/2023    HCT 44.0 02/02/2023    MCV 89 02/02/2023     (L) 02/02/2023     02/02/2023    K 3.6 02/02/2023     02/02/2023    CALCIUM 9.4 02/02/2023    PHOS " "2.9 04/18/2015    CO2 29 02/02/2023     (H) 02/02/2023    BUN 16 02/02/2023    CREATININE 1.0 02/02/2023    ANIONGAP 9 02/02/2023    PROT 6.6 02/02/2023    ALBUMIN 3.8 02/02/2023    BILITOT 0.8 02/02/2023    ALKPHOS 122 02/02/2023    ALT 51 (H) 02/02/2023    AST 37 02/02/2023    INR 1.0 06/28/2019    CHOL 209 (H) 02/02/2023    TRIG 212 (H) 02/02/2023    HDL 30 (L) 02/02/2023    LDLCALC 136.6 02/02/2023    TSH 3.003 05/07/2019    PSA 1.4 02/02/2023    HGBA1C 5.3 02/02/2023     Lab reviewed by me: Particular labs of significance that I will monitor, workup, or treat to improve are mentioned below in diagnostic impression remarks.    The 10-year ASCVD risk score (Sushma CHRISTIAN, et al., 2019) is: 28.1%    Values used to calculate the score:      Age: 67 years      Sex: Male      Is Non- : No      Diabetic: No      Tobacco smoker: No      Systolic Blood Pressure: 138 mmHg      Is BP treated: Yes      HDL Cholesterol: 30 mg/dL      Total Cholesterol: 209 mg/dL    Imaging/EKG: I have reviewed the pertinent results and my findings are noted in remarks.  274}    CC:   Chief Complaint   Patient presents with    Follow-up    Hypertension        274}    Assessment/Plan  Elliott Gaspar is a 67 y.o. male who presents to clinic with:  1. Healthcare maintenance    2. Essential hypertension    3. Fatty liver disease, nonalcoholic    4. Abnormal results of liver function studies    5. Colon cancer screening    6. Thrombocytopenia    7. [place] cardiac risk >20% in next 10 years       274}  Diagnostic Impression Remarks + HPI     Documentation entered by me for this encounter may have been done in part using speech-recognition technology. Although I have made an effort to ensure accuracy, "sound like" errors may exist and should be interpreted in context.     Maintenance-recommend to do colon cancer screening he wants mail-in kit not colonoscopy will send in Cologuard   Hypertension well controlled " continue current meds   Elevated [place] risk score-patient had a cardiac risk score 20% had negative stress test with his cardiologist went over the benefits of taking a statin for primary prevention recommended a coronary calcium score to get a number to further assess he will talk with his cardiologist understands the risks of cardiovascular events without a statin.    Hyperlipidemia-needs improved recommend improve diet exercise consider statin with Cardiology about calcium scan   Fatty liver disease-recommend healthy diet exercise weight loss monitor limit alcohol   The patient's thrombocytopenia was monitored, evaluated, addressed and/or treated.      This is the extent of this pleasant patient's concerns at this present time. He did not feel chest pain upon exertion, dyspnea, nausea, vomiting, diaphoresis, or syncope. No pleuritic chest pain, unilateral leg swelling, calf tenderness, or calf pain. Negative for unintentional weight loss night sweats and fevers. Elliott will return to clinic in a few months for further workup and reassessment or sooner as needed. He was instructed to call the clinic or go to the emergency department or urgent care immediately if his symptoms do not improve, worsens, or if any new symptoms develop. As we discussed that symptoms could worsen over the next 24 hours he was advised that if any increased swelling, pain, or numbness arise to go immediately to the ED. Patient knows to call any time if an emergency arises. Shared decision making occurred and he verbalized understanding in agreement with this plan. I discussed imaging findings, diagnosis, possibilities, treatment options, medications, risks, and benefits. He had many questions regarding the options and long-term effects. All questions were answered. He expressed understanding after counseling regarding the diagnosis and recommendations. He was capable and demonstrated competence with understanding of these options.  Shared decision making was performed resulting in him choosing the current treatment plan. Patient handout was given with instructions and recommendations. Advised the patient that if they become pregnant to alert us immediately to assess for medication changes. I also discussed the importance of close follow up to discuss labs, change or modify his medications if needed, monitor side effects, and further evaluation of medical problems.     Additional workup planned: see labs ordered below.    See below for labs and meds ordered with associated diagnosis      1. Healthcare maintenance    2. Essential hypertension    3. Fatty liver disease, nonalcoholic    4. Abnormal results of liver function studies    5. Colon cancer screening  - Cologuard Screening (Multitarget Stool DNA); Future  - Cologuard Screening (Multitarget Stool DNA)    6. Thrombocytopenia  - Vitamin B12 Deficiency Panel; Future  - Folate; Future  - Iron and TIBC; Future  - Ferritin; Future    7. [place] cardiac risk >20% in next 10 years      Jasiel Nelson MD   274}    If you are due for any health screening(s) below please notify me so we can arrange them to be ordered and scheduled. Most healthy patients at your age complete them, but you are free to accept or refuse.     If you can't do it, I'll definitely understand. If you can, I'd certainly appreciate it!   Tests to Keep You Healthy    Colon Cancer Screening: ORDERED  Last Blood Pressure <= 139/89 (2/3/2023): Yes

## 2023-02-03 NOTE — PATIENT INSTRUCTIONS
Byron Gallagher, Please read below to learn more on healthy habits.  Most of my patients read it.       If you are due for any health screening(s) below please notify me so we can arrange them to be ordered and scheduled. Most healthy patients at your age complete them, but you are free to accept or refuse.     If you can't do it, I'll definitely understand. If you can, I'd certainly appreciate it!     Tests to Keep You Healthy    Colon Cancer Screening: ORDERED  Last Blood Pressure <= 139/89 (2/3/2023): Yes         Colon Cancer Screening    Of cancers affecting both men and women, colorectal cancer is the third leading cancer killer in the [place]. But it doesnt have to be. Screening can prevent colorectal cancer or find it at an early stage when treatment often leads to a cure.    A colonoscopy is the preferred test for detecting colon cancer. It is needed only once every 10 years if results are negative. While sedated, a flexible, lighted tube with a tiny camera is inserted into the rectum and advanced through the colon to look for cancers. An alternative screening test that is used at home and returned to the lab may also be used. It detects hidden blood in bowel movements which could indicate cancer in the colon. If results are positive, you will need a colonoscopy to determine if the blood is a sign of cancer. This type of follow up (diagnostic) colonoscopy usually requires additional copays as required by your insurance provider. Please contact your PCP if you have any questions.    Although you are still overdue for this important screening, due to the COVID-19 pandemic, we recommend scheduling it in the near future.                    Table of Contents:     Cancer prevention  Explanation on the different components of your blood work and interpretation  Frequently asked questions   Blood pressure log   E-Visits  E-Consults     Cancer Prevention    Why should you choose to get screened for cancer? One  "simple reason is because you are important. You matter and deserve to have the best health so you can fulfill your great potential.     Colon Cancer screening - Most colon cancers can be prevented by screening. In most cases a polyp in the colon can grow and is not cancerous at first, but it can become cancerous years later. A polyp is like a seed that can grow into a weed if it is left in the soil. If the seed is detected and removed, no weed sprouts. A FIT test/Cologuard test and colonoscopy can detect precancerous polyps and lead to the prevention of cancer. A polyp is just like a seed that can be removed before the roots take hold. A colonoscopy can remove these polyps and eliminate the chance that these polyps turn into cancer.     Cervical Cancer screening- A PAP smear can detect precancerous cells that can become cervical cancer and can lead to procedures to remove them. It is very important to get a PAP smear as investing these few minutes can help prevent a lot of trouble down the road. If you want to do the most you can do to spend more time with your family and friends', cancer screenings can help with that goal.     Breast Cancer screening- Mammograms can detect breast cancer before it has spread and early detection allows the ability to remove the lesion prior to any spread. It is similar to a small rotten spot on fruit. If the spoiled part is removed quickly it can preserve the rest of the fruit, but if it is left alone it will corrupt the whole peach.     Smoking Cessation- "Smoking is like a chimney. The tar builds up and causes a back draft which leads to a cough. Smoking is like sitting in a car with a blocked exhaust system." Smoking can increase your risk for lung, mouth, throat, nose, esophagus, bladder, kidney, ureter, pancreas, stomach, liver, cervix, ovary, bowel, and leukemia [2]. If you have smoked in the past, you might meet the criteria for a CT lung cancer screening.     Lung Cancer " "Screening - "The U.S. Preventive Services Task Force (USPSTF) recommendsexternal icon yearly lung cancer screening with LDCT for people who--have a 20 pack-year or more smoking history, and smoke now or have quit within the past 15 years, and are between 50 and 80 years old. A pack-year is smoking an average of one pack of cigarettes per day for one year. For example, a person could have a 20 pack-year history by smoking one pack a day for 20 years or two packs a day for 10 years." [3]    Hypertension - Common high blood pressure meds may lower colorectal cancer risk-[facility], July 6, 2020 - Hypertension Journal Report Medications commonly prescribed to treat high blood pressure may also reduce patients' colorectal cancer risk, according to new research published today in Hypertension, an American Heart Association journal." [4].     Low HbA1c - "Higher HbA1c levels (within both the non?diabetic and diabetic ranges) were associated with the risk of colorectal cancer (Model 2; P linear?=?0.009), especially for colon cancer." [5].    A healthy diet, exercise, limitation of alcohol use, certain infections, avoidance of radiation/environmental toxins, and staying lean lowers your cancer risk [6].     I want to empower you to make informed choices for your health. I have a listed below the most common blood components that we check for when you get blood work. I discuss what each component measures along with common reasons for why they can be abnormal. If you are interested in understanding your blood work better, please read the lab explanation attached below.     Explanation of Lab Results    Please note: This information is included as a reference to help you better understand your lab results and is not be used for diagnosis.     Lipid Panel:  Cholesterol is a measure of cardiac risk and stroke risk. A lipid panel measures total cholesterol and provides readings which are broken down into 3 subsections: " Triglycerides, HDL and LDL.    Total Cholesterol: Your total blood cholesterol is a measure of LDL cholesterol, HDL cholesterol, and other lipid components.  If your individual lipid level components are in the normal range and you have an elevated total cholesterol level we are generally not to concerned since we primarily look at the LDL cholesterol which is considered the bad cholesterol which can lead to heart attacks and strokes.  Your calculated cardiac risk is the most important factor in deciding if you would benefit from a cholesterol-lowering medication that the total cholesterol level.    Triglycerides are most diet and weight sensitive. They are affected easily by lifestyle changes. Ideally, this reading should be less than 150, although if the remainder of the cholesterol panel is within normal limits, we will tolerate upwards of 250-300. For the most part, if triglycerides are the only part of your cholesterol panel reading as 'abnormal', it is best to lose weight and modify your diet, including less saturated fat and less simple sugars. We only start medication to lower this is the level is greater than 500 since this can increase ones risk for pancreatitis. This is not the cholesterol type that leads to heart attacks.     HDL is your 'good cholesterol.' Ideally, the reading should be over 40 and is particularly helpful when it is greater than 60. Research indicates that high HDL is extremely protective; and if your HDL level is greater than 60, we tolerate far worse LDL or triglyceride levels. For the most part, HDL is responsive to aerobic activity and ideal weight. We do not have medicines that increase the HDL reading very easily.    LDL is your 'bad cholesterol.' Our goals depend on how many cardiac risk factors you have.     High LDL: If you are diabetic or have had a heart attack, we like the LDL level to be less than 100. If you have 2 cardiac risk factors (such as diabetes, hypertension,  family history, a low HDL and smoking), we like your LDL to be less than 130. If you have 0-2 cardiac risk factors, we like your LDL level to be less than 160, but we may not necessarily initiate medications to 190 unless you cannot lower it. The latest guidelines recommend to consider taking a statin only if your ASCVD cardiac risk score is at least greater than 7.5% and a strong recommendation if your risk score is greater than 10%.     Low LDL: Normal or low LDL is considered good and having an LDL below the normal range is not of a concern.     Complete Blood Count (CBC):    White blood cells: These are infection fighting cells. Mild fluctuations may represent minor illness at the time of blood draw. Marked fluctuations can represent immune diseases. This can also be elevated from smoking.     High WBC: Elevation in wbc can commonly be caused by an infection, steroid use, or any cause of inflammation such as many rheumatologic diseases, surgery, or trauma.      Low WBC: Many different things can cause this such as infections, medications, rheumatologic disorders, malignancies, nutritional deficiencies, and a normal variant in some individuals. If this occurs it is common practice to rule out a few viruses such as HIV, Hep C, B12, folate along with a a peripheral blood smear to look for abnormalities. If you are otherwise healthy with no concerning symptoms and the workup is negative we usually monitor it with yearly blood work.  If there are other abnormal cell line abnormalities sometimes we refer to hematology to further evaluate.    Hemoglobin: A key indicator for anemia. Ranges depend on age. If you are significantly out of this range, we may need to talk with you.    High Hemoglobin: This can be elevated in some blood disorders, sleep apnea, chronic lung disease, kidney disease, testosterone use, dehydration, smoking, along with some other rare causes.     Low Hemoglobin: Many things can lead to this but  the most common cause is an iron deficiency in females who lose blood during their periods, decreased absorption due to antacids, poor diet, sickle cell, anemia of chronic disease, malignancy, bleeding from the gut, along with some other less common causes. If you are a young female who has periods it is usually assumed that this is from that blood loss unless other symptoms or abnormal blood work is present. If you are above 45 and have an iron deficiency it is always recommended to get a colonoscopy to ensure that there is no lesion causing blood loss and to exclude malignancy.     Hematocrit: Another way of looking at anemia, much like your hemoglobin. If you are significantly out of this range, we may need to talk with you.    MCV: This looks at the size of your red blood cells.     High MCV:  A large number may indicate a B-12 deficiency, folate deficiency, alcohol use, liver disease, medication-induced phenomenon, or a need for further workup.    Low MCV: A small number may imply iron anemia or genetic disorder such as alpha-thalassemia. We may check iron studies called to see if you are low.    MCH: Much like MCV above.    Platelets: These are clotting factors. We tolerate a broad range in this. If your numbers are less than 100, we may need to work it up.     High Platelet Count: If your numbers are elevated, this could represent ongoing inflammation within the body which can be caused by any infection, illness, iron deficiency, or other malignancy. We usually recheck it to monitor for improvement and if it does not resolve will work it up with more blood work.     Low Platelet Count: Many things can cause this such as infections, alcohol use, medications, liver disease, vitamin deficiencies, aspleenia, and some other rare blood disorders. If it persists many times we refer to hematology if a cause is not identified.     Iron:  This is not a reliable blood marker since this fluctuates throughout the day and  if you are fasting many times your iron level in your blood is low but this does not necessarily mean you have a deficiency.  There are more reliable ways to measure your iron stores and these are discussed below.    Transferrin:  Many things can cause an abnormal result and this is not as important as some other labs mentioned below.    TIBC:  This is the total iron-binding capacity and this measures the total amount of iron that can be bound by proteins in the blood.  If you have an elevated TIBC this is a good marker that you could be low on iron and this is useful blood marker.  A simple way to think of this is seats in a car. The TIBC is the number of open seats that iron can sit in. If you have a high TIBC (number of open seats) that means you have a low iron level.     Ferritin:  A low ferritin is almost always caused from an iron deficiency.  A normal ferritin level does not need necessarily exclude an iron deficiency since many inflammatory conditions such as kidney disease, diabetes, arthritis, and obesity can raise this level hiding an iron deficiency.  If you are healthy with no inflammatory conditions this is a very reliable test for detecting an iron deficiency since nothing else lowers your ferritin level except a low iron level. Keep in mind that you can have an iron deficiency if your ferritin level is normal but your TIBC is elevated.  If you have a low iron level the next step is always to identify why you have a low iron level.    CMP (Comprehensive Metabolic Panel):  Broadly, this is a test of organ function including the kidneys, liver, and electrolyte levels.    Glucose: This is primarily looking for diabetes. We like your blood glucose level to be less than 100 when fasting. Readings of 100-125 indicate what we call 'pre-diabetes' or 'glucose intolerance.' This does not necessarily indicate diabetes, but we may check another test called a hemoglobin A1C for confirmation. This level puts you  at great risk for becoming a true diabetic and we would encourage the reduction of simple sugars and processed white flour as well as appropriate weight loss. If this number is greater than 125, it is likely you are diabetic; we will get an additional hemoglobin A1C test and will likely schedule you for an appointment. If you notice that your blood glucose is above 125 and we have not scheduled a follow-up appointment, please call us. Patients who are known diabetics can have readings greater than 125.    Urea Nitrogen: This is a kidney function and maybe elevated because of mild dehydration or because of excessive muscle breakdown from aggressive exercise habits.    Creatinine: This also is a kidney function test. It may be mildly elevated if you have particularly large muscle bulk or taking a supplement like creatine. It is related to the GFR. It is a muscle product that we track to look at your kidney function. If this is elevated and new, we may need to talk with you. If you have had this mildly elevated in the past, it is likely that we will just track it to ensure that it does not worsen quickly. Some medications may gently worsen this, namely blood pressure pills called ace inhibitors. To some degree, we will permit levels of up to 1.6.    Sodium: This is essentially the concentration of salt within the body. This may be mildly low because of dehydration, overhydration, diuretics, .    Potassium: This is an electrolyte that can cause muscular cramping or cardiac difficulties. It is sometimes lowered by diuretic medications.    Chloride: For the most part, this is only relevant if the other electrolytes are abnormal.    CO2: This is a function of acid balance within the body. For the most part, mild abnormalities are not important and may represent a starvation or dehydration state when blood was drawn. Many medications can change this level as well such as diuretics, acetazolamide, calcium carbonate,  laxatives, aspirin, and many others.    High CO2: Many things can cause this which includes dehydration, sleep apnea, and COPD.     Low CO2: Many things can lead to a low level and if you are generally healthy we are usually  not concerned. Diarrhea, renal disease, diabetes, and many medications can cause this.     Anion Gap: Only relevant if your CO2 is abnormal.    Calcium: This is not related to dietary intake of calcium. It may fluctuate gently based on the amount of protein within your body. If it is above 10.9, we may need to do additional testing. Many times if low the albumin level is low and once the corrected calcium level is calculated the calcium is in a normal range.     Total protein: This looks at protein within the body. Markedly elevated levels can represent an immune response and may require further workup.    Albumin: This may be elevated because of particularly high level of fitness. If it is markedly suppressed, it may represent organ dysfunction, particularly in the liver or kidneys.    AST and ALT: These are enzymes in the liver and if elevated can indicate liver damage.     Elevated AST/ALT: Many things can caused elevated liver enzymes and the most common reason is viral infections, alcohol use, medications, supplements, autoimmune, along with some other rare causes. One of the most common reasons for a mild elevation in liver enzymes (less than 3 times the upper limit) is fatty liver disease. Many individuals who have extra fat in their diet store this in the liver and this can build up and cause a mild elevation. This is usually diagnosed with a liver ultrasound and exclusion of other causes. The only treatment for this is diet and exercise along with avoidance of liver toxic medications and alcohol. It is standard of care to rule our viral hepatitis and get imaging of the liver if elevated. This is monitored and if I feel concerned will refer to hepatology.     Alk Phos: Part of liver  function or bones    High Alk Phos: elevated levels may indicate a liver injury or obstruction of bile flow. Elevated levels can also be seen in vitamin D deficiency, drug induced, or bone disorders.     Low Alk Phos: In most cases having a low Alkaline Phosphatase enzyme activity is not due to any disease and is simply a normal variant.  Having an elevated Alk Phos is more concerning and associated with many diseases and thus I would not be overly concerned with a low level which is seen in many health individuals. The reasons for a low serum alkaline phosphatase activity were reviewed in a 1-yr retrospective study and in this study it was found that no cause was found in most cases.  Low activity values were recorded in several individuals in the absence of any obvious cause. This would suggest that the definition of the lower limit of the reference range for alkaline phosphatase is arbitrary, thus limiting the use of low serum activity as a marker of disease.  In some cases micronutrients like Zinc (Zn) and Magnesium (Mg) are causes of low ALP activity and you can take zinc or magnesium supplements. Unfortunately, the blood work to measure zinc and magnesium levels are unreliable and not very accurate since it does not test for the intracellular zinc or magnesium levels.     Kenji DISLAD, Sherlyn JH, Matthew CANO. Clinical significance of a low serum alkaline phosphatase. Neth J Med. 1992 Feb;40(1-2):9-14. PMID: [id number].  Fady LEACH. S, Alvaro B, Milana I, Behera S, Fady S. Low Alkaline Phosphatase (ALP) In Adult Population an Indicator of Zinc (Zn) and Magnesium (Mg) Deficiency. Curr Res Nutr Food Sci 2017;5(3). doi : http://dx.[email or web address]    Total Bilirubin: This is also part of liver function.    High T Bili: If you have right upper quadrant pain an elevation in total bilirubin can be caused by a gallbladder stone that is blocking the biliary tract that leads to your gastrointestinal tract. If you  have fever and right upper quadrant pain this can sometimes be elevated when your gallbladder is infected and most individuals have nausea with vomiting associated with it. If you have no symptoms and are otherwise healthy this can be caused by Gilbert syndrome which is a benign normal variant. There are other causes such as some anemias but there would be abnormal blood counts.     Low T Bili: Nothing to be concerned about.     Thyroid Stimulating Hormone (TSH): This reading is an indicator of your thyroid function. The thyroid regulates energy levels throughout the entire body, affecting almost every organ system. This is an inverse relationship so a high number actually presents a low thyroid. If this is abnormal, we will often check an additional lab called a Free T4 to evaluate this more carefully. Borderline elevations, those of 5-10, can be watched or worked up further. Please do not take the supplement Biotin for at least a week prior to getting your TSH checked since this can lead to false measurement levels.     Prostate Specific Antigen (PSA): (Men only.) This is a prostate cancer screening test, and is no longer a routine screening test. Levels are truly a function of age. Being less than 4 is typical for someone more in their 60s. If you are young, it should probably be less than 3. A higher PSA result does not necessarily mean that you have cancer, but may indicate a need for a discussion with your provider. Options include observation to look at rate of rise or prostate biopsy. Not only is the absolute value important, but how much it has changed from previous years. Please ensure that there is not a dramatic rise from previous years.    Please Note: This information is included as a reference to help you better understand your lab results and is not be used for diagnosis.    Frequently asked questions    When should I take my blood pressure medication?    The latest studies show that taking your  "blood pressure medication at night is the best time. A recent study showed that this prevents more heart attacks and strokes. See the answer below from [facility].     "Q. I've taken blood pressure medicines every morning for many years, and they keep my pressure under control. Recently, my doctor recommended taking them at bedtime, instead. Does that make sense?    A. It actually does make sense -- based on recent research. For many years, there have been at least three theoretical reasons for taking blood pressure medicines before bedtime. First, a body system that strongly affects blood pressure, called the renin-angiotensin system, has its peak activity during sleep. Second, circadian rhythms cause differences in the body chemistry at night compared with daytime. Third, most heart attacks occur in the morning, before medicines taken in the morning have a chance to "kick in."" [6].     When should I take my cholesterol medication?    It used to be recommended to take your cholesterol medication at night since the original statins that lowered cholesterol did not last 24 hours and most cholesterol synthesis is done at night. The long acting statins such as atorvastatin and rosuvastatin last 24 hours so they can be taken any time during the day. Simvastatin, pravastatin, fluvastatin, and lovastatin are shorter acting and should be taken at bed time.     Can supplements affect my blood work?    Yes they can. A very important supplement to not take for at least a week prior to your blood work is biotin. "Biotin supplement use is common and can lead to the false measurement of thyroid hormone in commonly used assays." [8.]    What are conditions that should not be addressed during a virtual visit?    There are some conditions that should not be evaluated via a virtual visit since optimal care is impossible. Chest pain, shortness of breath, lung conditions, abdominal pain, and any neurological complaint such as headache, " dizziness, numbness ect.         When will I get commentary of my blood work?    I review all blood work that you get and I will send out commentary on this via [employer] within 72 hours. In most cases you will get a message from me sooner, but many times not all of the blood work is completed thus I usually wait until all results have returned. If there is a critical abnormality you should be contacted the same day you got blood work.     How frequently do I need to have visits to get controlled substances?    It is standard protocol to have a visit every 3 months if you are taking scheduled substances such as ADHD medications, psychiatric medications, and pain medications. This is to ensure your safety and monitor for any side effects.     When should I bring prior to a visit if I want to lose weight?    I recommend that you make a food diary for a week and fill out what you ate each day. You can bring this form in to your visit and I can look over it to suggest changes that you can make.     Which over the counter medications should I avoid if I have decreased kidney function?    NSAIDs which includes ibuprofen (Advil, Motrin, Nuprin), naproxen (Aleve), meloxicam (Mobic) and diclofenac(Zorvolex, Voltaren) and ketorolac (Toradol) can damage your kidneys if you take this long term.Tylenol does not affect your kidney and thus is safe as long as you don't have liver disease.     Is there an [facility] pharmacy?     Yes! The [facility] is located on the first floor of the [facility]. The address is listed below. You can get curbside pickup if you call their number at [phone]. One of the many benefits of using the [facility] pharmacy is that the pharmacists can contact me directly if a cheaper alternative is available to save you money. They also see your note to know more about what the medication was prescribed for. I recommend this pharmacy since communication with me is quick in case any confusion arises on  your medications.     [address]  [address]  [place] [address]  Phone: [phone]    Hours:  Monday - Friday  8:00 a.m. - 5:30 a.m.    Why is it not in my best interest to call in order to get an antibiotic?    Medicine is a complex field and many times the correct diagnosis is critical in order to provide the correct care. One of the most important goals of a healthcare provider is to ensure that no dangerous condition is masquerading as a mild illness. Specific questions are very important to obtain during an examination that provide a wealth of information to understand your illness. Health care providers are trained to investigate for signs that can be dangerous to your health. Messaging or calling the office in order to get an antibiotic can be very dangerous.     For example, many urinary tract infections can lead to an infection in the kidney that can result in a serious blood stream infection that can lead to hospitalization if not recognized. A cough can be caused by many different things and not necessarily an infection. It is not uncommon that one assumes a cough is from an infection when it is actually caused by a blood clot in the lungs. This can lead to death. Determining your risk can only be performed after a thorough history and examination. A few sentences through e-mail is not enough.     What are some common symptoms that should be evaluated by the emergency department and not by phone or e-mail?    This does not include every symptom, but common examples of symptoms that should prompt one to go to the emergency department are chest pain, chest pressure, shortness of breath, difficulty breathing, abdominal pain, weakness or numbness or an extremity, sudden weakness or drooping on one side of the body, speaking difficulty, unusual or bad headache (particularly if it started suddenly), head injury, confusion, seizure, passing out, lightheaded, pain in arm or jaw, suddenly not able to speak,  see, walk, or move, dizziness, neck stiffness, suicidal thoughts, testicular pain, cuts and wounds, severe pain, along with many others. This is not an inclusive list.     Outside Records    It is common to have an colonoscopy, mammogram, PAP smear, or eye exam done outside the [facility] system. Many times we do not get the records automatically sent to us. Please call your provider's office to notify them to fax us your records so that we can have the most up to date information. Your provider will review your outside results in order to provide you with the complete care that you deserve. We appreciate that you decided to choose us to be serving on your healthcare team and the more information we have about your health is essential.     If I have a psychiatric crisis what should I do?    If you ever feel that there is a risk of a harm to yourself we recommend to go to the emergency room. There is a National Suicide Prevention lifeline number 0-842-594-TALK which route you to the nearest crisis center. There is also a suicide hotline 1-881-COAZSYJ (1-122.657.3367).    988 has been designated as the new three-digit dialing code that will route callers to the National Suicide Prevention Lifeline (now known as the 988 Suicide & Crisis Lifeline), and is now active across the [place].    When people call, text, or chat 158, they will be connected to trained counselors that are part of the existing Lifeline network. These trained counselors will listen, understand how their problems are affecting them, provide support, and connect them to resources if necessary.    The previous Lifeline phone number (1-466.123.2706) will always remain available to people in emotional distress or suicidal crisis.    The [employer] network of over 200 crisis centers has been in operation since 2005, and has been proven to be effective. Its the counselors at these local crisis centers who answer the contacts the Lifeline receives every  day. Numerous studies have shown that callers feel less suicidal, less depressed, less overwhelmed and more hopeful after speaking with a Lifeline counselor.     E-Visits    E-Visits are currently available for three conditions: Urinary tract infections, Sinus symptoms, and rashes.     If you have one of these infections or rash you can fill out a questionnaire that will be sent to your provider who can review and send an appropriate medication. No visit is needed.      What is an E-Visit?    An E-Visit is a way to get care for certain conditions without needing to schedule a virtual visit or to come into the clinic. We'll ask you some clinically based questions about yourself and your symptoms and your provider will evaluate, make a diagnosis and respond with a care plan with recommendations including testing and medications as indicated, just as they would in an in-person setting.  If it becomes clear that you need to be seen virtually or in-person after the E-Visit, we can arrange that.         Should I use an E-Visit?    E-Visits should be used only for non-urgent medical conditions. If you need urgent medical care, please contact your clinic by phone, schedule a same-day appointment online or find a nearby [facility]. For serious medical emergencies, please call 911 immediately.         What to expect during an E-Visit   Once you have agreed to an E-Visit, you will be prompted to complete the E-Visit clinical questionnaire in [employer], which can take 10-20 minutes to complete. You may also be asked to confirm your medications.     Since this is a medical evaluation, it is billable to your insurance. Because this is a billable visit, you may also be asked for your insurance details and to enter your credit card information, just as you would for any other visit or co-pay. Much of this is stored in your account, so it should be easy to access or update if needed. You may receive a bill for this service,  "including any applicable copay amount, after the service is rendered.     Please allow up to 24 business hours for a reply. At any point in the E-Visit process, if you have not received a response within 72 hours, please call your clinic.        To initiate the E-Visit process in [facility], click here.       E-Consults    E-Consults are available when you need to have a specialists opinion on one thing and your PCP agrees to send an E-Consult to answer your question within 48 hours. This not only saves you time but money as well since a visit is not always needed.          Patient Education       Checking Your Blood Pressure at Home   The Basics   Written by the doctors and editors at [facility]   How is blood pressure measured? -- Blood pressure is usually measured with a device that goes around your upper arm. This is often done in a doctor's office. But some people also check their blood pressure themselves, at home or at work.  Blood pressure is explained with 2 numbers. For instance, your blood pressure might be "140 over 90." The first (top) number is the pressure inside your arteries when your heart is shamika. The second (bottom) number is the pressure inside your arteries when your heart is relaxed. The table shows how doctors and nurses define high and normal blood pressure (table 1).  If your blood pressure gets too high, it puts you at risk for heart attack, stroke, and kidney disease. High blood pressure does not usually cause symptoms. But it can be serious.  What is a home blood pressure meter? -- A home blood pressure meter (or "monitor") is a device you can use to check your blood pressure yourself. It has a cuff that goes around your upper arm (figure 1). Some devices have a cuff that goes around your wrist instead. But doctors aren't sure if these work as well. The meter also has a small screen, or dial, that shows your blood pressure numbers.  There are also special meters you can wear for a " day or 2. These are different because they automatically check your blood pressure throughout the day and night, even while you are sleeping. If your doctor thinks you should use one of these devices, they will talk to you about how to wear it.  Why do I need to check my blood pressure at home? -- If your doctor knows or suspects that you have high blood pressure, they might want you to check it at home. There are a few reasons for this. Your doctor might want to look at:  Whether your blood pressure measures the same at home as it did in the doctor's office  How well your blood pressure medicines are working  Changes in your blood pressure, for example, if it goes up and down  People who check their own blood pressure at home usually do better at keeping it low.  How do I choose a home blood pressure meter? -- When choosing a home blood pressure meter, you will probably want to think about:  Cost - Some devices cost more than others. You should also check to see if your insurance will help pay for your device.  Size - It's important to make sure the cuff fits your arm comfortably. Your doctor or nurse can help you with this.  How easy it is to use - You should make sure you understand how to use the device. You also need to be able to read the numbers on the screen.  You do not need a prescription to buy a home blood pressure meter. You can buy them at most pharmacies or over the internet. Your doctor or nurse can help you choose the right device for you.  How do I check my blood pressure at home? -- Once you have a home blood pressure meter, your doctor or nurse should check it to make sure it fits you and works correctly.  When it's time to check your blood pressure:  Go to the bathroom and empty your bladder first. Having a full bladder can temporarily increase your blood pressure, making the results inaccurate.  Sit in a chair with your feet flat on the ground.  Try to breathe normally and stay calm.  Attach  the cuff to your arm. Place the cuff directly on your skin, not over your clothing. The cuff should be tight enough to not slip down, but not uncomfortably tight.  Sit and relax for about 3 to 5 minutes with the cuff on.  Follow the directions that came with your device to start measuring your blood pressure. This might involve squeezing the bulb at the end of the tube to inflate the cuff (fill it with air). With some monitors, you just need to press a button to inflate the cuff. When the cuff fills with air, it feels like someone is squeezing your arm, but it should not hurt. Then you will slowly deflate the cuff (let the air out of it), or it will deflate by itself. The screen or dial will show your blood pressure numbers.  Stay seated and relax for 1 minute, then measure your blood pressure again.  How often should I check my blood pressure? -- It depends. Different people need to follow different schedules. Your doctor or nurse will tell you how often to check your blood pressure, and when. Some people need to check their blood pressure twice a day, in the morning and evening.  Your doctor or nurse will probably tell you to keep track of your blood pressure for at least a few days (table 2). Then they will look at the numbers. The reason for this is that it's normal for your blood pressure to change a bit from day to day. For example, the numbers might change depending on whether you recently had caffeine, just exercised, or feel stressed. Checking your blood pressure over several days - or longer - will give your doctor or nurse a better idea of what is average for you.  How should I keep track of my blood pressure? -- Some blood pressure meters will record your numbers for you, or send them to your computer or smartphone. If yours does not do this, you will need to write them down. Your doctor or nurse can help you figure out the best way to keep track of the numbers.  What if my blood pressure is high? --  "Your doctor or nurse will tell you what to do if your blood pressure is high when you check it at home. If you get a number that is higher than normal, measure it again to see if it is still high. If it is very high (above a certain number, which your doctor or nurse will tell you to watch out for), you should call your doctor right away.  If your blood pressure is only a little high, your doctor or nurse might tell you to keep checking it for a few more days or weeks, and then call if it does not go back down. Then they can help you decide what to do next.  All topics are updated as new evidence becomes available and our peer review process is complete.  This topic retrieved from [employer] on: Sep 21, 2021.  Topic 064953 Version 4.0  Release: 29.4.2 - C29.263  © 2021 UpToDate, Inc. and/or its affiliates. All rights reserved.  table 1: Definition of normal and high blood pressure  Level  Top number  Bottom number    High 130 or above 80 or above   Elevated 120 to 129 79 or below   Normal 119 or below 79 or below   These definitions are from the American College of Cardiology/American Heart Association. Other expert groups might use slightly different definitions.  "Elevated blood pressure" is a term doctor or nurses use as a warning. It means you do not yet have high blood pressure, but your blood pressure is not as low as it should be for good health.  Graphic 22810 Version 6.0  figure 1: Using a home blood pressure meter     This is an example of a person using a home blood pressure meter.  Graphic 091388 Version 1.0    Consumer Information Use and Disclaimer   This information is not specific medical advice and does not replace information you receive from your health care provider. This is only a brief summary of general information. It does NOT include all information about conditions, illnesses, injuries, tests, procedures, treatments, therapies, discharge instructions or life-style choices that may apply to " you. You must talk with your health care provider for complete information about your health and treatment options. This information should not be used to decide whether or not to accept your health care provider's advice, instructions or recommendations. Only your health care provider has the knowledge and training to provide advice that is right for you. The use of this information is governed by the [employer] End User License Agreement, available at https://www.[employer]/en/solutions/Chronicity/about/jojo.The use of [employer] content is governed by the [employer] Terms of Use. ©2021 UpToDate, Inc. All rights reserved.  Copyright   © 2021 UpToDate, Inc. and/or its affiliates. All rights reserved.      Daily Blood Pressure Log    Please print this form to assist you in keeping track of your blood pressure at home.      Name:  Date of Birth:       Average Blood Pressure:           Date: Time  (a.m.) Blood  Pressure: Pulse  Rate: Time  (p.m.) Blood  Pressure : Pulse  Rate: Comments:   Sample 8:37 127/83 84    Stressful morning                                                                                                                                                                                                     Wishing you good health,     Jasiel Nelson MD     References:  1-https://www.[employer]/speakers/eva_vertes  2-https://www.[employer].com.au/news/isssj-kzi-77-cancers-that-can-tc-dbyixz-er-smoking/  3-https://www.cdc.gov/cancer/lung/basic_info/screening.htm  4-https://newsroom.heart.org/news/common-hypertension-medications-may-reduce-colorectal-cancer-risk  5-Ninao A, Lorin M, Sawada N, et al. High hemoglobin A1c levels within the non-diabetic range are associated with the risk of all cancers. Int J Cancer. 2016;138(7):5465-9905.  doi:10.1002/ijc.97134  6-https://www.[facility].[place].edu/newsletter_article/the-10-commandments-of-cancer-prevention  7-https://www.[facility].[place].edu/diseases-and-conditions/should-i-take-blood-pressure-medications-at-night  8-Gina LEYVA et al 2018 Prevalence of biotin supplement usage in outpatients and plasma biotin concentrations in patients presenting to the emergency department. Clin Biochem. Epub 2018 Jul 20. PMID: [id number].

## 2023-02-03 NOTE — PROGRESS NOTES
Can advise patient his cytology is atypical, and would therefore recommend completing hematuria workup given his past gross hematuria and his urgency/frequency    Per discussion  - Certainly will also add on urine cytology given the urgency frequency and remote history of gross hematuria.  If urine cytology or urinalysis microscopic is abnormal, is agreeable to workup for hematuria.  -   as well, could add on transrectal ultrasound to help guide recommendations for prostatic obstruction of present, and rule out any bladder process.    --> needs CT urogram with Cr on arrival prior to cysto/trus  (Can add on 2/20/23 otherwise choose march Tuesday, and complete CT 2 weeks prior)

## 2023-02-04 LAB
FERRITIN SERPL-MCNC: 244 NG/ML (ref 20–300)
FOLATE SERPL-MCNC: 4.6 NG/ML (ref 4–24)
IRON SERPL-MCNC: 110 UG/DL (ref 45–160)
SATURATED IRON: 24 % (ref 20–50)
TOTAL IRON BINDING CAPACITY: 451 UG/DL (ref 250–450)
TRANSFERRIN SERPL-MCNC: 305 MG/DL (ref 200–375)

## 2023-02-06 ENCOUNTER — PATIENT MESSAGE (OUTPATIENT)
Dept: FAMILY MEDICINE | Facility: CLINIC | Age: 68
End: 2023-02-06
Payer: MEDICARE

## 2023-02-06 ENCOUNTER — PATIENT OUTREACH (OUTPATIENT)
Dept: ADMINISTRATIVE | Facility: HOSPITAL | Age: 68
End: 2023-02-06
Payer: MEDICARE

## 2023-02-06 ENCOUNTER — OFFICE VISIT (OUTPATIENT)
Dept: DERMATOLOGY | Facility: CLINIC | Age: 68
End: 2023-02-06
Payer: MEDICARE

## 2023-02-06 DIAGNOSIS — L82.1 STUCCO KERATOSIS: ICD-10-CM

## 2023-02-06 DIAGNOSIS — L82.1 SEBORRHEIC KERATOSES: ICD-10-CM

## 2023-02-06 DIAGNOSIS — D18.01 CHERRY ANGIOMA: ICD-10-CM

## 2023-02-06 DIAGNOSIS — E61.1 IRON DEFICIENCY: Primary | ICD-10-CM

## 2023-02-06 DIAGNOSIS — I83.93 VARICOSE VEINS OF BOTH LOWER EXTREMITIES, UNSPECIFIED WHETHER COMPLICATED: ICD-10-CM

## 2023-02-06 DIAGNOSIS — L85.3 XEROSIS CUTIS: Primary | ICD-10-CM

## 2023-02-06 DIAGNOSIS — L82.0 SEBORRHEIC KERATOSES, INFLAMED: ICD-10-CM

## 2023-02-06 DIAGNOSIS — D22.9 BENIGN NEVUS: ICD-10-CM

## 2023-02-06 DIAGNOSIS — D23.9 DERMATOFIBROMA: ICD-10-CM

## 2023-02-06 PROCEDURE — 99213 OFFICE O/P EST LOW 20 MIN: CPT | Mod: 25,S$PBB,, | Performed by: STUDENT IN AN ORGANIZED HEALTH CARE EDUCATION/TRAINING PROGRAM

## 2023-02-06 PROCEDURE — 99213 OFFICE O/P EST LOW 20 MIN: CPT | Mod: PBBFAC,PO | Performed by: STUDENT IN AN ORGANIZED HEALTH CARE EDUCATION/TRAINING PROGRAM

## 2023-02-06 PROCEDURE — 99999 PR PBB SHADOW E&M-EST. PATIENT-LVL III: ICD-10-PCS | Mod: PBBFAC,,, | Performed by: STUDENT IN AN ORGANIZED HEALTH CARE EDUCATION/TRAINING PROGRAM

## 2023-02-06 PROCEDURE — 17110 DESTRUCTION B9 LES UP TO 14: CPT | Mod: S$PBB,,, | Performed by: STUDENT IN AN ORGANIZED HEALTH CARE EDUCATION/TRAINING PROGRAM

## 2023-02-06 PROCEDURE — 17110 PR DESTRUCTION BENIGN LESIONS UP TO 14: ICD-10-PCS | Mod: S$PBB,,, | Performed by: STUDENT IN AN ORGANIZED HEALTH CARE EDUCATION/TRAINING PROGRAM

## 2023-02-06 PROCEDURE — 99213 PR OFFICE/OUTPT VISIT, EST, LEVL III, 20-29 MIN: ICD-10-PCS | Mod: 25,S$PBB,, | Performed by: STUDENT IN AN ORGANIZED HEALTH CARE EDUCATION/TRAINING PROGRAM

## 2023-02-06 PROCEDURE — 99999 PR PBB SHADOW E&M-EST. PATIENT-LVL III: CPT | Mod: PBBFAC,,, | Performed by: STUDENT IN AN ORGANIZED HEALTH CARE EDUCATION/TRAINING PROGRAM

## 2023-02-06 PROCEDURE — 17110 DESTRUCTION B9 LES UP TO 14: CPT | Mod: PBBFAC,PO | Performed by: STUDENT IN AN ORGANIZED HEALTH CARE EDUCATION/TRAINING PROGRAM

## 2023-02-06 RX ORDER — AMMONIUM LACTATE 12 G/100G
LOTION TOPICAL
Qty: 225 G | Refills: 5 | Status: SHIPPED | OUTPATIENT
Start: 2023-02-06

## 2023-02-06 NOTE — PROGRESS NOTES
"  Subjective:       Patient ID:  Elliott Gaspar is a 67 y.o. male who presents for   Chief Complaint   Patient presents with    Spot     Chest , back, legs     LOV: 3/16/2018 ePr    Patient here today for several spots on chest and back x 3 years. Dry  and prickly sensation to spots on back. No treatment.    Also spots left leg x several years. Tiny, hard, nodules. Goes and comes. Reports" he picks them off but they return". Applying triamcinolone 0.1% cream PRN flaring.      Review of Systems   Constitutional:  Negative for fever and chills.   Respiratory:  Negative for cough and shortness of breath.    Gastrointestinal:  Negative for nausea and vomiting.      Objective:    Physical Exam   Constitutional: He appears well-developed and well-nourished.   Neurological: He is alert and oriented to person, place, and time.   Psychiatric: He has a normal mood and affect.   Skin:   Areas Examined (abnormalities noted in diagram):   Chest / Axilla Inspection Performed  Abdomen Inspection Performed  Back Inspection Performed  RLE Inspected  LLE Inspection Performed            Diagram Legend     Erythematous scaling macule/papule c/w actinic keratosis       Vascular papule c/w angioma      Pigmented verrucoid papule/plaque c/w seborrheic keratosis      Yellow umbilicated papule c/w sebaceous hyperplasia      Irregularly shaped tan macule c/w lentigo     1-2 mm smooth white papules consistent with Milia      Movable subcutaneous cyst with punctum c/w epidermal inclusion cyst      Subcutaneous movable cyst c/w pilar cyst      Firm pink to brown papule c/w dermatofibroma      Pedunculated fleshy papule(s) c/w skin tag(s)      Evenly pigmented macule c/w junctional nevus     Mildly variegated pigmented, slightly irregular-bordered macule c/w mildly atypical nevus      Flesh colored to evenly pigmented papule c/w intradermal nevus       Pink pearly papule/plaque c/w basal cell carcinoma      Erythematous hyperkeratotic " cursted plaque c/w SCC      Surgical scar with no sign of skin cancer recurrence      Open and closed comedones      Inflammatory papules and pustules      Verrucoid papule consistent consistent with wart     Erythematous eczematous patches and plaques     Dystrophic onycholytic nail with subungual debris c/w onychomycosis     Umbilicated papule    Erythematous-base heme-crusted tan verrucoid plaque consistent with inflamed seborrheic keratosis     Erythematous Silvery Scaling Plaque c/w Psoriasis     See annotation      Assessment / Plan:        Xerosis cutis  Stucco keratosis  Varicose veins of both lower extremities, unspecified whether complicated  -     ammonium lactate (LAC-HYDRIN) 12 % lotion; Apply topically as needed for Dry Skin.  Dispense: 225 g; Refill: 5  - recommend using nightly  - discussed that stucco keratoses are chronic and more will likely develop    Seborrheic keratoses  These are benign inherited growths without a malignant potential. Reassurance given to patient. No treatment is necessary.     Cherry angioma  This is a benign vascular lesion. Reassurance given. No treatment required.     Benign nevus  Careful dermoscopy evaluation of nevi performed with none identified as needing biopsy today  Monitor for new mole or moles that are becoming bigger, darker, irritated, or developing irregular borders.   Examined chest and back    Dermatofibroma  This is a benign scar-like lesion secondary to minor trauma. No treatment required.     Seborrheic keratoses, inflamed  - left back  Cryosurgery procedure note:    Verbal consent from the patient is obtained. Liquid nitrogen cryosurgery is applied to 1 lesions to produce a freeze injury. The patient is aware that blisters may form and is instructed on wound care with gentle cleansing and use of vaseline ointment to keep moist until healed. The patient is supplied a handout on cryosurgery and is instructed to call if lesions do not completely  resolve.           PRN  No follow-ups on file.

## 2023-02-06 NOTE — PATIENT INSTRUCTIONS

## 2023-02-06 NOTE — LETTER
AUTHORIZATION FOR RELEASE OF   CONFIDENTIAL INFORMATION    Dear Dr. Ro,    We are seeing Elliott Gaspar, date of birth 1955, in the clinic at [facility]. Jasiel Nelson MD is the patient's PCP. Elliott Gaspar has an outstanding lab/procedure at the time we reviewed his chart. In order to help keep his health information updated, he has authorized us to request the following medical record(s):                                           ( X )  RECENT LAB RESULTS                                        ( X )  STRESS TEST         Please fax records to [facility], Stephen Lee Lambert, MD, [phone]     If you have any questions, please contact [facility] at [phone].           Patient Name: Elliott Gaspar  : 1955  Patient Phone #: [phone]

## 2023-02-07 ENCOUNTER — PATIENT OUTREACH (OUTPATIENT)
Dept: ADMINISTRATIVE | Facility: HOSPITAL | Age: 68
End: 2023-02-07
Payer: MEDICARE

## 2023-02-07 LAB — VIT B12 SERPL-MCNC: 208 NG/L (ref 180–914)

## 2023-02-07 NOTE — PROGRESS NOTES
PT/PTA met face to face to discuss pt's treatment plan and progress towards established goals. Pt will be seen by a physical therapist minimally every 6th visit or every 30 days.     Please see Plan of Care dated 2/8/23 for goals.     *right cervical and shoulder - flexibility, cervical / shoulder ROM, posture, cervical/shoulder/mazin-scap strengthening, manual intervention as tolerated by patient;    VENKATA Frost, PT

## 2023-02-08 ENCOUNTER — CLINICAL SUPPORT (OUTPATIENT)
Dept: REHABILITATION | Facility: HOSPITAL | Age: 68
End: 2023-02-08
Payer: MEDICARE

## 2023-02-08 DIAGNOSIS — M89.8X1 PAIN OF RIGHT SCAPULA: ICD-10-CM

## 2023-02-08 DIAGNOSIS — M54.2 CERVICAL PAIN: ICD-10-CM

## 2023-02-08 DIAGNOSIS — M25.511 RIGHT SHOULDER PAIN, UNSPECIFIED CHRONICITY: Primary | ICD-10-CM

## 2023-02-08 PROCEDURE — 97110 THERAPEUTIC EXERCISES: CPT | Mod: PN

## 2023-02-08 PROCEDURE — 97161 PT EVAL LOW COMPLEX 20 MIN: CPT | Mod: PN

## 2023-02-08 NOTE — PLAN OF CARE
[facility]  Physical Therapy Initial Evaluation    Name: Elliott Gaspar  Clinic Number: [id number]    Therapy Diagnosis:   Encounter Diagnoses   Name Primary?    Right shoulder pain, unspecified chronicity Yes    Cervical pain     Pain of right scapula      Physician: Rodrigo Richardson,*    Physician Orders: PT Eval and Treat   Medical Diagnosis from Referral: M25.511 (ICD-10-CM) - Right shoulder pain, unspecified chronicity M89.8X1 (ICD-10-CM) - Pain of right scapula   Evaluation Date: 2/8/2023  Authorization Period Expiration: 02/02/2024   Plan of Care Expiration: 5/8/23  Visit # / Visits authorized: 1 / 1  FOTO Visit #:  1/3    Time In: 9:07AM  Time Out: 9:45AM  Total Appointment Time (timed & untimed codes): 38 minutes    Precautions: Standard     Subjective   Date of onset: Thanksgiving 2022  History of current condition - Frankie reports: I was moving in/out of a house - Walking down the stairs with a couch, the shekhar dropped his end and the couch came up and hit me across the collar bone. It felt like a deep bruise on the joint between collar and sternum. I expected it to get better, but the pain has not improved at all since the injury. Some days are good. Some days are bad.     Medical History:   Past Medical History:   Diagnosis Date    Contact lens/glasses fitting     wears contacts    Deviated septum     Fatty liver disease, nonalcoholic     Negative Hep A, B, C in past    Hypertension     Hypokalemia 4/18/2015    Kidney stone     Persistent headaches     related to sinus congestion    Peyronie's disease     Thrombocytopenia     Variable; low normal; Hematology eval in past and no specific f/u advised     Surgical History:   Elliott Gaspar  has a past surgical history that includes Hernia repair; Hernia repair (2008); Sinus surgery (2012); Nasal septum surgery; and Cardiac catheterization (2015).    Medications:   Elliott has a current medication list which includes the  following prescription(s): ammonium lactate, b complex vitamins, colchicine, glucosamine-chondroitin, nebivolol, nystatin-triamcinolone, prednisone, and tadalafil.    Allergies:   Review of patient's allergies indicates:   Allergen Reactions    Morphine Other (See Comments)     Family HX-mother went into anaphylactic shock    Contrast media      Family history of renal failure with iodinated contrast    Demerol [meperidine]     Morphine Other (See Comments)     pts mother had anaphylaxis from Morphine so he does not want to take      Imaging, bone scan films: Right Shoulder  Findings:  There is no evidence fracture or malalignment.    There are degenerative changes of the right shoulder and right acromioclavicular joint.    The adjacent soft tissues are unremarkable.  Impression:  There are degenerative changes of the right shoulder and acromioclavicular joint.    Prior Therapy: No prior therapy for current condition  Social History: lives with their spouse  Occupation: Semi-retired;  Prior Level of Function: Independent  Current Level of Function: Independent    Pain:  Current 6/10, worst 10/10, best 4/10   Location: Right SC joint- radiates to right shoulder, upper trap/base of skull, down to the right elbow  Description: tight, constant pain;  Aggravating Factors: Increased use of the UE  Easing Factors: OTC pain relievers    Pts goals: To get rid of the pain;     Objective     Posture: L shoulder elevated, forward head, rounded shoulders    Palpation: Tenderness: R cervical paraspinals, UT, pec (minor and clavicular portion), subclavius, scalenes, SC joint  Tightness: UT, scalenes, SCM, bilaterally    Cervical ROM  Active in Sitting  Flexion: ----------------- 55 degrees  Extension: ------------- 30 degrees **pain right cervical  Right Lateral Flexion: 30 degrees **pain right cervical  Left Lateral Flexion:  6 degrees *pain right cervical  Right Rotation: ------- 45 degrees  Left Rotation: --------- 45 degrees  *pain left cervical    (+) spurling's in extension and right lateral flexion    Upper Extremity Functional Movement Patterns:      Right   Reaching behind head:  T1, *right cervical   Reaching behind back:  T11     Shoulder Active ROM    Right    Flexion: 144degrees, *base of R cervical   Extension: 55degrees, stiffness right shoulder  Abduction: 160degrees   Ext Rotation: 65degrees     Shoulder Passive ROM    Right    Flexion: 145 degrees *  Abduction: 160 degrees *     Shoulder Special Tests: RIGHT  AC Joint Compression: (+)  Painful Arc: (+)    Supraspinatus  Drop Arm: (-)    Infraspinatus and Teres Minor  Ext Rotation: (-)    Subscapularis  Lift-Off: (-)    Upper Extremity MMT    Right Left  Shoulder  Flexion: 4+ 5  Abduction: 4 5  Ext Rot: 4+ 5  Int Rot:  5 5  Germaine-Scapula  Upper Trap: 3 * 5  Middle Trap: NT NT  Lower Trap: NT NT    Limitation/Restriction for FOTO Shoulder Survey    Therapist reviewed FOTO scores for Elliott Gaspar on 2/8/2023.   FOTO documents entered into [facility] - see Media section.    Limitation Score: 33%         TREATMENT   Treatment Time In: 9:35AM  Treatment Time Out: 9:45AM  Total Treatment time (time-based codes) separate from Evaluation: 10 minutes    Frankie received the treatments listed below:    THERAPEUTIC EXERCISES to develop ROM, flexibility, and posture for 10 minutes including chin nods, cervical retraction, and cervical rotation stretch with towel in supine    Home Exercises and Patient Education Provided    Education provided:   - PT POC and HEP    Written Home Exercises Provided: yes.  Exercises were reviewed and Frankie was able to demonstrate them prior to the end of the session.  Frankie demonstrated good  understanding of the education provided.     See EMR under Patient Instructions for exercises provided 2/8/2023.    Assessment   Elliott is a 67 y.o. male referred to outpatient Physical Therapy with a medical diagnosis of M25.511 (ICD-10-CM) - Right shoulder pain, unspecified  chronicity M89.8X1 (ICD-10-CM) - Pain of right scapula. Pt presents with complaints of pain at the right sternoclavicular joint, right-side cervical, and throughout the right upper arm. Evaluation findings reveal postural deviations (bilateral shoulder internal rotation), positive spurling's compression test, significant tenderness to palpation over the right SC joint, painfully limited cervical and right shoulder ROM limitations, tissue extensibility deficits, and strength deficits of shoulder girdle and mazin-scapular musculature. These deficits affect patient's ability to perform ADLs and functional mobility at prior level of function.       Pt prognosis is Fair.   Pt will benefit from skilled outpatient Physical Therapy to address the deficits stated above and in the chart below, provide pt/family education, and to maximize pt's level of independence.     Plan of care discussed with patient: Yes  Pt's spiritual, cultural and educational needs considered and patient is agreeable to the plan of care and goals as stated below:     Anticipated Barriers for therapy: Pain    Medical Necessity is demonstrated by the following  History  Co-morbidities and personal factors that may impact the plan of care Co-morbidities:   HTN    Personal Factors:   no deficits     low   Examination  Body Structures and Functions, activity limitations and participation restrictions that may impact the plan of care Body Regions:   head  neck  upper extremities    Body Systems:    gross symmetry  ROM  strength  gross coordinated movement  transitions  motor control    Participation Restrictions:   None    Activity limitations:   Learning and applying knowledge  no deficits    General Tasks and Commands  no deficits    Communication  no deficits    Mobility  driving (bike, car, motorcycle)    Self care  no deficits    Domestic Life  doing house work (cleaning house, washing dishes, laundry)    Interactions/Relationships  no deficits    Life  "Areas  no deficits    Community and Social Life  no deficits         low   Clinical Presentation stable and uncomplicated low   Decision Making/ Complexity Score: low     Goals:  Short Term Goals: 4 weeks   1. Report decreased cervical / shoulder pain  < / =  8/10 "At Worst" to increase tolerance for ADLs.  2. Demonstrate cervical AROM within 10degrees of full motion and without pain provocation to restore mobility for ADLs.  3. Demonstrate Shoulder AROM within 10degrees of full motion and without pain provocation to restore mobility for ADLs.  4. Demonstrate (-) pain provocation with special testing of cervical and shoulder to demonstrate healing.  5. Increase strength in shoulder and mazin-scapular musculature to >/= 4/5 MMT grade to increase tolerance for ADL and work activities.  6. Pt to tolerate HEP to improve ROM and independence with ADL's    Long Term Goals: 8 weeks   1. Report decreased cervical / shoulder pain  < / =  6/10 "At Worst" to increase tolerance for ADLs.  2. Increase strength in shoulder and mazin-scapular musculature to 5/5 MMT grade to increase tolerance for ADL and work activities.  3. Pt goal: To get rid of the pain.  4. Report no disruption of sleep due to neck or shoulder pain to promote sleep quality for physical restoration.   5. Pt will demonstrate </= 20% limitation on FOTO shoulder in order to demonstrate true functional improvement.    Plan   Plan of care Certification: 2/8/2023 to 5/8/2023.    Outpatient Physical Therapy 2 times weekly for 8 weeks to include the following interventions: Cervical/Lumbar Traction, Electrical Stimulation as appropriate, Manual Therapy, Moist Heat/ Ice, Neuromuscular Re-ed, Patient Education, Self Care, Therapeutic Activities, Therapeutic Exercise, Ultrasound, and Dry needling.     Vannessa Frost, PT    "

## 2023-02-09 LAB — METHYLMALONATE SERPL-SCNC: 0.17 NMOL/ML

## 2023-02-10 ENCOUNTER — CLINICAL SUPPORT (OUTPATIENT)
Dept: REHABILITATION | Facility: HOSPITAL | Age: 68
End: 2023-02-10
Payer: MEDICARE

## 2023-02-10 DIAGNOSIS — M17.10 ARTHRITIS OF KNEE: Primary | ICD-10-CM

## 2023-02-10 DIAGNOSIS — M25.511 RIGHT SHOULDER PAIN, UNSPECIFIED CHRONICITY: Primary | ICD-10-CM

## 2023-02-10 PROCEDURE — 97113 AQUATIC THERAPY/EXERCISES: CPT | Mod: PN,CQ

## 2023-02-10 NOTE — PROGRESS NOTES
[facility]   Physical Therapy Treatment Note     Name: Elliott Gaspar  Clinic Number: [id number]    Therapy Diagnosis:   Encounter Diagnosis   Name Primary?    Right shoulder pain, unspecified chronicity Yes     Physician: Rodrigo Richardson,*    Visit Date: 2/10/2023    Physician Orders: PT Eval and Treat   Medical Diagnosis from Referral: M25.511 (ICD-10-CM) - Right shoulder pain, unspecified chronicity M89.8X1 (ICD-10-CM) - Pain of right scapula   Evaluation Date: 2/8/2023  Authorization Period Expiration: 02/02/2024   Plan of Care Expiration: 5/8/23  Visit # / Visits authorized: 1 / 12  FOTO Visit #:  1/3    PTA Visit #: 1/5     Time In: 8:45 AM  Time Out: 9:45 AM  Total Billable Time: 40 minutes    SUBJECTIVE     Pt reports: I am a little sore today.  He was compliant with home exercise program.  Response to previous treatment: N/A  Functional change: N/A    Pain: 5/10  Location: right neck/shoulder     OBJECTIVE     Objective Measures updated at progress report unless specified.     Treatment     Frankie received the treatments listed below:      therapeutic exercises to develop strength, endurance, ROM, flexibility, and posture for 30 minutes including:  UBE x 8 min(4'/4')  Pulleys(flex) x 3 min  Scapular Squeezes x 20  Shld Shrugs x 15  Cervical ROM: Fl/Ex; Rot; SB x 10 ea  Seated Posture Ex's x 10  Standing Shld Flex w/ 1# bar x 10(Increased pain in R clavicle region)  Serratus Lifts(unable to perform-increased pain in R clavicle region)  S/L Shld ER x 10  S/L Shld Abd x 10  Prone Shld Abd x 10  Prone Shld Rows x 10  Prone Shld Ext x 10    manual therapy techniques:  STM applied to the:  right scapular region for 10 minutes       neuromuscular re-education activities to improve:  for  minutes. The following activities were included:      therapeutic activities to improve functional performance for   minutes, including:      gait training to improve functional mobility and safety  "for   minutes, including:      direct contact modalities after being cleared for contraindications:     supervised modalities after being cleared for contradictions:     hot pack for  minutes to .    cold pack for  minutes to .        Patient Education and Home Exercises     Home Exercises Provided and Patient Education Provided     Education provided:       Written Home Exercises Provided: yes. Exercises were reviewed and Frankie was able to demonstrate them prior to the end of the session.  Frankie demonstrated good  understanding of the education provided. See EMR under Patient Instructions for exercises provided during therapy sessions    ASSESSMENT     Patient did well with exercises; did have some increased pain in right sternoclavicular region with some exercises. His activities continue to be limited by pain.     Frankie Is progressing well towards his goals.   Pt prognosis is Fair.     Pt will continue to benefit from skilled outpatient physical therapy to address the deficits listed in the problem list box on initial evaluation, provide pt/family education and to maximize pt's level of independence in the home and community environment.     Pt's spiritual, cultural and educational needs considered and pt agreeable to plan of care and goals.     Anticipated barriers to physical therapy: Pain    Goals:   Short Term Goals: 4 weeks   1. Report decreased cervical / shoulder pain  < / =  8/10 "At Worst" to increase tolerance for ADLs.  2. Demonstrate cervical AROM within 10degrees of full motion and without pain provocation to restore mobility for ADLs.  3. Demonstrate Shoulder AROM within 10degrees of full motion and without pain provocation to restore mobility for ADLs.  4. Demonstrate (-) pain provocation with special testing of cervical and shoulder to demonstrate healing.  5. Increase strength in shoulder and mazin-scapular musculature to >/= 4/5 MMT grade to increase tolerance for ADL and work activities.  6. Pt to " "tolerate HEP to improve ROM and independence with ADL's     Long Term Goals: 8 weeks   1. Report decreased cervical / shoulder pain  < / =  6/10 "At Worst" to increase tolerance for ADLs.  2. Increase strength in shoulder and mazin-scapular musculature to 5/5 MMT grade to increase tolerance for ADL and work activities.  3. Pt goal: To get rid of the pain.  4. Report no disruption of sleep due to neck or shoulder pain to promote sleep quality for physical restoration.   5. Pt will demonstrate </= 20% limitation on FOTO shoulder in order to demonstrate true functional improvement.    PLAN     Continue per POC and progress with strength/mobility exercises as patient tolerates.    Jonathan Favre, PTA      "

## 2023-02-14 ENCOUNTER — CLINICAL SUPPORT (OUTPATIENT)
Dept: REHABILITATION | Facility: HOSPITAL | Age: 68
End: 2023-02-14
Payer: MEDICARE

## 2023-02-14 DIAGNOSIS — M25.511 RIGHT SHOULDER PAIN, UNSPECIFIED CHRONICITY: Primary | ICD-10-CM

## 2023-02-14 PROCEDURE — 97110 THERAPEUTIC EXERCISES: CPT | Mod: PN,CQ

## 2023-02-14 PROCEDURE — 97140 MANUAL THERAPY 1/> REGIONS: CPT | Mod: PN,CQ

## 2023-02-14 NOTE — PROGRESS NOTES
[facility]   Physical Therapy Treatment Note     Name: Elliott Gaspar  Clinic Number: [id number]    Therapy Diagnosis:   Encounter Diagnosis   Name Primary?    Right shoulder pain, unspecified chronicity Yes     Physician: Rodrigo Richardson,*    Visit Date: 2/14/2023    Physician Orders: PT Eval and Treat   Medical Diagnosis from Referral: M25.511 (ICD-10-CM) - Right shoulder pain, unspecified chronicity M89.8X1 (ICD-10-CM) - Pain of right scapula   Evaluation Date: 2/8/2023  Authorization Period Expiration: 02/02/2024   Plan of Care Expiration: 5/8/23  Visit # / Visits authorized: 2 / 12  FOTO Visit #:  1/3    PTA Visit #: 2/5     Time In: 9:40 AM  Time Out: 10:25 AM  Total Billable Time: 40 minutes    SUBJECTIVE     Pt reports: I am sore today.   He was compliant with home exercise program.  Response to previous treatment: Really sore  Functional change: N/A    Pain: 8/10  Location: right neck/shoulder     OBJECTIVE     Objective Measures updated at progress report unless specified.     Treatment     Frankie received the treatments listed below:      therapeutic exercises to develop strength, endurance, ROM, flexibility, and posture for 15 minutes including:  UBE x 8 min(4'/4')  Pulleys(flex) x 3 min  Scapular Squeezes x 20  Shld Shrugs x 15  Cervical ROM: Fl/Ex; Rot; SB x 10 ea  Seated Posture Ex's x 10  Standing Shld Flex w/ 1# bar x 10(Increased pain in R clavicle region)  Serratus Lifts(unable to perform-increased pain in R clavicle region)  S/L Shld ER x 10  S/L Shld Abd x 10  Prone Shld Abd x 10  Prone Shld Rows x 10  Prone Shld Ext x 10    manual therapy techniques:  STM applied to the:  right scapular region for 25 minutes       neuromuscular re-education activities to improve:  for  minutes. The following activities were included:      therapeutic activities to improve functional performance for   minutes, including:      gait training to improve functional mobility and safety  "for   minutes, including:      direct contact modalities after being cleared for contraindications:     supervised modalities after being cleared for contradictions:     hot pack for  minutes to .    cold pack for  minutes to .        Patient Education and Home Exercises     Home Exercises Provided and Patient Education Provided     Education provided:       Written Home Exercises Provided: yes. Exercises were reviewed and Frankie was able to demonstrate them prior to the end of the session.  Frankie demonstrated good  understanding of the education provided. See EMR under Patient Instructions for exercises provided during therapy sessions    ASSESSMENT     Patient performed limited exercises today secondary to increased soreness last visit. His activities continue to be limited by pain.     Frankie Is progressing well towards his goals.   Pt prognosis is Fair.     Pt will continue to benefit from skilled outpatient physical therapy to address the deficits listed in the problem list box on initial evaluation, provide pt/family education and to maximize pt's level of independence in the home and community environment.     Pt's spiritual, cultural and educational needs considered and pt agreeable to plan of care and goals.     Anticipated barriers to physical therapy: Pain    Goals:   Short Term Goals: 4 weeks   1. Report decreased cervical / shoulder pain  < / =  8/10 "At Worst" to increase tolerance for ADLs.  2. Demonstrate cervical AROM within 10degrees of full motion and without pain provocation to restore mobility for ADLs.  3. Demonstrate Shoulder AROM within 10degrees of full motion and without pain provocation to restore mobility for ADLs.  4. Demonstrate (-) pain provocation with special testing of cervical and shoulder to demonstrate healing.  5. Increase strength in shoulder and mazin-scapular musculature to >/= 4/5 MMT grade to increase tolerance for ADL and work activities.  6. Pt to tolerate HEP to improve ROM and " "independence with ADL's     Long Term Goals: 8 weeks   1. Report decreased cervical / shoulder pain  < / =  6/10 "At Worst" to increase tolerance for ADLs.  2. Increase strength in shoulder and mazin-scapular musculature to 5/5 MMT grade to increase tolerance for ADL and work activities.  3. Pt goal: To get rid of the pain.  4. Report no disruption of sleep due to neck or shoulder pain to promote sleep quality for physical restoration.   5. Pt will demonstrate </= 20% limitation on FOTO shoulder in order to demonstrate true functional improvement.    PLAN     Continue per POC and progress with strength/mobility exercises as patient tolerates.    Jonathan Favre, PTA        "

## 2023-02-16 ENCOUNTER — HOSPITAL ENCOUNTER (OUTPATIENT)
Dept: RADIOLOGY | Facility: HOSPITAL | Age: 68
Discharge: HOME | End: 2023-02-16
Attending: ORTHOPAEDIC SURGERY
Payer: MEDICARE

## 2023-02-16 ENCOUNTER — CLINICAL SUPPORT (OUTPATIENT)
Dept: REHABILITATION | Facility: HOSPITAL | Age: 68
End: 2023-02-16
Payer: MEDICARE

## 2023-02-16 ENCOUNTER — OFFICE VISIT (OUTPATIENT)
Dept: ORTHOPEDICS | Facility: CLINIC | Age: 68
End: 2023-02-16
Payer: MEDICARE

## 2023-02-16 VITALS — HEIGHT: 69 IN | WEIGHT: 194 LBS | RESPIRATION RATE: 18 BRPM | BODY MASS INDEX: 28.73 KG/M2

## 2023-02-16 DIAGNOSIS — M17.10 ARTHRITIS OF KNEE: Primary | ICD-10-CM

## 2023-02-16 DIAGNOSIS — M70.41 PREPATELLAR BURSITIS, RIGHT KNEE: ICD-10-CM

## 2023-02-16 DIAGNOSIS — M17.10 ARTHRITIS OF KNEE: ICD-10-CM

## 2023-02-16 DIAGNOSIS — M25.511 RIGHT SHOULDER PAIN, UNSPECIFIED CHRONICITY: Primary | ICD-10-CM

## 2023-02-16 PROCEDURE — 73564 X-RAY EXAM KNEE 4 OR MORE: CPT | Mod: 26,RT,, | Performed by: RADIOLOGY

## 2023-02-16 PROCEDURE — 97140 MANUAL THERAPY 1/> REGIONS: CPT | Mod: PN,CQ

## 2023-02-16 PROCEDURE — 73564 X-RAY EXAM KNEE 4 OR MORE: CPT | Mod: TC,50,PN

## 2023-02-16 PROCEDURE — 20610 LARGE JOINT ASPIRATION/INJECTION: BILATERAL KNEE: ICD-10-PCS | Mod: 50,S$PBB,, | Performed by: ORTHOPAEDIC SURGERY

## 2023-02-16 PROCEDURE — 99214 PR OFFICE/OUTPT VISIT, EST, LEVL IV, 30-39 MIN: ICD-10-PCS | Mod: 25,S$PBB,, | Performed by: ORTHOPAEDIC SURGERY

## 2023-02-16 PROCEDURE — 99213 OFFICE O/P EST LOW 20 MIN: CPT | Mod: PBBFAC,PN,25 | Performed by: ORTHOPAEDIC SURGERY

## 2023-02-16 PROCEDURE — 20610 DRAIN/INJ JOINT/BURSA W/O US: CPT | Mod: 50,PBBFAC,PN | Performed by: ORTHOPAEDIC SURGERY

## 2023-02-16 PROCEDURE — 73564 XR KNEE ORTHO BILAT WITH FLEXION: ICD-10-PCS | Mod: 26,RT,, | Performed by: RADIOLOGY

## 2023-02-16 PROCEDURE — 99214 OFFICE O/P EST MOD 30 MIN: CPT | Mod: 25,S$PBB,, | Performed by: ORTHOPAEDIC SURGERY

## 2023-02-16 PROCEDURE — 97110 THERAPEUTIC EXERCISES: CPT | Mod: PN,CQ

## 2023-02-16 PROCEDURE — 99999 PR PBB SHADOW E&M-EST. PATIENT-LVL III: CPT | Mod: PBBFAC,,, | Performed by: ORTHOPAEDIC SURGERY

## 2023-02-16 PROCEDURE — 99999 PR PBB SHADOW E&M-EST. PATIENT-LVL III: ICD-10-PCS | Mod: PBBFAC,,, | Performed by: ORTHOPAEDIC SURGERY

## 2023-02-16 PROCEDURE — 73564 X-RAY EXAM KNEE 4 OR MORE: CPT | Mod: 26,LT,, | Performed by: RADIOLOGY

## 2023-02-16 RX ORDER — TRIAMCINOLONE ACETONIDE 40 MG/ML
40 INJECTION, SUSPENSION INTRA-ARTICULAR; INTRAMUSCULAR
Status: DISCONTINUED | OUTPATIENT
Start: 2023-02-16 | End: 2023-02-16 | Stop reason: HOSPADM

## 2023-02-16 RX ADMIN — TRIAMCINOLONE ACETONIDE 40 MG: 40 INJECTION, SUSPENSION INTRA-ARTICULAR; INTRAMUSCULAR at 01:02

## 2023-02-16 NOTE — PROCEDURES
Large Joint Aspiration/Injection: bilateral knee    Date/Time: 2/16/2023 1:30 PM  Performed by: Rodrigo Richardson MD  Authorized by: Rodrigo Richardson MD     Consent Done?:  Yes (Verbal)  Indications:  Pain  Site marked: the procedure site was marked    Timeout: prior to procedure the correct patient, procedure, and site was verified    Prep: patient was prepped and draped in usual sterile fashion      Local anesthesia used?: Yes    Anesthesia:  Local infiltration  Local anesthetic:  Bupivacaine 0.25% without epinephrine and lidocaine 2% without epinephrine  Anesthetic total (ml):  6      Details:  Needle Size:  22 G  Ultrasonic Guidance for needle placement?: No    Approach:  Anterolateral  Location:  Knee  Laterality:  Bilateral  Site:  Bilateral knee (Left knee and right prepatellar bursa)  Medications (Right):  40 mg triamcinolone acetonide 40 mg/mL  Medications (Left):  40 mg triamcinolone acetonide 40 mg/mL  Patient tolerance:  Patient tolerated the procedure well with no immediate complications
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
## 2023-02-16 NOTE — PROGRESS NOTES
[facility]   Physical Therapy Treatment Note     Name: Elliott Gaspar  Clinic Number: [id number]    Therapy Diagnosis:   Encounter Diagnosis   Name Primary?    Right shoulder pain, unspecified chronicity Yes     Physician: Rodrigo Richardson,*    Visit Date: 2/16/2023    Physician Orders: PT Eval and Treat   Medical Diagnosis from Referral: M25.511 (ICD-10-CM) - Right shoulder pain, unspecified chronicity M89.8X1 (ICD-10-CM) - Pain of right scapula   Evaluation Date: 2/8/2023  Authorization Period Expiration: 02/02/2024   Plan of Care Expiration: 5/8/23  Visit # / Visits authorized: 3 / 12  FOTO Visit #:  1/3    PTA Visit #: 3/5     Time In: 8:45 AM  Time Out: 9:50 AM  Total Billable Time: 40 minutes    SUBJECTIVE     Pt reports: I am sore today.   He was compliant with home exercise program.  Response to previous treatment: Really sore  Functional change: N/A    Pain: 8/10  Location: right neck/shoulder     OBJECTIVE     Objective Measures updated at progress report unless specified.     Treatment     Frankie received the treatments listed below:      therapeutic exercises to develop strength, endurance, ROM, flexibility, and posture for 15 minutes including:  UBE x 8 min(4'/4')  Pulleys(flex) x 3 min  Scapular Squeezes x 20  Shld Shrugs x 15  Cervical ROM: Fl/Ex; Rot; SB x 10 ea  Seated Posture Ex's x 10  Upper Trap stretch 3 x 30 sec  Standing Shld Flex w/ 1# bar x 10(Increased pain in R clavicle region)  Serratus Lifts(unable to perform-increased pain in R clavicle region)  S/L Shld ER x 10  S/L Shld Abd x 10  Prone Shld Abd x 10  Prone Shld Rows x 10  Prone Shld Ext x 10    manual therapy techniques:  STM applied to the:  right scapular region for 25 minutes       neuromuscular re-education activities to improve:  for  minutes. The following activities were included:      therapeutic activities to improve functional performance for   minutes, including:      gait training to improve  "functional mobility and safety for   minutes, including:      direct contact modalities after being cleared for contraindications:     supervised modalities after being cleared for contradictions:     hot pack for  minutes to .    cold pack for  minutes to .        Patient Education and Home Exercises     Home Exercises Provided and Patient Education Provided     Education provided:       Written Home Exercises Provided: yes. Exercises were reviewed and Frankie was able to demonstrate them prior to the end of the session.  Frankie demonstrated good  understanding of the education provided. See EMR under Patient Instructions for exercises provided during therapy sessions    ASSESSMENT     Patient performed limited exercises today secondary to increased soreness last visit. His activities continue to be limited by pain.     Frankie Is progressing well towards his goals.   Pt prognosis is Fair.     Pt will continue to benefit from skilled outpatient physical therapy to address the deficits listed in the problem list box on initial evaluation, provide pt/family education and to maximize pt's level of independence in the home and community environment.     Pt's spiritual, cultural and educational needs considered and pt agreeable to plan of care and goals.     Anticipated barriers to physical therapy: Pain    Goals:   Short Term Goals: 4 weeks   1. Report decreased cervical / shoulder pain  < / =  8/10 "At Worst" to increase tolerance for ADLs.  2. Demonstrate cervical AROM within 10degrees of full motion and without pain provocation to restore mobility for ADLs.  3. Demonstrate Shoulder AROM within 10degrees of full motion and without pain provocation to restore mobility for ADLs.  4. Demonstrate (-) pain provocation with special testing of cervical and shoulder to demonstrate healing.  5. Increase strength in shoulder and mazin-scapular musculature to >/= 4/5 MMT grade to increase tolerance for ADL and work activities.  6. Pt to " "tolerate HEP to improve ROM and independence with ADL's     Long Term Goals: 8 weeks   1. Report decreased cervical / shoulder pain  < / =  6/10 "At Worst" to increase tolerance for ADLs.  2. Increase strength in shoulder and mazin-scapular musculature to 5/5 MMT grade to increase tolerance for ADL and work activities.  3. Pt goal: To get rid of the pain.  4. Report no disruption of sleep due to neck or shoulder pain to promote sleep quality for physical restoration.   5. Pt will demonstrate </= 20% limitation on FOTO shoulder in order to demonstrate true functional improvement.    PLAN     Continue per POC and progress with strength/mobility exercises as patient tolerates.    Jonathan Favre, PTA          "

## 2023-02-22 ENCOUNTER — CLINICAL SUPPORT (OUTPATIENT)
Dept: REHABILITATION | Facility: HOSPITAL | Age: 68
End: 2023-02-22
Payer: MEDICARE

## 2023-02-22 DIAGNOSIS — M25.511 RIGHT SHOULDER PAIN, UNSPECIFIED CHRONICITY: Primary | ICD-10-CM

## 2023-02-22 PROCEDURE — 97140 MANUAL THERAPY 1/> REGIONS: CPT | Mod: PN,CQ

## 2023-02-22 PROCEDURE — 97014 ELECTRIC STIMULATION THERAPY: CPT | Mod: PN,CQ

## 2023-02-22 PROCEDURE — 97110 THERAPEUTIC EXERCISES: CPT | Mod: PN,CQ

## 2023-02-22 NOTE — PROGRESS NOTES
[facility]   Physical Therapy Treatment Note     Name: Elliott Gaspar  Clinic Number: [id number]    Therapy Diagnosis:   Encounter Diagnosis   Name Primary?    Right shoulder pain, unspecified chronicity Yes     Physician: Rodrigo Richardson,*    Visit Date: 2/22/2023    Physician Orders: PT Eval and Treat   Medical Diagnosis from Referral: M25.511 (ICD-10-CM) - Right shoulder pain, unspecified chronicity M89.8X1 (ICD-10-CM) - Pain of right scapula   Evaluation Date: 2/8/2023  Authorization Period Expiration: 02/02/2024   Plan of Care Expiration: 5/8/23  Visit # / Visits authorized: 4 / 12  FOTO Visit #:  1/3    PTA Visit #: 4/5     Time In: 9:05 AM  Time Out: 10:15 AM  Total Billable Time: 55 minutes    SUBJECTIVE     Pt reports: I am sore today.   He was compliant with home exercise program.  Response to previous treatment: Really sore  Functional change: N/A    Pain: 6/10  Location: right neck/shoulder     OBJECTIVE     Objective Measures updated at progress report unless specified.     Treatment     Frankie received the treatments listed below:      therapeutic exercises to develop strength, endurance, ROM, flexibility, and posture for 25 minutes including:  UBE x 10 min(5'/5')  Pulleys(flex) x 3 min  Scapular Squeezes x 20  Shld Shrugs x 15  Cervical ROM: Fl/Ex; Rot; SB x 10 ea  Seated Posture Ex's x 10  Upper Trap stretch 3 x 30 sec  Standing Shld Flex w/ 1# bar x 10(Increased pain in R clavicle region)  Serratus Lifts(unable to perform-increased pain in R clavicle region)  S/L Shld ER x 10  S/L Shld Abd x 10  Standing leaning on hi/lo table:  Shld Abd x 10  Shld Rows x 10  Shld Ext x 10    manual therapy techniques:  STM applied to the:  right scapular region for 15 minutes       neuromuscular re-education activities to improve:  for  minutes. The following activities were included:      therapeutic activities to improve functional performance for   minutes, including:      gait  "training to improve functional mobility and safety for   minutes, including:      direct contact modalities after being cleared for contraindications:     supervised modalities after being cleared for contradictions: IFC w/ MHP x 20 min to right shoulder/upper trap    hot pack for  minutes to .    cold pack for  minutes to .        Patient Education and Home Exercises     Home Exercises Provided and Patient Education Provided     Education provided:       Written Home Exercises Provided: yes. Exercises were reviewed and Frankie was able to demonstrate them prior to the end of the session.  Frankie demonstrated good  understanding of the education provided. See EMR under Patient Instructions for exercises provided during therapy sessions    ASSESSMENT     Patient did well with exercises. Some of his activities continue to be limited by pain, so tried IFC w/ MHP to right shoulder/upper trap to try to alleviate the pain.     Frankie Is progressing well towards his goals.   Pt prognosis is Fair.     Pt will continue to benefit from skilled outpatient physical therapy to address the deficits listed in the problem list box on initial evaluation, provide pt/family education and to maximize pt's level of independence in the home and community environment.     Pt's spiritual, cultural and educational needs considered and pt agreeable to plan of care and goals.     Anticipated barriers to physical therapy: Pain    Goals:   Short Term Goals: 4 weeks   1. Report decreased cervical / shoulder pain  < / =  8/10 "At Worst" to increase tolerance for ADLs.  2. Demonstrate cervical AROM within 10degrees of full motion and without pain provocation to restore mobility for ADLs.  3. Demonstrate Shoulder AROM within 10degrees of full motion and without pain provocation to restore mobility for ADLs.  4. Demonstrate (-) pain provocation with special testing of cervical and shoulder to demonstrate healing.  5. Increase strength in shoulder and " "mazin-scapular musculature to >/= 4/5 MMT grade to increase tolerance for ADL and work activities.  6. Pt to tolerate HEP to improve ROM and independence with ADL's     Long Term Goals: 8 weeks   1. Report decreased cervical / shoulder pain  < / =  6/10 "At Worst" to increase tolerance for ADLs.  2. Increase strength in shoulder and mazin-scapular musculature to 5/5 MMT grade to increase tolerance for ADL and work activities.  3. Pt goal: To get rid of the pain.  4. Report no disruption of sleep due to neck or shoulder pain to promote sleep quality for physical restoration.   5. Pt will demonstrate </= 20% limitation on FOTO shoulder in order to demonstrate true functional improvement.    PLAN     Continue per POC and progress with strength/mobility exercises as patient tolerates.    Jonathan Favre, PTA            "

## 2023-02-24 ENCOUNTER — CLINICAL SUPPORT (OUTPATIENT)
Dept: REHABILITATION | Facility: HOSPITAL | Age: 68
End: 2023-02-24
Payer: MEDICARE

## 2023-02-24 DIAGNOSIS — M54.2 CERVICAL PAIN: ICD-10-CM

## 2023-02-24 DIAGNOSIS — M25.511 RIGHT SHOULDER PAIN, UNSPECIFIED CHRONICITY: Primary | ICD-10-CM

## 2023-02-24 DIAGNOSIS — M89.8X1 PAIN OF RIGHT SCAPULA: ICD-10-CM

## 2023-02-24 PROCEDURE — 97110 THERAPEUTIC EXERCISES: CPT | Mod: PN

## 2023-02-24 PROCEDURE — 97140 MANUAL THERAPY 1/> REGIONS: CPT | Mod: PN

## 2023-02-24 NOTE — PROGRESS NOTES
[facility]   Physical Therapy Treatment Note     Name: Elliott Gaspar  Clinic Number: [id number]    Therapy Diagnosis:   Encounter Diagnoses   Name Primary?    Right shoulder pain, unspecified chronicity Yes    Cervical pain     Pain of right scapula      Physician: Rodrigo Richardson,*    Visit Date: 2/24/2023    Physician Orders: PT Eval and Treat   Medical Diagnosis from Referral: M25.511 (ICD-10-CM) - Right shoulder pain, unspecified chronicity M89.8X1 (ICD-10-CM) - Pain of right scapula   Evaluation Date: 2/8/2023  Authorization Period Expiration: 02/02/2024   Plan of Care Expiration: 5/8/23  Visit # / Visits authorized: 5 / 12 (Not including Initial Evaluation)  FOTO Visit #:  1/3    PTA Visit #: 4/5     Time In: 12:30PM  Time Out: 1:25PM  Total Billable Time: 53 minutes    SUBJECTIVE     Pt reports: I felt good following the last session - The TENS seemed to help. Overall it feels the same as it did when I started. The worst part is around the base of the neck (levator scap, UT)    He was compliant with home exercise program.    Response to previous treatment: No adverse response  Functional change: N/A    Pain: 7/10  Location: Right neck/shoulder     OBJECTIVE     Objective Measures updated at progress report unless specified.     Treatment     Frankie received the treatments listed below:      Therapeutic Exercises to develop strength, endurance, ROM, flexibility, and posture for 30 minutes including:  UBE x 10min (5'/5')  Seated Upper Trap stretch [id number]  Following Manual  Performed by PT:  Passive Cervical ROM in all planes of motion 10x  R/L Scalene stretch [id number] each  R/L SCM stretch [id number] each  R/L Upper Trap stretch [id number] each  Hands clasped shoulder flexion 2x10  Serratus punch with dowel x 10 *symptom aggravation  Prone R scap retraction x 15 *symptom aggravation  Prone R Row x 15 *symptom aggravation  Prone R shoulder extension x 15  Seated Shld Shrugs x 15  Seated  Scapular Squeezes x 20    Pulleys(flex) x 3 min  Seated Posture Ex's x 10  Standing Shld Flex w/ 1# bar x 10(Increased pain in R clavicle region)  Serratus Lifts(unable to perform-increased pain in R clavicle region)  S/L Shld ER x 10  S/L Shld Abd x 10  Standing leaning on hi/lo table:  Shld Abd x 10  Shld Rows x 10  Shld Ext x 10    Manual Therapy Techniques: applied to the: for 23 minutes   STM - Cervical paraspinals, upper/middle trap, levator scap, right pec musculature  Suboccipital Release  Bilateral Upper trap depression for cervical distraction, 5rounds sustained x1min  Manual Cervical traction, 5rounds sustained x1min    neuromuscular re-education activities to improve:  for - minutes. The following activities were included:    therapeutic activities to improve functional performance for - minutes, including:    direct contact modalities after being cleared for contraindications:     supervised modalities after being cleared for contradictions: IFC w/ MHP x 20 min to right shoulder/upper trap     hot pack for  minutes to .    cold pack for  minutes to .    Patient Education and Home Exercises     Home Exercises Provided and Patient Education Provided     Education provided: HEP review    Written Home Exercises Provided: yes. Exercises were reviewed and Frankie was able to demonstrate them prior to the end of the session.  Frankie demonstrated good  understanding of the education provided. See EMR under Patient Instructions for exercises provided during therapy sessions    ASSESSMENT     Patient demonstrates fair tolerance with today's session. Session focused on pain-free cervical ROM, soft tissue mobility, tissue extensibility, and gentle strengthening. Pain persists at the R SC joint and subclavius musculature. TTP throughout cervicothoracic tissues. PT advised patient to schedule follow-up with referring provider due to continued pain. Patient does feel that therapy has been beneficial in improving cervical ROM  "and cervicothoracic flexibility. Patient reports electrical stim has been the only pain relieving modality and experienced relief for 12-18 hours. Therefore this to be continued.      Frankie Is minimally progressing towards his goals.     Pt prognosis is Fair.     Pt will continue to benefit from skilled outpatient physical therapy to address the deficits listed in the problem list box on initial evaluation, provide pt/family education and to maximize pt's level of independence in the home and community environment.   Pt's spiritual, cultural and educational needs considered and pt agreeable to plan of care and goals.     Anticipated barriers to physical therapy: Pain    Goals:   Short Term Goals: 4 weeks   1. Report decreased cervical / shoulder pain  < / =  8/10 "At Worst" to increase tolerance for ADLs.  2. Demonstrate cervical AROM within 10degrees of full motion and without pain provocation to restore mobility for ADLs.  3. Demonstrate Shoulder AROM within 10degrees of full motion and without pain provocation to restore mobility for ADLs.  4. Demonstrate (-) pain provocation with special testing of cervical and shoulder to demonstrate healing.  5. Increase strength in shoulder and mazin-scapular musculature to >/= 4/5 MMT grade to increase tolerance for ADL and work activities.  6. Pt to tolerate HEP to improve ROM and independence with ADL's     Long Term Goals: 8 weeks   1. Report decreased cervical / shoulder pain  < / =  6/10 "At Worst" to increase tolerance for ADLs.  2. Increase strength in shoulder and mazin-scapular musculature to 5/5 MMT grade to increase tolerance for ADL and work activities.  3. Pt goal: To get rid of the pain.  4. Report no disruption of sleep due to neck or shoulder pain to promote sleep quality for physical restoration.   5. Pt will demonstrate </= 20% limitation on FOTO shoulder in order to demonstrate true functional improvement.    PLAN     Continue per POC   Plan for continued " therapy: Cervical ROM, cervicothoracic tissue flexibility, ending sessions with RADHA and IFC    Vannessa Frost, PT

## 2023-02-28 ENCOUNTER — CLINICAL SUPPORT (OUTPATIENT)
Dept: REHABILITATION | Facility: HOSPITAL | Age: 68
End: 2023-02-28
Payer: MEDICARE

## 2023-02-28 DIAGNOSIS — M25.511 RIGHT SHOULDER PAIN, UNSPECIFIED CHRONICITY: Primary | ICD-10-CM

## 2023-02-28 PROCEDURE — 97140 MANUAL THERAPY 1/> REGIONS: CPT | Mod: PN,CQ

## 2023-02-28 PROCEDURE — 97110 THERAPEUTIC EXERCISES: CPT | Mod: PN,CQ

## 2023-02-28 PROCEDURE — 97014 ELECTRIC STIMULATION THERAPY: CPT | Mod: PN,CQ

## 2023-02-28 NOTE — PROGRESS NOTES
[facility]   Physical Therapy Treatment Note     Name: Elliott Gaspar  Clinic Number: [id number]    Therapy Diagnosis:   Encounter Diagnosis   Name Primary?    Right shoulder pain, unspecified chronicity Yes     Physician: Rodrigo Richardson,*    Visit Date: 2/28/2023    Physician Orders: PT Eval and Treat   Medical Diagnosis from Referral: M25.511 (ICD-10-CM) - Right shoulder pain, unspecified chronicity M89.8X1 (ICD-10-CM) - Pain of right scapula   Evaluation Date: 2/8/2023  Authorization Period Expiration: 02/02/2024   Plan of Care Expiration: 5/8/23  Visit # / Visits authorized: 6 / 12 (Not including Initial Evaluation)  FOTO Visit #:  2/3(Updated 2/28/23)    PTA Visit #: 1/5     Time In: 8:10 AM  Time Out: 9:20 AM  Total Billable Time: 53 minutes    SUBJECTIVE     Pt reports: No new c/o's; feeling about the same.     He was compliant with home exercise program.    Response to previous treatment: No adverse response  Functional change: N/A    Pain: 5/10  Location: Right neck/shoulder     OBJECTIVE     Objective Measures updated at progress report unless specified.     Treatment     Frankie received the treatments listed below:      Therapeutic Exercises to develop strength, endurance, ROM, flexibility, and posture for 30 minutes including:  UBE x 10min (5'/5')  Seated Upper Trap stretch [id number]  Following Manual  Performed by PTA:  Passive Cervical ROM in all planes of motion 10x  R/L Scalene stretch [id number] each  R/L SCM stretch [id number] each  R/L Upper Trap stretch [id number] each  Hands clasped shoulder flexion 2x10  Serratus punch with dowel x 10 *symptom aggravation  Prone R scap retraction x 15 *symptom aggravation  Prone R Row x 15 *symptom aggravation  Prone R shoulder extension x 15  Seated Shld Shrugs x 15  Seated Scapular Squeezes x 20    Pulleys(flex) x 3 min  Seated Posture Ex's x 10  Standing Shld Flex w/ 1# bar x 10(Increased pain in R clavicle region)  Serratus Lifts(unable  to perform-increased pain in R clavicle region)  S/L Shld ER x 10  S/L Shld Abd x 10  Standing leaning on hi/lo table:  Shld Abd x 10  Shld Rows x 10  Shld Ext x 10    Manual Therapy Techniques: applied to the: for 20 minutes   STM - Cervical paraspinals, upper/middle trap, levator scap, right pec musculature  Suboccipital Release  Bilateral Upper trap depression for cervical distraction, 5rounds sustained x1min  Manual Cervical traction, 5rounds sustained x1min    neuromuscular re-education activities to improve:  for - minutes. The following activities were included:    therapeutic activities to improve functional performance for - minutes, including:    direct contact modalities after being cleared for contraindications:     supervised modalities after being cleared for contradictions: IFC w/ MHP x 20 min to right shoulder/upper trap     hot pack for  minutes to .    cold pack for  minutes to .    Patient Education and Home Exercises     Home Exercises Provided and Patient Education Provided     Education provided: HEP review    Written Home Exercises Provided: yes. Exercises were reviewed and Frankie was able to demonstrate them prior to the end of the session.  Frankie demonstrated good  understanding of the education provided. See EMR under Patient Instructions for exercises provided during therapy sessions    ASSESSMENT     Patient demonstrates fair tolerance with today's session. Session focused on pain-free cervical ROM, soft tissue mobility, tissue extensibility, and gentle strengthening. Pain persists at the R SC joint and subclavius musculature. Patient does feel that therapy has been beneficial in improving cervical ROM and cervicothoracic flexibility. Patient reports electrical stim has been the only pain relieving modality and experienced relief for 12-18 hours. Therefore this to be continued.      Frankie Is minimally progressing towards his goals.     Pt prognosis is Fair.     Pt will continue to benefit from  "skilled outpatient physical therapy to address the deficits listed in the problem list box on initial evaluation, provide pt/family education and to maximize pt's level of independence in the home and community environment.   Pt's spiritual, cultural and educational needs considered and pt agreeable to plan of care and goals.     Anticipated barriers to physical therapy: Pain    Goals:   Short Term Goals: 4 weeks   1. Report decreased cervical / shoulder pain  < / =  8/10 "At Worst" to increase tolerance for ADLs.  2. Demonstrate cervical AROM within 10degrees of full motion and without pain provocation to restore mobility for ADLs.  3. Demonstrate Shoulder AROM within 10degrees of full motion and without pain provocation to restore mobility for ADLs.  4. Demonstrate (-) pain provocation with special testing of cervical and shoulder to demonstrate healing.  5. Increase strength in shoulder and mazin-scapular musculature to >/= 4/5 MMT grade to increase tolerance for ADL and work activities.  6. Pt to tolerate HEP to improve ROM and independence with ADL's     Long Term Goals: 8 weeks   1. Report decreased cervical / shoulder pain  < / =  6/10 "At Worst" to increase tolerance for ADLs.  2. Increase strength in shoulder and mazin-scapular musculature to 5/5 MMT grade to increase tolerance for ADL and work activities.  3. Pt goal: To get rid of the pain.  4. Report no disruption of sleep due to neck or shoulder pain to promote sleep quality for physical restoration.   5. Pt will demonstrate </= 20% limitation on FOTO shoulder in order to demonstrate true functional improvement.    PLAN     Continue per POC   Plan for continued therapy: Cervical ROM, cervicothoracic tissue flexibility, ending sessions with  and IFC    Jonathan Favre, PTA                "

## 2023-03-01 NOTE — PROGRESS NOTES
[facility]   Physical Therapy Treatment / PROGRESS Note     Name: Elliott Gaspar  Clinic Number: [id number]    Therapy Diagnosis:   Encounter Diagnoses   Name Primary?    Right shoulder pain, unspecified chronicity Yes    Cervical pain     Pain of right scapula      Physician: Rodrigo Richardson,*    Visit Date: 3/2/2023    Physician Orders: PT Eval and Treat   Medical Diagnosis from Referral: M25.511 (ICD-10-CM) - Right shoulder pain, unspecified chronicity M89.8X1 (ICD-10-CM) - Pain of right scapula   Evaluation Date: 2/8/2023  Authorization Period Expiration: 02/02/2024   Plan of Care Expiration: 5/8/23  Visit # / Visits authorized: 7 / 12 (Not including Initial Evaluation)  FOTO Visit #:  2/3 (Updated 2/28/23)    PTA Visit #: 1/5     Time In: 11:01AM  Time Out: 12:10PM  Total Billable Time: 53 minutes    SUBJECTIVE     Pt reports: I did feel a little better following the last session, despite the fact that after the exercises, it's probably a 7-8/10 pain. The IFC E-stim and the heat seem to be the most beneficial in pain reduction. Overall, there hasn't been much improvement in pain. I do feel a little more motion and flexibility at the neck.  I've got an appointment scheduled next Monday (6th) with Dr. Richardson.     He was compliant with home exercise program.    Response to previous treatment: No adverse response  Functional change: N/A    Pain:  Current 5/10, worst 10/10 (Worst 9/10), best 4/10 (best 5/10) *updated  Location: Right SC joint- radiates to right shoulder, upper trap/base of skull, down to the right elbow (Symptoms in upper arm have resolved)  Description: tight, constant pain;  Aggravating Factors: Increased use of the UE  Easing Factors: OTC pain relievers     Pts goals: To get rid of the pain;     OBJECTIVE     Posture:   2/8/23: L shoulder elevated, forward head, rounded shoulders  3/2/23: Unchanged     Palpation:    2/8/23: Tenderness: R cervical paraspinals,  UT, pec (minor and clavicular portion), subclavius, scalenes, SC joint  Tightness: UT, scalenes, SCM, bilaterally  3/2/23: Tenderness: R T3 paraspinal begins to rhomboids, R cervical paraspinal/levator scap, UT, pec (minor and clavicular portion), subclavius, scalenes, SC joint   Tightness: Cervical paraspinals, scalenes, SCM, Upper/middle trap, rhomboids, levator     Cervical ROM (Active in Sitting)     2/8/23   3/2/23    Flexion: -----------------  55 deg   50 deg  Extension: -------------  30 deg **R cervical 30 deg  Right Lateral Flexion: 30 deg **R cervical 25 deg *R cervical paraspinal  Left Lateral Flexion:    6 deg *R cervical 10 deg *pain with return to neutral  Right Rotation: -------   45 deg   55 deg *R cervical paraspinal  Left Rotation: ---------   45 deg *L cervical 55 deg     2/8/23: (+) spurling's in extension and right lateral flexion  3/1/23: Unchanged     Upper Extremity Functional Movement Patterns:      2/8/23   3/2/23                                         Right     Right  Reaching behind head:           T1, *right cervical      T1, * right cervical   Reaching behind back:           T11        T9     Shoulder Active ROM    2/8/23     3/2/23                           Right        Right             Flexion:           144deg, *base of R cervical   145 deg *  Extension:       55deg, stiffness right shoulder 60 deg  Abduction:       160deg        160 deg  Ext Rotation:   65deg         70deg     Shoulder Passive ROM    2/8/23   3/2/23                           Right     Right              Flexion:           145 degrees *  165 deg pain resolved  Abduction:       160 degrees *   165 deg pain resolved         Shoulder Special Tests: RIGHT     2/8/23  3/2/23   AC Joint Compression: (+)  (+)  Painful Arc:   (+)  (+)     Upper Extremity MMT    2/8/23   3/2/23                           Right   Left        Right  Shoulder  Flexion:           4+        5  4+  Abduction:       4          5  5  Ext Rot:            4+        5  5  Int Rot:             5          5  5  Germaine-Scapula  Upper Trap:     3 *        5  3 * at GHJ and clavicle  Middle Trap:    NT       NT  3  Lower Trap:     NT       NT  3    SC joint pain with right scapular depression    FOTO Shoulder Survey  Limitation Score:   2/8/23: 33%  2/28/23: 39%      Treatment     Frankie received the treatments listed below:      Therapeutic Exercises to develop strength, endurance, ROM, flexibility, and posture for 23 minutes including:  UBE x 8min (4'/4')  Cervical AROM in all planes of motion x5reps;   Right Shoulder AROM in all planes of motion x5reps;   Right Shoulder PROM Flexion, Scaption, Abduction 5reps x 10sec sustained stretch at end-range  Mid-Range Isometrics for Right Deltoid (flexion, abduction, and extension), R external rotation;   Seated Baljit Scapular Elevation discontinued pain  Seated Baljti Scapular Depression 5x 5sec  Sidelying Right Scapular retraction 15x 3sec;   Sidelying Right Scapular retraction + depression 15x 3sec;  Supine Right Upper Trap stretch [id number]    Pulleys(flex) x 3min  Performed by Therapist:  Passive Cervical ROM in all planes of motion 10x  R/L Scalene stretch [id number] each  R/L SCM stretch [id number] each  R/L Upper Trap stretch [id number] each  Supine Hands clasped shoulder flexion 2x10  S/L Shld ER x 10  S/L Shld Abd x 10  Prone R shoulder extension x15  Seated Shld Shrugs x15  Seated Scapular Squeezes x 20  Standing leaning on hi/lo table:  Shld Abd x 10  Shld Rows x 10  Shld Ext x 10    Manual Therapy Techniques: applied to the: for 10 minutes   - Right GHJ mobilizations   Posteriorly, Grade 1-2, 6 rounds x 10sec oscillatory   Inferiorly, Grade 1-2, 6 rounds x 10sec oscillatory  - Right Upper trap depression for cervical distraction, 3rounds sustained x30sec  - Trigger Point Release at Right upper trap  - Strain Counter-Strain at Right upper trap    STM: Cervical paraspinals, upper/middle trap, levator scap, right pec  musculature  Suboccipital Release  Manual Cervical traction, 5rounds sustained x1min    Supervised Modalities after being cleared for contradictions: IFC w/ MHP x 20 min to right shoulder/upper trap     Patient Education and Home Exercises     Home Exercises Provided and Patient Education Provided     Education provided: HEP review    Written Home Exercises Provided: yes. Exercises were reviewed and Frankie was able to demonstrate them prior to the end of the session.  Frankie demonstrated good  understanding of the education provided. See EMR under Patient Instructions for exercises provided during therapy sessions    ASSESSMENT     Patient presents today for follow-up visit and Progress Note assessment since initiating physical therapy on 2/8/23 for M25.511 (ICD-10-CM) - Right shoulder pain, unspecified chronicity M89.8X1 (ICD-10-CM) - Pain of right scapula.Subjectively, patient reports E-stim and the heat remain most beneficial in pain reduction. Overall, there hasn't been much improvement in pain. I do feel a little more motion and flexibility at the neck. Objective measures demonstrate improvement in cervical rotation R/L, pain resolution with several cervical motions, UE strength, and passive right shoulder ROM. Patient remains with musculoskeletal deficits: Tenderness to palpation (cervicothoracic), Cervical ROM limitations (extension, R/L lateral flexion), Pain with R/L lateral flexion and R cervical rotation, Pain with right shoulder elevation overhead, and mazin-scapular strength and stability deficits.     Frankie Is minimally progressing towards his goals.     Pt prognosis is Fair.     Pt will continue to benefit from skilled outpatient physical therapy to address the deficits listed in the problem list box on initial evaluation, provide pt/family education and to maximize pt's level of independence in the home and community environment.   Pt's spiritual, cultural and educational needs considered and pt agreeable to  "plan of care and goals.     Anticipated barriers to physical therapy: Pain    Goals:   Short Term Goals: 4 weeks   1. Report decreased cervical / shoulder pain  < / =  8/10 "At Worst" to increase tolerance for ADLs.   - Not Met, However Improved 3/2/23  2. Demonstrate cervical AROM within 10degrees of full motion and without pain provocation to restore mobility for ADLs.   - Not Met, However Improved 3/2/23  3. Demonstrate Shoulder AROM within 10degrees of full motion and without pain provocation to restore mobility for ADLs.   - Partially Met 3/2/23  4. Demonstrate (-) pain provocation with special testing of cervical and shoulder to demonstrate healing.   - Not Met 3/2/23  5. Increase strength in shoulder and mazin-scapular musculature to >/= 4/5 MMT grade to increase tolerance for ADL and work activities. - Partially Met 3/2/23  6. Pt to tolerate HEP to improve ROM and independence with ADL's   - Partially Met 3/2/23     Long Term Goals: 8 weeks - ONGOING 3/2/23  1. Report decreased cervical / shoulder pain  < / =  6/10 "At Worst" to increase tolerance for ADLs.   2. Increase strength in shoulder and mazin-scapular musculature to 5/5 MMT grade to increase tolerance for ADL and work activities.  3. Pt goal: To get rid of the pain.   4. Report no disruption of sleep due to neck or shoulder pain to promote sleep quality for physical restoration.  5. Pt will demonstrate </= 20% limitation on FOTO shoulder in order to demonstrate true functional improvement.    PLAN     Patient has follow up with referring provider to discuss persistent pain despite compliance with physical therapy.   Plan to put PT sessions on hold until MD follow-up. Patient is in agreement with this plan.    Plan for continued therapy: Cervical ROM, cervicothoracic tissue flexibility, ending sessions with  and IFC    Plan of care Certification: 2/8/2023 to 5/8/2023.     Outpatient Physical Therapy 2 times weekly for 4 weeks to include the following " interventions: Cervical/Lumbar Traction, Electrical Stimulation as appropriate, Manual Therapy, Moist Heat/ Ice, Neuromuscular Re-ed, Patient Education, Self Care, Therapeutic Activities, Therapeutic Exercise, Ultrasound, and Dry needling.     Vannessa Frost, PT

## 2023-03-02 ENCOUNTER — CLINICAL SUPPORT (OUTPATIENT)
Dept: REHABILITATION | Facility: HOSPITAL | Age: 68
End: 2023-03-02
Payer: MEDICARE

## 2023-03-02 DIAGNOSIS — M89.8X1 PAIN OF RIGHT SCAPULA: ICD-10-CM

## 2023-03-02 DIAGNOSIS — M25.511 RIGHT SHOULDER PAIN, UNSPECIFIED CHRONICITY: Primary | ICD-10-CM

## 2023-03-02 DIAGNOSIS — M54.2 CERVICAL PAIN: ICD-10-CM

## 2023-03-02 PROCEDURE — 97110 THERAPEUTIC EXERCISES: CPT | Mod: PN

## 2023-03-02 PROCEDURE — 97140 MANUAL THERAPY 1/> REGIONS: CPT | Mod: PN

## 2023-03-02 PROCEDURE — 97014 ELECTRIC STIMULATION THERAPY: CPT | Mod: PN

## 2023-03-08 ENCOUNTER — CLINICAL SUPPORT (OUTPATIENT)
Dept: REHABILITATION | Facility: HOSPITAL | Age: 68
End: 2023-03-08
Payer: MEDICARE

## 2023-03-08 DIAGNOSIS — M25.511 RIGHT SHOULDER PAIN, UNSPECIFIED CHRONICITY: Primary | ICD-10-CM

## 2023-03-08 PROCEDURE — 97140 MANUAL THERAPY 1/> REGIONS: CPT | Mod: PN,CQ

## 2023-03-08 PROCEDURE — 97014 ELECTRIC STIMULATION THERAPY: CPT | Mod: PN,CQ

## 2023-03-08 PROCEDURE — 97110 THERAPEUTIC EXERCISES: CPT | Mod: PN,CQ

## 2023-03-08 NOTE — PROGRESS NOTES
[facility]   Physical Therapy Treatment / PROGRESS Note     Name: Elliott Gaspar  Clinic Number: [id number]    Therapy Diagnosis:   Encounter Diagnosis   Name Primary?    Right shoulder pain, unspecified chronicity Yes     Physician: Rodrigo Richardson,*    Visit Date: 3/8/2023    Physician Orders: PT Eval and Treat   Medical Diagnosis from Referral: M25.511 (ICD-10-CM) - Right shoulder pain, unspecified chronicity M89.8X1 (ICD-10-CM) - Pain of right scapula   Evaluation Date: 2/8/2023  Authorization Period Expiration: 02/02/2024   Plan of Care Expiration: 5/8/23  Visit # / Visits authorized: 8 / 12 (Not including Initial Evaluation)  FOTO Visit #:  2/3 (Updated 2/28/23)    PTA Visit #: 2/5     Time In: 8:00 AM  Time Out: 9:10 AM  Total Billable Time: 53 minutes    SUBJECTIVE     Pt reports: No new c/o's; still feeling about the same.     He was compliant with home exercise program.    Response to previous treatment: No adverse response  Functional change: N/A    Pain:  Current 4/10   Location: Right SC joint- radiates to right shoulder, upper trap/base of skull, down to the right elbow (Symptoms in upper arm have resolved)  Description: tight, constant pain;  Aggravating Factors: Increased use of the UE  Easing Factors: OTC pain relievers     Pts goals: To get rid of the pain;     OBJECTIVE     Posture:   2/8/23: L shoulder elevated, forward head, rounded shoulders  3/2/23: Unchanged     Palpation:    2/8/23: Tenderness: R cervical paraspinals, UT, pec (minor and clavicular portion), subclavius, scalenes, SC joint  Tightness: UT, scalenes, SCM, bilaterally  3/2/23: Tenderness: R T3 paraspinal begins to rhomboids, R cervical paraspinal/levator scap, UT, pec (minor and clavicular portion), subclavius, scalenes, SC joint   Tightness: Cervical paraspinals, scalenes, SCM, Upper/middle trap, rhomboids, levator     Cervical ROM (Active in Sitting)     2/8/23   3/2/23    Flexion:  -----------------  55 deg   50 deg  Extension: -------------  30 deg **R cervical 30 deg  Right Lateral Flexion: 30 deg **R cervical 25 deg *R cervical paraspinal  Left Lateral Flexion:    6 deg *R cervical 10 deg *pain with return to neutral  Right Rotation: -------   45 deg   55 deg *R cervical paraspinal  Left Rotation: ---------   45 deg *L cervical 55 deg     2/8/23: (+) spurling's in extension and right lateral flexion  3/1/23: Unchanged     Upper Extremity Functional Movement Patterns:      2/8/23   3/2/23                                         Right     Right  Reaching behind head:           T1, *right cervical      T1, * right cervical   Reaching behind back:           T11        T9     Shoulder Active ROM    2/8/23     3/2/23                           Right        Right             Flexion:           144deg, *base of R cervical   145 deg *  Extension:       55deg, stiffness right shoulder 60 deg  Abduction:       160deg        160 deg  Ext Rotation:   65deg         70deg     Shoulder Passive ROM    2/8/23   3/2/23                           Right     Right              Flexion:           145 degrees *  165 deg pain resolved  Abduction:       160 degrees *   165 deg pain resolved         Shoulder Special Tests: RIGHT     2/8/23  3/2/23   AC Joint Compression: (+)  (+)  Painful Arc:   (+)  (+)     Upper Extremity MMT    2/8/23   3/2/23                           Right   Left        Right  Shoulder  Flexion:           4+        5  4+  Abduction:       4          5  5  Ext Rot:           4+        5  5  Int Rot:             5          5  5  Germaine-Scapula  Upper Trap:     3 *        5  3 * at GHJ and clavicle  Middle Trap:    NT       NT  3  Lower Trap:     NT       NT  3    SC joint pain with right scapular depression    FOTO Shoulder Survey  Limitation Score:   2/8/23: 33%  2/28/23: 39%      Treatment     Frankie received the treatments listed below:      Therapeutic Exercises to develop strength, endurance, ROM,  flexibility, and posture for 23 minutes including:  UBE x 8min (4'/4')  Cervical AROM in all planes of motion x5reps;   Right Shoulder AROM in all planes of motion x5reps;   Right Shoulder PROM Flexion, Scaption, Abduction 5reps x 10sec sustained stretch at end-range  Mid-Range Isometrics for Right Deltoid (flexion, abduction, and extension), R external rotation;   Seated Baljit Scapular Elevation discontinued pain  Seated Baljit Scapular Depression 5x 5sec  Sidelying Right Scapular retraction 15x 3sec;   Sidelying Right Scapular retraction + depression 15x 3sec;  Supine Right Upper Trap stretch [id number]  Pulleys(flex) x 3min  Performed by Therapist:  Passive Cervical ROM in all planes of motion 10x  R/L Scalene stretch [id number] each  R/L SCM stretch [id number] each  R/L Upper Trap stretch [id number] each  Supine Hands clasped shoulder flexion 2x10  S/L Shld ER x 10  S/L Shld Abd x 10  Prone R shoulder extension x15  Seated Shld Shrugs x15  Seated Scapular Squeezes x 20  Standing leaning on hi/lo table:  Shld Abd x 10  Shld Rows x 10  Shld Ext x 10    Manual Therapy Techniques: applied to the: for 10 minutes   - Right GHJ mobilizations   Posteriorly, Grade 1-2, 6 rounds x 10sec oscillatory   Inferiorly, Grade 1-2, 6 rounds x 10sec oscillatory  - Right Upper trap depression for cervical distraction, 3rounds sustained x30sec  - Trigger Point Release at Right upper trap  - Strain Counter-Strain at Right upper trap    STM: Cervical paraspinals, upper/middle trap, levator scap, right pec musculature  Suboccipital Release  Manual Cervical traction, 5rounds sustained x1min    Supervised Modalities after being cleared for contradictions: IFC w/ MHP x 20 min to right shoulder/upper trap     Patient Education and Home Exercises     Home Exercises Provided and Patient Education Provided     Education provided: HEP review    Written Home Exercises Provided: yes. Exercises were reviewed and Frankie was able to demonstrate them prior to the end of  "the session.  Frankie demonstrated good  understanding of the education provided. See EMR under Patient Instructions for exercises provided during therapy sessions    ASSESSMENT     Overall, there hasn't been much improvement in pain. Objective measures demonstrate improvement in cervical rotation R/L, pain resolution with several cervical motions, UE strength, and passive right shoulder ROM. Patient remains with musculoskeletal deficits: Tenderness to palpation (cervicothoracic), Cervical ROM limitations (extension, R/L lateral flexion), Pain with R/L lateral flexion and R cervical rotation, Pain with right shoulder elevation overhead, and mazin-scapular strength and stability deficits.     Frankie Is minimally progressing towards his goals.     Pt prognosis is Fair.     Pt will continue to benefit from skilled outpatient physical therapy to address the deficits listed in the problem list box on initial evaluation, provide pt/family education and to maximize pt's level of independence in the home and community environment.   Pt's spiritual, cultural and educational needs considered and pt agreeable to plan of care and goals.     Anticipated barriers to physical therapy: Pain    Goals:   Short Term Goals: 4 weeks   1. Report decreased cervical / shoulder pain  < / =  8/10 "At Worst" to increase tolerance for ADLs.   - Not Met, However Improved 3/2/23  2. Demonstrate cervical AROM within 10degrees of full motion and without pain provocation to restore mobility for ADLs.   - Not Met, However Improved 3/2/23  3. Demonstrate Shoulder AROM within 10degrees of full motion and without pain provocation to restore mobility for ADLs.   - Partially Met 3/2/23  4. Demonstrate (-) pain provocation with special testing of cervical and shoulder to demonstrate healing.   - Not Met 3/2/23  5. Increase strength in shoulder and mazin-scapular musculature to >/= 4/5 MMT grade to increase tolerance for ADL and work activities. - Partially Met " "3/2/23  6. Pt to tolerate HEP to improve ROM and independence with ADL's   - Partially Met 3/2/23     Long Term Goals: 8 weeks - ONGOING 3/2/23  1. Report decreased cervical / shoulder pain  < / =  6/10 "At Worst" to increase tolerance for ADLs.   2. Increase strength in shoulder and mazin-scapular musculature to 5/5 MMT grade to increase tolerance for ADL and work activities.  3. Pt goal: To get rid of the pain.   4. Report no disruption of sleep due to neck or shoulder pain to promote sleep quality for physical restoration.  5. Pt will demonstrate </= 20% limitation on FOTO shoulder in order to demonstrate true functional improvement.    PLAN     Patient has follow up with referring provider to discuss persistent pain despite compliance with physical therapy.   Plan to put PT sessions on hold until MD follow-up. Patient is in agreement with this plan.    Plan for continued therapy: Cervical ROM, cervicothoracic tissue flexibility, ending sessions with  and IFC    Plan of care Certification: 2/8/2023 to 5/8/2023.     Outpatient Physical Therapy 2 times weekly for 4 weeks to include the following interventions: Cervical/Lumbar Traction, Electrical Stimulation as appropriate, Manual Therapy, Moist Heat/ Ice, Neuromuscular Re-ed, Patient Education, Self Care, Therapeutic Activities, Therapeutic Exercise, Ultrasound, and Dry needling.     Jonathan Favre, PTA                    "

## 2023-03-10 ENCOUNTER — CLINICAL SUPPORT (OUTPATIENT)
Dept: REHABILITATION | Facility: HOSPITAL | Age: 68
End: 2023-03-10
Payer: MEDICARE

## 2023-03-10 DIAGNOSIS — M25.511 RIGHT SHOULDER PAIN, UNSPECIFIED CHRONICITY: Primary | ICD-10-CM

## 2023-03-10 PROCEDURE — 97110 THERAPEUTIC EXERCISES: CPT | Mod: PN,CQ

## 2023-03-10 PROCEDURE — 97140 MANUAL THERAPY 1/> REGIONS: CPT | Mod: PN,CQ

## 2023-03-10 PROCEDURE — 97014 ELECTRIC STIMULATION THERAPY: CPT | Mod: PN,CQ

## 2023-03-10 NOTE — PROGRESS NOTES
[facility]   Physical Therapy Treatment / PROGRESS Note     Name: Elliott Gaspar  Clinic Number: [id number]    Therapy Diagnosis:   Encounter Diagnosis   Name Primary?    Right shoulder pain, unspecified chronicity Yes     Physician: Rodrigo Richardson,*    Visit Date: 3/10/2023    Physician Orders: PT Eval and Treat   Medical Diagnosis from Referral: M25.511 (ICD-10-CM) - Right shoulder pain, unspecified chronicity M89.8X1 (ICD-10-CM) - Pain of right scapula   Evaluation Date: 2/8/2023  Authorization Period Expiration: 02/02/2024   Plan of Care Expiration: 5/8/23  Visit # / Visits authorized: 9 / 12 (Not including Initial Evaluation)  FOTO Visit #:  2/3 (Updated 2/28/23)    PTA Visit #: 3/5     Time In: 8:00 AM  Time Out: 9:10 AM  Total Billable Time: 53 minutes    SUBJECTIVE     Pt reports: No new c/o's; feeling better today.    He was compliant with home exercise program.    Response to previous treatment: No adverse response  Functional change: N/A    Pain:  Current 3/10   Location: Right SC joint- radiates to right shoulder, upper trap/base of skull, down to the right elbow (Symptoms in upper arm have resolved)  Description: tight, constant pain;  Aggravating Factors: Increased use of the UE  Easing Factors: OTC pain relievers     Pts goals: To get rid of the pain;     OBJECTIVE     Posture:   2/8/23: L shoulder elevated, forward head, rounded shoulders  3/2/23: Unchanged     Palpation:    2/8/23: Tenderness: R cervical paraspinals, UT, pec (minor and clavicular portion), subclavius, scalenes, SC joint  Tightness: UT, scalenes, SCM, bilaterally  3/2/23: Tenderness: R T3 paraspinal begins to rhomboids, R cervical paraspinal/levator scap, UT, pec (minor and clavicular portion), subclavius, scalenes, SC joint   Tightness: Cervical paraspinals, scalenes, SCM, Upper/middle trap, rhomboids, levator     Cervical ROM (Active in Sitting)     2/8/23   3/2/23    Flexion: -----------------   55 deg   50 deg  Extension: -------------  30 deg **R cervical 30 deg  Right Lateral Flexion: 30 deg **R cervical 25 deg *R cervical paraspinal  Left Lateral Flexion:    6 deg *R cervical 10 deg *pain with return to neutral  Right Rotation: -------   45 deg   55 deg *R cervical paraspinal  Left Rotation: ---------   45 deg *L cervical 55 deg     2/8/23: (+) spurling's in extension and right lateral flexion  3/1/23: Unchanged     Upper Extremity Functional Movement Patterns:      2/8/23   3/2/23                                         Right     Right  Reaching behind head:           T1, *right cervical      T1, * right cervical   Reaching behind back:           T11        T9     Shoulder Active ROM    2/8/23     3/2/23                           Right        Right             Flexion:           144deg, *base of R cervical   145 deg *  Extension:       55deg, stiffness right shoulder 60 deg  Abduction:       160deg        160 deg  Ext Rotation:   65deg         70deg     Shoulder Passive ROM    2/8/23   3/2/23                           Right     Right              Flexion:           145 degrees *  165 deg pain resolved  Abduction:       160 degrees *   165 deg pain resolved         Shoulder Special Tests: RIGHT     2/8/23  3/2/23   AC Joint Compression: (+)  (+)  Painful Arc:   (+)  (+)     Upper Extremity MMT    2/8/23   3/2/23                           Right   Left        Right  Shoulder  Flexion:           4+        5  4+  Abduction:       4          5  5  Ext Rot:           4+        5  5  Int Rot:             5          5  5  Germaine-Scapula  Upper Trap:     3 *        5  3 * at GHJ and clavicle  Middle Trap:    NT       NT  3  Lower Trap:     NT       NT  3    SC joint pain with right scapular depression    FOTO Shoulder Survey  Limitation Score:   2/8/23: 33%  2/28/23: 39%      Treatment     Frankie received the treatments listed below:      Therapeutic Exercises to develop strength, endurance, ROM, flexibility, and  posture for 23 minutes including:  UBE x 10 min (5'/5')  Cervical AROM in all planes of motion x5reps;   Right Shoulder AROM in all planes of motion x5reps;   Right Shoulder PROM Flexion, Scaption, Abduction 5reps x 10sec sustained stretch at end-range  Mid-Range Isometrics for Right Deltoid (flexion, abduction, and extension), R external rotation;   Seated Baljit Scapular Elevation discontinued pain  Seated Baljit Scapular Depression 5x 5sec  Sidelying Right Scapular retraction 15x 3sec;   Sidelying Right Scapular retraction + depression 15x 3sec;  Supine Right Upper Trap stretch [id number]  Pulleys(flex) x 3min  Performed by Therapist:  Passive Cervical ROM in all planes of motion 10x  R/L Scalene stretch [id number] each  R/L SCM stretch [id number] each  R/L Upper Trap stretch [id number] each  Supine Hands clasped shoulder flexion 2x10  S/L Shld ER x 10  S/L Shld Abd x 10  Prone R shoulder extension x15  Seated Shld Shrugs x15  Seated Scapular Squeezes x 20  Standing leaning on hi/lo table:  Shld Abd x 10  Shld Rows x 10  Shld Ext x 10    Manual Therapy Techniques: applied to the: for 10 minutes   - Right GHJ mobilizations   Posteriorly, Grade 1-2, 6 rounds x 10sec oscillatory   Inferiorly, Grade 1-2, 6 rounds x 10sec oscillatory  - Right Upper trap depression for cervical distraction, 3rounds sustained x30sec  - Trigger Point Release at Right upper trap  - Strain Counter-Strain at Right upper trap    STM: Cervical paraspinals, upper/middle trap, levator scap, right pec musculature  Suboccipital Release  Manual Cervical traction, 5rounds sustained x1min    Supervised Modalities after being cleared for contradictions: IFC w/ MHP x 20 min to right shoulder/upper trap     Patient Education and Home Exercises     Home Exercises Provided and Patient Education Provided     Education provided: HEP review    Written Home Exercises Provided: yes. Exercises were reviewed and Frankie was able to demonstrate them prior to the end of the session.   "Frankie demonstrated good  understanding of the education provided. See EMR under Patient Instructions for exercises provided during therapy sessions    ASSESSMENT     Overall, there has been some slight improvement in pain. Objective measures demonstrate improvement in cervical rotation R/L, pain resolution with several cervical motions, UE strength, and passive right shoulder ROM. Patient remains with musculoskeletal deficits: Tenderness to palpation (cervicothoracic), Cervical ROM limitations (extension, R/L lateral flexion), Pain with R/L lateral flexion and R cervical rotation, Pain with right shoulder elevation overhead, and mazin-scapular strength and stability deficits.     Frankie Is minimally progressing towards his goals.     Pt prognosis is Fair.     Pt will continue to benefit from skilled outpatient physical therapy to address the deficits listed in the problem list box on initial evaluation, provide pt/family education and to maximize pt's level of independence in the home and community environment.   Pt's spiritual, cultural and educational needs considered and pt agreeable to plan of care and goals.     Anticipated barriers to physical therapy: Pain    Goals:   Short Term Goals: 4 weeks   1. Report decreased cervical / shoulder pain  < / =  8/10 "At Worst" to increase tolerance for ADLs.   - Not Met, However Improved 3/2/23  2. Demonstrate cervical AROM within 10degrees of full motion and without pain provocation to restore mobility for ADLs.   - Not Met, However Improved 3/2/23  3. Demonstrate Shoulder AROM within 10degrees of full motion and without pain provocation to restore mobility for ADLs.   - Partially Met 3/2/23  4. Demonstrate (-) pain provocation with special testing of cervical and shoulder to demonstrate healing.   - Not Met 3/2/23  5. Increase strength in shoulder and mazin-scapular musculature to >/= 4/5 MMT grade to increase tolerance for ADL and work activities. - Partially Met 3/2/23  6. Pt " "to tolerate HEP to improve ROM and independence with ADL's   - Partially Met 3/2/23     Long Term Goals: 8 weeks - ONGOING 3/2/23  1. Report decreased cervical / shoulder pain  < / =  6/10 "At Worst" to increase tolerance for ADLs.   2. Increase strength in shoulder and mazin-scapular musculature to 5/5 MMT grade to increase tolerance for ADL and work activities.  3. Pt goal: To get rid of the pain.   4. Report no disruption of sleep due to neck or shoulder pain to promote sleep quality for physical restoration.  5. Pt will demonstrate </= 20% limitation on FOTO shoulder in order to demonstrate true functional improvement.    PLAN     Patient has follow up with referring provider to discuss persistent pain despite compliance with physical therapy.   Plan to put PT sessions on hold until MD follow-up. Patient is in agreement with this plan.    Plan for continued therapy: Cervical ROM, cervicothoracic tissue flexibility, ending sessions with  and IFC    Plan of care Certification: 2/8/2023 to 5/8/2023.     Outpatient Physical Therapy 2 times weekly for 4 weeks to include the following interventions: Cervical/Lumbar Traction, Electrical Stimulation as appropriate, Manual Therapy, Moist Heat/ Ice, Neuromuscular Re-ed, Patient Education, Self Care, Therapeutic Activities, Therapeutic Exercise, Ultrasound, and Dry needling.     Jonathan Favre, PTA                      "

## 2023-03-13 ENCOUNTER — CLINICAL SUPPORT (OUTPATIENT)
Dept: REHABILITATION | Facility: HOSPITAL | Age: 68
End: 2023-03-13
Payer: MEDICARE

## 2023-03-13 DIAGNOSIS — M54.2 CERVICAL PAIN: ICD-10-CM

## 2023-03-13 DIAGNOSIS — M89.8X1 PAIN OF RIGHT SCAPULA: ICD-10-CM

## 2023-03-13 DIAGNOSIS — M25.511 RIGHT SHOULDER PAIN, UNSPECIFIED CHRONICITY: Primary | ICD-10-CM

## 2023-03-13 PROCEDURE — 97110 THERAPEUTIC EXERCISES: CPT | Mod: PN

## 2023-03-13 PROCEDURE — 97140 MANUAL THERAPY 1/> REGIONS: CPT | Mod: PN

## 2023-03-13 PROCEDURE — 97014 ELECTRIC STIMULATION THERAPY: CPT | Mod: PN

## 2023-03-13 NOTE — PROGRESS NOTES
[facility]   Physical Therapy Treatment Note     Name: Elliott Gaspar  Clinic Number: [id number]    Therapy Diagnosis:   Encounter Diagnoses   Name Primary?    Right shoulder pain, unspecified chronicity Yes    Cervical pain     Pain of right scapula      Physician: Rodrigo Richardson,*    Visit Date: 3/13/2023    Physician Orders: PT Eval and Treat   Medical Diagnosis from Referral: M25.511 (ICD-10-CM) - Right shoulder pain, unspecified chronicity M89.8X1 (ICD-10-CM) - Pain of right scapula   Evaluation Date: 2/8/2023  Authorization Period Expiration: 02/02/2024   Plan of Care Expiration: 5/8/23  Visit # / Visits authorized: 10 / 12 (Not including Initial Evaluation)  FOTO Visit #:  2/3 (Updated 2/28/23)    PTA Visit #: 3/5     Time In: 9:10 AM   Time Out: 10:30 AM  Total Billable Time: 53 minutes + 20 minutes IFC with     SUBJECTIVE     Pt reports: It's a 3/10 coming in today; This arm bike will increase it, but overall, I do feel it's getting better. I'm not sure if it's the TENs or the combination of TENs and exercise.    He was compliant with home exercise program.    Response to previous treatment: No adverse response  Functional change: N/A    Pain:  Current 3/10   Location: Right SC joint- radiates to right shoulder, upper trap/base of skull, down to the right elbow (Symptoms in upper arm have resolved)  Description: tight, constant pain;  Aggravating Factors: Increased use of the UE  Easing Factors: OTC pain relievers     Pts goals: To get rid of the pain;     OBJECTIVE     Posture:   3/2/23: L shoulder elevated, forward head, rounded shoulders     Palpation:    3/2/23: Tenderness: R T3 paraspinal begins to rhomboids, R cervical paraspinal/levator scap, UT, pec (minor and clavicular portion), subclavius, scalenes, SC joint   Tightness: Cervical paraspinals, scalenes, SCM, Upper/middle trap, rhomboids, levator     Cervical ROM (Active in  Sitting)     2/8/23   3/2/23    Flexion: -----------------  55 deg   50 deg  Extension: -------------  30 deg **R cervical 30 deg  Right Lateral Flexion: 30 deg **R cervical 25 deg *R cervical paraspinal  Left Lateral Flexion:    6 deg *R cervical 10 deg *pain with return to neutral  Right Rotation: -------   45 deg   55 deg *R cervical paraspinal  Left Rotation: ---------   45 deg *L cervical 55 deg     2/8/23: (+) spurling's in extension and right lateral flexion  3/1/23: Unchanged     Upper Extremity Functional Movement Patterns:      2/8/23   3/2/23                                         Right     Right  Reaching behind head:           T1, *right cervical      T1, * right cervical   Reaching behind back:           T11        T9     Right Shoulder Active ROM    3/2/23                             Flexion:           145 deg *  Extension:       60 deg  Abduction:      160 deg  Ext Rotation:  70deg     Right Shoulder Passive Flexion and Abduction ROM  3/2/23: 165 deg pain resolved     Right Upper Extremity MMT    3/2/23                   Shoulder  Flexion:       4+  Germaine-Scapula  Upper Trap:  3 * at GHJ and clavicle  Middle Trap: 3  Lower Trap:   3    SC joint pain with right scapular depression    Treatment     Frankie received the treatments listed below:      Therapeutic Exercises to develop strength, endurance, ROM, flexibility, and posture for 40minutes including:  Seated UBE, Level 1 x2mins forward, x2mins backward, (x4mins total)  Doorway Pec Stretch, Hands Low: [id number]  Shoulder Rolls 20x  Seated Pulleys, Flexion, Abduction, and Horizontal Abduction(standing) x1min in each plane of motion;   BUE Shoulder Flexion Wall Slides 20x  Seated Right upper trap stretch, holding seat edge, [id number]  Seated Cervical Retraction: 15x active, 2x15 pressing into ball  Seated Baljit shoulder ext rotation, green looped band around 2x12 -- cues for head position  Seated Right levator scap stretch [id number];   Seated Scapular Squeezes  x20  Seated Shld Shrugs x15  Seated Baljit Scapular Depression 5x 5sec  Sidelying Right Scapular retraction 15x 3sec;   Sidelying Right Scapular retraction + depression 15x 3sec;  Performed by Therapist:  Passive Cervical ROM in all planes of motion 5x  R/L Scalene stretch 2e01hpb each  R/L SCM stretch 8o78coa each  R/L Upper Trap stretch 5p80kdo each  S/L Shld ER x 10  S/L Shld Abd x 10  Prone R shoulder extension x15  Standing leaning on hi/lo table:  Shld Abd x 10  Shld Rows x 10  Shld Ext x 10    Manual Therapy Techniques: applied to the: for 13minutes   - Trigger Point Release at    R/L cervicothoracic paraspinals  R/L upper trap  R/L levator trap  - Suboccipital Release  - Manual Cervical traction, 3rounds sustained x30sec  - Right Shoulder depression with Upper trap stretch   - Bilateral shoulder depression overpressure for cervical distraction, 5rounds sustained x30sec  - Strain Counter-Strain at Right upper trap  - STM: Cervical paraspinals, upper/middle trap, levator scap, right pec musculature  - Right GHJ mobilizations   Posteriorly, Grade 1-2, 6 rounds x 10sec oscillatory   Inferiorly, Grade 1-2, 6 rounds x 10sec oscillatory    Supervised Modalities after being cleared for contradictions: IFC w/ MHP x 20min to right shoulder/upper trap     Patient Education and Home Exercises     Home Exercises Provided and Patient Education Provided     Education provided: HEP review    Written Home Exercises Provided: yes. Exercises were reviewed and Frankie was able to demonstrate them prior to the end of the session.  Frankie demonstrated good  understanding of the education provided. See EMR under Patient Instructions for exercises provided during therapy sessions    ASSESSMENT     Patient demonstrates improved tolerance with today's session. UBE discontinued at lower duration to avoid symptom exacerbation today. Shoulder pulleys for flexion elicit symptoms in right upper trap but not at clavicular region. Otherwise shoulder  "ROM interventions are well tolerated. Patient demonstrates improved cervical retraction following patient education and visual/verbal cues. However, patient remains with postural deficits, specifically forward head position during exercises, which elicits posterior cervical symptoms. Patient is less guarded with manual interventions today and denies pain provocation with palpation.     Patient remains with musculoskeletal deficits: Tenderness to palpation (cervicothoracic), Cervical ROM limitations (extension, R/L lateral flexion), Pain with R/L lateral flexion and R cervical rotation, Pain with right shoulder elevation overhead, and mazin-scapular strength and stability deficits.     Frankie Is minimally progressing towards his goals.     Pt prognosis is Fair.     Pt will continue to benefit from skilled outpatient physical therapy to address the deficits listed in the problem list box on initial evaluation, provide pt/family education and to maximize pt's level of independence in the home and community environment.   Pt's spiritual, cultural and educational needs considered and pt agreeable to plan of care and goals.     Anticipated barriers to physical therapy: Pain    Goals:   Short Term Goals: 4 weeks   1. Report decreased cervical / shoulder pain  < / =  8/10 "At Worst" to increase tolerance for ADLs.   - Not Met, However Improved 3/2/23  2. Demonstrate cervical AROM within 10degrees of full motion and without pain provocation to restore mobility for ADLs.   - Not Met, However Improved 3/2/23  3. Demonstrate Shoulder AROM within 10degrees of full motion and without pain provocation to restore mobility for ADLs.   - Partially Met 3/2/23  4. Demonstrate (-) pain provocation with special testing of cervical and shoulder to demonstrate healing.   - Not Met 3/2/23  5. Increase strength in shoulder and mazin-scapular musculature to >/= 4/5 MMT grade to increase tolerance for ADL and work activities. - Partially Met " "3/2/23  6. Pt to tolerate HEP to improve ROM and independence with ADL's   - Partially Met 3/2/23     Long Term Goals: 8 weeks - ONGOING 3/2/23  1. Report decreased cervical / shoulder pain  < / =  6/10 "At Worst" to increase tolerance for ADLs.   2. Increase strength in shoulder and mazin-scapular musculature to 5/5 MMT grade to increase tolerance for ADL and work activities.  3. Pt goal: To get rid of the pain.   4. Report no disruption of sleep due to neck or shoulder pain to promote sleep quality for physical restoration.  5. Pt will demonstrate </= 20% limitation on FOTO shoulder in order to demonstrate true functional improvement.    PLAN     Patient has follow up with referring provider to discuss persistent pain despite compliance with physical therapy.   Plan to put PT sessions on hold until MD follow-up. Patient is in agreement with this plan.    Plan for continued therapy: Cervical ROM, cervicothoracic tissue flexibility, ending sessions with  and IFC    Plan of care Certification: 2/8/2023 to 5/8/2023.     Outpatient Physical Therapy 2 times weekly for 4 weeks to include the following interventions: Cervical/Lumbar Traction, Electrical Stimulation as appropriate, Manual Therapy, Moist Heat/ Ice, Neuromuscular Re-ed, Patient Education, Self Care, Therapeutic Activities, Therapeutic Exercise, Ultrasound, and Dry needling.     Vannessa Frost, PT                        "

## 2023-03-15 ENCOUNTER — CLINICAL SUPPORT (OUTPATIENT)
Dept: REHABILITATION | Facility: HOSPITAL | Age: 68
End: 2023-03-15
Payer: MEDICARE

## 2023-03-15 DIAGNOSIS — M25.511 RIGHT SHOULDER PAIN, UNSPECIFIED CHRONICITY: Primary | ICD-10-CM

## 2023-03-15 PROCEDURE — 97140 MANUAL THERAPY 1/> REGIONS: CPT | Mod: PN,CQ

## 2023-03-15 PROCEDURE — 97110 THERAPEUTIC EXERCISES: CPT | Mod: PN,CQ

## 2023-03-15 PROCEDURE — 97014 ELECTRIC STIMULATION THERAPY: CPT | Mod: PN,CQ

## 2023-03-15 NOTE — PROGRESS NOTES
[facility]   Physical Therapy Treatment Note     Name: Elliott Gaspar  Clinic Number: [id number]    Therapy Diagnosis:   Encounter Diagnosis   Name Primary?    Right shoulder pain, unspecified chronicity Yes     Physician: Rodrigo Richardson,*    Visit Date: 3/15/2023    Physician Orders: PT Eval and Treat   Medical Diagnosis from Referral: M25.511 (ICD-10-CM) - Right shoulder pain, unspecified chronicity M89.8X1 (ICD-10-CM) - Pain of right scapula   Evaluation Date: 2/8/2023  Authorization Period Expiration: 02/02/2024   Plan of Care Expiration: 5/8/23  Visit # / Visits authorized: 11 / 12 (Not including Initial Evaluation)  FOTO Visit #:  2/3 (Updated 2/28/23)    PTA Visit #: 1/5     Time In: 8:10 AM   Time Out: 9:25 AM  Total Billable Time: 53 minutes + 20 minutes IFC with     SUBJECTIVE     Pt reports: No new c/o's.    He was compliant with home exercise program.    Response to previous treatment: No adverse response  Functional change: N/A    Pain:  Current 3/10   Location: Right SC joint- radiates to right shoulder, upper trap/base of skull, down to the right elbow (Symptoms in upper arm have resolved)  Description: tight, constant pain;  Aggravating Factors: Increased use of the UE  Easing Factors: OTC pain relievers     Pts goals: To get rid of the pain;     OBJECTIVE     Posture:   3/2/23: L shoulder elevated, forward head, rounded shoulders     Palpation:    3/2/23: Tenderness: R T3 paraspinal begins to rhomboids, R cervical paraspinal/levator scap, UT, pec (minor and clavicular portion), subclavius, scalenes, SC joint   Tightness: Cervical paraspinals, scalenes, SCM, Upper/middle trap, rhomboids, levator     Cervical ROM (Active in Sitting)     2/8/23   3/2/23    Flexion: -----------------  55 deg   50 deg  Extension: -------------  30 deg **R cervical 30 deg  Right Lateral Flexion: 30 deg **R cervical 25 deg *R cervical paraspinal  Left Lateral Flexion:    6 deg *R  cervical 10 deg *pain with return to neutral  Right Rotation: -------   45 deg   55 deg *R cervical paraspinal  Left Rotation: ---------   45 deg *L cervical 55 deg     2/8/23: (+) spurling's in extension and right lateral flexion  3/1/23: Unchanged     Upper Extremity Functional Movement Patterns:      2/8/23   3/2/23                                         Right     Right  Reaching behind head:           T1, *right cervical      T1, * right cervical   Reaching behind back:           T11        T9     Right Shoulder Active ROM    3/2/23                             Flexion:           145 deg *  Extension:       60 deg  Abduction:      160 deg  Ext Rotation:  70deg     Right Shoulder Passive Flexion and Abduction ROM  3/2/23: 165 deg pain resolved     Right Upper Extremity MMT    3/2/23                   Shoulder  Flexion:       4+  Germaine-Scapula  Upper Trap:  3 * at GHJ and clavicle  Middle Trap: 3  Lower Trap:   3    SC joint pain with right scapular depression    Treatment     Frankie received the treatments listed below:      Therapeutic Exercises to develop strength, endurance, ROM, flexibility, and posture for 40minutes including:  Seated UBE, Level 1 x 8 min(4'/4')  Doorway Pec Stretch, Hands Low: 4l67ryz  Shoulder Rolls 20x  Seated Pulleys, Flexion, Abduction, and Horizontal Abduction(standing) x1min in each plane of motion;   BUE Shoulder Flexion Wall Slides 20x  Seated Right upper trap stretch, holding seat edge, 1q13hgq  Seated Cervical Retraction: 15x active, 2x15 pressing into ball  Seated Baljit shoulder ext rotation, green looped band around 2x12 -- cues for head position  Seated Right levator scap stretch 2q49dhy;   Seated Scapular Squeezes x20  Seated Shld Shrugs x15  Seated Baljit Scapular Depression 5x 5sec  Sidelying Right Scapular retraction 15x 3sec;   Sidelying Right Scapular retraction + depression 15x 3sec;  Performed by Therapist:  Passive Cervical ROM in all planes of motion 5x  R/L Scalene stretch  3k60nin each  R/L SCM stretch 8s05eck each  R/L Upper Trap stretch 6l00vru each  S/L Shld ER x 10  S/L Shld Abd x 10  Prone R shoulder extension x15  Standing leaning on hi/lo table:  Shld Abd x 10  Shld Rows x 10  Shld Ext x 10    Manual Therapy Techniques: applied to the: for 13minutes   - Trigger Point Release at    R/L cervicothoracic paraspinals  R/L upper trap  R/L levator trap  - Suboccipital Release  - Manual Cervical traction, 3rounds sustained x30sec  - Right Shoulder depression with Upper trap stretch   - Bilateral shoulder depression overpressure for cervical distraction, 5rounds sustained x30sec  - Strain Counter-Strain at Right upper trap  - STM: Cervical paraspinals, upper/middle trap, levator scap, right pec musculature  - Right GHJ mobilizations   Posteriorly, Grade 1-2, 6 rounds x 10sec oscillatory   Inferiorly, Grade 1-2, 6 rounds x 10sec oscillatory    Supervised Modalities after being cleared for contradictions: IFC w/ MHP x 20min to right shoulder/upper trap     Patient Education and Home Exercises     Home Exercises Provided and Patient Education Provided     Education provided: HEP review    Written Home Exercises Provided: yes. Exercises were reviewed and Frankie was able to demonstrate them prior to the end of the session.  Frankie demonstrated good  understanding of the education provided. See EMR under Patient Instructions for exercises provided during therapy sessions    ASSESSMENT     Patient demonstrates improved tolerance with today's session. Shoulder pulleys for flexion elicit symptoms in right upper trap but not at clavicular region. Otherwise shoulder ROM interventions are well tolerated. Patient demonstrates improved cervical retraction following patient education and visual/verbal cues. However, patient remains with postural deficits, specifically forward head position during exercises, which elicits posterior cervical symptoms. Patient is less guarded with manual interventions today  "and denies pain provocation with palpation.     Patient remains with musculoskeletal deficits: Tenderness to palpation (cervicothoracic), Cervical ROM limitations (extension, R/L lateral flexion), Pain with R/L lateral flexion and R cervical rotation, Pain with right shoulder elevation overhead, and mazin-scapular strength and stability deficits.     Frankie Is minimally progressing towards his goals.     Pt prognosis is Fair.     Pt will continue to benefit from skilled outpatient physical therapy to address the deficits listed in the problem list box on initial evaluation, provide pt/family education and to maximize pt's level of independence in the home and community environment.   Pt's spiritual, cultural and educational needs considered and pt agreeable to plan of care and goals.     Anticipated barriers to physical therapy: Pain    Goals:   Short Term Goals: 4 weeks   1. Report decreased cervical / shoulder pain  < / =  8/10 "At Worst" to increase tolerance for ADLs.   - Not Met, However Improved 3/2/23  2. Demonstrate cervical AROM within 10degrees of full motion and without pain provocation to restore mobility for ADLs.   - Not Met, However Improved 3/2/23  3. Demonstrate Shoulder AROM within 10degrees of full motion and without pain provocation to restore mobility for ADLs.   - Partially Met 3/2/23  4. Demonstrate (-) pain provocation with special testing of cervical and shoulder to demonstrate healing.   - Not Met 3/2/23  5. Increase strength in shoulder and mazin-scapular musculature to >/= 4/5 MMT grade to increase tolerance for ADL and work activities. - Partially Met 3/2/23  6. Pt to tolerate HEP to improve ROM and independence with ADL's   - Partially Met 3/2/23     Long Term Goals: 8 weeks - ONGOING 3/2/23  1. Report decreased cervical / shoulder pain  < / =  6/10 "At Worst" to increase tolerance for ADLs.   2. Increase strength in shoulder and mazin-scapular musculature to 5/5 MMT grade to increase " tolerance for ADL and work activities.  3. Pt goal: To get rid of the pain.   4. Report no disruption of sleep due to neck or shoulder pain to promote sleep quality for physical restoration.  5. Pt will demonstrate </= 20% limitation on FOTO shoulder in order to demonstrate true functional improvement.    PLAN     Patient has follow up with referring provider to discuss persistent pain despite compliance with physical therapy.   Plan to put PT sessions on hold until MD follow-up. Patient is in agreement with this plan.    Plan for continued therapy: Cervical ROM, cervicothoracic tissue flexibility, ending sessions with  and IFC    Plan of care Certification: 2/8/2023 to 5/8/2023.     Outpatient Physical Therapy 2 times weekly for 4 weeks to include the following interventions: Cervical/Lumbar Traction, Electrical Stimulation as appropriate, Manual Therapy, Moist Heat/ Ice, Neuromuscular Re-ed, Patient Education, Self Care, Therapeutic Activities, Therapeutic Exercise, Ultrasound, and Dry needling.     Jonathan Favre, PTA

## 2023-03-20 ENCOUNTER — OFFICE VISIT (OUTPATIENT)
Dept: ORTHOPEDICS | Facility: CLINIC | Age: 68
End: 2023-03-20
Payer: MEDICARE

## 2023-03-20 VITALS — HEIGHT: 69 IN | RESPIRATION RATE: 18 BRPM | BODY MASS INDEX: 28.73 KG/M2 | WEIGHT: 194 LBS

## 2023-03-20 DIAGNOSIS — M89.8X1 PAIN OF RIGHT SCAPULA: ICD-10-CM

## 2023-03-20 DIAGNOSIS — M17.10 ARTHRITIS OF KNEE: Primary | ICD-10-CM

## 2023-03-20 DIAGNOSIS — M70.41 PREPATELLAR BURSITIS, RIGHT KNEE: ICD-10-CM

## 2023-03-20 DIAGNOSIS — M25.511 RIGHT SHOULDER PAIN, UNSPECIFIED CHRONICITY: ICD-10-CM

## 2023-03-20 PROCEDURE — 99999 PR PBB SHADOW E&M-EST. PATIENT-LVL II: ICD-10-PCS | Mod: PBBFAC,,, | Performed by: ORTHOPAEDIC SURGERY

## 2023-03-20 PROCEDURE — 99212 OFFICE O/P EST SF 10 MIN: CPT | Mod: PBBFAC,PN | Performed by: ORTHOPAEDIC SURGERY

## 2023-03-20 PROCEDURE — 99999 PR PBB SHADOW E&M-EST. PATIENT-LVL II: CPT | Mod: PBBFAC,,, | Performed by: ORTHOPAEDIC SURGERY

## 2023-03-20 PROCEDURE — 99214 PR OFFICE/OUTPT VISIT, EST, LEVL IV, 30-39 MIN: ICD-10-PCS | Mod: S$PBB,,, | Performed by: ORTHOPAEDIC SURGERY

## 2023-03-20 PROCEDURE — 99214 OFFICE O/P EST MOD 30 MIN: CPT | Mod: S$PBB,,, | Performed by: ORTHOPAEDIC SURGERY
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
## 2023-03-21 ENCOUNTER — CLINICAL SUPPORT (OUTPATIENT)
Dept: REHABILITATION | Facility: HOSPITAL | Age: 68
End: 2023-03-21
Payer: MEDICARE

## 2023-03-21 DIAGNOSIS — M17.10 ARTHRITIS OF KNEE: ICD-10-CM

## 2023-03-21 DIAGNOSIS — M54.2 CERVICAL PAIN: Primary | ICD-10-CM

## 2023-03-21 DIAGNOSIS — M25.511 RIGHT SHOULDER PAIN, UNSPECIFIED CHRONICITY: ICD-10-CM

## 2023-03-21 DIAGNOSIS — M89.8X1 PAIN OF RIGHT SCAPULA: ICD-10-CM

## 2023-03-21 NOTE — PROGRESS NOTES
[facility]   Physical Therapy Treatment Note     Name: Elliott Gaspar  Clinic Number: [id number]    Therapy Diagnosis:   Encounter Diagnoses   Name Primary?    Cervical pain Yes    Right shoulder pain, unspecified chronicity     Pain of right scapula     Arthritis of knee      Physician: Rodrigo Richardson,*    Visit Date: 3/21/2023    Physician Orders: PT Eval and Treat   Medical Diagnosis from Referral:   M25.511 (ICD-10-CM) - Right shoulder pain, unspecified chronicity   M89.8X1 (ICD-10-CM) - Pain of right scapula   Evaluation Date: 2/8/2023  Authorization Period Expiration: 02/02/2024   Plan of Care Expiration: 05/08/2023  Visit # / Visits authorized: 12 / 40 (Not including Initial Evaluation)  FOTO Visit #:  2/3 (Updated 2/28/23)    PTA Visit #: 1/5     Time In: 9:06 AM *Patient arrived late - (9:59)  Time Out: 10:22 AM  Total Billable Time: 53 minutes + 20 minutes IFC with     SUBJECTIVE     Pt reports: No new c/o's. Since starting e-stim at the shoulder, the pain in the clavicle has improved/decreased. The most pain i'm feeling is at the neck, at the bottom of the shoulder blade and on the front of the shoulder;     He was compliant with home exercise program.    Response to previous treatment: No adverse response  Functional change: N/A    Pain:   Current: 4/10   Location: Right SC joint- radiates to right shoulder, upper trap/base of skull, down to the right elbow // Symptoms in upper arm have resolved)  Description: tight, constant pain;  Aggravating Factors: Increased use of the UE  Easing Factors: OTC pain relievers     Pts goals: To get rid of the pain;     OBJECTIVE     Objective Measures updated at progress report unless specified.     Treatment     Frankie received the treatments listed below:      Therapeutic Exercises to develop strength, endurance, ROM, flexibility, and posture for 53 minutes including:  Seated UBE, Level 1 x 10 min (5' / 5') -- no pain  reported  Seated Right upper trap stretch, holding seat edge, [id number]  Seated Right levator scap stretch [id number];   Shoulder Rolls 20x  Seated Scapular Squeezes x20  Seated Shld Shrugs x15  Seated Baljit Scapular Depression 5x 5sec  Doorway Pec Stretch, Hands Low: [id number] -- no pain reported  Shoulder Pulleys: Scaption and Horizontal Abduction x1min in each plane of motion; -- no pain reported  Shoulder Wall Slides with towel: BUE Flexion x2mins, Single UE Diagonal reach x2mins alternating R/L;  Supine Chin Tuck 3-5sec holds x1min;   Supine Chin Tuck + Lift for deep cervical flexors, 1x12;   Supine Cervical Retraction with head neutral, 1x15;   Supine BUE Ext Rotation Pull Aparts, Green Theraband 1x16;  Supine BUE Horizontal Pull Aparts, Blue Theraband 2x20;  Supine UE Diagonal Pull Back, Blue Theraband 1x20 R/L;  Supine BUE Serratus Punch: Active 10x, 1# Dowel 20x  Performed by Therapist:  Passive Cervical ROM in all planes of motion 5x  R/L Scalene stretch 7o09jon each  R/L SCM stretch 3l14dqo each  R/L Upper Trap stretch 6d68kdb each  Sidelying Right Scapular retraction 15x 3sec;   Sidelying Right Scapular retraction + depression 15x 3sec;  Sidelying Shld ER x 10  Sidelying Shld Abd x 10  Prone R shoulder extension x15  Seated [place] Pulls, Blue Theraband, 1x15 R/L; postural and mobility deficits   Seated Rows, Blue Theraband, 1x15   Standing leaning on hi/lo table:  Shld Abd x 10  Shld Rows x 10  Shld Ext x 10    Manual Therapy Techniques: applied to the: for - minutes   - Trigger Point Release at    R/L cervicothoracic paraspinals  R/L upper trap  R/L levator trap  - Suboccipital Release  - Manual Cervical traction, 3rounds sustained x30sec  - Right Shoulder depression with Upper trap stretch   - Bilateral shoulder depression overpressure for cervical distraction, 5rounds sustained x30sec  Strain Counter-Strain at Right upper trap  STM: Cervical paraspinals, upper/middle trap, levator scap, right pec  musculature  Right GHJ mobilizations   Posteriorly, Grade 1-2, 6 rounds x 10sec oscillatory   Inferiorly, Grade 1-2, 6 rounds x 10sec oscillatory    Supervised Modalities after being cleared for contradictions: IFC w/ MHP x 20min to right shoulder/upper trap     Patient Education and Home Exercises     Home Exercises Provided and Patient Education Provided     Education provided: HEP review    Written Home Exercises Provided: yes. Exercises were reviewed and Frankie was able to demonstrate them prior to the end of the session.  Frankie demonstrated good  understanding of the education provided. See EMR under Patient Instructions for exercises provided during therapy sessions    ASSESSMENT     Patient demonstrates improved tolerance with today's session. Shoulder pulleys were performed without pain, as were shoulder towel slides on wall. Verbal cues provided for chin tuck without excessive exertion and lifting of head. Cervical interventions performed in supine for gravity assist in reduction of forward head position. Cues for light force to avoid symptom provocation/exacerbation. Resisted band interventions initiated in supine and performed without patient reporting pain. Overall, patient able to tolerate more exercises and perform greater shoulder ROM without grimacing. Plan to continue working on head position, and postural strength / mazin-scap stability. Patient has difficulty with bow pulls, specifically assuming proper head position with intervention due to cervical mobility/flexibility limitations.     Patient remains with musculoskeletal deficits: Tenderness to palpation (cervicothoracic), Cervical ROM limitations (extension, R/L lateral flexion), Pain with R/L lateral flexion and R cervical rotation, Pain with right shoulder elevation overhead, and mazin-scapular strength and stability deficits.     Frankie Is minimally progressing towards his goals.     Pt prognosis is Fair.     Pt will continue to benefit from skilled  "outpatient physical therapy to address the deficits listed in the problem list box on initial evaluation, provide pt/family education and to maximize pt's level of independence in the home and community environment.   Pt's spiritual, cultural and educational needs considered and pt agreeable to plan of care and goals.     Anticipated barriers to physical therapy: Pain    Goals:   Short Term Goals: 4 weeks   1. Report decreased cervical / shoulder pain  < / =  8/10 "At Worst" to increase tolerance for ADLs.   - Not Met, However Improved 3/2/23  2. Demonstrate cervical AROM within 10degrees of full motion and without pain provocation to restore mobility for ADLs.   - Not Met, However Improved 3/2/23  3. Demonstrate Shoulder AROM within 10degrees of full motion and without pain provocation to restore mobility for ADLs.   - Partially Met 3/2/23  4. Demonstrate (-) pain provocation with special testing of cervical and shoulder to demonstrate healing.   - Not Met 3/2/23  5. Increase strength in shoulder and mazin-scapular musculature to >/= 4/5 MMT grade to increase tolerance for ADL and work activities. - Partially Met 3/2/23  6. Pt to tolerate HEP to improve ROM and independence with ADL's   - Partially Met 3/2/23     Long Term Goals: 8 weeks - ONGOING 3/2/23  1. Report decreased cervical / shoulder pain  < / =  6/10 "At Worst" to increase tolerance for ADLs.   2. Increase strength in shoulder and mazin-scapular musculature to 5/5 MMT grade to increase tolerance for ADL and work activities.  3. Pt goal: To get rid of the pain.   4. Report no disruption of sleep due to neck or shoulder pain to promote sleep quality for physical restoration.  5. Pt will demonstrate </= 20% limitation on FOTO shoulder in order to demonstrate true functional improvement.    PLAN     Advance patient as tolerated.  Consider IFC and heat at cervicothoracic region to assess benefit.  Plan to continue cervical/shoulder; once patient feels " confident in independent management of current condition, will discharge and evaluate knee pain    Plan of care Certification: 2/8/2023 to 5/8/2023.     Outpatient Physical Therapy 2 times weekly for 4 weeks to include the following interventions: Cervical/Lumbar Traction, Electrical Stimulation as appropriate, Manual Therapy, Moist Heat/ Ice, Neuromuscular Re-ed, Patient Education, Self Care, Therapeutic Activities, Therapeutic Exercise, Ultrasound, and Dry needling.     Vannessa Frost, PT

## 2023-03-23 ENCOUNTER — CLINICAL SUPPORT (OUTPATIENT)
Dept: REHABILITATION | Facility: HOSPITAL | Age: 68
End: 2023-03-23
Payer: MEDICARE

## 2023-03-23 DIAGNOSIS — M25.511 RIGHT SHOULDER PAIN, UNSPECIFIED CHRONICITY: Primary | ICD-10-CM

## 2023-03-23 PROCEDURE — 97110 THERAPEUTIC EXERCISES: CPT | Mod: PN,CQ

## 2023-03-23 PROCEDURE — 97014 ELECTRIC STIMULATION THERAPY: CPT | Mod: PN,CQ

## 2023-03-23 NOTE — PROGRESS NOTES
[facility]   Physical Therapy Treatment Note     Name: Elliott Gaspar  Clinic Number: [id number]    Therapy Diagnosis:   Encounter Diagnosis   Name Primary?    Right shoulder pain, unspecified chronicity Yes     Physician: Rodrigo Richardson,*    Visit Date: 3/23/2023    Physician Orders: PT Eval and Treat   Medical Diagnosis from Referral:   M25.511 (ICD-10-CM) - Right shoulder pain, unspecified chronicity   M89.8X1 (ICD-10-CM) - Pain of right scapula   Evaluation Date: 2/8/2023  Authorization Period Expiration: 02/02/2024   Plan of Care Expiration: 05/08/2023  Visit # / Visits authorized: 13 / 40 (Not including Initial Evaluation)  FOTO Visit #:  2/3 (Updated 2/28/23)    PTA Visit #: 1/5     Time In: 8:10 AM Patient was late and had to do an abbreviated session.   Time Out: 9:00 AM  Total Billable Time: 50 minutes     SUBJECTIVE     Pt reports: No new c/o's.     He was compliant with home exercise program.    Response to previous treatment: No adverse response  Functional change: N/A    Pain:   Current: 5/10   Location: Right SC joint- radiates to right shoulder, upper trap/base of skull, down to the right elbow // Symptoms in upper arm have resolved)  Description: tight, constant pain;  Aggravating Factors: Increased use of the UE  Easing Factors: OTC pain relievers     Pts goals: To get rid of the pain;     OBJECTIVE     Objective Measures updated at progress report unless specified.     Treatment     Frankie received the treatments listed below:      Therapeutic Exercises to develop strength, endurance, ROM, flexibility, and posture for 30 minutes including:  Seated UBE, Level 1 x 10 min (5' / 5') -- no pain reported  Seated Right upper trap stretch, holding seat edge, 6n50obh  Seated Right levator scap stretch 4x39tzi;   Shoulder Rolls 20x  Seated Scapular Squeezes x20  Seated Shld Shrugs x15  Seated Baljit Scapular Depression 5x 5sec  Doorway Pec Stretch, Hands Low: 4o13wil -- no pain  reported  Shoulder Pulleys: Scaption and Horizontal Abduction x1min in each plane of motion; -- no pain reported  Shoulder Wall Slides with towel: BUE Flexion x2mins, Single UE Diagonal reach x2mins alternating R/L;  Supine Chin Tuck 3-5sec holds x1min;   Supine Chin Tuck + Lift for deep cervical flexors, 1x12;   Supine Cervical Retraction with head neutral, 1x15;   Supine BUE Ext Rotation Pull Aparts, Green Theraband 1x16;  Supine BUE Horizontal Pull Aparts, Blue Theraband 2x20;  Supine UE Diagonal Pull Back, Blue Theraband 1x20 R/L;  Supine BUE Serratus Punch: Active 10x, 1# Dowel 20x  Performed by Therapist:  Passive Cervical ROM in all planes of motion 5x  R/L Scalene stretch 9s24xcg each  R/L SCM stretch 0m32wla each  R/L Upper Trap stretch 2w60cdm each  Sidelying Right Scapular retraction 15x 3sec;   Sidelying Right Scapular retraction + depression 15x 3sec;  Sidelying Shld ER x 10  Sidelying Shld Abd x 10  Prone R shoulder extension x15  Seated [place] Pulls, Blue Theraband, 1x15 R/L; postural and mobility deficits   Seated Rows, Blue Theraband, 1x15   Standing leaning on hi/lo table:  Shld Abd x 10  Shld Rows x 10  Shld Ext x 10    Manual Therapy Techniques: applied to the: for - minutes   - Trigger Point Release at    R/L cervicothoracic paraspinals  R/L upper trap  R/L levator trap  - Suboccipital Release  - Manual Cervical traction, 3rounds sustained x30sec  - Right Shoulder depression with Upper trap stretch   - Bilateral shoulder depression overpressure for cervical distraction, 5rounds sustained x30sec  Strain Counter-Strain at Right upper trap  STM: Cervical paraspinals, upper/middle trap, levator scap, right pec musculature  Right GHJ mobilizations   Posteriorly, Grade 1-2, 6 rounds x 10sec oscillatory   Inferiorly, Grade 1-2, 6 rounds x 10sec oscillatory    Supervised Modalities after being cleared for contradictions: IFC w/ MHP x 20min to right shoulder/upper trap     Patient Education and Home  Exercises     Home Exercises Provided and Patient Education Provided     Education provided: HEP review    Written Home Exercises Provided: yes. Exercises were reviewed and Frankie was able to demonstrate them prior to the end of the session.  Frankie demonstrated good  understanding of the education provided. See EMR under Patient Instructions for exercises provided during therapy sessions    ASSESSMENT     Patient demonstrates improved tolerance with today's session. Shoulder pulleys were performed without pain, as were shoulder towel slides on wall. Verbal cues provided for chin tuck without excessive exertion and lifting of head. Cervical interventions performed in supine for gravity assist in reduction of forward head position. Cues for light force to avoid symptom provocation/exacerbation. Resisted band interventions initiated in supine and performed without patient reporting pain. Overall, patient able to tolerate more exercises and perform greater shoulder ROM without grimacing. Plan to continue working on head position, and postural strength / mazin-scap stability. Patient has difficulty with bow pulls, specifically assuming proper head position with intervention due to cervical mobility/flexibility limitations.     Patient remains with musculoskeletal deficits: Tenderness to palpation (cervicothoracic), Cervical ROM limitations (extension, R/L lateral flexion), Pain with R/L lateral flexion and R cervical rotation, Pain with right shoulder elevation overhead, and mazin-scapular strength and stability deficits.     Frankie Is minimally progressing towards his goals.     Pt prognosis is Fair.     Pt will continue to benefit from skilled outpatient physical therapy to address the deficits listed in the problem list box on initial evaluation, provide pt/family education and to maximize pt's level of independence in the home and community environment.   Pt's spiritual, cultural and educational needs considered and pt  "agreeable to plan of care and goals.     Anticipated barriers to physical therapy: Pain    Goals:   Short Term Goals: 4 weeks   1. Report decreased cervical / shoulder pain  < / =  8/10 "At Worst" to increase tolerance for ADLs.   - Not Met, However Improved 3/2/23  2. Demonstrate cervical AROM within 10degrees of full motion and without pain provocation to restore mobility for ADLs.   - Not Met, However Improved 3/2/23  3. Demonstrate Shoulder AROM within 10degrees of full motion and without pain provocation to restore mobility for ADLs.   - Partially Met 3/2/23  4. Demonstrate (-) pain provocation with special testing of cervical and shoulder to demonstrate healing.   - Not Met 3/2/23  5. Increase strength in shoulder and mazin-scapular musculature to >/= 4/5 MMT grade to increase tolerance for ADL and work activities. - Partially Met 3/2/23  6. Pt to tolerate HEP to improve ROM and independence with ADL's   - Partially Met 3/2/23     Long Term Goals: 8 weeks - ONGOING 3/2/23  1. Report decreased cervical / shoulder pain  < / =  6/10 "At Worst" to increase tolerance for ADLs.   2. Increase strength in shoulder and mazin-scapular musculature to 5/5 MMT grade to increase tolerance for ADL and work activities.  3. Pt goal: To get rid of the pain.   4. Report no disruption of sleep due to neck or shoulder pain to promote sleep quality for physical restoration.  5. Pt will demonstrate </= 20% limitation on FOTO shoulder in order to demonstrate true functional improvement.    PLAN     Advance patient as tolerated.  Consider IFC and heat at cervicothoracic region to assess benefit.  Plan to continue cervical/shoulder; once patient feels confident in independent management of current condition, will discharge and evaluate knee pain    Plan of care Certification: 2/8/2023 to 5/8/2023.     Outpatient Physical Therapy 2 times weekly for 4 weeks to include the following interventions: Cervical/Lumbar Traction, Electrical " Stimulation as appropriate, Manual Therapy, Moist Heat/ Ice, Neuromuscular Re-ed, Patient Education, Self Care, Therapeutic Activities, Therapeutic Exercise, Ultrasound, and Dry needling.     Jonathan Favre, PTA

## 2023-03-27 ENCOUNTER — CLINICAL SUPPORT (OUTPATIENT)
Dept: REHABILITATION | Facility: HOSPITAL | Age: 68
End: 2023-03-27
Payer: MEDICARE

## 2023-03-27 DIAGNOSIS — M54.2 CERVICAL PAIN: ICD-10-CM

## 2023-03-27 DIAGNOSIS — M89.8X1 PAIN OF RIGHT SCAPULA: ICD-10-CM

## 2023-03-27 DIAGNOSIS — M25.511 RIGHT SHOULDER PAIN, UNSPECIFIED CHRONICITY: Primary | ICD-10-CM

## 2023-03-27 PROCEDURE — 97140 MANUAL THERAPY 1/> REGIONS: CPT | Mod: PN

## 2023-03-27 PROCEDURE — 97110 THERAPEUTIC EXERCISES: CPT | Mod: PN

## 2023-03-27 NOTE — PROGRESS NOTES
[facility]   Physical Therapy Treatment / PROGRESS Note     Name: Elliott Gaspar  Clinic Number: [id number]    Therapy Diagnosis:   Encounter Diagnoses   Name Primary?    Right shoulder pain, unspecified chronicity Yes    Cervical pain     Pain of right scapula      Physician: Rodrigo Richardson,*    Visit Date: 3/27/2023    Physician Orders: PT Eval and Treat   Medical Diagnosis from Referral:   M25.511 (ICD-10-CM) - Right shoulder pain, unspecified chronicity   M89.8X1 (ICD-10-CM) - Pain of right scapula   Evaluation Date: 2/8/2023  Progress Note: 2/28/23; 3/27/23  Authorization Period Expiration: 02/02/2024   Plan of Care Expiration: 05/08/2023  Visit # / Visits authorized: 14 / 40 (Not including Initial Evaluation)  FOTO Visit #:  3/3     PTA Visit #: 1/5     Time In: 8:58 AM  Time Out: 9:48 AM *patient requested to leave early for appointment  Total Billable Time: 45 minutes     SUBJECTIVE     Pt reports: No new c/o's. Symptoms really depend on how strenuous the week has been. The last few days I've had to take aleve because of my back which dulls everything else. Overall, I do think it's getting better.     He was compliant with home exercise program.    Response to previous treatment: No adverse response  Functional change: N/A    Pain:   Current: 2-3/10, At Worst 8/10; At Best 2/10  Location: Right SC joint- radiates to right shoulder, upper trap/base of skull, down to the right elbow // Symptoms in upper arm have resolved)  Description: tight, constant pain;  Aggravating Factors: Increased use of the UE  Easing Factors: OTC pain relievers     Pts goals: To get rid of the pain;     OBJECTIVE     Objective Measures updated at progress report unless specified.     Posture:   2/8/23: L shoulder elevated, forward head, rounded shoulders  3/27/23: Unchanged     Palpation:          2/8/23: Tenderness: R cervical paraspinals, UT, pec (minor and clavicular portion), subclavius,  "scalenes, SC joint  Tightness: UT, scalenes, SCM, bilaterally  3/2/23: Tenderness: R T3 paraspinal begins to rhomboids, R cervical paraspinal/levator scap, UT, pec (minor and clavicular portion), subclavius, scalenes, SC joint              Tightness: Cervical paraspinals, scalenes, SCM, Upper/middle trap, rhomboids, levator  3/27/23: Unchanged;      Cervical ROM (Active in Sitting)                                      2/8/23                        3/2/23         3/27/23           Flexion: -----------------  55 deg                        50 deg     50 deg CT junction 7/10  Extension: -------------  30 deg **R cervical    30 deg     35 deg base of skull 2/10  Right Lateral Flexion: 30 deg **R cervical   25 deg *R cervical paraspinal  30 deg compression  Left Lateral Flexion:    6 deg *R cervical       10 deg *pain with return to neutral 5 deg pain  Right Rotation: -------   45 deg                      55 deg *R cervical paraspinal  55 deg "no problem"  Left Rotation: ---------   45 deg *L cervical     55 deg     55 deg L-side cervical "compression"     2/8/23: (+) spurling's in extension and right lateral flexion  3/27/23: Unchanged     Upper Extremity Functional Movement Patterns:                                                  2/8/23                    3/2/23   3/27/23                                                     Right                   Right   Right  Reaching behind head:           T1, *right cervical      T1, * right cervical T1 *neck pain due to compensatory cervical flexion   Reaching behind back:           T11                       T9   L1 *collar bone     Shoulder Active ROM                          2/8/23                                          3/2/23   3/27/23                           Right                                            Right            Right  Flexion:           144deg, *base of R cervical            145 deg * 150 deg *pressure at 1st rib   Extension:       55deg, stiffness right " shoulder       60 deg  60 deg  Abduction:       160deg                                          160 deg 160 deg *pressure at 1st rib    Ext Rotation:   65deg                                               70deg  65deg     Shoulder Passive ROM                          2/8/23                   3/2/23    3/27/23                           Right                   Right               Right  Flexion:           145 degrees *             165 deg pain resolved  175 deg  Abduction:       160 degrees *           165 deg pain resolved  175 deg       Shoulder Special Tests: RIGHT                                      2/8/23              3/2/23  3/27/23     AC Joint Compression: (+)                 (+)  (-)  Painful Arc:                  (+)           (+)  (+)  Subscap Testing:     (+) however 5/5 with resisted testing     Upper Extremity MMT                          2/8/23                      3/2/23     3/27/23                          Right   Left                Right    Right  Shoulder  Flexion:           4+        5                  4+    5  Abduction:       4          5                5    5  Ext Rot:           4+        5                  5    5  Int Rot:             5          5                    5    5  Germaine-Scapula  Upper Trap:     3 *        5              3 * at GHJ and clavicle 3+  Middle Trap:    NT       NT                   3    3+  Lower Trap:     NT       NT        3    3+     SC joint pain with right scapular depression     FOTO Shoulder Survey  Limitation Score:   2/8/23: 33%  2/28/23: 39%  3/27/23: 37%    Treatment     Frankie received the treatments listed below:      Therapeutic Exercises to develop strength, endurance, ROM, flexibility, and posture for 20 minutes including:  Seated UBE, Level 1 x 10 min (5' / 5')   Cervical AROM in all planes  Right shoulder AROM in all planes of motion, active and passive  Seated Right/Left upper trap stretch, holding seat edge, [id number]  Seated Right levator scap stretch  [id number];   Shoulder Rolls 20x  Seated Scapular Squeezes x20  Seated Shld Shrugs x15  Seated Baljit Scapular Depression 5x 5sec  Doorway Pec Stretch, Hands Low: [id number] -- no pain reported  Shoulder Pulleys: Scaption and Horizontal Abduction x1min in each plane of motion; -- no pain reported  Shoulder Wall Slides with towel: BUE Flexion x2mins, Single UE Diagonal reach x2mins alternating R/L;  Right shoulder IR towel stretch [id number] - 8/10 collar bone pain  Supine Chin Tuck 3-5sec holds x1min;   Supine Chin Tuck + Lift for deep cervical flexors, 1x12;   Supine Cervical Retraction with head neutral, 1x15;   Supine BUE Ext Rotation Pull Aparts, Green Theraband 1x16;  Supine BUE Horizontal Pull Aparts, Blue Theraband 2x20;  Supine UE Diagonal Pull Back, Blue Theraband 1x20 R/L;  Supine BUE Serratus Punch: Active 10x, 1# Dowel 20x  Performed by Therapist:  Passive Cervical ROM in all planes of motion 5x  R/L Scalene stretch [id number] each  R/L SCM stretch [id number] each  R/L Upper Trap stretch [id number] each  Sidelying Right Scapular retraction 15x 3sec;   Sidelying Right Scapular retraction + depression 15x 3sec;  Sidelying Shld ER x 10  Sidelying Shld Abd x 10  Prone R shoulder extension x15  Seated Drums Pulls, Blue Theraband, 1x15 R/L; postural and mobility deficits   Seated Rows, Blue Theraband, 1x15   Standing leaning on hi/lo table:  Shld Abd x 10  Shld Rows x 10  Shld Ext x 10    Manual Therapy Techniques: applied to the: for 23 minutes   - Trigger Point Release at    R/L cervicothoracic paraspinals   R subscap  R/L upper trap  R/L levator trap  - Suboccipital Release  - Manual Cervical traction, 5rounds sustained x60sec  - Right Shoulder depression with Upper trap stretch   - Bilateral shoulder depression overpressure for cervical distraction, 5rounds sustained x30sec  - Strain Counter-Strain at Right upper trap and R/L SCM  STM: Cervical paraspinals, upper/middle trap, levator scap, right pec musculature  - Right GHJ  mobilizations   Posteriorly, Grade 1-2, 6 rounds x 10sec oscillatory   Inferiorly, Grade 1-2, 6 rounds x 10sec oscillatory    Supervised Modalities after being cleared for contradictions: IFC w/ MHP x -min to right shoulder/upper trap - Not today due to time.    Patient Education and Home Exercises     Home Exercises Provided and Patient Education Provided     Education provided: HEP review    Written Home Exercises Provided: yes. Exercises were reviewed and Frankie was able to demonstrate them prior to the end of the session.  Frankie demonstrated good  understanding of the education provided. See EMR under Patient Instructions for exercises provided during therapy sessions    ASSESSMENT     Patient presents today for follow-up visit and Progress Note assessment. Subjectively, patient reports improvement in pain at the collar bone but remains with tension at cervicothoracic musculature. FOTO scoring demonstrates improvement in function since last re-assessment. Objective measures demonstrate improvement in shoulder ROM and shoulder girdle strength; However, postural deviations, soft tissue hypomobility, and cervical AROM remain painfully limited.  Patient would benefit from continued outpatient physical therapy to address remaining musculoskeletal deficits. Patient is in agreement with this plan.    Patient remains with musculoskeletal deficits: Tenderness to palpation (cervicothoracic), Cervical ROM limitations (extension, R/L lateral flexion), Pain with R/L lateral flexion and R cervical rotation, Pain with right shoulder elevation overhead, and mazin-scapular strength and stability deficits.     Frankie Is minimally progressing towards his goals.     Pt prognosis is Fair.     Pt will continue to benefit from skilled outpatient physical therapy to address the deficits listed in the problem list box on initial evaluation, provide pt/family education and to maximize pt's level of independence in the home and community  "environment.   Pt's spiritual, cultural and educational needs considered and pt agreeable to plan of care and goals.     Anticipated barriers to physical therapy: Pain    Goals:   Short Term Goals: 4 weeks   1. Report decreased cervical / shoulder pain  < / =  8/10 "At Worst" to increase tolerance for ADLs.   - MET 3/27/23  2. Demonstrate cervical AROM within 10degrees of full motion and without pain provocation to restore mobility for ADLs.   - Not Met, However Improved overall 3/27/23  3. Demonstrate Shoulder AROM within 10degrees of full motion and without pain provocation to restore mobility for ADLs.   - Partially Met 3/27/23  4. Demonstrate (-) pain provocation with special testing of cervical and shoulder to demonstrate healing.   - Not Met, however improved 3/27/23  5. Increase strength in shoulder and mazin-scapular musculature to >/= 4/5 MMT grade to increase tolerance for ADL and work activities.   - Partially Met 3/2/23  6. Pt to tolerate HEP to improve ROM and independence with ADL's   - MET 3/27/23     Long Term Goals: 8 weeks - ONGOING 3/27/23  1. Report decreased cervical / shoulder pain  < / =  6/10 "At Worst" to increase tolerance for ADLs.   2. Increase strength in shoulder and mazin-scapular musculature to 5/5 MMT grade to increase tolerance for ADL and work activities.  3. Pt goal: To get rid of the pain.   4. Report no disruption of sleep due to neck or shoulder pain to promote sleep quality for physical restoration.  5. Pt will demonstrate </= 20% limitation on FOTO shoulder in order to demonstrate true functional improvement.    PLAN     Advance patient as tolerated.  Consider IFC and heat at cervicothoracic region to assess benefit.  Plan to continue cervical/shoulder; once patient feels confident in independent management of current condition, will discharge and evaluate knee pain    Plan of care Certification: 2/8/2023 to 5/8/2023.     Continue Outpatient Physical Therapy 2 times weekly for " 4 weeks to include the following interventions: Cervical/Lumbar Traction, Electrical Stimulation as appropriate, Manual Therapy, Moist Heat/ Ice, Neuromuscular Re-ed, Patient Education, Self Care, Therapeutic Activities, Therapeutic Exercise, Ultrasound, and Dry needling.     Vannessa Frost, PT

## 2023-03-28 ENCOUNTER — OFFICE VISIT (OUTPATIENT)
Dept: URGENT CARE | Facility: CLINIC | Age: 68
End: 2023-03-28
Payer: MEDICARE

## 2023-03-28 VITALS
OXYGEN SATURATION: 98 % | BODY MASS INDEX: 28.14 KG/M2 | SYSTOLIC BLOOD PRESSURE: 144 MMHG | TEMPERATURE: 99 F | WEIGHT: 190 LBS | HEART RATE: 60 BPM | HEIGHT: 69 IN | DIASTOLIC BLOOD PRESSURE: 84 MMHG

## 2023-03-28 DIAGNOSIS — B96.89 BACTERIAL UPPER RESPIRATORY INFECTION: Primary | ICD-10-CM

## 2023-03-28 DIAGNOSIS — J06.9 BACTERIAL UPPER RESPIRATORY INFECTION: Primary | ICD-10-CM

## 2023-03-28 DIAGNOSIS — R05.9 COUGH, UNSPECIFIED TYPE: ICD-10-CM

## 2023-03-28 DIAGNOSIS — J34.89 SINUS PRESSURE: ICD-10-CM

## 2023-03-28 DIAGNOSIS — R51.9 SINUS HEADACHE: ICD-10-CM

## 2023-03-28 DIAGNOSIS — R09.81 NASAL CONGESTION: ICD-10-CM

## 2023-03-28 PROCEDURE — 99213 PR OFFICE/OUTPT VISIT, EST, LEVL III, 20-29 MIN: ICD-10-PCS | Mod: ,,,

## 2023-03-28 PROCEDURE — 99213 OFFICE O/P EST LOW 20 MIN: CPT | Mod: ,,,

## 2023-03-28 RX ORDER — METHYLPREDNISOLONE 4 MG/1
TABLET ORAL
Qty: 21 EACH | Refills: 0 | Status: SHIPPED | OUTPATIENT
Start: 2023-03-28 | End: 2023-04-18

## 2023-03-28 RX ORDER — CIPROFLOXACIN 250 MG/5ML
500 KIT ORAL 2 TIMES DAILY
Qty: 100 ML | Status: SHIPPED | OUTPATIENT
Start: 2023-03-28 | End: 2023-04-04

## 2023-03-28 NOTE — PROGRESS NOTES
"Subjective:       Patient ID: Elliott Gaspar is a 67 y.o. male.    Vitals:  height is 5' 9" (1.753 m) and weight is 86.2 kg (190 lb). His oral temperature is 98.8 °F (37.1 °C). His blood pressure is 144/84 (abnormal) and his pulse is 60. His oxygen saturation is 98%.     Chief Complaint: Sinus Problem (Patient states he has had sinus congestion for a month.)    This is a 67 y.o. male who presents today with a chief complaint of sinus issues for  a month. Headache and fatigue.   Patient presents with:  Sinus Problem: Patient states he has had sinus congestion for a month. Pt states that he is having sinus pressure with congestion. Pt states that he is having sinus headaches. Pt states that he is coughing up yellow green sputum.    Sinus Problem  This is a recurrent problem. The current episode started 1 to 4 weeks ago. The problem is unchanged. There has been no fever. He is experiencing no pain. Associated symptoms include congestion, coughing, headaches, sinus pressure and a sore throat. Past treatments include acetaminophen (aleve, claritin d, sudafed). The treatment provided mild relief.     Constitution: Negative.   HENT:  Positive for congestion, sinus pain, sinus pressure and sore throat.    Neck: neck negative.   Cardiovascular: Negative.    Eyes: Negative.    Respiratory:  Positive for cough.    Gastrointestinal: Negative.    Endocrine: negative.   Genitourinary: Negative.    Musculoskeletal: Negative.    Skin: Negative.    Allergic/Immunologic: Negative.    Neurological:  Positive for headaches.   Hematologic/Lymphatic: Negative.    Psychiatric/Behavioral: Negative.           Objective:      Physical Exam   Constitutional: He is oriented to person, place, and time. normal  HENT:   Head: Normocephalic and atraumatic.   Ears:   Right Ear: Hearing, tympanic membrane, external ear and ear canal normal.   Left Ear: Hearing, tympanic membrane, external ear and ear canal normal.   Nose: Congestion present. Right " sinus exhibits maxillary sinus tenderness and frontal sinus tenderness. Left sinus exhibits maxillary sinus tenderness and frontal sinus tenderness.   Mouth/Throat: Uvula is midline and mucous membranes are normal. Mucous membranes are moist. Posterior oropharyngeal erythema present. Oropharynx is clear.   Eyes: Conjunctivae are normal. Pupils are equal, round, and reactive to light. Extraocular movement intact   Neck: Neck supple.   Cardiovascular: Normal rate, regular rhythm, normal heart sounds and normal pulses.   Pulmonary/Chest: Effort normal and breath sounds normal.         Comments: Positive for yellow green productive cough    Abdominal: Normal appearance and bowel sounds are normal. Soft.   Musculoskeletal: Normal range of motion.         General: Normal range of motion.   Neurological: no focal deficit. He is alert and oriented to person, place, and time.   Skin: Skin is warm and dry. Capillary refill takes less than 2 seconds.   Nursing note and vitals reviewed.      Past medical history and current medications reviewed.       Assessment:           No diagnosis found.          Plan:         There are no diagnoses linked to this encounter.         There are no Patient Instructions on file for this visit.

## 2023-03-28 NOTE — PATIENT INSTRUCTIONS
Please return here or go to the Emergency Department for any concerns or worsening of condition.  Please drink plenty of fluids.  Please get plenty of rest.  If you were prescribed antibiotics, please take them to completion.  If you were given wait & see antibiotics, please wait 5-7 days before taking them, and only take them if your symptoms have worsened or not improved.  If you do begin taking the antibiotics, please take them to completion.  If you were given a steroid shot in the clinic and have also been given a prescription for a steroid such as Prednisone or a Medrol Dose Pack, please begin taking them tomorrow.  If you do not have Hypertension or any history of palpitations, it is ok to take over the counter Sudafed or Mucinex D or Allegra-D or Claritin-D or Zyrtec-D.  If you do take one of the above, it is ok to combine that with plain over the counter Mucinex or Allegra or Claritin or Zyrtec.  If for example you are taking Zyrtec -D, you can combine that with Mucinex, but not Mucinex-D.  If you are taking Mucinex-D, you can combine that with plain Allegra or Claritin or Zyrtec.   If you do have Hypertension or palpitations, it is safe to take Coricidin HBP for relief of sinus symptoms.  If not allergic, please take over the counter Tylenol (Acetaminophen) and/or Motrin (Ibuprofen) as directed for control of pain and/or fever.  Please follow up with your primary care doctor or specialist as needed.    If you  smoke, please stop smoking. .        OVER THE COUNTER RECOMMENDATIONS/SUGGESTIONS.  ·         Make sure to stay well hydrated.  ·         Use Nasal Saline to mechanically move any post nasal drip from your eustachian tube or from the back of your throat.  ·         Use warm saltwater gargles to ease your throat pain. Warm saltwater gargles as needed for sore throat-  1/2 tsp salt to 1 cup warm water, gargle as desired.  ·         Use an antihistamine such as Claritin, Zyrtec or Allegra to dry you  out.  ·         Use pseudoephedrine (behind the counter) to decongest. Pseudoephedrine  30 mg up to 240 mg /day. It can raise your blood pressure and give you palpitations.  ·         Use Mucinex (guaifenisin) to break up mucous up to 2400mg/day to loosen any mucous.  ·         The Mucinex DM pill has a cough suppressant that can be sedating. It can be used at night to stop the tickle at the back of your throat.  ·         You can use Mucinex D (it has guaifenesin and a high dose of pseudoephedrine) in the mornings to help decongest.  ·         Use Afrin (oxymetazoline) in each nare for no longer than 3 days, as it is addictive. It can also dry out your mucous membranes and cause elevated blood pressure. This is especially useful if you are flying.  ·         Use Flonase 1-2 sprays/nostril per day. It is a local acting steroid nasal spray, if you develop a bloody nose, stop using the medication immediately.  ·         Sometimes Nyquil at night is beneficial to help you get some rest, however it is sedating, and it does have an antihistamine, and Tylenol.  ·         Honey is a natural cough suppressant that can be used.  ·         Tylenol up to 4,000 mg a day is safe for short periods and can be used for body aches, pain, and fever. However, in high doses and prolonged use it can cause liver irritation.  ·         Ibuprofen is a non-steroidal anti-inflammatory that can be used for body aches, pain, and fever. However, it can also cause stomach irritation if overused.

## 2023-03-29 ENCOUNTER — CLINICAL SUPPORT (OUTPATIENT)
Dept: REHABILITATION | Facility: HOSPITAL | Age: 68
End: 2023-03-29
Payer: MEDICARE

## 2023-03-29 DIAGNOSIS — M25.511 RIGHT SHOULDER PAIN, UNSPECIFIED CHRONICITY: Primary | ICD-10-CM

## 2023-03-29 PROCEDURE — 97014 ELECTRIC STIMULATION THERAPY: CPT | Mod: PN,CQ

## 2023-03-29 PROCEDURE — 97110 THERAPEUTIC EXERCISES: CPT | Mod: PN,CQ

## 2023-03-29 PROCEDURE — 97140 MANUAL THERAPY 1/> REGIONS: CPT | Mod: PN,CQ

## 2023-03-29 NOTE — PROGRESS NOTES
[facility]   Physical Therapy Treatment / PROGRESS Note     Name: Elliott Gaspar  Clinic Number: [id number]    Therapy Diagnosis:   Encounter Diagnosis   Name Primary?    Right shoulder pain, unspecified chronicity Yes     Physician: Rodrigo Richardson,*    Visit Date: 3/29/2023    Physician Orders: PT Eval and Treat   Medical Diagnosis from Referral:   M25.511 (ICD-10-CM) - Right shoulder pain, unspecified chronicity   M89.8X1 (ICD-10-CM) - Pain of right scapula   Evaluation Date: 2/8/2023  Progress Note: 2/28/23; 3/27/23  Authorization Period Expiration: 02/02/2024   Plan of Care Expiration: 05/08/2023  Visit # / Visits authorized: 15 / 40 (Not including Initial Evaluation)  FOTO Visit #:  3/3     PTA Visit #: 1/5     Time In: 8:00 AM  Time Out: 9:25 AM  Total Billable Time: 60 minutes     SUBJECTIVE     Pt reports: No new c/o's. Symptoms really depend on how strenuous the week has been. The last few days I've had to take aleve because of my back which dulls everything else. Overall, I do think it's getting better.     He was compliant with home exercise program.    Response to previous treatment: No adverse response  Functional change: N/A    Pain:   Current: 2-3/10, At Worst 8/10; At Best 2/10  Location: Right SC joint- radiates to right shoulder, upper trap/base of skull, down to the right elbow // Symptoms in upper arm have resolved)  Description: tight, constant pain;  Aggravating Factors: Increased use of the UE  Easing Factors: OTC pain relievers     Pts goals: To get rid of the pain;     OBJECTIVE     Objective Measures updated at progress report unless specified.     Posture:   2/8/23: L shoulder elevated, forward head, rounded shoulders  3/27/23: Unchanged     Palpation:          2/8/23: Tenderness: R cervical paraspinals, UT, pec (minor and clavicular portion), subclavius, scalenes, SC joint  Tightness: UT, scalenes, SCM, bilaterally  3/2/23: Tenderness: R T3 paraspinal  "begins to rhomboids, R cervical paraspinal/levator scap, UT, pec (minor and clavicular portion), subclavius, scalenes, SC joint              Tightness: Cervical paraspinals, scalenes, SCM, Upper/middle trap, rhomboids, levator  3/27/23: Unchanged;      Cervical ROM (Active in Sitting)                                      2/8/23                        3/2/23         3/27/23           Flexion: -----------------  55 deg                        50 deg     50 deg CT junction 7/10  Extension: -------------  30 deg **R cervical    30 deg     35 deg base of skull 2/10  Right Lateral Flexion: 30 deg **R cervical   25 deg *R cervical paraspinal  30 deg compression  Left Lateral Flexion:    6 deg *R cervical       10 deg *pain with return to neutral 5 deg pain  Right Rotation: -------   45 deg                      55 deg *R cervical paraspinal  55 deg "no problem"  Left Rotation: ---------   45 deg *L cervical     55 deg     55 deg L-side cervical "compression"     2/8/23: (+) spurling's in extension and right lateral flexion  3/27/23: Unchanged     Upper Extremity Functional Movement Patterns:                                                  2/8/23                    3/2/23   3/27/23                                                     Right                   Right   Right  Reaching behind head:           T1, *right cervical      T1, * right cervical T1 *neck pain due to compensatory cervical flexion   Reaching behind back:           T11                       T9   L1 *collar bone     Shoulder Active ROM                          2/8/23                                          3/2/23   3/27/23                           Right                                            Right            Right  Flexion:           144deg, *base of R cervical            145 deg * 150 deg *pressure at 1st rib   Extension:       55deg, stiffness right shoulder       60 deg  60 deg  Abduction:       160deg                                          160 " deg 160 deg *pressure at 1st rib    Ext Rotation:   65deg                                               70deg  65deg     Shoulder Passive ROM                          2/8/23                   3/2/23    3/27/23                           Right                   Right               Right  Flexion:           145 degrees *             165 deg pain resolved  175 deg  Abduction:       160 degrees *           165 deg pain resolved  175 deg       Shoulder Special Tests: RIGHT                                      2/8/23              3/2/23  3/27/23     AC Joint Compression: (+)                 (+)  (-)  Painful Arc:                  (+)           (+)  (+)  Subscap Testing:     (+) however 5/5 with resisted testing     Upper Extremity MMT                          2/8/23                      3/2/23     3/27/23                          Right   Left                Right    Right  Shoulder  Flexion:           4+        5                  4+    5  Abduction:       4          5                5    5  Ext Rot:           4+        5                  5    5  Int Rot:             5          5                    5    5  Germaine-Scapula  Upper Trap:     3 *        5              3 * at GHJ and clavicle 3+  Middle Trap:    NT       NT                   3    3+  Lower Trap:     NT       NT        3    3+     SC joint pain with right scapular depression     FOTO Shoulder Survey  Limitation Score:   2/8/23: 33%  2/28/23: 39%  3/27/23: 37%    Treatment     Frankie received the treatments listed below:      Therapeutic Exercises to develop strength, endurance, ROM, flexibility, and posture for 30 minutes including:  Seated UBE, Level 1 x 10 min (5' / 5')   Cervical AROM in all planes  Right shoulder AROM in all planes of motion, active and passive  Seated Right/Left upper trap stretch, holding seat edge, [id number]  Seated Right levator scap stretch 1x15vzs;   Shoulder Rolls 20x  Seated Scapular Squeezes x20  Seated Shld Shrugs x15  Seated Baljit Scapular  Depression 5x 5sec  Doorway Pec Stretch, Hands Low: [id number] -- no pain reported  Shoulder Pulleys: Scaption and Horizontal Abduction x1min in each plane of motion; -- no pain reported  Shoulder Wall Slides with towel: BUE Flexion x2mins, Single UE Diagonal reach x2mins alternating R/L;  Right shoulder IR towel stretch [id number] - 8/10 collar bone pain  Supine Chin Tuck 3-5sec holds x1min;   Supine Chin Tuck + Lift for deep cervical flexors, 1x12;   Supine Cervical Retraction with head neutral, 1x15;   Supine BUE Ext Rotation Pull Aparts, Green Theraband 1x16;  Supine BUE Horizontal Pull Aparts, Blue Theraband 2x20;  Supine UE Diagonal Pull Back, Blue Theraband 1x20 R/L;  Supine BUE Serratus Punch: Active 10x, 1# Dowel 20x  Performed by Therapist:  Passive Cervical ROM in all planes of motion 5x  R/L Scalene stretch [id number] each  R/L SCM stretch [id number] each  R/L Upper Trap stretch 3l10bgm each  Sidelying Right Scapular retraction 15x 3sec;   Sidelying Right Scapular retraction + depression 15x 3sec;  Sidelying Shld ER x 10  Sidelying Shld Abd x 10  Prone R shoulder extension x15  Seated [place] Pulls, Blue Theraband, 1x15 R/L; postural and mobility deficits   Seated Rows, Blue Theraband, 1x15   Standing leaning on hi/lo table:  Shld Abd x 10  Shld Rows x 10  Shld Ext x 10    Manual Therapy Techniques: applied to the: for 15 minutes   - Trigger Point Release at    R/L cervicothoracic paraspinals   R subscap  R/L upper trap  R/L levator trap  - Suboccipital Release  - Manual Cervical traction, 5rounds sustained x 60sec  - Right Shoulder depression with Upper trap stretch   - Bilateral shoulder depression overpressure for cervical distraction, 5rounds sustained x30sec  - Strain Counter-Strain at Right upper trap and R/L SCM  STM: Cervical paraspinals, upper/middle trap, levator scap, right pec musculature  - Right GHJ mobilizations   Posteriorly, Grade 1-2, 6 rounds x 10sec oscillatory   Inferiorly, Grade 1-2, 6 rounds x  10sec oscillatory    Supervised Modalities after being cleared for contradictions: IFC w/ MHP x 20 min to right shoulder/upper trap     Patient Education and Home Exercises     Home Exercises Provided and Patient Education Provided     Education provided: HEP review    Written Home Exercises Provided: yes. Exercises were reviewed and Frankie was able to demonstrate them prior to the end of the session.  Frankie demonstrated good  understanding of the education provided. See EMR under Patient Instructions for exercises provided during therapy sessions    ASSESSMENT     Subjectively, patient reports improvement in pain at the collar bone but remains with tension at cervicothoracic musculature. FOTO scoring demonstrates improvement in function since last re-assessment. Objective measures demonstrate improvement in shoulder ROM and shoulder girdle strength; However, postural deviations, soft tissue hypomobility, and cervical AROM remain painfully limited.  Patient would benefit from continued outpatient physical therapy to address remaining musculoskeletal deficits. Patient is in agreement with this plan.    Patient remains with musculoskeletal deficits: Tenderness to palpation (cervicothoracic), Cervical ROM limitations (extension, R/L lateral flexion), Pain with R/L lateral flexion and R cervical rotation, Pain with right shoulder elevation overhead, and mazin-scapular strength and stability deficits.     Frankie Is minimally progressing towards his goals.     Pt prognosis is Fair.     Pt will continue to benefit from skilled outpatient physical therapy to address the deficits listed in the problem list box on initial evaluation, provide pt/family education and to maximize pt's level of independence in the home and community environment.   Pt's spiritual, cultural and educational needs considered and pt agreeable to plan of care and goals.     Anticipated barriers to physical therapy: Pain    Goals:   Short Term Goals: 4 weeks  "  1. Report decreased cervical / shoulder pain  < / =  8/10 "At Worst" to increase tolerance for ADLs.   - MET 3/27/23  2. Demonstrate cervical AROM within 10degrees of full motion and without pain provocation to restore mobility for ADLs.   - Not Met, However Improved overall 3/27/23  3. Demonstrate Shoulder AROM within 10degrees of full motion and without pain provocation to restore mobility for ADLs.   - Partially Met 3/27/23  4. Demonstrate (-) pain provocation with special testing of cervical and shoulder to demonstrate healing.   - Not Met, however improved 3/27/23  5. Increase strength in shoulder and mazin-scapular musculature to >/= 4/5 MMT grade to increase tolerance for ADL and work activities.   - Partially Met 3/2/23  6. Pt to tolerate HEP to improve ROM and independence with ADL's   - MET 3/27/23     Long Term Goals: 8 weeks - ONGOING 3/27/23  1. Report decreased cervical / shoulder pain  < / =  6/10 "At Worst" to increase tolerance for ADLs.   2. Increase strength in shoulder and mazin-scapular musculature to 5/5 MMT grade to increase tolerance for ADL and work activities.  3. Pt goal: To get rid of the pain.   4. Report no disruption of sleep due to neck or shoulder pain to promote sleep quality for physical restoration.  5. Pt will demonstrate </= 20% limitation on FOTO shoulder in order to demonstrate true functional improvement.    PLAN     Advance patient as tolerated.  Consider IFC and heat at cervicothoracic region to assess benefit.  Plan to continue cervical/shoulder; once patient feels confident in independent management of current condition, will discharge and evaluate knee pain    Plan of care Certification: 2/8/2023 to 5/8/2023.     Continue Outpatient Physical Therapy 2 times weekly for 4 weeks to include the following interventions: Cervical/Lumbar Traction, Electrical Stimulation as appropriate, Manual Therapy, Moist Heat/ Ice, Neuromuscular Re-ed, Patient Education, Self Care, " Therapeutic Activities, Therapeutic Exercise, Ultrasound, and Dry needling.     Jonathan Favre, PTA

## 2023-04-06 ENCOUNTER — CLINICAL SUPPORT (OUTPATIENT)
Dept: REHABILITATION | Facility: HOSPITAL | Age: 68
End: 2023-04-06
Payer: MEDICARE

## 2023-04-06 DIAGNOSIS — M25.511 RIGHT SHOULDER PAIN, UNSPECIFIED CHRONICITY: Primary | ICD-10-CM

## 2023-04-06 DIAGNOSIS — M89.8X1 PAIN OF RIGHT SCAPULA: ICD-10-CM

## 2023-04-06 DIAGNOSIS — M54.2 CERVICAL PAIN: ICD-10-CM

## 2023-04-06 PROCEDURE — 97140 MANUAL THERAPY 1/> REGIONS: CPT | Mod: PN

## 2023-04-06 PROCEDURE — 97014 ELECTRIC STIMULATION THERAPY: CPT | Mod: PN

## 2023-04-06 PROCEDURE — 97110 THERAPEUTIC EXERCISES: CPT | Mod: PN

## 2023-04-06 NOTE — PROGRESS NOTES
[facility]   Physical Therapy Treatment Note     Name: Elliott Gaspar  Clinic Number: [id number]    Therapy Diagnosis:   Encounter Diagnoses   Name Primary?    Right shoulder pain, unspecified chronicity Yes    Cervical pain     Pain of right scapula      Physician: Rodrigo Richardson,*    Visit Date: 4/6/2023    Physician Orders: PT Eval and Treat   Medical Diagnosis from Referral:   M25.511 (ICD-10-CM) - Right shoulder pain, unspecified chronicity   M89.8X1 (ICD-10-CM) - Pain of right scapula   Evaluation Date: 2/8/2023  Progress Note: 2/28/23; 3/27/23  Authorization Period Expiration: 02/02/2024   Plan of Care Expiration: 05/08/2023  Visit # / Visits authorized: 16 / 40 (Not including Initial Evaluation)  FOTO Visit #:  3/3     PTA Visit #: 1/5     Time In: 9:00 AM  Time Out: 10:20 AM  Total Billable Time: 53 minutes + 20mins IFC with     SUBJECTIVE     Pt reports: No new c/o's. Back pain is the primary complaint today. Patient reports history of hard falls once on left and right hip each;     He was compliant with home exercise program.    Response to previous treatment: No adverse response  Functional change: N/A    Pain:   Current: 2/10, At Worst 8/10; At Best 2/10  Location: Right SC joint- radiates to right shoulder, upper trap/base of skull, down to the right elbow // Symptoms in upper arm have resolved)  Description: tight, constant pain;  Aggravating Factors: Increased use of the UE  Easing Factors: OTC pain relievers     Pts goals: To get rid of the pain;     OBJECTIVE     Objective Measures updated at progress report unless specified.     Treatment     Frankie received the treatments listed below:      Therapeutic Exercises to develop strength, endurance, ROM, flexibility, and posture for 38 minutes including:  Seated UBE, Level 1 x 10 min (5' / 5')   Doorway Pec Stretch, Hands Low: 1j42bof -- no pain reported  Shoulder Pulleys: Scaption and Horizontal Abduction x1min in  each plane of motion; -- no pain reported  Shoulder Wall Slides with towel: BUE Flexion x2mins, Single UE Diagonal reach x2mins alternating R/L;  Following Manual:  Performed by Therapist:  Passive Cervical ROM in all planes of motion 5x  R/L Scalene stretch [id number] each  R/L SCM stretch 5c13qrg each  R/L Upper Trap stretch 4o88dfw each  Supine Chin Tuck 3-5sec holds x1min  Supine Chin Tuck + Lift for deep cervical flexors, 1x8  Supine Cervical Retraction with head neutral, 1x15 with 3-5sec holds  Supine [place] (BUE abduction) 1x12  Supine RUE pull down (~45deg abduction), Green theraband, 1x15  Supine BUE Horizontal Pull Aparts, Blue Theraband 2x20;  Supine RUE Horizontal Pull, Green Theraband 2x20;  Supine UE Diagonal Pull Back, Blue Theraband 1x20 R/L;  Supine RUE Diagonal Pull, Green Theraband 2x20;  Supine BUE Ext Rotation Pull Aparts, Green Theraband 1x16;  Supine BUE Serratus Punch: Active 10x, 1# Dowel 20x  Reviewed - Sidelying Right Scapular retraction 15x 3sec;   Sidelying Right Scapular retraction + depression 15x 3sec;  Sidelying Shld ER x 10  Sidelying Right shoulder horizontal abduction, no weight, 15x  Prone Baljit shoulder external rotation at 90deg abduction 2x10 -- challenging  Prone BUE Horizontal Abd 6x discontinued upper trap pain  Prone RUE Lower trap 10x  Prone R shoulder extension x15  Seated Right/Left upper trap stretch, holding seat edge, 7o38aeu  Seated Right levator scap stretch 5u02dsd;   Seated Scapular Squeezes x20  Seated Shld Shrugs x15  Seated [place] Pulls, Blue Theraband, 1x15 R/L; postural and mobility deficits   Seated Rows, Blue Theraband, 1x15   Standing leaning on hi/lo table:  Shld Abd x 10  Shld Rows x 10  Shld Ext x 10    Manual Therapy Techniques: applied to the: for 15 minutes   - Suboccipital Release  - Manual Cervical traction, 5rounds sustained x 60sec  - Right Shoulder depression with Upper trap stretch   - Bilateral shoulder depression overpressure for cervical  distraction, 5rounds sustained x30sec  - STM: Cervical paraspinals, upper/middle trap, levator scap, right pec musculature  - Trigger Point Release at    R/L cervicothoracic paraspinals   R subscap  R/L upper trap  R/L levator trap  Strain Counter-Strain at Right upper trap and R/L SCM  Right GHJ mobilizations   Posteriorly, Grade 1-2, 6 rounds x 10sec oscillatory   Inferiorly, Grade 1-2, 6 rounds x 10sec oscillatory    Supervised Modalities after being cleared for contradictions: IFC w/ MHP x 20 min to right shoulder/upper trap     Patient Education and Home Exercises     Home Exercises Provided and Patient Education Provided     Education provided: HEP review    Written Home Exercises Provided: yes. Exercises were reviewed and Frankie was able to demonstrate them prior to the end of the session.  Frankie demonstrated good  understanding of the education provided. See EMR under Patient Instructions for exercises provided during therapy sessions    ASSESSMENT     Patient demonstrates good tolerance with today's session. Session initiated with manual at cervical region to promote joint mobility and tissue extensibility prior to cervical interventions. Patient remains with forward head posture and increased cervical extension. Right pec tightness evident today. With patient demonstrating good ability with supine mazin-scap interventions using strong resistance at higher repetitions, plan to progress mazin-scap interventions through gravity-resisted and unsupported positions. Interventions performed in sidelying and attempted in prone. With prone positioning, thoracic stiffness and shoulder mobility limitations are evident and affect performance technique. Lower trap strength deficits result in over compensation at the upper trap.   Sessions should continue with heavy focus on posture, spinal mobility, cervicothoracic tissue extensibility, and mazin-scap stability and strength.    Patient remains with musculoskeletal deficits:  "Tenderness to palpation (cervicothoracic), Cervical ROM limitations (extension, R/L lateral flexion), Pain with R/L lateral flexion and R cervical rotation, Pain with right shoulder elevation overhead, and mazin-scapular strength and stability deficits.     Frankie Is minimally progressing towards his goals.     Pt prognosis is Fair.     Pt will continue to benefit from skilled outpatient physical therapy to address the deficits listed in the problem list box on initial evaluation, provide pt/family education and to maximize pt's level of independence in the home and community environment.   Pt's spiritual, cultural and educational needs considered and pt agreeable to plan of care and goals.     Anticipated barriers to physical therapy: Pain    Goals:   Short Term Goals: 4 weeks   1. Report decreased cervical / shoulder pain  < / =  8/10 "At Worst" to increase tolerance for ADLs.   - MET 3/27/23  2. Demonstrate cervical AROM within 10degrees of full motion and without pain provocation to restore mobility for ADLs.   - Not Met, However Improved overall 3/27/23  3. Demonstrate Shoulder AROM within 10degrees of full motion and without pain provocation to restore mobility for ADLs.   - Partially Met 3/27/23  4. Demonstrate (-) pain provocation with special testing of cervical and shoulder to demonstrate healing.   - Not Met, however improved 3/27/23  5. Increase strength in shoulder and mazin-scapular musculature to >/= 4/5 MMT grade to increase tolerance for ADL and work activities.   - Partially Met 3/2/23  6. Pt to tolerate HEP to improve ROM and independence with ADL's   - MET 3/27/23     Long Term Goals: 8 weeks - ONGOING 3/27/23  1. Report decreased cervical / shoulder pain  < / =  6/10 "At Worst" to increase tolerance for ADLs.   2. Increase strength in shoulder and mazin-scapular musculature to 5/5 MMT grade to increase tolerance for ADL and work activities.  3. Pt goal: To get rid of the pain.   4. Report no " disruption of sleep due to neck or shoulder pain to promote sleep quality for physical restoration.  5. Pt will demonstrate </= 20% limitation on FOTO shoulder in order to demonstrate true functional improvement.    PLAN     Advance patient as tolerated.  Consider IFC and heat at cervicothoracic region to assess benefit.  Plan to continue cervical/shoulder; once patient feels confident in independent management of current condition, will discharge and evaluate knee pain    Plan of care Certification: 2/8/2023 to 5/8/2023.     Continue Outpatient Physical Therapy 2 times weekly for 4 weeks to include the following interventions: Cervical/Lumbar Traction, Electrical Stimulation as appropriate, Manual Therapy, Moist Heat/ Ice, Neuromuscular Re-ed, Patient Education, Self Care, Therapeutic Activities, Therapeutic Exercise, Ultrasound, and Dry needling.     Vannessa Frost, PT

## 2023-04-11 ENCOUNTER — PATIENT MESSAGE (OUTPATIENT)
Dept: ADMINISTRATIVE | Facility: HOSPITAL | Age: 68
End: 2023-04-11
Payer: MEDICARE

## 2023-04-11 ENCOUNTER — CLINICAL SUPPORT (OUTPATIENT)
Dept: REHABILITATION | Facility: HOSPITAL | Age: 68
End: 2023-04-11
Payer: MEDICARE

## 2023-04-11 DIAGNOSIS — M25.511 RIGHT SHOULDER PAIN, UNSPECIFIED CHRONICITY: Primary | ICD-10-CM

## 2023-04-11 PROCEDURE — 97014 ELECTRIC STIMULATION THERAPY: CPT | Mod: PN,CQ

## 2023-04-11 PROCEDURE — 97110 THERAPEUTIC EXERCISES: CPT | Mod: PN,CQ

## 2023-04-11 PROCEDURE — 97140 MANUAL THERAPY 1/> REGIONS: CPT | Mod: PN,CQ

## 2023-04-11 NOTE — PROGRESS NOTES
[facility]   Physical Therapy Treatment Note     Name: Elliott Gaspar  Clinic Number: [id number]    Therapy Diagnosis:   Encounter Diagnosis   Name Primary?    Right shoulder pain, unspecified chronicity Yes     Physician: Rodrigo Richardson,*    Visit Date: 4/11/2023    Physician Orders: PT Eval and Treat   Medical Diagnosis from Referral:   M25.511 (ICD-10-CM) - Right shoulder pain, unspecified chronicity   M89.8X1 (ICD-10-CM) - Pain of right scapula   Evaluation Date: 2/8/2023  Progress Note: 2/28/23; 3/27/23  Authorization Period Expiration: 02/02/2024   Plan of Care Expiration: 05/08/2023  Visit # / Visits authorized: 17 / 40 (Not including Initial Evaluation)  FOTO Visit #:  3/3     PTA Visit #: 1/5     Time In: 9:00 AM  Time Out: 10:25 AM  Total Billable Time: 53 minutes + 20mins IFC with     SUBJECTIVE     Pt reports: No new c/o's.    He was compliant with home exercise program.    Response to previous treatment: No adverse response  Functional change: N/A    Pain:   Current: 3/10, At Worst 8/10; At Best 2/10  Location: Right SC joint- radiates to right shoulder, upper trap/base of skull, down to the right elbow // Symptoms in upper arm have resolved)  Description: tight, constant pain;  Aggravating Factors: Increased use of the UE  Easing Factors: OTC pain relievers     Pts goals: To get rid of the pain;     OBJECTIVE     Objective Measures updated at progress report unless specified.     Treatment     Frankie received the treatments listed below:      Therapeutic Exercises to develop strength, endurance, ROM, flexibility, and posture for 38 minutes including:  Seated UBE, Level 1 x 10 min (5' / 5')   Doorway Pec Stretch, Hands Low: [id number] -- no pain reported  Shoulder Pulleys: Scaption and Horizontal Abduction x1min in each plane of motion; -- no pain reported  Shoulder Wall Slides with towel: BUE Flexion x2mins, Single UE Diagonal reach x2mins alternating R/L;  Following  Manual:  Performed by Therapist:  Passive Cervical ROM in all planes of motion 5x  R/L Scalene stretch 8m63amp each  R/L SCM stretch [id number] each  R/L Upper Trap stretch [id number] each  Supine Chin Tuck 3-5sec holds x1min  Supine Chin Tuck + Lift for deep cervical flexors, 1x8  Supine Cervical Retraction with head neutral, 1x15 with 3-5sec holds  Supine [place] (BUE abduction) 1x12  Supine RUE pull down (~45deg abduction), Green theraband, 1x15  Supine BUE Horizontal Pull Aparts, Blue Theraband 2x20;  Supine RUE Horizontal Pull, Green Theraband 2x20;  Supine UE Diagonal Pull Back, Blue Theraband 1x20 R/L;  Supine RUE Diagonal Pull, Green Theraband 2x20;  Supine BUE Ext Rotation Pull Aparts, Green Theraband 1x16;  Supine BUE Serratus Punch: Active 10x, 1# Dowel 20x  Reviewed - Sidelying Right Scapular retraction 15x 3sec;   Sidelying Right Scapular retraction + depression 15x 3sec;  Sidelying Shld ER x 10  Sidelying Right shoulder horizontal abduction, no weight, 15x  Prone Baljit shoulder external rotation at 90deg abduction 2x10 -- challenging  Prone BUE Horizontal Abd 6x discontinued upper trap pain  Prone RUE Lower trap 10x  Prone R shoulder extension x15  Seated Right/Left upper trap stretch, holding seat edge, [id number]  Seated Right levator scap stretch [id number];   Seated Scapular Squeezes x20  Seated Shld Shrugs x15  Seated [place] Pulls, Blue Theraband, 1x15 R/L; postural and mobility deficits   Seated Rows, Blue Theraband, 1x15   Standing leaning on hi/lo table:  Shld Abd x 10  Shld Rows x 10  Shld Ext x 10    Manual Therapy Techniques: applied to the: for 15 minutes   - Suboccipital Release  - Manual Cervical traction, 5rounds sustained x 60sec  - Right Shoulder depression with Upper trap stretch   - Bilateral shoulder depression overpressure for cervical distraction, 5rounds sustained x30sec  - STM: Cervical paraspinals, upper/middle trap, levator scap, right pec musculature  -Thera gun to right scapular/UT  region  - Trigger Point Release at    R/L cervicothoracic paraspinals   R subscap  R/L upper trap  R/L levator trap  Strain Counter-Strain at Right upper trap and R/L SCM  Right GHJ mobilizations   Posteriorly, Grade 1-2, 6 rounds x 10sec oscillatory   Inferiorly, Grade 1-2, 6 rounds x 10sec oscillatory    Supervised Modalities after being cleared for contradictions: IFC w/ MHP x 20 min to right shoulder/upper trap     Patient Education and Home Exercises     Home Exercises Provided and Patient Education Provided     Education provided: HEP review    Written Home Exercises Provided: yes. Exercises were reviewed and Frankie was able to demonstrate them prior to the end of the session.  Frankie demonstrated good  understanding of the education provided. See EMR under Patient Instructions for exercises provided during therapy sessions    ASSESSMENT     Patient demonstrates good tolerance with today's session. Session initiated with manual at cervical region to promote joint mobility and tissue extensibility prior to cervical interventions. Patient remains with forward head posture and increased cervical extension. Right pec tightness evident today. With patient demonstrating good ability with supine mazin-scap interventions using strong resistance at higher repetitions, plan to progress mazin-scap interventions through gravity-resisted and unsupported positions. Interventions performed in sidelying and attempted in prone. With prone positioning, thoracic stiffness and shoulder mobility limitations are evident and affect performance technique. Lower trap strength deficits result in over compensation at the upper trap.   Sessions should continue with heavy focus on posture, spinal mobility, cervicothoracic tissue extensibility, and mazin-scap stability and strength.    Patient remains with musculoskeletal deficits: Tenderness to palpation (cervicothoracic), Cervical ROM limitations (extension, R/L lateral flexion), Pain with R/L  "lateral flexion and R cervical rotation, Pain with right shoulder elevation overhead, and mazin-scapular strength and stability deficits.     Frankie Is minimally progressing towards his goals.     Pt prognosis is Fair.     Pt will continue to benefit from skilled outpatient physical therapy to address the deficits listed in the problem list box on initial evaluation, provide pt/family education and to maximize pt's level of independence in the home and community environment.   Pt's spiritual, cultural and educational needs considered and pt agreeable to plan of care and goals.     Anticipated barriers to physical therapy: Pain    Goals:   Short Term Goals: 4 weeks   1. Report decreased cervical / shoulder pain  < / =  8/10 "At Worst" to increase tolerance for ADLs.   - MET 3/27/23  2. Demonstrate cervical AROM within 10degrees of full motion and without pain provocation to restore mobility for ADLs.   - Not Met, However Improved overall 3/27/23  3. Demonstrate Shoulder AROM within 10degrees of full motion and without pain provocation to restore mobility for ADLs.   - Partially Met 3/27/23  4. Demonstrate (-) pain provocation with special testing of cervical and shoulder to demonstrate healing.   - Not Met, however improved 3/27/23  5. Increase strength in shoulder and mazin-scapular musculature to >/= 4/5 MMT grade to increase tolerance for ADL and work activities.   - Partially Met 3/2/23  6. Pt to tolerate HEP to improve ROM and independence with ADL's   - MET 3/27/23     Long Term Goals: 8 weeks - ONGOING 3/27/23  1. Report decreased cervical / shoulder pain  < / =  6/10 "At Worst" to increase tolerance for ADLs.   2. Increase strength in shoulder and mazin-scapular musculature to 5/5 MMT grade to increase tolerance for ADL and work activities.  3. Pt goal: To get rid of the pain.   4. Report no disruption of sleep due to neck or shoulder pain to promote sleep quality for physical restoration.  5. Pt will demonstrate " </= 20% limitation on FOTO shoulder in order to demonstrate true functional improvement.    PLAN     Advance patient as tolerated.  Consider IFC and heat at cervicothoracic region to assess benefit.  Plan to continue cervical/shoulder; once patient feels confident in independent management of current condition, will discharge and evaluate knee pain    Plan of care Certification: 2/8/2023 to 5/8/2023.     Continue Outpatient Physical Therapy 2 times weekly for 4 weeks to include the following interventions: Cervical/Lumbar Traction, Electrical Stimulation as appropriate, Manual Therapy, Moist Heat/ Ice, Neuromuscular Re-ed, Patient Education, Self Care, Therapeutic Activities, Therapeutic Exercise, Ultrasound, and Dry needling.     Jonathan Favre, PTA

## 2023-04-14 ENCOUNTER — CLINICAL SUPPORT (OUTPATIENT)
Dept: REHABILITATION | Facility: HOSPITAL | Age: 68
End: 2023-04-14
Payer: MEDICARE

## 2023-04-14 DIAGNOSIS — M25.511 RIGHT SHOULDER PAIN, UNSPECIFIED CHRONICITY: Primary | ICD-10-CM

## 2023-04-14 PROCEDURE — 97014 ELECTRIC STIMULATION THERAPY: CPT | Mod: PN,CQ

## 2023-04-14 PROCEDURE — 97110 THERAPEUTIC EXERCISES: CPT | Mod: PN,CQ

## 2023-04-14 PROCEDURE — 97140 MANUAL THERAPY 1/> REGIONS: CPT | Mod: PN,CQ

## 2023-04-14 NOTE — PROGRESS NOTES
[facility]   Physical Therapy Treatment Note     Name: Elliott Gaspar  Clinic Number: [id number]    Therapy Diagnosis:   Encounter Diagnosis   Name Primary?    Right shoulder pain, unspecified chronicity Yes     Physician: Rodrigo Richardson,*    Visit Date: 4/14/2023    Physician Orders: PT Eval and Treat   Medical Diagnosis from Referral:   M25.511 (ICD-10-CM) - Right shoulder pain, unspecified chronicity   M89.8X1 (ICD-10-CM) - Pain of right scapula   Evaluation Date: 2/8/2023  Progress Note: 2/28/23; 3/27/23  Authorization Period Expiration: 02/02/2024   Plan of Care Expiration: 05/08/2023  Visit # / Visits authorized: 18 / 40 (Not including Initial Evaluation)  FOTO Visit #:  3/3     PTA Visit #: 2/5     Time In: 9:00 AM  Time Out: 10:25 AM  Total Billable Time: 53 minutes + 20mins IFC with     SUBJECTIVE     Pt reports: No new c/o's.    He was compliant with home exercise program.    Response to previous treatment: No adverse response  Functional change: N/A    Pain:   Current: 3/10  Location: Right SC joint- radiates to right shoulder, upper trap/base of skull, down to the right elbow // Symptoms in upper arm have resolved)  Description: tight, constant pain;  Aggravating Factors: Increased use of the UE  Easing Factors: OTC pain relievers     Pts goals: To get rid of the pain;     OBJECTIVE     Objective Measures updated at progress report unless specified.     Treatment     Frankie received the treatments listed below:      Therapeutic Exercises to develop strength, endurance, ROM, flexibility, and posture for 38 minutes including:  Seated UBE, Level 1 x 10 min (5' / 5')   Doorway Pec Stretch, Hands Low: [id number] -- no pain reported  Shoulder Pulleys: Scaption and Horizontal Abduction x1min in each plane of motion; -- no pain reported  Shoulder Wall Slides with towel: BUE Flexion x2mins, Single UE Diagonal reach x2mins alternating R/L;  Following Manual:  Performed by  Therapist:  Passive Cervical ROM in all planes of motion 5x  R/L Scalene stretch [id number] each  R/L SCM stretch [id number] each  R/L Upper Trap stretch [id number] each  Supine Chin Tuck 3-5sec holds x1min  Supine Chin Tuck + Lift for deep cervical flexors, 1x8  Supine Cervical Retraction with head neutral, 1x15 with 3-5sec holds  Supine [place] (BUE abduction) 1x12  Supine RUE pull down (~45deg abduction), Green theraband, 1x15  Supine BUE Horizontal Pull Aparts, Blue Theraband x 20;  Supine RUE Horizontal Pull, Green Theraband 2x20;  Supine UE Diagonal Pull Back, Blue Theraband 1x20 R/L;  Supine RUE Diagonal Pull, Green Theraband 2x20;  Supine BUE Ext Rotation Pull Aparts, Blue Theraband 1x16;  Supine BUE Serratus Punch: Active 10x, 1# Dowel 20x  Reviewed - Sidelying Right Scapular retraction 15x 3sec;   Sidelying Right Scapular retraction + depression 15x 3sec;  Sidelying Shld ER x 10  Sidelying Right shoulder horizontal abduction, no weight, 15x  Prone Baljit shoulder external rotation at 90deg abduction 2x10 -- challenging  Prone BUE Horizontal Abd 6x discontinued upper trap pain  Prone RUE Lower trap 10x  Prone R shoulder extension x15  Seated Right/Left upper trap stretch, holding seat edge, [id number]  Seated Right levator scap stretch [id number];   Seated Scapular Squeezes x20  Seated Shld Shrugs x15  Seated Tw[place] Pulls, Blue Theraband, 1x15 R/L; postural and mobility deficits   Seated Rows, Blue Theraband, 1x15   Standing leaning on hi/lo table:  Shld Abd x 10  Shld Rows x 10  Shld Ext x 10    Manual Therapy Techniques: applied to the: for 15 minutes   - Suboccipital Release  - Manual Cervical traction, 5rounds sustained x 60sec  - Right Shoulder depression with Upper trap stretch   - Bilateral shoulder depression overpressure for cervical distraction, 5rounds sustained x30sec  - STM: Cervical paraspinals, upper/middle trap, levator scap, right pec musculature  -Thera gun to right scapular/UT region  - Trigger Point  Release at    R/L cervicothoracic paraspinals   R subscap  R/L upper trap  R/L levator trap  Strain Counter-Strain at Right upper trap and R/L SCM  Right GHJ mobilizations   Posteriorly, Grade 1-2, 6 rounds x 10sec oscillatory   Inferiorly, Grade 1-2, 6 rounds x 10sec oscillatory    Supervised Modalities after being cleared for contradictions: IFC w/ MHP x 20 min to right shoulder/upper trap     Patient Education and Home Exercises     Home Exercises Provided and Patient Education Provided     Education provided: HEP review    Written Home Exercises Provided: yes. Exercises were reviewed and [place] was able to demonstrate them prior to the end of the session.  Frankie demonstrated good  understanding of the education provided. See EMR under Patient Instructions for exercises provided during therapy sessions    ASSESSMENT     Patient demonstrates good tolerance with today's session. Session initiated with manual at cervical region to promote joint mobility and tissue extensibility prior to cervical interventions. Patient remains with forward head posture and increased cervical extension. Right pec tightness evident today. With patient demonstrating good ability with supine mazin-scap interventions using strong resistance at higher repetitions, plan to progress mazin-scap interventions through gravity-resisted and unsupported positions. Interventions performed in sidelying and attempted in prone. With prone positioning, thoracic stiffness and shoulder mobility limitations are evident and affect performance technique. Lower trap strength deficits result in over compensation at the upper trap.   Sessions should continue with heavy focus on posture, spinal mobility, cervicothoracic tissue extensibility, and mazin-scap stability and strength.    Patient remains with musculoskeletal deficits: Tenderness to palpation (cervicothoracic), Cervical ROM limitations (extension, R/L lateral flexion), Pain with R/L lateral flexion and R  "cervical rotation, Pain with right shoulder elevation overhead, and mazin-scapular strength and stability deficits.     Frankie Is minimally progressing towards his goals.     Pt prognosis is Fair.     Pt will continue to benefit from skilled outpatient physical therapy to address the deficits listed in the problem list box on initial evaluation, provide pt/family education and to maximize pt's level of independence in the home and community environment.   Pt's spiritual, cultural and educational needs considered and pt agreeable to plan of care and goals.     Anticipated barriers to physical therapy: Pain    Goals:   Short Term Goals: 4 weeks   1. Report decreased cervical / shoulder pain  < / =  8/10 "At Worst" to increase tolerance for ADLs.   - MET 3/27/23  2. Demonstrate cervical AROM within 10degrees of full motion and without pain provocation to restore mobility for ADLs.   - Not Met, However Improved overall 3/27/23  3. Demonstrate Shoulder AROM within 10degrees of full motion and without pain provocation to restore mobility for ADLs.   - Partially Met 3/27/23  4. Demonstrate (-) pain provocation with special testing of cervical and shoulder to demonstrate healing.   - Not Met, however improved 3/27/23  5. Increase strength in shoulder and mazin-scapular musculature to >/= 4/5 MMT grade to increase tolerance for ADL and work activities.   - Partially Met 3/2/23  6. Pt to tolerate HEP to improve ROM and independence with ADL's   - MET 3/27/23     Long Term Goals: 8 weeks - ONGOING 3/27/23  1. Report decreased cervical / shoulder pain  < / =  6/10 "At Worst" to increase tolerance for ADLs.   2. Increase strength in shoulder and mazin-scapular musculature to 5/5 MMT grade to increase tolerance for ADL and work activities.  3. Pt goal: To get rid of the pain.   4. Report no disruption of sleep due to neck or shoulder pain to promote sleep quality for physical restoration.  5. Pt will demonstrate </= 20% limitation on " FOTO shoulder in order to demonstrate true functional improvement.    PLAN     Advance patient as tolerated.  Consider IFC and heat at cervicothoracic region to assess benefit.  Plan to continue cervical/shoulder; once patient feels confident in independent management of current condition, will discharge and evaluate knee pain    Plan of care Certification: 2/8/2023 to 5/8/2023.     Continue Outpatient Physical Therapy 2 times weekly for 4 weeks to include the following interventions: Cervical/Lumbar Traction, Electrical Stimulation as appropriate, Manual Therapy, Moist Heat/ Ice, Neuromuscular Re-ed, Patient Education, Self Care, Therapeutic Activities, Therapeutic Exercise, Ultrasound, and Dry needling.     Jonathan Favre, PTA

## 2023-04-21 ENCOUNTER — CLINICAL SUPPORT (OUTPATIENT)
Dept: REHABILITATION | Facility: HOSPITAL | Age: 68
End: 2023-04-21
Payer: MEDICARE

## 2023-04-21 DIAGNOSIS — M25.511 RIGHT SHOULDER PAIN, UNSPECIFIED CHRONICITY: Primary | ICD-10-CM

## 2023-04-21 PROCEDURE — 97014 ELECTRIC STIMULATION THERAPY: CPT | Mod: PN,CQ

## 2023-04-21 PROCEDURE — 97110 THERAPEUTIC EXERCISES: CPT | Mod: PN,CQ

## 2023-04-21 NOTE — PROGRESS NOTES
[facility]   Physical Therapy Treatment Note     Name: Elliott Gaspar  Clinic Number: [id number]    Therapy Diagnosis:   Encounter Diagnosis   Name Primary?    Right shoulder pain, unspecified chronicity Yes     Physician: Rodrigo Richardson,*    Visit Date: 4/21/2023    Physician Orders: PT Eval and Treat   Medical Diagnosis from Referral:   M25.511 (ICD-10-CM) - Right shoulder pain, unspecified chronicity   M89.8X1 (ICD-10-CM) - Pain of right scapula   Evaluation Date: 2/8/2023  Progress Note: 2/28/23; 3/27/23  Authorization Period Expiration: 02/02/2024   Plan of Care Expiration: 05/08/2023  Visit # / Visits authorized: 19 / 40 (Not including Initial Evaluation)  FOTO Visit #:  3/3     PTA Visit #: 3/5     Time In: 8:05 AM  Time Out: 9:20 AM  Total Billable Time: 40 minutes + 20mins IFC with     SUBJECTIVE     Pt reports: I saw the doctor Wednesday; I am going to have some new orders my neck and back. With the shoulder issues better, I would like to get some exercises together so I can continue to do them at home.    He was compliant with home exercise program.    Response to previous treatment: No adverse response  Functional change: N/A    Pain:   Current: 3/10  Location: Right SC joint- radiates to right shoulder, upper trap/base of skull, down to the right elbow // Symptoms in upper arm have resolved)  Description: tight, constant pain;  Aggravating Factors: Increased use of the UE  Easing Factors: OTC pain relievers     Pts goals: To get rid of the pain;     OBJECTIVE     Objective Measures updated at progress report unless specified.     Treatment     Frankie received the treatments listed below:      Therapeutic Exercises to develop strength, endurance, ROM, flexibility, and posture for 40 minutes including:  Seated UBE, Level 1 x 10 min (5' / 5')   Shld Shrugs x 15  Wall Push-ups x 15  Wall Lift offs x 10  3 Way Scapular Retraction w/ BTB x 15  Doorway Pec Stretch, Hands Low:  [id number] -- no pain reported  Shoulder Pulleys: Scaption and Horizontal Abduction x1min in each plane of motion; -- no pain reported  Shoulder Wall Slides with towel: BUE Flexion x2mins, Single UE Diagonal reach x2mins alternating R/L;  Following Manual:  Performed by Therapist:  Passive Cervical ROM in all planes of motion 5x  R/L Scalene stretch [id number] each  R/L SCM stretch 8l37lpf each  R/L Upper Trap stretch 3a44xvx each  Supine Chin Tuck 3-5sec holds x1min  Supine Chin Tuck + Lift for deep cervical flexors, 1x8  Supine Cervical Retraction with head neutral, 1x15 with 3-5sec holds  Supine [place] (BUE abduction) 1x12  Supine RUE pull down (~45deg abduction), Green theraband, 1x15  Supine BUE Horizontal Pull Aparts, Blue Theraband x 20;  Supine RUE Horizontal Pull, Green Theraband 2x20;  Supine UE Diagonal Pull Back, Blue Theraband 1x20 R/L;  Supine RUE Diagonal Pull, Green Theraband 2x20;  Supine BUE Ext Rotation Pull Aparts, Blue Theraband 1x16;  Supine BUE Serratus Punch: Active 10x, 1# Dowel 20x  Reviewed - Sidelying Right Scapular retraction 15x 3sec;   Sidelying Right Scapular retraction + depression 15x 3sec;  Sidelying Shld ER x 10  Sidelying Right shoulder horizontal abduction, no weight, 15x  Prone Baljit shoulder external rotation at 90deg abduction 2x10 -- challenging  Prone BUE Horizontal Abd 6x discontinued upper trap pain  Prone RUE Lower trap 10x  Prone R shoulder extension x15  Seated Right/Left upper trap stretch, holding seat edge 2 x 30  Seated Right levator scap stretch [id number];   Seated [place] Pulls, Blue Theraband, 1x15 R/L; postural and mobility deficits  Standing leaning on hi/lo table:  Shld Abd x 15  Shld Rows x 15  Shld Ext x 15    DNP  Manual Therapy Techniques: applied to the: for 15 minutes   - Suboccipital Release  - Manual Cervical traction, 5rounds sustained x 60sec  - Right Shoulder depression with Upper trap stretch   - Bilateral shoulder depression overpressure for cervical  distraction, 5rounds sustained x30sec  - STM: Cervical paraspinals, upper/middle trap, levator scap, right pec musculature  -Thera gun to right scapular/UT region  - Trigger Point Release at    R/L cervicothoracic paraspinals   R subscap  R/L upper trap  R/L levator trap  Strain Counter-Strain at Right upper trap and R/L SCM  Right GHJ mobilizations   Posteriorly, Grade 1-2, 6 rounds x 10sec oscillatory   Inferiorly, Grade 1-2, 6 rounds x 10sec oscillatory    Supervised Modalities after being cleared for contradictions: IFC w/ MHP x 20 min to right shoulder/upper trap     Patient Education and Home Exercises     Home Exercises Provided and Patient Education Provided     Education provided: HEP review    Written Home Exercises Provided: yes. Exercises were reviewed and Frankie was able to demonstrate them prior to the end of the session.  Frankie demonstrated good  understanding of the education provided. See EMR under Patient Instructions for exercises provided during therapy sessions    ASSESSMENT     Patient demonstrates good tolerance with today's session. Session focused on exercises. Patient remains with forward head posture and increased cervical extension. Right pec tightness evident today. With patient demonstrating good ability with supine mazin-scap interventions using strong resistance at higher repetitions, plan to progress mazin-scap interventions through gravity-resisted and unsupported positions. Interventions performed in sidelying and attempted in prone. With prone positioning, thoracic stiffness and shoulder mobility limitations are evident and affect performance technique. Lower trap strength deficits result in over compensation at the upper trap.   Sessions should continue with heavy focus on posture, spinal mobility, cervicothoracic tissue extensibility, and mazin-scap stability and strength.    Patient remains with musculoskeletal deficits: Tenderness to palpation (cervicothoracic), Cervical ROM  "limitations (extension, R/L lateral flexion), Pain with R/L lateral flexion and R cervical rotation, Pain with right shoulder elevation overhead, and mazin-scapular strength and stability deficits.     Frankie Is minimally progressing towards his goals.     Pt prognosis is Fair.     Pt will continue to benefit from skilled outpatient physical therapy to address the deficits listed in the problem list box on initial evaluation, provide pt/family education and to maximize pt's level of independence in the home and community environment.   Pt's spiritual, cultural and educational needs considered and pt agreeable to plan of care and goals.     Anticipated barriers to physical therapy: Pain    Goals:   Short Term Goals: 4 weeks   1. Report decreased cervical / shoulder pain  < / =  8/10 "At Worst" to increase tolerance for ADLs.   - MET 3/27/23  2. Demonstrate cervical AROM within 10degrees of full motion and without pain provocation to restore mobility for ADLs.   - Not Met, However Improved overall 3/27/23  3. Demonstrate Shoulder AROM within 10degrees of full motion and without pain provocation to restore mobility for ADLs.   - Partially Met 3/27/23  4. Demonstrate (-) pain provocation with special testing of cervical and shoulder to demonstrate healing.   - Not Met, however improved 3/27/23  5. Increase strength in shoulder and mazin-scapular musculature to >/= 4/5 MMT grade to increase tolerance for ADL and work activities.   - Partially Met 3/2/23  6. Pt to tolerate HEP to improve ROM and independence with ADL's   - MET 3/27/23     Long Term Goals: 8 weeks - ONGOING 3/27/23  1. Report decreased cervical / shoulder pain  < / =  6/10 "At Worst" to increase tolerance for ADLs.   2. Increase strength in shoulder and mazin-scapular musculature to 5/5 MMT grade to increase tolerance for ADL and work activities.  3. Pt goal: To get rid of the pain.   4. Report no disruption of sleep due to neck or shoulder pain to promote sleep " quality for physical restoration.  5. Pt will demonstrate </= 20% limitation on FOTO shoulder in order to demonstrate true functional improvement.    PLAN     Advance patient as tolerated.  Consider IFC and heat at cervicothoracic region to assess benefit.  Plan to continue cervical/shoulder; once patient feels confident in independent management of current condition, will discharge and evaluate knee pain    Plan of care Certification: 2/8/2023 to 5/8/2023.     Continue Outpatient Physical Therapy 2 times weekly for 4 weeks to include the following interventions: Cervical/Lumbar Traction, Electrical Stimulation as appropriate, Manual Therapy, Moist Heat/ Ice, Neuromuscular Re-ed, Patient Education, Self Care, Therapeutic Activities, Therapeutic Exercise, Ultrasound, and Dry needling.     Jonathan Favre, PTA

## 2023-04-24 ENCOUNTER — CLINICAL SUPPORT (OUTPATIENT)
Dept: REHABILITATION | Facility: HOSPITAL | Age: 68
End: 2023-04-24
Payer: MEDICARE

## 2023-04-24 DIAGNOSIS — M89.8X1 PAIN OF RIGHT SCAPULA: ICD-10-CM

## 2023-04-24 DIAGNOSIS — M25.511 RIGHT SHOULDER PAIN, UNSPECIFIED CHRONICITY: Primary | ICD-10-CM

## 2023-04-24 DIAGNOSIS — M54.2 CERVICAL PAIN: ICD-10-CM

## 2023-04-24 PROCEDURE — 97110 THERAPEUTIC EXERCISES: CPT | Mod: PN

## 2023-04-24 PROCEDURE — 97140 MANUAL THERAPY 1/> REGIONS: CPT | Mod: PN

## 2023-04-24 PROCEDURE — 97014 ELECTRIC STIMULATION THERAPY: CPT | Mod: PN

## 2023-04-24 NOTE — PROGRESS NOTES
[facility]   Physical Therapy Treatment Note     Name: Elliott Gaspar  Clinic Number: [id number]    Therapy Diagnosis:   Encounter Diagnoses   Name Primary?    Right shoulder pain, unspecified chronicity Yes    Cervical pain     Pain of right scapula      Physician: Rodrigo Richardson,*    Visit Date: 4/24/2023    Physician Orders: PT Eval and Treat   Medical Diagnosis from Referral:   M25.511 (ICD-10-CM) - Right shoulder pain, unspecified chronicity   M89.8X1 (ICD-10-CM) - Pain of right scapula   Evaluation Date: 2/8/2023  Progress Note: 2/28/23; 3/27/23  Authorization Period Expiration: 02/02/2024   Plan of Care Expiration: 05/08/2023  Visit # / Visits authorized: 20 / 40 (Not including Initial Evaluation)  FOTO Visit #:  3/3     PTA Visit #: 3/5     Time In: 10:06 AM * Patient arrived late  Time Out: 11:15 AM  Total Billable Time: 40 minutes + 20mins IFC with      SUBJECTIVE     Pt reports: No new complaints. I have some new orders my neck and back- My doctor wants to give me some injections at the neck and low back. With the shoulder issues much better, I would like to get some exercises together for the shoulder so I can continue to do them at home. I would like to have PT for my back and my neck.    He was compliant with home exercise program.    Response to previous treatment: No adverse response  Functional change: N/A    Pain:   Current: 2/10  Location: Right SC joint- radiates to right shoulder, upper trap/base of skull, down to the right elbow // Symptoms in upper arm have resolved)  Description: tight, constant pain;  Aggravating Factors: Increased use of the UE  Easing Factors: OTC pain relievers     Pts goals: To get rid of the pain;     OBJECTIVE     Objective Measures updated at progress report unless specified.     Treatment     Frankie received the treatments listed below:      Therapeutic Exercises to develop strength, endurance, ROM, flexibility, and posture for 25  minutes including:  Seated UBE, Level 1.5 x 10 min (5' / 5')   Doorway Pec Stretch, Hands Low: [id number] -- no pain reported  Shoulder Shrugs x 15  Wall Push-ups x 15  Wall Lift offs x 10  3 Way Scapular Retraction w/ BTB x 15  Following Manual:  Prone Press Ups for thoracic extension 10x 3sec hold  Prone on Forearms -    Head hang for paraspinal and suboccipital stretch, [id number]   Chin Tuck 10x   Cervical Retraction 10x  Prone Baljit Scap Retraction + Depression (Palms down for ext rot) 1x20 with 2-3sec hold  Prone Baljit Scap + Shoulder Extension 1x10 with 2sec hold  Prone BUE Horizontal Abduction 10x  Prone Baljit Shoulder external rotation lift offs at 90deg abduction 1x20 -- focus on lower trap, no upper trap  Prone RUE Lower trap 15x  Performed by Therapist:  Passive Cervical ROM in all planes of motion 5x  R/L Scalene stretch 8f46opt each  R/L SCM stretch 8l92aec each  R/L Upper Trap stretch 5r34fwm each  Supine Chin Tuck 3-5sec holds x1min  Supine Chin Tuck + Lift for deep cervical flexors, 1x8  Supine Cervical Retraction with head neutral, 1x15 with 3-5sec holds  Supine RUE pull down (~45deg abduction), Green theraband, 1x15  Supine BUE Horizontal Pull Aparts, Blue Theraband x 20;  Supine UE Diagonal Pull Back, Blue Theraband 1x20 R/L;  Supine BUE Ext Rotation Pull Aparts, Blue Theraband 1x16;  Supine BUE Serratus Punch: Active 10x, 1# Dowel 20x  Sidelying Right Scapular retraction + depression 15x 3sec;  Sidelying Shld ER x 10  Sidelying Right shoulder horizontal abduction, no weight, 15x  Seated Right/Left upper trap stretch, holding seat edge 2 x 30  Seated Right levator scap stretch [id number];   Seated [place] Pulls, Blue Theraband, 1x15 R/L; postural and mobility deficits  Standing leaning on hi/lo table:  Shld Abd x 15  Shld Rows x 15  Shld Ext x 15    Manual Therapy Techniques: applied to the: for 15 minutes   Joint Mobilizations-  Thoracic: PA mobilizations at Bilateral Transverse Proc of Mid-Lower Thoracic, Grade  2-4, sustained / oscillatory  Thoracic: PA mobilizations at Central Spinous Proc of Mid-Lower Thoracic, Grade 2-4, sustained / oscillatory  Thoracic: PA mobilizations at R/L Transverse Proc of Mid-Lower Thoracic, Grade 1-2 springing  Right GHJ mobilizations   Posteriorly, Grade 1-2, 6 rounds x 10sec oscillatory   Inferiorly, Grade 1-2, 6 rounds x 10sec oscillatory  Suboccipital Release   Manual Cervical traction, 5rounds sustained x 60sec  Right Shoulder depression with Upper trap stretch   Bilateral shoulder depression overpressure for cervical distraction, 5rounds sustained x30sec  STM: Cervical paraspinals, upper/middle trap, levator scap, right pec musculature  Thera gun to right scapular/UT region  Trigger Point Release at    R/L cervicothoracic paraspinals   R subscap  R/L upper trap  R/L levator trap  Strain Counter-Strain at Right upper trap and R/L SCM    Supervised Modalities after being cleared for contradictions: IFC w/ MHP x 20 min to cervical / thoracic paraspinals    Patient Education and Home Exercises     Home Exercises Provided and Patient Education Provided     Education provided: HEP review    Written Home Exercises Provided: yes. Exercises were reviewed and Frankie was able to demonstrate them prior to the end of the session.  Frankie demonstrated good  understanding of the education provided. See EMR under Patient Instructions for exercises provided during therapy sessions    ASSESSMENT     Patient demonstrates good tolerance with today's session. Session focused on cervical/head positioning, postural stability, thoracic extension, and mazin-scap strength. Patient remains with forward head posture and increased cervical extension.  Thoracic extension remains stiff and limited. With prone positioning, thoracic stiffness and shoulder mobility limitations are evident and affect performance technique. Lower trap strength deficits result in over compensation at the upper trap.     Frankie Is minimally  "progressing towards his goals.     Pt prognosis is Fair.     Pt will continue to benefit from skilled outpatient physical therapy to address the deficits listed in the problem list box on initial evaluation, provide pt/family education and to maximize pt's level of independence in the home and community environment.   Pt's spiritual, cultural and educational needs considered and pt agreeable to plan of care and goals.     Anticipated barriers to physical therapy: Pain    Goals:   Short Term Goals: 4 weeks   1. Report decreased cervical / shoulder pain  < / =  8/10 "At Worst" to increase tolerance for ADLs.   - MET 3/27/23  2. Demonstrate cervical AROM within 10degrees of full motion and without pain provocation to restore mobility for ADLs.   - Not Met, However Improved overall 3/27/23  3. Demonstrate Shoulder AROM within 10degrees of full motion and without pain provocation to restore mobility for ADLs.   - Partially Met 3/27/23  4. Demonstrate (-) pain provocation with special testing of cervical and shoulder to demonstrate healing.   - Not Met, however improved 3/27/23  5. Increase strength in shoulder and mazin-scapular musculature to >/= 4/5 MMT grade to increase tolerance for ADL and work activities.   - Partially Met 3/2/23  6. Pt to tolerate HEP to improve ROM and independence with ADL's   - MET 3/27/23     Long Term Goals: 8 weeks - ONGOING 3/27/23  1. Report decreased cervical / shoulder pain  < / =  6/10 "At Worst" to increase tolerance for ADLs.   2. Increase strength in shoulder and mazin-scapular musculature to 5/5 MMT grade to increase tolerance for ADL and work activities.  3. Pt goal: To get rid of the pain.   4. Report no disruption of sleep due to neck or shoulder pain to promote sleep quality for physical restoration.  5. Pt will demonstrate </= 20% limitation on FOTO shoulder in order to demonstrate true functional improvement.    PLAN     Advance patient as tolerated.  Consider IFC and heat at " cervicothoracic region to assess benefit.  Plan to continue cervical/shoulder; once patient feels confident in independent management of current condition, will discharge and evaluate knee pain    Plan of care Certification: 2/8/2023 to 5/8/2023.     Continue Outpatient Physical Therapy 2 times weekly for 4 weeks to include the following interventions: Cervical/Lumbar Traction, Electrical Stimulation as appropriate, Manual Therapy, Moist Heat/ Ice, Neuromuscular Re-ed, Patient Education, Self Care, Therapeutic Activities, Therapeutic Exercise, Ultrasound, and Dry needling.     Vannessa Frost, PT

## 2023-04-25 NOTE — PROGRESS NOTES
[facility]   Physical Therapy Treatment / DISCHARGE Note     Name: Elliott Gaspar  Clinic Number: [id number]    Therapy Diagnosis:   Encounter Diagnoses   Name Primary?    Right shoulder pain, unspecified chronicity Yes    Cervical pain     Pain of right scapula      Physician: Rodrigo Richardson,*    Visit Date: 4/26/2023    Physician Orders: PT Eval and Treat   Medical Diagnosis from Referral:   M25.511 (ICD-10-CM) - Right shoulder pain, unspecified chronicity   M89.8X1 (ICD-10-CM) - Pain of right scapula   Evaluation Date: 2/8/2023  Progress Note: 2/28/23; 3/27/23  Discharge Note: 4/26/23  Authorization Period Expiration: 02/02/2024   Plan of Care Expiration: 05/08/2023  Visit # / Visits authorized: 21 / 40 (Not including Initial Evaluation)  FOTO Visit #:  3/3     PTA Visit #: 3/5     Time In: 10:06 AM * Patient arrived late 10:53 AM  Time Out: 11:12 AM  Total Billable Time: 40 minutes + 20mins IFC with Cold Pack    SUBJECTIVE     Pt reports: Overall, pain intensity has improved at the collarbone/sternal junction as well as at the shoulder. As long as I keep doing the exercises, I think I can manage it.  I do have some new orders my neck and back- My doctor wants to give me some injections at the neck and low back. With the shoulder issues much better, I would like to have PT for my lower back and my neck.    He was compliant with home exercise program.    Response to previous treatment: No adverse response  Functional change: N/A    Pain:   Current: 3.5/10; At Worst 8/10 (At Worst 5/10); At Best 2/10 (At Best 2/10)  Location: Right SC joint- radiates to right shoulder, upper trap/base of skull, down to the right elbow // Symptoms in upper arm have resolved)  Description: tight, constant pain;  Aggravating Factors: Increased use of the UE  Easing Factors: OTC pain relievers     Pts goals: To get rid of the pain;     OBJECTIVE     Posture:   2/8/23: L shoulder elevated, forward  "head, rounded shoulders  4/26/23: L shoulder elevated, forward head with right lateral head tilt, manisha shoulder IR, stiff thoracic spine     Palpation:          2/8/23: Tenderness: R cervical paraspinals, UT, pec (minor and clavicular portion), subclavius, scalenes, SC joint  Tightness: UT, scalenes, SCM, bilaterally  3/2/23: Tenderness: R T3 paraspinal begins to rhomboids, R cervical paraspinal/levator scap, UT, pec (minor and clavicular portion), subclavius, scalenes, SC joint              Tightness: Cervical paraspinals, scalenes, SCM, Upper/middle trap, rhomboids, levator  4/26/23: Tenderness: R T3 paraspinal begins to rhomboids, R cervical paraspinal/levator scap, UT, pec (minor and clavicular portion), subclavius, scalenes, SC joint              Tightness: Cervical paraspinals, scalenes, SCM, Upper/middle trap, rhomboids, levator       Cervical ROM (Active in Sitting)                                   2/8/23                3/2/23                      3/27/23            4/26/23  Flexion: --------------- 55 deg                      50 deg                          50 CT junction 7/10  50deg (75% of full) No P!   Extension: ------------- 30 deg **R cervical   30 deg                               35 base of skull 2/10  40deg No P!   Right Lateral Flexion:  30 deg **R cervical    25 deg *R cerv para          30 compression  30deg No P!   Left Lateral Flexion:  6 deg *R cervical        10 deg *pain return to neut   5 pain    20deg R cerv base "tension"  Right Rotation: ----- 45 deg                         55 deg *R cerv para         55 "no problem"  60deg No P!  Left Rotation: --------- 45 deg *L cervical    55 deg                             55 L cerv "compression" 55deg L cerv "compression"     Spurlings  2/8/23: (+) spurling's in extension and right lateral flexion  4/26/23: (-) in all positions     Upper Extremity Functional Movement Patterns:                          2/8/23                      3/2/23             "              3/27/23  4/26/23                                 Right                      Right                           Right   Right  Reach behind head:   T1, *right cervical  T1, * right cervical       T1 *neck pain  T2 *  Reach behind back:  T11                           T9                                L1 *collar bone T10 No P!    Shoulder flexion  Lat stretch     Shoulder Active ROM                          2/8/23                         3/2/23     3/27/23    4/26/23                          Right                              Right               Right    Right  Flexion:           144deg, *base of R cerv   145 deg *      150 deg *pressure at 1st rib   160 deg pressure RESOLVED  Extension:       55deg, stiffness R shld           60 deg            60 deg    60 deg  Abduction:       160deg                               160 deg         160 deg *pressure at 1st rib 160 deg pressure RESOLVED      Ext Rotation:   65deg                              70deg              65deg    65 deg     Shoulder Passive ROM            2/8/23            3/2/23                             3/27/23                          Right            Right                                  Right  Flexion:         145 deg *     165 deg pain resolved      175 deg  Abduction:     160 deg *    165 deg pain resolved       175 deg        Shoulder Special Tests: RIGHT                                      2/8/23    3/2/23   3/27/23        4/26/23           AC Joint Compression: (+)               (+)              (-)    (-)  Painful Arc:                  (+)              (+)               (+)    (-)  Subscap Testing:                                            (+) however 5/5 with MMT (-)     Upper Extremity MMT             2/8/23           3/2/23             3/27/23 4/26/23                      Right   Left   Right                Right  Right  Shoulder  Flexion:  4+        5      4+                          5  5  Abduction:   4          5       5                               5  5  Ext Rot:   4+        5      5                             5  5  Int Rot:    5          5       5                          5  5  Germaine-Scapula  Upper Trap: 3 *        5     3 * at GHJ / clavicle  3+  4  Middle Trap: NT       NT    3                         3+  4-  Lower Trap:  NT       NT   3                         3+  4-     SC joint pain with right scapular depression     FOTO Shoulder Survey  Limitation Score:   2/8/23: 33%  2/28/23: 39%  3/27/23: 37%  4/26/23: 26%    Treatment     Frankie received the treatments listed below:      Therapeutic Exercises to develop strength, endurance, ROM, flexibility, and posture for 30 minutes including:  Seated UBE, Level 1.5 x 10 min (5' / 5')   Doorway Pec Stretch, Hands Low: [id number] -- no pain reported  Seated Right/Left upper trap stretch, holding seat edge 2 x 30  Seated Right levator scap stretch [id number];   Passive Thoracic Extension in sitting performed by PT  Passive Right pec stretch in sitting performed by PT  Passive Right subclavius stretch in sitting performed by PT  Cervical AROM in all planes of motion  R/L Shoulder AROM flexion, abduction, extension and ext rotation in neutral  R/L UE Functional Movement Patterns - Reaching Behind Head / Back  Seated Scap Retraction 20x  Shoulder Shrugs x 15  Wall Push-ups x 15  Wall Lift offs x 10  3 Way Scapular Retraction w/ BTB x 15  Prone Press Ups for thoracic extension 10x 3sec hold  Prone on Forearms -    Head hang for paraspinal and suboccipital stretch, [id number]   Chin Tuck 10x   Cervical Retraction 10x  Prone Baljit Scap Retraction + Depression (Palms down for ext rot) 1x20 with 2-3sec hold  Prone Baljit Scap + Shoulder Extension 1x10 with 2sec hold  Prone BUE Horizontal Abduction 10x  Prone Baljit Shoulder external rotation lift offs at 90deg abduction 1x20 -- focus on lower trap, no upper trap  Prone RUE Lower trap 15x  Supine Chin Tuck + Lift for deep cervical flexors, 1x8  Supine RUE pull down (~45deg  abduction), Green theraband, 1x15  Supine BUE Horizontal Pull Aparts, Blue Theraband x 20;  Supine UE Diagonal Pull Back, Blue Theraband 1x20 R/L;  Supine BUE Ext Rotation Pull Aparts, Blue Theraband 1x16;  Seated [place] Pulls, Blue Theraband, 1x15 R/L; postural and mobility deficits    Manual Therapy Techniques: applied to the: for 10 minutes   Joint Mobilizations-  Thoracic: PA mobilizations at Bilateral Transverse Proc of Mid-Lower Thoracic, Grade 2-4, sustained / oscillatory  Thoracic: PA mobilizations at Central Spinous Proc of Mid-Lower Thoracic, Grade 2-4, sustained / oscillatory  Thoracic: PA mobilizations at R/L Transverse Proc of Mid-Lower Thoracic, Grade 1-2 springing  Right GHJ mobilizations   Posteriorly, Grade 1-2, 6 rounds x 10sec oscillatory   Inferiorly, Grade 1-2, 6 rounds x 10sec oscillatory  Suboccipital Release   Manual Cervical traction, 5rounds sustained x 60sec  Right Shoulder depression with Upper trap stretch   Bilateral shoulder depression overpressure for cervical distraction, 5rounds sustained x30sec  STM: Cervical paraspinals, upper/middle trap, levator scap, right pec musculature  Thera gun to right scapular/UT region  Trigger Point Release at    R/L cervicothoracic paraspinals   R subscap  R/L upper trap  R/L levator trap  Strain Counter-Strain at Right upper trap and R/L SCM    Supervised Modalities after being cleared for contradictions:  IFC w/ CP x 20 min to cervical / right shoulder paraspinals    Patient Education and Home Exercises     Home Exercises Provided and Patient Education Provided     Education provided: HEP review    Written Home Exercises Provided: yes. Exercises were reviewed and Frankie was able to demonstrate them prior to the end of the session.  Frankie demonstrated good  understanding of the education provided. See EMR under Patient Instructions for exercises provided during therapy sessions    ASSESSMENT     Patient presents today for follow-up visit and Discharge  "assessment since initiating physical therapy on 2/8/23 with a medical diagnosis M25.511 (ICD-10-CM) - Right shoulder pain, unspecified chronicity, M89.8X1 (ICD-10-CM) - Pain of right scapula. Subjectively, patient reports improvement over course of care in both pain reduction and overall function. FOTO scoring demonstrates improvement in function. Objective measures denoted above demonstrate improvement since initiation of skilled PT. Patient reports confidence with independent management and readiness for PT discharge. PT and patient are in agreement with this plan.    Goals:   Short Term Goals: 4 weeks   1. Report decreased cervical / shoulder pain  < / =  8/10 "At Worst" to increase tolerance for ADLs. - MET 3/27/23  2. Demonstrate cervical AROM within 10degrees of full motion and without pain provocation to restore mobility for ADLs. - PARTIALLY MET 4/26/23  3. Demonstrate Shoulder AROM within 10degrees of full motion and without pain provocation to restore mobility for ADLs. - MET 4/26/23  4. Demonstrate (-) pain provocation with special testing of cervical and shoulder to demonstrate healing. - MET 4/26/23  5. Increase strength in shoulder /mazin-scapulaR musculature to >/= 4/5 MMT grade to increase tolerance for ADLS.- PARTIALLY MET 4/26/23  6. Pt to tolerate HEP to improve ROM and independence with ADL's - MET 3/27/23     Long Term Goals: 8 weeks  1. Report decreased cervical / shoulder pain  < / =  6/10 "At Worst" to increase tolerance for ADLs. - MET 4/26/23  2. Increase strength in shoulder Lperi-scapular musculature to 5/5 MMT to increase tolerance for ADL.- NOT MET, HOWEVER IMPROVED 4/26/23  3. Pt goal: To get rid of the pain. - 70% MET 4/26/23  4. Report no disruption of sleep due to neck or shoulder pain to promote sleep quality for physical restoration. - MET 4/26/23  5. Pt will demonstrate </= 20% limitation on FOTO shoulder in order to demonstrate functional improvement. - NOT MET, HOWEVER IMPROVED " 4/26/23    PLAN     Discharge to independent management.    Vannessa Frsot, PT

## 2023-04-26 ENCOUNTER — CLINICAL SUPPORT (OUTPATIENT)
Dept: REHABILITATION | Facility: HOSPITAL | Age: 68
End: 2023-04-26
Payer: MEDICARE

## 2023-04-26 DIAGNOSIS — M48.062 SPINAL STENOSIS, LUMBAR REGION WITH NEUROGENIC CLAUDICATION: ICD-10-CM

## 2023-04-26 DIAGNOSIS — M54.16 LUMBAR RADICULOPATHY: Primary | ICD-10-CM

## 2023-04-26 DIAGNOSIS — M54.12 CERVICAL RADICULOPATHY: ICD-10-CM

## 2023-04-26 DIAGNOSIS — M54.2 CERVICAL PAIN: ICD-10-CM

## 2023-04-26 DIAGNOSIS — M48.02 CERVICAL SPINAL STENOSIS: ICD-10-CM

## 2023-04-26 DIAGNOSIS — M25.511 RIGHT SHOULDER PAIN, UNSPECIFIED CHRONICITY: Primary | ICD-10-CM

## 2023-04-26 DIAGNOSIS — M89.8X1 PAIN OF RIGHT SCAPULA: ICD-10-CM

## 2023-04-26 PROCEDURE — 97110 THERAPEUTIC EXERCISES: CPT | Mod: PN

## 2023-04-26 PROCEDURE — 97140 MANUAL THERAPY 1/> REGIONS: CPT | Mod: PN

## 2023-04-26 PROCEDURE — 97014 ELECTRIC STIMULATION THERAPY: CPT | Mod: PN

## 2023-05-03 NOTE — PROGRESS NOTES
PT/PTA met face to face to discuss pt's treatment plan and progress towards established goals. Pt will be seen by a physical therapist minimally every 6th visit or every 30 days.     Please see Plan of Care dated 5/4/23 for goals.     Neck and Lower back - general exercise: Posture, cervical/lumbar ROM, lumbopelvic stability/motor control, flexibility, and strength     VENKATA Frost, PT

## 2023-05-04 ENCOUNTER — CLINICAL SUPPORT (OUTPATIENT)
Dept: REHABILITATION | Facility: HOSPITAL | Age: 68
End: 2023-05-04
Payer: MEDICARE

## 2023-05-04 DIAGNOSIS — M48.062 SPINAL STENOSIS, LUMBAR REGION WITH NEUROGENIC CLAUDICATION: ICD-10-CM

## 2023-05-04 DIAGNOSIS — M54.12 CERVICAL RADICULOPATHY: ICD-10-CM

## 2023-05-04 DIAGNOSIS — M54.16 LUMBAR RADICULOPATHY: Primary | ICD-10-CM

## 2023-05-04 PROCEDURE — 97110 THERAPEUTIC EXERCISES: CPT | Mod: PN

## 2023-05-04 PROCEDURE — 97161 PT EVAL LOW COMPLEX 20 MIN: CPT | Mod: PN

## 2023-05-04 NOTE — PLAN OF CARE
[facility]  Physical Therapy Initial Evaluation    Name: Elliott Gaspar  Clinic Number: [id number]    Therapy Diagnosis:   Encounter Diagnoses   Name Primary?    Lumbar radiculopathy Yes    Spinal stenosis, lumbar region with neurogenic claudication     Cervical radiculopathy      Physician: Aaron Fernandez MD    Physician Orders: PT Eval and Treat - Neck and Low Back  Medical Diagnosis from Referral: M54.16 (ICD-10-CM) - Lumbar radiculopathy M48.062 (ICD-10-CM) - Spinal stenosis, lumbar region with neurogenic claudication M54.12 (ICD-10-CM) - Cervical radiculopathy   Evaluation Date: 5/4/2023  Authorization Period Expiration: 6/04/2023   Plan of Care Expiration: 8/4/2023  Visit # / Visits authorized: 1 / 1  FOTO Visit #:  1/3    Time In: 11:05 AM  Time Out: 11:52 AM  Total Appointment Time (timed & untimed codes): 45 minutes    Precautions: Standard    Subjective   Date of onset: Sept/Oct 2021  History of current condition - Frankie reports: The lower back pain started before the neck. I was walking on a damp sidewalk and slipped on a manhole cover. I landed on my left hip when I fell. Once I stood up, I knew I hurt something. 9 months later, I fell out of bed onto the right hip. That increased the symptoms from the first fall. Both of those falls sun my neck tremendously. I just dealt with the pain and went on with my life. In Nov 2022 is when the couch was dropped, while moving it, and the edge hit my collar bone. (Prior PT for collarbone and right shoulder completed). Now that the pain at the collar and right shoulder is managed, I want to address the lower back and the neck. The lower back hurts more than the neck.      Medical History:   Past Medical History:   Diagnosis Date    Contact lens/glasses fitting     wears contacts    Deviated septum     Fatty liver disease, nonalcoholic     Negative Hep A, B, C in past    Hypertension     Hypokalemia 4/18/2015    Kidney stone      Persistent headaches     related to sinus congestion    Peyronie's disease     Thrombocytopenia     Variable; low normal; Hematology eval in past and no specific f/u advised     Surgical History:   Elliott Gaspar  has a past surgical history that includes Hernia repair; Hernia repair (2008); Sinus surgery (2012); Nasal septum surgery; and Cardiac catheterization (2015).    Medications:   Elliott has a current medication list which includes the following prescription(s): ammonium lactate, b complex vitamins, colchicine, glucosamine-chondroitin, nebivolol, nystatin-triamcinolone, and tadalafil.    Allergies:   Review of patient's allergies indicates:   Allergen Reactions    Morphine Other (See Comments)     Family HX-mother went into anaphylactic shock    Contrast media      Family history of renal failure with iodinated contrast    Demerol [meperidine]     Morphine Other (See Comments)     pts mother had anaphylaxis from Morphine so he does not want to take     Imaging: MRI of the Lumbar Spine 2021  FINDINGS:  Normal curvature and alignment of the lumbar spine is demonstrated. Vertebral body heights are maintained. There is mild multilevel vertebral body marginal osteophytosis. There is disc desiccation throughout the lumbar spine with disc height loss at L3-4, L4-5 and L5-S1. Fatty marrow endplate changes are noted at L5-S1 as well as Schmorl's node formation of the L5 inferior endplate. Vertebral body hemangioma of the anterior L3 vertebra noted. The conus medullaris terminates at the L1 vertebra level. Intrathecal nerve roots are within normal limits. No intrathecal lesions observed. The paravertebral soft tissues are unremarkable. The visualized abdominal viscera are unremarkable.     T12-L1: Mild broad-based disc bulge indents the ventral thecal sac. There is also mild bilateral facet joint arthropathy.     L1-2: Mild broad-based disc bulge indents the ventral thecal sac and narrows the lateral recesses.  Broad-based disc bulge touches the left L2 nerve root in the lateral recess and closely encroaches the right L2 nerve root. There is minimal facet joint arthropathy.     L2-3: Mild bilateral facet joint arthropathy.     L3-4: Minimal broad-based disc bulge. Moderate facet joint arthropathy and mild ligamentum flavum thickening noted. Very mild right neural foraminal narrowing due to combination of broad-based disc bulge and facet joint arthropathy.     L4-5: Broad-based disc bulge flattens the ventral thecal sac. AP diameter of the thecal sac measures 8 mm. Moderate facet joint arthropathy and ligamentum flavum thickening mildly contributes to spinal canal narrowing. There is narrowing of the lateral recesses bilaterally with broad-based disc bulge touching the L5 nerve roots in the lateral recesses. There is moderate left and severe right neural foraminal narrowing secondary to facet joint arthropathy with facet joint arthropathy touching the left L4 nerve root and impinging the right L4 root in the neural foramina.     L5-S1: Mild broad-based disc bulge. Thecal sac is beginning to taper at this level. There is mild-to-moderate bilateral facet joint arthropathy, right greater than left with moderate bilateral neural foraminal narrowing.     IMPRESSION:  Multilevel discogenic and facet degenerative changes of the lumbar spine as described with varying degrees of spinal canal and neural foraminal stenosis as described.    Prior Therapy: Recent PT for Right shoulder girdle; However none for PT  Social History: lives with spouse  Occupation: Semi-retired;  Prior Level of Function: Independent  Current Level of Function: Independent but limited in all ADLs due to low back pain    Pain:  Current 5/10, Worst 10/10, Best 5/10 (Lower Back)  Location:   Lumbosacral (belt line) emanating laterally R/L and into the hips; Occasionally radiates from hips to knees posteriorly, however infrequent  Neck - skull extending to  "shoulder along paraspinal (R)  Description:   Lumbar: Sharp at base of spine with burning laterally; Walking feels like a constant ache around the waist region;   Neck: Tight stiff  Aggravating Factors: Bending, Lift/Carry, prolonged positions, walking > 15mins  Easing Factors: Medication; positional changes; Activity avoidance    Pts goals: To reduce the pain without medication;     Objective     Posture: Forward head, rounded shoulders, stiff thoracic, loss of lumbar lordosis; Right head tilt, L shoulder / R ilium elevated    Palpation: Significant TTP at the greater trochanter (L); Tightness/tenderness: QL and lumbar paraspinals (R>L), R gluteal/piriformis;   L thoracic and R lumbar rotated segments     CERVICAL ROM (Active in Sitting)  Flexion: ---------------   50deg (75% of full) No P!   Extension: ------------ 40deg No P!   Right Lateral Flexion: 30deg No P!   Left Lateral Flexion:  20deg R cerv base "tension"  Right Rotation: -----   60deg No P!  Left Rotation: ---------  55deg L cerv "compression"     THORACIC ROM: % of full, * indicates pain   Right Rotation - 15% * thoracic paraspinals  Left Rotation --- 30% * thoracic paraspinals    LUMBAR ROM: % of full, * indicates pain  Flexion  ----------------- 50% burning across lower back   Extension -------------- 50% compression at lower back   Left Side Bending --- 50% compression   Right Side Bending - 50% compression   Left Rotation ---------- 75% "tight"  Right Rotation -------- 75% "tight"    Lumbar Special Tests:  -Repeated Flexion: NT  -Repeated Ext: NT  -Slump Test: (-) L/R  -Active SLR: (-) L/R  -Prone Instability Test: (-)  -Bridge Test: (-)    Joint Mobility: Hypomobile with thoracic springing; Guarded with springing of lumbar;     Upper Extremity MMT                        Left Right  Shoulder  Flexion:         5 5  Abduction:     5 5  Ext Rot:          5 5  Int Rot:  5 5     Germaine-Scapula  Upper Trap: 5 5  Middle Trap:   4 4-  Lower Trap:   4 4-   "   Lower Extremity MMT    Left Right  HIP  Flexion -------- 5 5    Extension ---- 3- * 3 *    Abduction ---- 4 4+   Adduction ---- 4+ 3    Int Rot --------- 4 4+  Ext Rot -------- 4 4-    KNEE  Flexion -------- 5 5  Extension ---- 5 5  LUMBAR   Extensors 4- 3+    Lower Extremity Flexibility:      Left Right  Ely's Test -------------- 110 ** 115 *    90/90 Hamstring ----- 30 35  Jacques's Test ------------ (+) * (-)  Chirag Test    Iliopsoas ------ (++) (+) Tightness   Rectus Fem-- (++) (+) Tightness    Hip Special Tests:  FELIX: (+) L / R ; Tightness and pain at lateral hip, L>R  FADDIR: (+) L / R ; Tightness and pain at lateral hip, L>R  Scour: (+) L / R    Limitation/Restriction for FOTO LUMBAR Survey    Therapist reviewed FOTO scores for Elliott Gaspar on 5/4/2023.   FOTO documents entered into [employer] - see Media section.    Limitation Score: 41%         TREATMENT   Treatment Time In: 11:42 AM  Treatment Time Out: 11:50 AM  Total Treatment time (time-based codes) separate from Evaluation: 8 minutes    Frankie received the treatments listed below:    THERAPEUTIC EXERCISES to develop strength, endurance, ROM, flexibility, posture, and core stabilization for 8 minutes including LTR, Piriformis stretch, Quad stretch, Bridge, and Hamstring stretch;    Home Exercises and Patient Education Provided    Education provided:   - PT POC and HEP    Written Home Exercises Provided: yes.  Exercises were reviewed and Frankie was able to demonstrate them prior to the end of the session.  Frankie demonstrated good  understanding of the education provided.     See EMR under Patient Instructions for exercises provided 5/4/2023.    Assessment   Elliott is a 67 y.o. male referred to outpatient Physical Therapy with a medical diagnosis of M54.16 (ICD-10-CM) - Lumbar radiculopathy M48.062 (ICD-10-CM) - Spinal stenosis, lumbar region with neurogenic claudication M54.12 (ICD-10-CM) - Cervical radiculopathy. Pt presents with complaints of chronic lower  back and neck pain. Evaluation findings reveal postural deviations, cervical and lumbar ROM limitations, tissue extensibility deficits, and strength deficits at the mazin-scap and hip musculature. These deficits affect patient's ability to perform ADLs and functional mobility at prior level of function.     Pt prognosis is Good.   Pt will benefit from skilled outpatient Physical Therapy to address the deficits stated above and in the chart below, provide pt/family education, and to maximize pt's level of independence.     Plan of care discussed with patient: Yes  Pt's spiritual, cultural and educational needs considered and patient is agreeable to the plan of care and goals as stated below:     Anticipated Barriers for therapy: None    Medical Necessity is demonstrated by the following  History  Co-morbidities and personal factors that may impact the plan of care Co-morbidities:   HTN    Personal Factors:   no deficits     low   Examination  Body Structures and Functions, activity limitations and participation restrictions that may impact the plan of care Body Regions:   head  neck  back  lower extremities  trunk    Body Systems:    gross symmetry  ROM  strength  gross coordinated movement  transitions  motor control  motor learning    Participation Restrictions:   None    Activity limitations:   Learning and applying knowledge  no deficits    General Tasks and Commands  no deficits    Communication  no deficits    Mobility  lifting and carrying objects  walking    Self care  no deficits    Domestic Life  shopping  cooking  doing house work (cleaning house, washing dishes, laundry)  assisting others    Interactions/Relationships  no deficits    Life Areas  no deficits    Community and Social Life  community life  recreation and leisure         low   Clinical Presentation stable and uncomplicated low   Decision Making/ Complexity Score: low     Goals:  Short Term Goals: 4 weeks   1. Report decreased lower back and  "neck pain  < / =  8/10 "At Worst" to increase tolerance for functional mobility and ADLs.  2. Increase cervical and lumbar ROM to 75% of full in all planes of motion without pain provocation in order to perform ADLs without difficulty.  3. Increase strength to 4+/5 MMT grade grossly throughout to increase tolerance for ADL and work activities.  4. Demonstrate (-) flexibility limitations in BLE to promote ROM without restriction.  5. Demonstrate (-) neural tension testing to improve positional tolerance.    Long Term Goals: 8 weeks   1. Report decreased pain < / = 6/10 "At Worst" to increase tolerance for functional mobility and ADLs.  2. Patient goal: To reduce the pain without medication;   3. Increase strength to 5/5 MMT grade grossly throughout to increase tolerance for ADL and work activities.  4. Pt will report at </= 35% limitation on FOTO  to demonstrate increase in LE function with every day tasks.   5. Patient to demonstrate independence with comprehensive HEP for self-management.    Plan   Plan of care Certification: 5/4/2023 to 8/4/2023.    Outpatient Physical Therapy 2 times weekly for 8 weeks to include the following interventions: Aquatic Therapy, Cervical/Lumbar Traction, Electrical Stimulation as appropriate, Manual Therapy, Moist Heat/ Ice, Neuromuscular Re-ed, Patient Education, Self Care, Therapeutic Activities, Therapeutic Exercise, Ultrasound, and Dry Needling.     Vannessa Frost, PT    "

## 2023-05-09 ENCOUNTER — CLINICAL SUPPORT (OUTPATIENT)
Dept: REHABILITATION | Facility: HOSPITAL | Age: 68
End: 2023-05-09
Payer: MEDICARE

## 2023-05-09 DIAGNOSIS — M54.16 LUMBAR RADICULOPATHY: Primary | ICD-10-CM

## 2023-05-09 PROCEDURE — 97110 THERAPEUTIC EXERCISES: CPT | Mod: KX,PN,CQ

## 2023-05-09 NOTE — PROGRESS NOTES
[facility]   Physical Therapy Treatment Note      Name: Elliott Gaspar  Clinic Number: [id number]    Therapy Diagnosis:   Encounter Diagnosis   Name Primary?    Lumbar radiculopathy Yes     Physician: Aaron Fernandez MD    Visit Date: 5/9/2023    Physician Orders: PT Eval and Treat - Neck and Low Back  Medical Diagnosis from Referral: M54.16 (ICD-10-CM) - Lumbar radiculopathy M48.062 (ICD-10-CM) - Spinal stenosis, lumbar region with neurogenic claudication M54.12 (ICD-10-CM) - Cervical radiculopathy   Evaluation Date: 5/4/2023  Authorization Period Expiration: 6/04/2023   Plan of Care Expiration: 8/4/2023  Visit # / Visits authorized: 1 / 12  FOTO Visit #:  1/3    PTA Visit #: 1/5     Time In: 8:10 AM  Time Out: 9:20 AM  Total Billable Time: 53 minutes    Subjective     Pt reports: No new c/o's.  He was compliant with home exercise program.  Response to previous treatment: N/A  Functional change: N/A    Pain: 4/10  Location: bilateral low back      Objective      Objective Measures updated at progress report unless specified.     Treatment     Frankie received the treatments listed below:      therapeutic exercises to develop strength, endurance, ROM, and flexibility for 53 minutes including:  Nustep level 1 x 15 min  Heel Cord stretch on wedge 3 x 30 sec  Seated Lumbar Flexion w/ PB x 2 min  Seated H/S stretch 3 x 30 sec  Seated Piriformis stretch 3 x 30 sec  Tr Ab activation x 10 - 3 sec hold  Pelvic Tilts x 15  Bridges x 15  Ball Squeezes x 2 min  Supine Knee fallouts x 15  Supine Clams x 15  LTR w/ PB x 2 min  DKC w/ PB x 2 min        manual therapy techniques:  were applied to the:  for  minutes, including:      neuromuscular re-education activities to improve:  for  minutes. The following activities were included:      therapeutic activities to improve functional performance for   minutes, including:      gait training to improve functional mobility and safety for   minutes,  "including:      direct contact modalities after being cleared for contraindications:     supervised modalities after being cleared for contradictions:     hot pack for  minutes to .    cold pack for  minutes to .    Patient Education and Home Exercises       Education provided:       Written Home Exercises Provided: yes. Exercises were reviewed and Frankie was able to demonstrate them prior to the end of the session.  Frankie demonstrated good  understanding of the education provided. See EMR under Patient Instructions for exercises provided during therapy sessions    Assessment     Patient did well with exercises; no increased symptoms noted.     Frankie Is progressing well towards his goals.   Pt prognosis is Good.     Pt will continue to benefit from skilled outpatient physical therapy to address the deficits listed in the problem list box on initial evaluation, provide pt/family education and to maximize pt's level of independence in the home and community environment.     Pt's spiritual, cultural and educational needs considered and pt agreeable to plan of care and goals.     Anticipated barriers to physical therapy: None    Goals:   Short Term Goals: 4 weeks   1. Report decreased lower back and neck pain  < / =  8/10 "At Worst" to increase tolerance for functional mobility and ADLs.  2. Increase cervical and lumbar ROM to 75% of full in all planes of motion without pain provocation in order to perform ADLs without difficulty.  3. Increase strength to 4+/5 MMT grade grossly throughout to increase tolerance for ADL and work activities.  4. Demonstrate (-) flexibility limitations in BLE to promote ROM without restriction.  5. Demonstrate (-) neural tension testing to improve positional tolerance.     Long Term Goals: 8 weeks   1. Report decreased pain < / = 6/10 "At Worst" to increase tolerance for functional mobility and ADLs.  2. Patient goal: To reduce the pain without medication;   3. Increase strength to 5/5 MMT grade " grossly throughout to increase tolerance for ADL and work activities.  4. Pt will report at </= 35% limitation on FOTO  to demonstrate increase in LE function with every day tasks.   5. Patient to demonstrate independence with comprehensive HEP for self-management.    Plan     Continue per POC and progress with strength/mobility exercises as patient tolerates.     Jonathan Favre, PTA

## 2023-05-11 ENCOUNTER — CLINICAL SUPPORT (OUTPATIENT)
Dept: REHABILITATION | Facility: HOSPITAL | Age: 68
End: 2023-05-11
Payer: MEDICARE

## 2023-05-11 DIAGNOSIS — M54.16 LUMBAR RADICULOPATHY: Primary | ICD-10-CM

## 2023-05-11 PROCEDURE — 97140 MANUAL THERAPY 1/> REGIONS: CPT | Mod: PN,CQ

## 2023-05-11 NOTE — PROGRESS NOTES
[facility]   Physical Therapy Treatment Note      Name: Elliott Gaspar  Clinic Number: [id number]    Therapy Diagnosis:   Encounter Diagnosis   Name Primary?    Lumbar radiculopathy Yes     Physician: Aaron Fernandez MD    Visit Date: 5/11/2023    Physician Orders: PT Eval and Treat - Neck and Low Back  Medical Diagnosis from Referral: M54.16 (ICD-10-CM) - Lumbar radiculopathy M48.062 (ICD-10-CM) - Spinal stenosis, lumbar region with neurogenic claudication M54.12 (ICD-10-CM) - Cervical radiculopathy   Evaluation Date: 5/4/2023  Authorization Period Expiration: 6/04/2023   Plan of Care Expiration: 8/4/2023  Visit # / Visits authorized: 2 / 12 (not including eval)  FOTO Visit #:  1/3    PTA Visit #: 2/5     Time In: 10:15 AM (late)  Time Out: 10:55 AM  Total Billable Time: 45 minutes    Subjective     Pt reports: No new c/o's.  He was compliant with home exercise program.  Response to previous treatment: N/A  Functional change: N/A    Pain: 4/10  Location: bilateral low back      Objective      Objective Measures updated at progress report unless specified.     Treatment     Frankie received the treatments listed below:      therapeutic exercises to develop strength, endurance, ROM, and flexibility for 0 minutes including:  Nustep level 1 x 15 min  Heel Cord stretch on wedge 3 x 30 sec  Seated Lumbar Flexion w/ PB x 2 min  Seated H/S stretch 3 x 30 sec  Seated Piriformis stretch 3 x 30 sec  Tr Ab activation x 10 - 3 sec hold  Pelvic Tilts x 15  Bridges x 15  Ball Squeezes x 2 min  Supine Knee fallouts x 15  Supine Clams x 15  LTR w/ PB x 2 min  DKC w/ PB x 2 min        manual therapy techniques:  were applied to the low back/neck:  for  minutes 45 min, including:  Myofascial Release   Bilateral piriformis   Bilateral psoas ( light due to old hernia sx)   Multifidus   Sub occipitals   Upper trap/scalenes      neuromuscular re-education activities to improve:  for  minutes. The following  "activities were included:      therapeutic activities to improve functional performance for   minutes, including:      gait training to improve functional mobility and safety for   minutes, including:      direct contact modalities after being cleared for contraindications:     supervised modalities after being cleared for contradictions:     hot pack for  minutes to .    cold pack for  minutes to .    Patient Education and Home Exercises       Education provided:       Written Home Exercises Provided: yes. Exercises were reviewed and Frankie was able to demonstrate them prior to the end of the session.  Frankie demonstrated good  understanding of the education provided. See EMR under Patient Instructions for exercises provided during therapy sessions    Assessment     Patient got good releases at his piriformis and multifidus today.  He noticed better movement and less pain.    Frankie Is progressing well towards his goals.   Pt prognosis is Good.     Pt will continue to benefit from skilled outpatient physical therapy to address the deficits listed in the problem list box on initial evaluation, provide pt/family education and to maximize pt's level of independence in the home and community environment.     Pt's spiritual, cultural and educational needs considered and pt agreeable to plan of care and goals.     Anticipated barriers to physical therapy: None    Goals:   Short Term Goals: 4 weeks   1. Report decreased lower back and neck pain  < / =  8/10 "At Worst" to increase tolerance for functional mobility and ADLs.  2. Increase cervical and lumbar ROM to 75% of full in all planes of motion without pain provocation in order to perform ADLs without difficulty.  3. Increase strength to 4+/5 MMT grade grossly throughout to increase tolerance for ADL and work activities.  4. Demonstrate (-) flexibility limitations in BLE to promote ROM without restriction.  5. Demonstrate (-) neural tension testing to improve positional " "tolerance.     Long Term Goals: 8 weeks   1. Report decreased pain < / = 6/10 "At Worst" to increase tolerance for functional mobility and ADLs.  2. Patient goal: To reduce the pain without medication;   3. Increase strength to 5/5 MMT grade grossly throughout to increase tolerance for ADL and work activities.  4. Pt will report at </= 35% limitation on FOTO  to demonstrate increase in LE function with every day tasks.   5. Patient to demonstrate independence with comprehensive HEP for self-management.    Plan     Continue per POC and progress with strength/mobility exercises as patient tolerates.     Mo Ibarra, PTA        "

## 2023-05-16 ENCOUNTER — CLINICAL SUPPORT (OUTPATIENT)
Dept: REHABILITATION | Facility: HOSPITAL | Age: 68
End: 2023-05-16
Payer: MEDICARE

## 2023-05-16 DIAGNOSIS — M48.062 SPINAL STENOSIS, LUMBAR REGION WITH NEUROGENIC CLAUDICATION: ICD-10-CM

## 2023-05-16 DIAGNOSIS — M54.12 CERVICAL RADICULOPATHY: ICD-10-CM

## 2023-05-16 DIAGNOSIS — M54.16 LUMBAR RADICULOPATHY: Primary | ICD-10-CM

## 2023-05-16 PROCEDURE — 97110 THERAPEUTIC EXERCISES: CPT | Mod: PN

## 2023-05-16 PROCEDURE — 97140 MANUAL THERAPY 1/> REGIONS: CPT | Mod: PN

## 2023-05-16 NOTE — PROGRESS NOTES
[facility]   Physical Therapy Treatment Note      Name: Elliott Gaspar  Clinic Number: [id number]    Therapy Diagnosis:   Encounter Diagnoses   Name Primary?    Lumbar radiculopathy Yes    Spinal stenosis, lumbar region with neurogenic claudication     Cervical radiculopathy      Physician: Aaron Fernandez MD    Visit Date: 5/16/2023    Physician Orders: PT Eval and Treat - Neck and Low Back  Medical Diagnosis from Referral: M54.16 (ICD-10-CM) - Lumbar radiculopathy M48.062 (ICD-10-CM) - Spinal stenosis, lumbar region with neurogenic claudication M54.12 (ICD-10-CM) - Cervical radiculopathy   Evaluation Date: 5/4/2023  Authorization Period Expiration: 6/04/2023   Plan of Care Expiration: 8/4/2023  Visit # / Visits authorized: 3 / 12 (Not including Initial Evaluation)  FOTO Visit #:  1/3    PTA Visit #: 2/5     Time In: 10:03 AM  Time Out: 10:57 AM  Total Billable Time: 53 minutes    Subjective     Pt reports: No new c/o's.    He was compliant with home exercise program.  Response to previous treatment: N/A  Functional change: N/A    Pain: 4/10  Location: bilateral low back      Objective      Objective Measures updated at progress report unless specified.     Treatment     Frankie received the treatments listed below:      Therapeutic Exercises to develop strength, endurance, ROM, and flexibility for 45 minutes including:  Nustep Level 3 x 15 min  Heel Cord stretch on wedge 3 x 30 sec  Standing Hamstring Stretch with heel propped on step, 3 x 30 sec R/L  Following Manual:  Prone Contract - Relax stretch for quad 5 x 5sec  Prone Quad stretch with strap, 3 x 30sec R/L  Prone Hip Extension (knee straight) 1 x 10 R/L  DKC w/ PB x 2 min  Bridges with BLE over physio ball x 15  LTR x 2 min  Hooklying Piriformis stretch 3 x 30sec  Seated Lumbar Flexion w/ PB x 2 min  Seated H/S stretch 3 x 30 sec  Tr Ab activation x 10 - 3 sec hold  Pelvic Tilts x 15  Ball Squeezes x 2 min  Supine Knee fallouts x  15  Supine Clams x 15    Manual Therapy Techniques:  were applied to the low back/neck: for 8 minutes, including:  Joint Mobilizations -  Thoracic: PA mobilizations at Bilateral Transverse Proc of Mid-Lower Thoracic, Grade 2-4, oscillatory  Thoracic: PA mobilizations at R/L Transverse Proc of Mid-Lower Thoracic, Grade 1-2 springing  Lumbar: PA mobilizations at Bilateral Transverse Proc of Lumbar segments, Grade 2-3, oscillatory  Lumbar: PA mobilizations at R/L Transverse Proc of Lumbar segments, Grade 1-2 springing    STM / DTM -  R/L lumbar paravertebrals and QL musculature    Myofascial Release   Bilateral piriformis   Bilateral psoas ( light due to old hernia sx)   Multifidus   Sub occipitals   Upper trap/scalenes    Neuromuscular Re-Education activities to improve:  for  minutes. The following activities were included:    Therapeutic Activities to improve functional performance for   minutes, including:    Direct Contact Modalities after being cleared for contraindications:     Supervised Modalities after being cleared for contradictions:     Hot pack for - minutes to -.    Cold pack for - minutes to -.    Patient Education and Home Exercises       Education provided: HEP review    Written Home Exercises Provided: yes. Exercises were reviewed and Frankie was able to demonstrate them prior to the end of the session.  Frankie demonstrated good  understanding of the education provided. See EMR under Patient Instructions for exercises provided during therapy sessions    Assessment     Patient demonstrates good tolerance with today's session. Increased level of resistance on Nustep bike without adverse response. Increased tissue density noted at paraspinals and QL bilaterally. Thoracic extension hypomobility noted. Generalized flexibility and mobility deficits are observed grossly throughout trunk and BLEs. Verbal cues to avoid breath holding, allowing for greater ease and tissue response to stretches.    Frankie Is progressing  "well towards his goals.   Pt prognosis is Good.     Pt will continue to benefit from skilled outpatient physical therapy to address the deficits listed in the problem list box on initial evaluation, provide pt/family education and to maximize pt's level of independence in the home and community environment.   Pt's spiritual, cultural and educational needs considered and pt agreeable to plan of care and goals.     Anticipated barriers to physical therapy: None    Goals:   Short Term Goals: 4 weeks   1. Report decreased lower back and neck pain  < / =  8/10 "At Worst" to increase tolerance for functional mobility and ADLs.  2. Increase cervical and lumbar ROM to 75% of full in all planes of motion without pain provocation in order to perform ADLs without difficulty.  3. Increase strength to 4+/5 MMT grade grossly throughout to increase tolerance for ADL and work activities.  4. Demonstrate (-) flexibility limitations in BLE to promote ROM without restriction.  5. Demonstrate (-) neural tension testing to improve positional tolerance.     Long Term Goals: 8 weeks   1. Report decreased pain < / = 6/10 "At Worst" to increase tolerance for functional mobility and ADLs.  2. Patient goal: To reduce the pain without medication;   3. Increase strength to 5/5 MMT grade grossly throughout to increase tolerance for ADL and work activities.  4. Pt will report at </= 35% limitation on FOTO  to demonstrate increase in LE function with every day tasks.   5. Patient to demonstrate independence with comprehensive HEP for self-management.    Plan     Continue per POC and progress with strength/mobility exercises as patient tolerates.     Plan of Care Certification: 5/4/2023 to 8/4/2023.     Outpatient Physical Therapy 2 times weekly for 8 weeks to include the following interventions: Aquatic Therapy, Cervical/Lumbar Traction, Electrical Stimulation as appropriate, Manual Therapy, Moist Heat/ Ice, Neuromuscular Re-ed, Patient Education, " Self Care, Therapeutic Activities, Therapeutic Exercise, Ultrasound, and Dry Needling.     Vannessa Frost, PT

## 2023-05-22 ENCOUNTER — OFFICE VISIT (OUTPATIENT)
Dept: ORTHOPEDICS | Facility: CLINIC | Age: 68
End: 2023-05-22
Payer: MEDICARE

## 2023-05-22 VITALS — HEIGHT: 69 IN | RESPIRATION RATE: 18 BRPM | WEIGHT: 190 LBS | BODY MASS INDEX: 28.14 KG/M2

## 2023-05-22 DIAGNOSIS — M70.41 PREPATELLAR BURSITIS, RIGHT KNEE: ICD-10-CM

## 2023-05-22 DIAGNOSIS — M17.10 ARTHRITIS OF KNEE: Primary | ICD-10-CM

## 2023-05-22 DIAGNOSIS — M25.511 RIGHT SHOULDER PAIN, UNSPECIFIED CHRONICITY: ICD-10-CM

## 2023-05-22 PROCEDURE — 99999 PR PBB SHADOW E&M-EST. PATIENT-LVL III: ICD-10-PCS | Mod: PBBFAC,,, | Performed by: ORTHOPAEDIC SURGERY

## 2023-05-22 PROCEDURE — 99213 OFFICE O/P EST LOW 20 MIN: CPT | Mod: S$PBB,,, | Performed by: ORTHOPAEDIC SURGERY

## 2023-05-22 PROCEDURE — 99213 PR OFFICE/OUTPT VISIT, EST, LEVL III, 20-29 MIN: ICD-10-PCS | Mod: S$PBB,,, | Performed by: ORTHOPAEDIC SURGERY

## 2023-05-22 PROCEDURE — 99999 PR PBB SHADOW E&M-EST. PATIENT-LVL III: CPT | Mod: PBBFAC,,, | Performed by: ORTHOPAEDIC SURGERY

## 2023-05-22 PROCEDURE — 99213 OFFICE O/P EST LOW 20 MIN: CPT | Mod: PBBFAC,PO | Performed by: ORTHOPAEDIC SURGERY
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
## 2023-05-23 ENCOUNTER — CLINICAL SUPPORT (OUTPATIENT)
Dept: REHABILITATION | Facility: HOSPITAL | Age: 68
End: 2023-05-23
Payer: MEDICARE

## 2023-05-23 DIAGNOSIS — M54.16 LUMBAR RADICULOPATHY: Primary | ICD-10-CM

## 2023-05-23 PROCEDURE — 97110 THERAPEUTIC EXERCISES: CPT | Mod: KX,PN,CQ

## 2023-05-23 PROCEDURE — 97140 MANUAL THERAPY 1/> REGIONS: CPT | Mod: KX,PN,CQ

## 2023-05-23 NOTE — PROGRESS NOTES
[facility]   Physical Therapy Treatment Note      Name: Elliott Gaspar  Clinic Number: [id number]    Therapy Diagnosis:   Encounter Diagnosis   Name Primary?    Lumbar radiculopathy Yes     Physician: Aaron Fernandez MD    Visit Date: 5/23/2023    Physician Orders: PT Eval and Treat - Neck and Low Back  Medical Diagnosis from Referral: M54.16 (ICD-10-CM) - Lumbar radiculopathy M48.062 (ICD-10-CM) - Spinal stenosis, lumbar region with neurogenic claudication M54.12 (ICD-10-CM) - Cervical radiculopathy   Evaluation Date: 5/4/2023  Authorization Period Expiration: 6/04/2023   Plan of Care Expiration: 8/4/2023  Visit # / Visits authorized: 4 / 12 (Not including Initial Evaluation)  FOTO Visit #:  1/3    PTA Visit #: 1/5     Time In: 9:05 AM  Time Out: 10:15 AM  Total Billable Time: 55 minutes    Subjective     Pt reports: No new c/o's.    He was compliant with home exercise program.  Response to previous treatment: N/A  Functional change: N/A    Pain: 3/10  Location: bilateral low back      Objective      Objective Measures updated at progress report unless specified.     Treatment     Frankie received the treatments listed below:      Therapeutic Exercises to develop strength, endurance, ROM, and flexibility for 45 minutes including:  Nustep Level 3 x 15 min  Heel Cord stretch on wedge 3 x 30 sec  Standing Hamstring Stretch with heel propped on step, 3 x 30 sec R/L  Following Manual:  Prone Contract - Relax stretch for quad 5 x 5sec  Prone Quad stretch with strap, 3 x 30sec R/L  Prone Hip Extension (knee straight) 1 x 10 R/L  DKC w/ PB x 2 min  Bridges with BLE over physio ball x 15  LTR w/ PB x 2 min  Hooklying Piriformis stretch 3 x 30sec  Seated Lumbar Flexion w/ PB x 2 min  Tr Ab activation x 10 - 3 sec hold  Pelvic Tilts x 15  Ball Squeezes x 2 min  Supine Knee fallouts x 15  Supine Clams x 15    Manual Therapy Techniques:  were applied to the low back/neck: for 10 minutes, including:  Joint  Mobilizations -  Thoracic: PA mobilizations at Bilateral Transverse Proc of Mid-Lower Thoracic, Grade 2-4, oscillatory  Thoracic: PA mobilizations at R/L Transverse Proc of Mid-Lower Thoracic, Grade 1-2 springing  Lumbar: PA mobilizations at Bilateral Transverse Proc of Lumbar segments, Grade 2-3, oscillatory  Lumbar: PA mobilizations at R/L Transverse Proc of Lumbar segments, Grade 1-2 springing    STM / DTM -  R/L lumbar paravertebrals and QL musculature    Myofascial Release   Bilateral piriformis   Bilateral psoas ( light due to old hernia sx)   Multifidus   Sub occipitals   Upper trap/scalenes    Neuromuscular Re-Education activities to improve:  for  minutes. The following activities were included:    Therapeutic Activities to improve functional performance for   minutes, including:    Direct Contact Modalities after being cleared for contraindications:     Supervised Modalities after being cleared for contradictions:     Hot pack for - minutes to -.    Cold pack for - minutes to -.    Patient Education and Home Exercises       Education provided: HEP review    Written Home Exercises Provided: yes. Exercises were reviewed and Frankie was able to demonstrate them prior to the end of the session.  Frankie demonstrated good  understanding of the education provided. See EMR under Patient Instructions for exercises provided during therapy sessions    Assessment     Patient demonstrates good tolerance with today's session. Increased tissue density noted at paraspinals and QL bilaterally. Thoracic extension hypomobility noted. Generalized flexibility and mobility deficits are observed grossly throughout trunk and BLEs. Verbal cues to avoid breath holding, allowing for greater ease and tissue response to stretches.    Frankie Is progressing well towards his goals.   Pt prognosis is Good.     Pt will continue to benefit from skilled outpatient physical therapy to address the deficits listed in the problem list box on initial  "evaluation, provide pt/family education and to maximize pt's level of independence in the home and community environment.   Pt's spiritual, cultural and educational needs considered and pt agreeable to plan of care and goals.     Anticipated barriers to physical therapy: None    Goals:   Short Term Goals: 4 weeks   1. Report decreased lower back and neck pain  < / =  8/10 "At Worst" to increase tolerance for functional mobility and ADLs.  2. Increase cervical and lumbar ROM to 75% of full in all planes of motion without pain provocation in order to perform ADLs without difficulty.  3. Increase strength to 4+/5 MMT grade grossly throughout to increase tolerance for ADL and work activities.  4. Demonstrate (-) flexibility limitations in BLE to promote ROM without restriction.  5. Demonstrate (-) neural tension testing to improve positional tolerance.     Long Term Goals: 8 weeks   1. Report decreased pain < / = 6/10 "At Worst" to increase tolerance for functional mobility and ADLs.  2. Patient goal: To reduce the pain without medication;   3. Increase strength to 5/5 MMT grade grossly throughout to increase tolerance for ADL and work activities.  4. Pt will report at </= 35% limitation on FOTO  to demonstrate increase in LE function with every day tasks.   5. Patient to demonstrate independence with comprehensive HEP for self-management.    Plan     Continue per POC and progress with strength/mobility exercises as patient tolerates.     Plan of Care Certification: 5/4/2023 to 8/4/2023.     Outpatient Physical Therapy 2 times weekly for 8 weeks to include the following interventions: Aquatic Therapy, Cervical/Lumbar Traction, Electrical Stimulation as appropriate, Manual Therapy, Moist Heat/ Ice, Neuromuscular Re-ed, Patient Education, Self Care, Therapeutic Activities, Therapeutic Exercise, Ultrasound, and Dry Needling.     Jonathan Favre, PTA            "

## 2023-05-25 ENCOUNTER — CLINICAL SUPPORT (OUTPATIENT)
Dept: REHABILITATION | Facility: HOSPITAL | Age: 68
End: 2023-05-25
Payer: MEDICARE

## 2023-05-25 DIAGNOSIS — M48.062 SPINAL STENOSIS, LUMBAR REGION WITH NEUROGENIC CLAUDICATION: ICD-10-CM

## 2023-05-25 DIAGNOSIS — M54.16 LUMBAR RADICULOPATHY: Primary | ICD-10-CM

## 2023-05-25 PROCEDURE — 97140 MANUAL THERAPY 1/> REGIONS: CPT | Mod: PN

## 2023-05-25 PROCEDURE — 97110 THERAPEUTIC EXERCISES: CPT | Mod: PN

## 2023-05-25 PROCEDURE — 97014 ELECTRIC STIMULATION THERAPY: CPT | Mod: PN

## 2023-05-25 NOTE — PROGRESS NOTES
[facility]   Physical Therapy Treatment Note      Name: Elliott Gaspar  Clinic Number: [id number]    Therapy Diagnosis:   Encounter Diagnoses   Name Primary?    Lumbar radiculopathy Yes    Spinal stenosis, lumbar region with neurogenic claudication      Physician: Aaron Fernandez MD    Visit Date: 5/25/2023    Physician Orders: PT Eval and Treat - Neck and Low Back  Medical Diagnosis from Referral: M54.16 (ICD-10-CM) - Lumbar radiculopathy M48.062 (ICD-10-CM) - Spinal stenosis, lumbar region with neurogenic claudication M54.12 (ICD-10-CM) - Cervical radiculopathy   Evaluation Date: 5/4/2023  Authorization Period Expiration: 6/04/2023   Plan of Care Expiration: 8/4/2023  Visit # / Visits authorized: 5 / 12 (Not including Initial Evaluation)  FOTO Visit #:  1/3    PTA Visit #: 1/5     Time In: 9:03 AM  Time Out: 10:15 AM  Total Billable Time: 53 minutes + IFC with Cold Pack    Subjective     Pt reports: No new complaints. I did want to ask about using the stim at the lower back just because it made such a difference in pain at the shoulder.     He was compliant with home exercise program.  Response to previous treatment: N/A  Functional change: N/A    Pain: 3/10  Location: bilateral low back      Objective      Objective Measures updated at progress report unless specified.     Treatment     Frankie received the treatments listed below:      Therapeutic Exercises to develop strength, endurance, ROM, and flexibility for 45 minutes including:  Nustep Level 5 x8min, Level 3 x7min  Seated Lumbar Flexion w/ PB x 2 min  DKC w/ PB x 2 min  Hooklying SLR with Strap Assist 10x R/L  Supine (for less posterior pelvic tilt) SLR with Strap Assist 10x R/L  LTR 15x R/L  Hooklying Piriformis stretch [id number] R/L  Ball Squeezes x 2 min  Supine Clams x 15  Bridge 20x   Bridges with BLE over physio ball x 15  Posterior pelvic Tilts 5-10sec holds x 2mins of work  Tr Ab activation x 10 - 3 sec hold  Supine Knee  fallouts x 15  Hooklying Braced Alt March 2 rounds x 30sec work  Following Manual:  Prone Quad stretch, [id number] R/L, performed by PT  Prone Passive Hip IR for stretch, [id number] R/L, performed by PT  Prone Contract - Relax stretch for quad 5 x 5sec  Prone Hip Extension (knee straight) 1 x 10 R/L  Heel Cord stretch on wedge 3 x 30 sec  Standing Hamstring Stretch with heel propped on step, 3 x 30 sec R/L      Manual Therapy Techniques: were applied to the low back/neck: for 8 minutes, including:  Joint Mobilizations -  Thoracic: PA mobilizations at Bilateral Transverse Proc of Mid-Lower Thoracic, Grade 2-4, oscillatory  Thoracic: PA mobilizations at R/L Transverse Proc of Mid-Lower Thoracic, Grade 1-2 springing  Lumbar: PA mobilizations at Bilateral Transverse Proc of Lumbar segments, Grade 2-3, oscillatory  Lumbar: PA mobilizations at R/L Transverse Proc of Lumbar segments, Grade 1-2 springing  STM / DTM -  R/L lumbar paravertebrals and QL musculature  Myofascial Release   Bilateral piriformis   Bilateral psoas ( light due to old hernia sx)   Multifidus   Sub occipitals   Upper trap/scalenes    Supervised Modalities after being cleared for contradictions: IFC Electrical Stimulation:  Elliott received IFC Electrical Stimulation for pain control applied to the lower lumbar paravertebrals. Pt received stimulation at 18volts (within patient's tolerance) for 15 minutes. Elliott tolderated treatment well without any adverse effects.      Cold pack for 15 minutes to lower back in combination with IFC E-stim.  Hot pack for - minutes to -.    Neuromuscular Re-Education activities to improve: - for - minutes. The following activities were included:  Therapeutic Activities to improve functional performance for - minutes, including:  Direct Contact Modalities after being cleared for contraindications:    Patient Education and Home Exercises       Education provided: HEP review    Written Home Exercises Provided: yes. Exercises  "were reviewed and Frankie was able to demonstrate them prior to the end of the session.  Frankie demonstrated good  understanding of the education provided. See EMR under Patient Instructions for exercises provided during therapy sessions    Assessment     Patient demonstrates good tolerance with today's session. Tightness persists in bilateral hamstrings and Left Rectus femoris. Glute bridge is performed without pain provocation and with appropriate muscle engagement. Much improved performance technique with posterior pelvic tilts in hooklying. Tenderness elicited with springing over the right transv process of L4-5. Increased tissue density remains at paraspinals and QL bilaterally. Verbal cues to avoid breath holding, allowing for greater ease and tissue response to stretches. Session ended with IFC and cold pack. Patient denies pain exacerbation at session conclusion.     Frankie Is progressing well towards his goals.   Pt prognosis is Good.     Pt will continue to benefit from skilled outpatient physical therapy to address the deficits listed in the problem list box on initial evaluation, provide pt/family education and to maximize pt's level of independence in the home and community environment.   Pt's spiritual, cultural and educational needs considered and pt agreeable to plan of care and goals.     Anticipated barriers to physical therapy: None    Goals:   Short Term Goals: 4 weeks   1. Report decreased lower back and neck pain  < / =  8/10 "At Worst" to increase tolerance for functional mobility and ADLs.  2. Increase cervical and lumbar ROM to 75% of full in all planes of motion without pain provocation in order to perform ADLs without difficulty.  3. Increase strength to 4+/5 MMT grade grossly throughout to increase tolerance for ADL and work activities.  4. Demonstrate (-) flexibility limitations in BLE to promote ROM without restriction.  5. Demonstrate (-) neural tension testing to improve positional tolerance.   " "  Long Term Goals: 8 weeks   1. Report decreased pain < / = 6/10 "At Worst" to increase tolerance for functional mobility and ADLs.  2. Patient goal: To reduce the pain without medication;   3. Increase strength to 5/5 MMT grade grossly throughout to increase tolerance for ADL and work activities.  4. Pt will report at </= 35% limitation on FOTO  to demonstrate increase in LE function with every day tasks.   5. Patient to demonstrate independence with comprehensive HEP for self-management.    Plan     Continue per POC and progress with strength/mobility exercises as patient tolerates.     Plan of Care Certification: 5/4/2023 to 8/4/2023.     Outpatient Physical Therapy 2 times weekly for 8 weeks to include the following interventions: Aquatic Therapy, Cervical/Lumbar Traction, Electrical Stimulation as appropriate, Manual Therapy, Moist Heat/ Ice, Neuromuscular Re-ed, Patient Education, Self Care, Therapeutic Activities, Therapeutic Exercise, Ultrasound, and Dry Needling.     Vannessa Frost, PT      "

## 2023-05-30 ENCOUNTER — CLINICAL SUPPORT (OUTPATIENT)
Dept: REHABILITATION | Facility: HOSPITAL | Age: 68
End: 2023-05-30
Payer: MEDICARE

## 2023-05-30 DIAGNOSIS — M54.16 LUMBAR RADICULOPATHY: Primary | ICD-10-CM

## 2023-05-30 PROCEDURE — 97110 THERAPEUTIC EXERCISES: CPT | Mod: PN,CQ

## 2023-05-30 PROCEDURE — 97014 ELECTRIC STIMULATION THERAPY: CPT | Mod: PN,CQ

## 2023-05-30 NOTE — PROGRESS NOTES
[facility]   Physical Therapy Treatment Note      Name: Elliott Gaspar  Clinic Number: [id number]    Therapy Diagnosis:   Encounter Diagnosis   Name Primary?    Lumbar radiculopathy Yes     Physician: Aaron Fernandez MD    Visit Date: 5/30/2023    Physician Orders: PT Eval and Treat - Neck and Low Back  Medical Diagnosis from Referral: M54.16 (ICD-10-CM) - Lumbar radiculopathy M48.062 (ICD-10-CM) - Spinal stenosis, lumbar region with neurogenic claudication M54.12 (ICD-10-CM) - Cervical radiculopathy   Evaluation Date: 5/4/2023  Authorization Period Expiration: 6/04/2023   Plan of Care Expiration: 8/4/2023  Visit # / Visits authorized: 6 / 12 (Not including Initial Evaluation)  FOTO Visit #:  1/3    PTA Visit #: 1/5     Time In: 9:05 AM  Time Out: 10:20 AM  Total Billable Time: 53 minutes + IFC with Cold Pack    Subjective     Pt reports: My low back started hurting Saturday afternoon and it has gotten progressively worse.     He was compliant with home exercise program.  Response to previous treatment: N/A  Functional change: N/A    Pain: 5/10  Location: bilateral low back      Objective      Objective Measures updated at progress report unless specified.     Treatment     Frankie received the treatments listed below:      Therapeutic Exercises to develop strength, endurance, ROM, and flexibility for 45 minutes including:  Nustep Level 4 x 15 min  Seated Lumbar Flexion w/ PB x 2 min  DKC w/ PB x 2 min  Hooklying SLR with Strap Assist 10x R/L  Supine (for less posterior pelvic tilt) SLR with Strap Assist 10x R/L  LTR 15x R/L  Hooklying Piriformis stretch [id number] R/L  Ball Squeezes x 2 min  Supine Clams x 15  Bridge 20x   Bridges with BLE over physio ball x 15  Posterior pelvic Tilts 5-10sec holds x 2mins of work  Tr Ab activation x 10 - 3 sec hold  Supine Knee fallouts x 15  Hooklying Braced Alt March 2 rounds x 30sec work  Prone Quad stretch w/ strap [id number] R/L  Prone Passive Hip IR for  stretch, [id number] R/L, performed by PT  Prone Contract - Relax stretch for quad 5 x 5sec  Prone Hip Extension (knee straight) 1 x 10 R/L  Heel Cord stretch on wedge 3 x 30 sec  Standing Hamstring Stretch with heel propped on step, 3 x 30 sec R/L      Manual Therapy Techniques: were applied to the low back/neck: for 0 minutes, including:  Joint Mobilizations -  Thoracic: PA mobilizations at Bilateral Transverse Proc of Mid-Lower Thoracic, Grade 2-4, oscillatory  Thoracic: PA mobilizations at R/L Transverse Proc of Mid-Lower Thoracic, Grade 1-2 springing  Lumbar: PA mobilizations at Bilateral Transverse Proc of Lumbar segments, Grade 2-3, oscillatory  Lumbar: PA mobilizations at R/L Transverse Proc of Lumbar segments, Grade 1-2 springing  STM / DTM -  R/L lumbar paravertebrals and QL musculature  Myofascial Release   Bilateral piriformis   Bilateral psoas ( light due to old hernia sx)   Multifidus   Sub occipitals   Upper trap/scalenes    Supervised Modalities after being cleared for contradictions: IFC Electrical Stimulation:  Elliott received IFC Electrical Stimulation for pain control applied to the lower lumbar paravertebrals. Pt received stimulation at 18volts (within patient's tolerance) for 20 minutes. Elliott tolderated treatment well without any adverse effects.      Cold pack for 15 minutes to lower back in combination with IFC E-stim.  Hot pack for - minutes to -.    Neuromuscular Re-Education activities to improve: - for - minutes. The following activities were included:  Therapeutic Activities to improve functional performance for - minutes, including:  Direct Contact Modalities after being cleared for contraindications:    Patient Education and Home Exercises       Education provided: HEP review    Written Home Exercises Provided: yes. Exercises were reviewed and Frankie was able to demonstrate them prior to the end of the session.  Frankie demonstrated good  understanding of the education provided. See EMR under  "Patient Instructions for exercises provided during therapy sessions    Assessment     Patient demonstrates good tolerance with today's session. Tightness persists in bilateral hamstrings and Left Rectus femoris. Glute bridge is performed without pain provocation and with appropriate muscle engagement. Much improved performance technique with posterior pelvic tilts in hooklying. Increased tissue density remains at paraspinals and QL bilaterally. Verbal cues to avoid breath holding, allowing for greater ease and tissue response to stretches. Session ended with IFC and cold pack. Patient denies pain exacerbation at session conclusion.     Frankie Is progressing well towards his goals.   Pt prognosis is Good.     Pt will continue to benefit from skilled outpatient physical therapy to address the deficits listed in the problem list box on initial evaluation, provide pt/family education and to maximize pt's level of independence in the home and community environment.   Pt's spiritual, cultural and educational needs considered and pt agreeable to plan of care and goals.     Anticipated barriers to physical therapy: None    Goals:   Short Term Goals: 4 weeks   1. Report decreased lower back and neck pain  < / =  8/10 "At Worst" to increase tolerance for functional mobility and ADLs.  2. Increase cervical and lumbar ROM to 75% of full in all planes of motion without pain provocation in order to perform ADLs without difficulty.  3. Increase strength to 4+/5 MMT grade grossly throughout to increase tolerance for ADL and work activities.  4. Demonstrate (-) flexibility limitations in BLE to promote ROM without restriction.  5. Demonstrate (-) neural tension testing to improve positional tolerance.     Long Term Goals: 8 weeks   1. Report decreased pain < / = 6/10 "At Worst" to increase tolerance for functional mobility and ADLs.  2. Patient goal: To reduce the pain without medication;   3. Increase strength to 5/5 MMT grade grossly " throughout to increase tolerance for ADL and work activities.  4. Pt will report at </= 35% limitation on FOTO  to demonstrate increase in LE function with every day tasks.   5. Patient to demonstrate independence with comprehensive HEP for self-management.    Plan     Continue per POC and progress with strength/mobility exercises as patient tolerates.     Plan of Care Certification: 5/4/2023 to 8/4/2023.     Outpatient Physical Therapy 2 times weekly for 8 weeks to include the following interventions: Aquatic Therapy, Cervical/Lumbar Traction, Electrical Stimulation as appropriate, Manual Therapy, Moist Heat/ Ice, Neuromuscular Re-ed, Patient Education, Self Care, Therapeutic Activities, Therapeutic Exercise, Ultrasound, and Dry Needling.     Jonathan Favre, PTA

## 2023-06-01 ENCOUNTER — CLINICAL SUPPORT (OUTPATIENT)
Dept: REHABILITATION | Facility: HOSPITAL | Age: 68
End: 2023-06-01
Payer: MEDICARE

## 2023-06-01 DIAGNOSIS — M54.16 LUMBAR RADICULOPATHY: Primary | ICD-10-CM

## 2023-06-01 DIAGNOSIS — M54.12 CERVICAL RADICULOPATHY: ICD-10-CM

## 2023-06-01 DIAGNOSIS — M48.062 SPINAL STENOSIS, LUMBAR REGION WITH NEUROGENIC CLAUDICATION: ICD-10-CM

## 2023-06-01 PROCEDURE — 97140 MANUAL THERAPY 1/> REGIONS: CPT | Mod: PN

## 2023-06-01 PROCEDURE — 97110 THERAPEUTIC EXERCISES: CPT | Mod: PN

## 2023-06-01 PROCEDURE — 97014 ELECTRIC STIMULATION THERAPY: CPT | Mod: PN

## 2023-06-01 NOTE — PROGRESS NOTES
[facility]   Physical Therapy Treatment Note      Name: lEliott Gaspar  Clinic Number: [id number]    Therapy Diagnosis:   Encounter Diagnoses   Name Primary?    Lumbar radiculopathy Yes    Spinal stenosis, lumbar region with neurogenic claudication     Cervical radiculopathy      Physician: Aaron Fernandez MD    Visit Date: 6/1/2023    Physician Orders: PT Eval and Treat - Neck and Low Back  Medical Diagnosis from Referral: M54.16 (ICD-10-CM) - Lumbar radiculopathy M48.062 (ICD-10-CM) - Spinal stenosis, lumbar region with neurogenic claudication M54.12 (ICD-10-CM) - Cervical radiculopathy   Evaluation Date: 5/4/2023  Authorization Period Expiration: 6/4/2023   Plan of Care Expiration: 8/4/2023  Visit # / Visits authorized: 7 / 12 (Not including Initial Evaluation)  FOTO Visit #:  1/3    PTA Visit #: 1/5     Time In: 9:03 AM  Time Out: 10:13 AM  Total Billable Time: 53 minutes + IFC with Hot Pack    Subjective     Pt reports: The back hasn't been so good this week. I'm not sure what has aggravated the back. I haven't lifted anything heavy or twisted my back. The electrical stim provides the most relief.    He was compliant with home exercise program.  Response to previous treatment: N/A  Functional change: None reported.    Pain: 4/10  Location: bilateral low back      Objective      Objective Measures updated at progress report unless specified.     Treatment     Frankie received the treatments listed below:      Therapeutic Exercises to develop strength, endurance, ROM, and flexibility for 23 minutes including:  Nustep Level 4 x 15 minutes  Seated Hamstring stretch with foot propped, [id number] R/L  Seated Lumbar Flexion stretch 10x 5sec  Seated Thoracic Rotation stretch 5x R/L  Following Manual:  Cat Camel 15x  Quadruped posterior rock backs 10x 5sec  Kell Pose 2x 15sec  DKC w/ PB x 2 min  Hooklying SLR with Strap Assist 10x R/L  Supine (for less posterior pelvic tilt) SLR with Strap Assist  10x R/L  LTR 15x R/L  Hooklying Piriformis stretch [id number] R/L  Ball Squeezes x 2 min  Supine Clams x 15  Bridge 20x   Bridges with BLE over physio ball x 15  Posterior pelvic Tilts 5-10sec holds x 2mins of work  Tr Ab activation x 10 - 3 sec hold  Supine Knee fallouts x 15  Hooklying Braced Alt March 2 rounds x 30sec work  Prone Quad stretch w/ strap [id number] R/L  Prone Passive Hip IR for stretch, [id number] R/L, performed by PT  Prone Contract - Relax stretch for quad 5 x 5sec  Prone Hip Extension (knee straight) 1 x 10 R/L  Heel Cord stretch on wedge 3 x 30 sec    Manual Therapy Techniques: were applied to the low back/neck: for 30 minutes, including:  Joint Mobilizations -  Thoracic: PA mobilizations at Bilateral Transverse Proc of Mid-Lower Thoracic, Grade 2-4, oscillatory  Thoracic: PA mobilizations at R/L Transverse Proc of Mid-Lower Thoracic, Grade 1-2 springing  Lumbar: PA mobilizations at Bilateral Transverse Proc of Lumbar segments, Grade 2-3, oscillatory  Lumbar: PA mobilizations at R/L Transverse Proc of Lumbar segments, Grade 1-2 springing  STM / DTM -  R/L lumbar paravertebrals and QL musculature  Myofascial Release   Bilateral piriformis   Bilateral psoas ( light due to old hernia sx)   Multifidus   Sub occipitals   Upper trap/scalenes    Supervised Modalities after being cleared for contradictions: IFC Electrical Stimulation:  Elliott received IFC Electrical Stimulation for pain control applied to the lower lumbar paravertebrals. Pt received stimulation at 20volts (within patient's tolerance) for 20 minutes. Elliott tolderated treatment well without any adverse effects.      Hot pack for 20 minutes to lower back in combination with IFC E-stim.    Neuromuscular Re-Education activities to improve: - for - minutes. The following activities were included:  Therapeutic Activities to improve functional performance for - minutes, including:  Direct Contact Modalities after being cleared for  "contraindications:    Patient Education and Home Exercises       Education provided: HEP review    Written Home Exercises Provided: yes. Exercises were reviewed and Frankie was able to demonstrate them prior to the end of the session.  Frankie demonstrated good  understanding of the education provided. See EMR under Patient Instructions for exercises provided during therapy sessions    Assessment     Patient demonstrates good tolerance with today's session. Interventions continued with focus on spinal mobility, specifically extension, along with Trunk and LE flexibility. Tightness persists in bilateral hamstrings and Left Rectus femoris. Upper-Mid Thoracic spine demonstrates greatest extension hypomobility. Spinal segments below demonstrate fair mobility. Increased tissue density noted in bilateral lumbar paraspinals and QL bilaterally, however right slightly more restricted. Verbal cues to avoid breath holding, allowing for greater ease and tissue response to stretches. Session ended with IFC and hot pack. Patient denies pain exacerbation at session conclusion.     Frankie Is progressing well towards his goals.   Pt prognosis is Good.     Pt will continue to benefit from skilled outpatient physical therapy to address the deficits listed in the problem list box on initial evaluation, provide pt/family education and to maximize pt's level of independence in the home and community environment.   Pt's spiritual, cultural and educational needs considered and pt agreeable to plan of care and goals.     Anticipated barriers to physical therapy: None    Goals:   Short Term Goals: 4 weeks   1. Report decreased lower back and neck pain  < / =  8/10 "At Worst" to increase tolerance for functional mobility and ADLs.  2. Increase cervical and lumbar ROM to 75% of full in all planes of motion without pain provocation in order to perform ADLs without difficulty.  3. Increase strength to 4+/5 MMT grade grossly throughout to increase tolerance " "for ADL and work activities.  4. Demonstrate (-) flexibility limitations in BLE to promote ROM without restriction.  5. Demonstrate (-) neural tension testing to improve positional tolerance.     Long Term Goals: 8 weeks   1. Report decreased pain < / = 6/10 "At Worst" to increase tolerance for functional mobility and ADLs.  2. Patient goal: To reduce the pain without medication;   3. Increase strength to 5/5 MMT grade grossly throughout to increase tolerance for ADL and work activities.  4. Pt will report at </= 35% limitation on FOTO  to demonstrate increase in LE function with every day tasks.   5. Patient to demonstrate independence with comprehensive HEP for self-management.    Plan     Continue per POC and progress with strength/mobility exercises as patient tolerates.     Plan of Care Certification: 5/4/2023 to 8/4/2023.     Outpatient Physical Therapy 2 times weekly for 8 weeks to include the following interventions: Aquatic Therapy, Cervical/Lumbar Traction, Electrical Stimulation as appropriate, Manual Therapy, Moist Heat/ Ice, Neuromuscular Re-ed, Patient Education, Self Care, Therapeutic Activities, Therapeutic Exercise, Ultrasound, and Dry Needling.     Vannessa Frost, PT      "

## 2023-06-06 ENCOUNTER — CLINICAL SUPPORT (OUTPATIENT)
Dept: REHABILITATION | Facility: HOSPITAL | Age: 68
End: 2023-06-06
Payer: MEDICARE

## 2023-06-06 DIAGNOSIS — M54.16 LUMBAR RADICULOPATHY: Primary | ICD-10-CM

## 2023-06-06 PROCEDURE — 97014 ELECTRIC STIMULATION THERAPY: CPT | Mod: PN,CQ

## 2023-06-06 PROCEDURE — 97110 THERAPEUTIC EXERCISES: CPT | Mod: PN,CQ

## 2023-06-06 NOTE — PROGRESS NOTES
[facility]   Physical Therapy Treatment Note      Name: Elliott Gaspar  Clinic Number: [id number]    Therapy Diagnosis:   Encounter Diagnosis   Name Primary?    Lumbar radiculopathy Yes     Physician: Aaron Fernandez MD    Visit Date: 6/6/2023    Physician Orders: PT Eval and Treat - Neck and Low Back  Medical Diagnosis from Referral: M54.16 (ICD-10-CM) - Lumbar radiculopathy M48.062 (ICD-10-CM) - Spinal stenosis, lumbar region with neurogenic claudication M54.12 (ICD-10-CM) - Cervical radiculopathy   Evaluation Date: 5/4/2023  Authorization Period Expiration: 6/4/2023   Plan of Care Expiration: 8/4/2023  Visit # / Visits authorized: 8 / 12 (Not including Initial Evaluation)  FOTO Visit #:  1/3    PTA Visit #: 1/5     Time In: 8:00 AM  Time Out: 9:10 AM  Total Billable Time: 55 minutes     Subjective     Pt reports: No new c/o's.     He was compliant with home exercise program.  Response to previous treatment: N/A  Functional change: None reported.    Pain: 4/10  Location: bilateral low back      Objective      Objective Measures updated at progress report unless specified.     Treatment     Frankie received the treatments listed below:      Therapeutic Exercises to develop strength, endurance, ROM, and flexibility for 35 minutes including:  Nustep Level 1 x 15 minutes  Seated Hamstring stretch with foot propped, [id number] R/L  Seated Lumbar Flexion stretch 5 sec hold x 2 min  Seated Thoracic Rotation stretch 5x R/L  Cat Camel 15x  Quadruped posterior rock backs 10x 5sec  Kell Pose 2x 15sec  DKC w/ PB x 2 min  Hooklying SLR with Strap Assist 10x R/L  Supine (for less posterior pelvic tilt) SLR with Strap Assist 10x R/L  LTR 15x R/L  Hooklying Piriformis stretch [id number] R/L  Ball Squeezes x 2 min  Supine Clams x 15  Bridge 20x   Bridges with BLE over physio ball x 15  Posterior pelvic Tilts 5-10sec holds x 2mins of work  Tr Ab activation x 10 - 3 sec hold  Supine Knee fallouts x 15  Hooklying  Braced Alt March 2 rounds x 30sec work  Prone Quad stretch w/ strap [id number] R/L  Prone Passive Hip IR for stretch, [id number] R/L, performed by PT  Prone Contract - Relax stretch for quad 5 x 5sec  Prone Hip Extension (knee straight) 1 x 10 R/L  Heel Cord stretch on wedge 3 x 30 sec    Manual Therapy Techniques: were applied to the low back/neck: for 0 minutes, including:  Joint Mobilizations -  Thoracic: PA mobilizations at Bilateral Transverse Proc of Mid-Lower Thoracic, Grade 2-4, oscillatory  Thoracic: PA mobilizations at R/L Transverse Proc of Mid-Lower Thoracic, Grade 1-2 springing  Lumbar: PA mobilizations at Bilateral Transverse Proc of Lumbar segments, Grade 2-3, oscillatory  Lumbar: PA mobilizations at R/L Transverse Proc of Lumbar segments, Grade 1-2 springing  STM / DTM -  R/L lumbar paravertebrals and QL musculature  Myofascial Release   Bilateral piriformis   Bilateral psoas ( light due to old hernia sx)   Multifidus   Sub occipitals   Upper trap/scalenes    Supervised Modalities after being cleared for contradictions: IFC Electrical Stimulation:  Elliott received IFC Electrical Stimulation for pain control applied to the lower lumbar paravertebrals. Pt received stimulation at 20volts (within patient's tolerance) for 20 minutes. Elliott tolderated treatment well without any adverse effects.      Hot pack for 20 minutes to lower back in combination with IFC E-stim.    Neuromuscular Re-Education activities to improve: - for - minutes. The following activities were included:  Therapeutic Activities to improve functional performance for - minutes, including:  Direct Contact Modalities after being cleared for contraindications:    Patient Education and Home Exercises       Education provided: HEP review    Written Home Exercises Provided: yes. Exercises were reviewed and Frankie was able to demonstrate them prior to the end of the session.  Frankie demonstrated good  understanding of the education provided. See EMR  "under Patient Instructions for exercises provided during therapy sessions    Assessment     Patient demonstrates good tolerance with today's session. Interventions continued with focus on spinal mobility, specifically extension, along with Trunk and LE flexibility. Tightness persists in bilateral hamstrings and Left Rectus femoris. Verbal cues to avoid breath holding, allowing for greater ease and tissue response to stretches. Session ended with IFC and hot pack. Patient denies pain exacerbation at session conclusion.     Frankie Is progressing well towards his goals.   Pt prognosis is Good.     Pt will continue to benefit from skilled outpatient physical therapy to address the deficits listed in the problem list box on initial evaluation, provide pt/family education and to maximize pt's level of independence in the home and community environment.   Pt's spiritual, cultural and educational needs considered and pt agreeable to plan of care and goals.     Anticipated barriers to physical therapy: None    Goals:   Short Term Goals: 4 weeks   1. Report decreased lower back and neck pain  < / =  8/10 "At Worst" to increase tolerance for functional mobility and ADLs.  2. Increase cervical and lumbar ROM to 75% of full in all planes of motion without pain provocation in order to perform ADLs without difficulty.  3. Increase strength to 4+/5 MMT grade grossly throughout to increase tolerance for ADL and work activities.  4. Demonstrate (-) flexibility limitations in BLE to promote ROM without restriction.  5. Demonstrate (-) neural tension testing to improve positional tolerance.     Long Term Goals: 8 weeks   1. Report decreased pain < / = 6/10 "At Worst" to increase tolerance for functional mobility and ADLs.  2. Patient goal: To reduce the pain without medication;   3. Increase strength to 5/5 MMT grade grossly throughout to increase tolerance for ADL and work activities.  4. Pt will report at </= 35% limitation on FOTO  to " demonstrate increase in LE function with every day tasks.   5. Patient to demonstrate independence with comprehensive HEP for self-management.    Plan     Continue per POC and progress with strength/mobility exercises as patient tolerates.     Plan of Care Certification: 5/4/2023 to 8/4/2023.     Outpatient Physical Therapy 2 times weekly for 8 weeks to include the following interventions: Aquatic Therapy, Cervical/Lumbar Traction, Electrical Stimulation as appropriate, Manual Therapy, Moist Heat/ Ice, Neuromuscular Re-ed, Patient Education, Self Care, Therapeutic Activities, Therapeutic Exercise, Ultrasound, and Dry Needling.     Jonathan Favre, PTA

## 2023-06-08 ENCOUNTER — CLINICAL SUPPORT (OUTPATIENT)
Dept: REHABILITATION | Facility: HOSPITAL | Age: 68
End: 2023-06-08
Payer: MEDICARE

## 2023-06-08 DIAGNOSIS — M54.16 LUMBAR RADICULOPATHY: Primary | ICD-10-CM

## 2023-06-08 PROCEDURE — 97014 ELECTRIC STIMULATION THERAPY: CPT | Mod: KX,PN,CQ

## 2023-06-08 PROCEDURE — 97110 THERAPEUTIC EXERCISES: CPT | Mod: KX,PN,CQ

## 2023-06-08 NOTE — PROGRESS NOTES
[facility]   Physical Therapy Treatment Note      Name: Elliott Gaspar  Clinic Number: [id number]    Therapy Diagnosis:   Encounter Diagnosis   Name Primary?    Lumbar radiculopathy Yes     Physician: Aaron Fernandez MD    Visit Date: 6/8/2023    Physician Orders: PT Eval and Treat - Neck and Low Back  Medical Diagnosis from Referral: M54.16 (ICD-10-CM) - Lumbar radiculopathy M48.062 (ICD-10-CM) - Spinal stenosis, lumbar region with neurogenic claudication M54.12 (ICD-10-CM) - Cervical radiculopathy   Evaluation Date: 5/4/2023  Authorization Period Expiration: 6/4/2023   Plan of Care Expiration: 8/4/2023  Visit # / Visits authorized: 9 / 12 (Not including Initial Evaluation)  FOTO Visit #:  1/3    PTA Visit #: 2/5     Time In: 8:00 AM  Time Out: 9:10 AM  Total Billable Time: 60 minutes     Subjective     Pt reports: No new c/o's.     He was compliant with home exercise program.  Response to previous treatment: N/A  Functional change: None reported.    Pain: 1/10  Location: bilateral low back      Objective      Objective Measures updated at progress report unless specified.     Treatment     Frankie received the treatments listed below:      Therapeutic Exercises to develop strength, endurance, ROM, and flexibility for 40 minutes including:  Nustep Level 1 x 15 minutes  Seated Hamstring stretch with foot propped, [id number] R/L  Seated Lumbar Flexion stretch 5 sec hold x 2 min  Seated Thoracic Rotation stretch 5x R/L  Cat Camel 15x  Quadruped posterior rock backs 10x 5sec  Kell Pose 2x 15sec  DKC w/ PB x 2 min  Hooklying SLR with Strap Assist 10x R/L  Supine (for less posterior pelvic tilt) SLR with Strap Assist 10x R/L  LTR w/ PB x 2 min  Hooklying Piriformis stretch [id number] R/L  Ball Squeezes x 2 min  Supine Clams x 15  Bridge 20x   Bridges with BLE over physio ball x 15  Posterior pelvic Tilts 5-10sec holds x 2mins of work  Tr Ab activation x 10 - 3 sec hold  Supine Knee fallouts x  15  Hooklying Braced Alt March 2 rounds x 30sec work  Prone Quad stretch w/ strap [id number] R/L  Prone Passive Hip IR for stretch, [id number] R/L, performed by PT  Prone Contract - Relax stretch for quad 5 x 5sec  Prone Hip Extension (knee straight) 1 x 10 R/L  Heel Cord stretch on wedge 3 x 30 sec    Manual Therapy Techniques: were applied to the low back/neck: for 0 minutes, including:  Joint Mobilizations -  Thoracic: PA mobilizations at Bilateral Transverse Proc of Mid-Lower Thoracic, Grade 2-4, oscillatory  Thoracic: PA mobilizations at R/L Transverse Proc of Mid-Lower Thoracic, Grade 1-2 springing  Lumbar: PA mobilizations at Bilateral Transverse Proc of Lumbar segments, Grade 2-3, oscillatory  Lumbar: PA mobilizations at R/L Transverse Proc of Lumbar segments, Grade 1-2 springing  STM / DTM -  R/L lumbar paravertebrals and QL musculature  Myofascial Release   Bilateral piriformis   Bilateral psoas ( light due to old hernia sx)   Multifidus   Sub occipitals   Upper trap/scalenes    Supervised Modalities after being cleared for contradictions: IFC Electrical Stimulation:  Elliott received IFC Electrical Stimulation for pain control applied to the lower lumbar paravertebrals. Pt received stimulation at 20volts (within patient's tolerance) for 20 minutes. Elliott tolderated treatment well without any adverse effects.      Cold pack for 20 minutes to lower back in combination with IFC E-stim.    Neuromuscular Re-Education activities to improve: - for - minutes. The following activities were included:  Therapeutic Activities to improve functional performance for - minutes, including:  Direct Contact Modalities after being cleared for contraindications:    Patient Education and Home Exercises       Education provided: HEP review    Written Home Exercises Provided: yes. Exercises were reviewed and Frankie was able to demonstrate them prior to the end of the session.  Frankie demonstrated good  understanding of the education  "provided. See EMR under Patient Instructions for exercises provided during therapy sessions    Assessment     Patient demonstrates good tolerance with today's session. Interventions continued with focus on spinal mobility, specifically extension, along with Trunk and LE flexibility. Tightness persists in bilateral hamstrings and Left Rectus femoris. Verbal cues to avoid breath holding, allowing for greater ease and tissue response to stretches. Session ended with IFC and hot pack. Patient denies pain exacerbation at session conclusion.     Frankie Is progressing well towards his goals.   Pt prognosis is Good.     Pt will continue to benefit from skilled outpatient physical therapy to address the deficits listed in the problem list box on initial evaluation, provide pt/family education and to maximize pt's level of independence in the home and community environment.   Pt's spiritual, cultural and educational needs considered and pt agreeable to plan of care and goals.     Anticipated barriers to physical therapy: None    Goals:   Short Term Goals: 4 weeks   1. Report decreased lower back and neck pain  < / =  8/10 "At Worst" to increase tolerance for functional mobility and ADLs.  2. Increase cervical and lumbar ROM to 75% of full in all planes of motion without pain provocation in order to perform ADLs without difficulty.  3. Increase strength to 4+/5 MMT grade grossly throughout to increase tolerance for ADL and work activities.  4. Demonstrate (-) flexibility limitations in BLE to promote ROM without restriction.  5. Demonstrate (-) neural tension testing to improve positional tolerance.     Long Term Goals: 8 weeks   1. Report decreased pain < / = 6/10 "At Worst" to increase tolerance for functional mobility and ADLs.  2. Patient goal: To reduce the pain without medication;   3. Increase strength to 5/5 MMT grade grossly throughout to increase tolerance for ADL and work activities.  4. Pt will report at </= 35% " limitation on FOTO  to demonstrate increase in LE function with every day tasks.   5. Patient to demonstrate independence with comprehensive HEP for self-management.    Plan     Continue per POC and progress with strength/mobility exercises as patient tolerates.     Plan of Care Certification: 5/4/2023 to 8/4/2023.     Outpatient Physical Therapy 2 times weekly for 8 weeks to include the following interventions: Aquatic Therapy, Cervical/Lumbar Traction, Electrical Stimulation as appropriate, Manual Therapy, Moist Heat/ Ice, Neuromuscular Re-ed, Patient Education, Self Care, Therapeutic Activities, Therapeutic Exercise, Ultrasound, and Dry Needling.     Jonathan Favre, PTA

## 2023-06-13 ENCOUNTER — CLINICAL SUPPORT (OUTPATIENT)
Dept: REHABILITATION | Facility: HOSPITAL | Age: 68
End: 2023-06-13
Payer: MEDICARE

## 2023-06-13 DIAGNOSIS — M54.16 LUMBAR RADICULOPATHY: Primary | ICD-10-CM

## 2023-06-13 PROCEDURE — 97110 THERAPEUTIC EXERCISES: CPT | Mod: KX,PN,CQ

## 2023-06-13 PROCEDURE — 97014 ELECTRIC STIMULATION THERAPY: CPT | Mod: KX,PN,CQ

## 2023-06-13 NOTE — PROGRESS NOTES
[facility]   Physical Therapy Treatment Note      Name: Elliott Gaspar  Clinic Number: [id number]    Therapy Diagnosis:   Encounter Diagnosis   Name Primary?    Lumbar radiculopathy Yes     Physician: Aaron Fernandez MD    Visit Date: 6/13/2023    Physician Orders: PT Eval and Treat - Neck and Low Back  Medical Diagnosis from Referral: M54.16 (ICD-10-CM) - Lumbar radiculopathy M48.062 (ICD-10-CM) - Spinal stenosis, lumbar region with neurogenic claudication M54.12 (ICD-10-CM) - Cervical radiculopathy   Evaluation Date: 5/4/2023  Authorization Period Expiration: 6/4/2023   Plan of Care Expiration: 8/4/2023  Visit # / Visits authorized: 10 / 12 (Not including Initial Evaluation)  FOTO Visit #:  1/3    PTA Visit #: 3/5     Time In: 8:10 AM  Time Out: 9:20 AM  Total Billable Time: 60 minutes     Subjective     Pt reports: No new c/o's.     He was compliant with home exercise program.  Response to previous treatment: N/A  Functional change: None reported.    Pain: 4/10  Location: bilateral low back      Objective      Objective Measures updated at progress report unless specified.     Treatment     Frankie received the treatments listed below:      Therapeutic Exercises to develop strength, endurance, ROM, and flexibility for 40 minutes including:  Nustep Level 2 x 15 minutes  Seated Hamstring stretch with foot propped, [id number] R/L  Seated Lumbar Flexion stretch 5 sec hold x 2 min  Seated Thoracic Rotation stretch 5x R/L  Cat Camel 15x  Quadruped posterior rock backs 10x 5sec  Kell Pose 2x 15sec  DKC w/ PB x 2 min  Hooklying SLR with Strap Assist 10x R/L  Supine (for less posterior pelvic tilt) SLR with Strap Assist 10x R/L  LTR w/ PB x 2 min  Hooklying Piriformis stretch [id number] R/L  Ball Squeezes x 2 min  Supine Clams x 15  Bridge 20x   Bridges with BLE over physio ball x 15  Posterior pelvic Tilts 5-10sec holds x 2mins of work  Tr Ab activation x 10 - 3 sec hold  Supine Knee fallouts x  15  Hooklying Braced Alt March 2 rounds x 30sec work  Prone Quad stretch w/ strap [id number] R/L  Prone Passive Hip IR for stretch, [id number] R/L, performed by PT  Prone Contract - Relax stretch for quad 5 x 5sec  Prone Hip Extension (knee straight) 1 x 10 R/L  Heel Cord stretch on wedge 3 x 30 sec    Manual Therapy Techniques: were applied to the low back/neck: for 0 minutes, including:  Joint Mobilizations -  Thoracic: PA mobilizations at Bilateral Transverse Proc of Mid-Lower Thoracic, Grade 2-4, oscillatory  Thoracic: PA mobilizations at R/L Transverse Proc of Mid-Lower Thoracic, Grade 1-2 springing  Lumbar: PA mobilizations at Bilateral Transverse Proc of Lumbar segments, Grade 2-3, oscillatory  Lumbar: PA mobilizations at R/L Transverse Proc of Lumbar segments, Grade 1-2 springing  STM / DTM -  R/L lumbar paravertebrals and QL musculature  Myofascial Release   Bilateral piriformis   Bilateral psoas ( light due to old hernia sx)   Multifidus   Sub occipitals   Upper trap/scalenes    Supervised Modalities after being cleared for contradictions: IFC Electrical Stimulation:  Elliott received IFC Electrical Stimulation for pain control applied to the lower lumbar paravertebrals. Pt received stimulation at 20volts (within patient's tolerance) for 20 minutes. Elliott tolderated treatment well without any adverse effects.      Cold pack for 20 minutes to lower back in combination with IFC E-stim.    Neuromuscular Re-Education activities to improve: - for - minutes. The following activities were included:  Therapeutic Activities to improve functional performance for - minutes, including:  Direct Contact Modalities after being cleared for contraindications:    Patient Education and Home Exercises       Education provided: HEP review    Written Home Exercises Provided: yes. Exercises were reviewed and Frankie was able to demonstrate them prior to the end of the session.  Frankie demonstrated good  understanding of the education  "provided. See EMR under Patient Instructions for exercises provided during therapy sessions    Assessment     Patient demonstrates good tolerance with today's session. Interventions continued with focus on spinal mobility, specifically extension, along with Trunk and LE flexibility. Tightness persists in bilateral hamstrings and Left Rectus femoris. Verbal cues to avoid breath holding, allowing for greater ease and tissue response to stretches. Session ended with IFC and hot pack. Patient denies pain exacerbation at session conclusion.     Frankie Is progressing well towards his goals.   Pt prognosis is Good.     Pt will continue to benefit from skilled outpatient physical therapy to address the deficits listed in the problem list box on initial evaluation, provide pt/family education and to maximize pt's level of independence in the home and community environment.   Pt's spiritual, cultural and educational needs considered and pt agreeable to plan of care and goals.     Anticipated barriers to physical therapy: None    Goals:   Short Term Goals: 4 weeks   1. Report decreased lower back and neck pain  < / =  8/10 "At Worst" to increase tolerance for functional mobility and ADLs.  2. Increase cervical and lumbar ROM to 75% of full in all planes of motion without pain provocation in order to perform ADLs without difficulty.  3. Increase strength to 4+/5 MMT grade grossly throughout to increase tolerance for ADL and work activities.  4. Demonstrate (-) flexibility limitations in BLE to promote ROM without restriction.  5. Demonstrate (-) neural tension testing to improve positional tolerance.     Long Term Goals: 8 weeks   1. Report decreased pain < / = 6/10 "At Worst" to increase tolerance for functional mobility and ADLs.  2. Patient goal: To reduce the pain without medication;   3. Increase strength to 5/5 MMT grade grossly throughout to increase tolerance for ADL and work activities.  4. Pt will report at </= 35% " limitation on FOTO  to demonstrate increase in LE function with every day tasks.   5. Patient to demonstrate independence with comprehensive HEP for self-management.    Plan     Continue per POC and progress with strength/mobility exercises as patient tolerates.     Plan of Care Certification: 5/4/2023 to 8/4/2023.     Outpatient Physical Therapy 2 times weekly for 8 weeks to include the following interventions: Aquatic Therapy, Cervical/Lumbar Traction, Electrical Stimulation as appropriate, Manual Therapy, Moist Heat/ Ice, Neuromuscular Re-ed, Patient Education, Self Care, Therapeutic Activities, Therapeutic Exercise, Ultrasound, and Dry Needling.     Jonathan Favre, PTA

## 2023-06-15 ENCOUNTER — CLINICAL SUPPORT (OUTPATIENT)
Dept: REHABILITATION | Facility: HOSPITAL | Age: 68
End: 2023-06-15
Payer: MEDICARE

## 2023-06-15 DIAGNOSIS — M54.16 LUMBAR RADICULOPATHY: Primary | ICD-10-CM

## 2023-06-15 PROCEDURE — 97014 ELECTRIC STIMULATION THERAPY: CPT | Mod: KX,PN,CQ

## 2023-06-15 PROCEDURE — 97110 THERAPEUTIC EXERCISES: CPT | Mod: KX,PN,CQ

## 2023-06-15 NOTE — PROGRESS NOTES
[facility]   Physical Therapy Treatment Note      Name: Elliott Gaspar  Clinic Number: [id number]    Therapy Diagnosis:   Encounter Diagnosis   Name Primary?    Lumbar radiculopathy Yes     Physician: Aaron Fernandez MD    Visit Date: 6/15/2023    Physician Orders: PT Eval and Treat - Neck and Low Back  Medical Diagnosis from Referral: M54.16 (ICD-10-CM) - Lumbar radiculopathy M48.062 (ICD-10-CM) - Spinal stenosis, lumbar region with neurogenic claudication M54.12 (ICD-10-CM) - Cervical radiculopathy   Evaluation Date: 5/4/2023  Authorization Period Expiration: 6/4/2023   Plan of Care Expiration: 8/4/2023  Visit # / Visits authorized: 11 / 12 (Not including Initial Evaluation)  FOTO Visit #:  1/3    PTA Visit #: 4/5     Time In: 8:00 AM  Time Out: 9:15 AM  Total Billable Time: 60 minutes     Subjective     Pt reports: I am a little sore today; I had to move furniture yesterday and was hurting pretty bad, so I took some Aleve.    He was compliant with home exercise program.  Response to previous treatment: N/A  Functional change: None reported.    Pain: 4/10  Location: bilateral low back      Objective      Objective Measures updated at progress report unless specified.     Treatment     Frankie received the treatments listed below:      Therapeutic Exercises to develop strength, endurance, ROM, and flexibility for 40 minutes including:  Nustep Level 2 x 15 minutes  Seated Hamstring stretch with foot propped, [id number] R/L  Seated Lumbar Flexion stretch 5 sec hold x 2 min  Seated Thoracic Rotation stretch 5x R/L  Cat Camel 15x  Quadruped posterior rock backs 10x 5sec  Kell Pose 2x 15sec  DKC w/ PB x 2 min  Hooklying SLR with Strap Assist 10x R/L  Supine (for less posterior pelvic tilt) SLR with Strap Assist 10x R/L  LTR w/ PB x 2 min  Hooklying Piriformis stretch [id number] R/L  Ball Squeezes x 2 min  Supine Clams x 15  Bridge 20x   Bridges with BLE over physio ball x 15  Posterior pelvic Tilts  5-10sec holds x 2mins of work  Tr Ab activation x 10 - 3 sec hold  Supine Knee fallouts x 15  Hooklying Braced Alt March 2 rounds x 30sec work  Prone Quad stretch w/ strap [id number] R/L  Prone Passive Hip IR for stretch, [id number] R/L, performed by PT  Prone Contract - Relax stretch for quad 5 x 5sec  Prone Hip Extension (knee straight) 1 x 10 R/L  Heel Cord stretch on wedge 3 x 30 sec    Manual Therapy Techniques: were applied to the low back/neck: for 0 minutes, including:  Joint Mobilizations -  Thoracic: PA mobilizations at Bilateral Transverse Proc of Mid-Lower Thoracic, Grade 2-4, oscillatory  Thoracic: PA mobilizations at R/L Transverse Proc of Mid-Lower Thoracic, Grade 1-2 springing  Lumbar: PA mobilizations at Bilateral Transverse Proc of Lumbar segments, Grade 2-3, oscillatory  Lumbar: PA mobilizations at R/L Transverse Proc of Lumbar segments, Grade 1-2 springing  STM / DTM -  R/L lumbar paravertebrals and QL musculature  Myofascial Release   Bilateral piriformis   Bilateral psoas ( light due to old hernia sx)   Multifidus   Sub occipitals   Upper trap/scalenes    Supervised Modalities after being cleared for contradictions: IFC Electrical Stimulation:  Elliott received IFC Electrical Stimulation for pain control applied to the lower lumbar paravertebrals. Pt received stimulation at 20volts (within patient's tolerance) for 20 minutes. Elliott tolderated treatment well without any adverse effects.      Cold pack for 20 minutes to lower back in combination with IFC E-stim.    Neuromuscular Re-Education activities to improve: - for - minutes. The following activities were included:  Therapeutic Activities to improve functional performance for - minutes, including:  Direct Contact Modalities after being cleared for contraindications:    Patient Education and Home Exercises       Education provided: HEP review    Written Home Exercises Provided: yes. Exercises were reviewed and Frankie was able to demonstrate them  "prior to the end of the session.  Frankie demonstrated good  understanding of the education provided. See EMR under Patient Instructions for exercises provided during therapy sessions    Assessment     Patient demonstrates good tolerance with today's session. Interventions continued with focus on spinal mobility, specifically extension, along with Trunk and LE flexibility. Tightness persists in bilateral hamstrings and Left Rectus femoris. Verbal cues to avoid breath holding, allowing for greater ease and tissue response to stretches. Session ended with IFC and cold pack. Patient denies pain exacerbation at session conclusion.     Frankie Is progressing well towards his goals.   Pt prognosis is Good.     Pt will continue to benefit from skilled outpatient physical therapy to address the deficits listed in the problem list box on initial evaluation, provide pt/family education and to maximize pt's level of independence in the home and community environment.   Pt's spiritual, cultural and educational needs considered and pt agreeable to plan of care and goals.     Anticipated barriers to physical therapy: None    Goals:   Short Term Goals: 4 weeks   1. Report decreased lower back and neck pain  < / =  8/10 "At Worst" to increase tolerance for functional mobility and ADLs.  2. Increase cervical and lumbar ROM to 75% of full in all planes of motion without pain provocation in order to perform ADLs without difficulty.  3. Increase strength to 4+/5 MMT grade grossly throughout to increase tolerance for ADL and work activities.  4. Demonstrate (-) flexibility limitations in BLE to promote ROM without restriction.  5. Demonstrate (-) neural tension testing to improve positional tolerance.     Long Term Goals: 8 weeks   1. Report decreased pain < / = 6/10 "At Worst" to increase tolerance for functional mobility and ADLs.  2. Patient goal: To reduce the pain without medication;   3. Increase strength to 5/5 MMT grade grossly " throughout to increase tolerance for ADL and work activities.  4. Pt will report at </= 35% limitation on FOTO  to demonstrate increase in LE function with every day tasks.   5. Patient to demonstrate independence with comprehensive HEP for self-management.    Plan     Continue per POC and progress with strength/mobility exercises as patient tolerates.     Plan of Care Certification: 5/4/2023 to 8/4/2023.     Outpatient Physical Therapy 2 times weekly for 8 weeks to include the following interventions: Aquatic Therapy, Cervical/Lumbar Traction, Electrical Stimulation as appropriate, Manual Therapy, Moist Heat/ Ice, Neuromuscular Re-ed, Patient Education, Self Care, Therapeutic Activities, Therapeutic Exercise, Ultrasound, and Dry Needling.     Jonathan Favre, PTA

## 2023-06-16 ENCOUNTER — HOSPITAL ENCOUNTER (EMERGENCY)
Facility: HOSPITAL | Age: 68
Discharge: HOME | End: 2023-06-16
Attending: EMERGENCY MEDICINE
Payer: MEDICARE

## 2023-06-16 VITALS
BODY MASS INDEX: 28.88 KG/M2 | HEART RATE: 65 BPM | DIASTOLIC BLOOD PRESSURE: 87 MMHG | TEMPERATURE: 98 F | OXYGEN SATURATION: 97 % | HEIGHT: 69 IN | RESPIRATION RATE: 20 BRPM | WEIGHT: 195 LBS | SYSTOLIC BLOOD PRESSURE: 163 MMHG

## 2023-06-16 DIAGNOSIS — S80.811A ABRASION OF RIGHT LEG, INITIAL ENCOUNTER: Primary | ICD-10-CM

## 2023-06-16 DIAGNOSIS — S80.11XA CONTUSION OF RIGHT LEG, INITIAL ENCOUNTER: ICD-10-CM

## 2023-06-16 DIAGNOSIS — R52 PAIN: ICD-10-CM

## 2023-06-16 PROCEDURE — 99284 EMERGENCY DEPT VISIT MOD MDM: CPT

## 2023-06-16 PROCEDURE — 73590 X-RAY EXAM OF LOWER LEG: CPT | Mod: 26,RT,, | Performed by: RADIOLOGY

## 2023-06-16 PROCEDURE — 73590 X-RAY EXAM OF LOWER LEG: CPT | Mod: TC,RT

## 2023-06-16 PROCEDURE — 73590 XR TIBIA FIBULA 2 VIEW RIGHT: ICD-10-PCS | Mod: 26,RT,, | Performed by: RADIOLOGY

## 2023-06-16 RX ORDER — IBUPROFEN 600 MG/1
600 TABLET ORAL EVERY 6 HOURS PRN
Qty: 20 TABLET | Refills: 0 | Status: SHIPPED | OUTPATIENT
Start: 2023-06-16 | End: 2024-02-28

## 2023-06-16 RX ORDER — HYDROCODONE BITARTRATE AND ACETAMINOPHEN 5; 325 MG/1; MG/1
1 TABLET ORAL EVERY 6 HOURS PRN
Qty: 8 TABLET | Refills: 0 | Status: SHIPPED | OUTPATIENT
Start: 2023-06-16 | End: 2023-06-18

## 2023-06-16 NOTE — ED PROVIDER NOTES
Encounter Date: 6/16/2023       History     Chief Complaint   Patient presents with    Knee Pain     Pt c/o R knee pain s/p fall last night. Reports he tripped and fell on concrete. Abrasion to R knee noted.        67-year-old male here with complaints of pain and abrasion to proximal anterior right leg that he sustained yesterday while walking around 17:30.  Patient reports he tripped over a concrete structure.  Patient able to limp.  Last tetanus 2018.     The history is provided by the patient.   Review of patient's allergies indicates:   Allergen Reactions    Morphine Other (See Comments)     Family HX-mother went into anaphylactic shock    Contrast media      Family history of renal failure with iodinated contrast    Demerol [meperidine]     Morphine Other (See Comments)     pts mother had anaphylaxis from Morphine so he does not want to take     Past Medical History:   Diagnosis Date    Contact lens/glasses fitting     wears contacts    Deviated septum     Fatty liver disease, nonalcoholic     Negative Hep A, B, C in past    Hypertension     Hypokalemia 4/18/2015    Kidney stone     Persistent headaches     related to sinus congestion    Peyronie's disease     Thrombocytopenia     Variable; low normal; Hematology eval in past and no specific f/u advised     Past Surgical History:   Procedure Laterality Date    CARDIAC CATHETERIZATION  2015    NSA of coronaries    HERNIA REPAIR      groin bilat    HERNIA REPAIR  2008    Dr. Kincaid    NASAL SEPTUM SURGERY      SINUS SURGERY  2012     Family History   Problem Relation Age of Onset    Heart disease Mother     Atrial fibrillation Mother     Heart disease Father     Hypertension Father     Heart failure Father     Psoriasis Father     Psoriasis Paternal Grandmother     Alzheimer's disease Maternal Grandmother     Melanoma Neg Hx     Lupus Neg Hx     Eczema Neg Hx      Social History     Tobacco Use    Smoking status: Former     Packs/day: 3.00     Years: 3.00      Pack years: 9.00     Types: Cigarettes, Cigars     Quit date:      Years since quittin.4     Passive exposure: Past    Smokeless tobacco: Never    Tobacco comments:     quit age 18   Substance Use Topics    Alcohol use: No    Drug use: No     Review of Systems   Musculoskeletal:         Right leg pain status post blunt injury yesterday.   Skin:         Abrasion right, anterior proximal right leg.   All other systems reviewed and are negative.    Physical Exam     Initial Vitals [23 0909]   BP Pulse Resp Temp SpO2   (!) 163/87 65 20 97.8 °F (36.6 °C) 97 %      MAP       --         Physical Exam    Nursing note and vitals reviewed.  Constitutional: He appears well-developed and well-nourished.   HENT:   Head: Atraumatic.   Eyes: EOM are normal.   Neck: Neck supple.   Cardiovascular:  Normal rate.           Pulmonary/Chest: No respiratory distress.   Abdominal: Abdomen is soft.   Musculoskeletal:         General: No edema.      Cervical back: Neck supple.      Comments: TTP about proximal right leg.  Right knee and ankle intact.     Neurological: He is oriented to person, place, and time.   Grossly afocal, GCS 15   Skin: Skin is warm and dry.   Large area of abrasion right proximal, anterior leg.   Psychiatric: He has a normal mood and affect.       ED Course   Procedures  Labs Reviewed - No data to display       Imaging Results              X-Ray Tibia Fibula 2 View Right (Final result)  Result time 23 10:22:30      Final result by Meghna Callahan MD (23 10:22:30)                   Impression:      Soft tissue swelling with no fracture or foreign body.      Electronically signed by: Meghna Callahan  Date:    2023  Time:    10:22               Narrative:    EXAMINATION:  XR TIBIA FIBULA 2 VIEW RIGHT    CLINICAL HISTORY:  Pain, unspecified    TECHNIQUE:  AP and lateral views of the right tibia and fibula were performed.    COMPARISON:  Previous knee x-rays  02/16/2023    FINDINGS:  There is no fracture in the tibia or fibula.  The knee and ankle are intact.  There are no foreign bodies in the soft tissues.  There is a suggestion of mild soft tissue swelling in the anterior lower leg.                                       Medications - No data to display  Medical Decision Making:   Differential Diagnosis:   Abrasion, contusion, closed fracture  67-year-old male here with complaints of abrasion and pain to proximal anterior right leg secondary to blunt injury yesterday.  We will clean wound, x-ray for possible fracture and discharge.                        Clinical Impression:   Final diagnoses:  [R52] Pain  [S80.811A] Abrasion of right leg, initial encounter (Primary)  [S80.11XA] Contusion of right leg, initial encounter        ED Disposition Condition    Discharge Stable          ED Prescriptions       Medication Sig Dispense Start Date End Date Auth. Provider    HYDROcodone-acetaminophen (NORCO) 5-325 mg per tablet Take 1 tablet by mouth every 6 (six) hours as needed for Pain. 8 tablet 6/16/2023 6/18/2023 Eliu Mathias MD    ibuprofen (ADVIL,MOTRIN) 600 MG tablet Take 1 tablet (600 mg total) by mouth every 6 (six) hours as needed for Pain. 20 tablet 6/16/2023 -- Eliu Mathias MD          Follow-up Information       Follow up With Specialties Details Why Contact Info    [facility] - Emergency Dept Emergency Medicine  As needed [address] [facility] [address] [phone]             Eliu Mathias MD  06/16/23 [id number]

## 2023-07-05 ENCOUNTER — TELEPHONE (OUTPATIENT)
Dept: FAMILY MEDICINE | Facility: CLINIC | Age: 68
End: 2023-07-05
Payer: MEDICARE

## 2023-07-05 NOTE — TELEPHONE ENCOUNTER
----- Message from Cass Zavala sent at 7/5/2023  9:39 AM CDT -----  Contact: Patient  Type:  Sooner Appointment Request    Caller is requesting a sooner appointment.  Caller declined first available appointment listed below.  Caller will not accept being placed on the waitlist and is requesting a message be sent to doctor.    Name of Caller:  Patient  When is the first available appointment?  7/6  Symptoms:  fell a couple weeks ago and has cellulitis  Best Call Back Number:  [phone]  Additional Information:  Please call the patient back at the phone number listed above to advise. Thank you!

## 2023-07-06 ENCOUNTER — OFFICE VISIT (OUTPATIENT)
Dept: ORTHOPEDICS | Facility: CLINIC | Age: 68
End: 2023-07-06
Payer: MEDICARE

## 2023-07-06 ENCOUNTER — OFFICE VISIT (OUTPATIENT)
Dept: FAMILY MEDICINE | Facility: CLINIC | Age: 68
End: 2023-07-06
Payer: MEDICARE

## 2023-07-06 VITALS — RESPIRATION RATE: 18 BRPM | WEIGHT: 195 LBS | HEIGHT: 69 IN | BODY MASS INDEX: 28.88 KG/M2

## 2023-07-06 VITALS
HEIGHT: 69 IN | BODY MASS INDEX: 29.13 KG/M2 | OXYGEN SATURATION: 98 % | HEART RATE: 70 BPM | SYSTOLIC BLOOD PRESSURE: 160 MMHG | WEIGHT: 196.69 LBS | RESPIRATION RATE: 18 BRPM | DIASTOLIC BLOOD PRESSURE: 98 MMHG

## 2023-07-06 DIAGNOSIS — L03.115 CELLULITIS OF LEG, RIGHT: Primary | ICD-10-CM

## 2023-07-06 DIAGNOSIS — I10 ESSENTIAL HYPERTENSION: ICD-10-CM

## 2023-07-06 DIAGNOSIS — L03.119 CELLULITIS OF LOWER EXTREMITY, UNSPECIFIED LATERALITY: Primary | ICD-10-CM

## 2023-07-06 DIAGNOSIS — L03.115 CELLULITIS OF RIGHT LOWER EXTREMITY: Primary | ICD-10-CM

## 2023-07-06 PROCEDURE — 3008F PR BODY MASS INDEX (BMI) DOCUMENTED: ICD-10-PCS | Mod: CPTII,S$GLB,, | Performed by: FAMILY MEDICINE

## 2023-07-06 PROCEDURE — 3008F BODY MASS INDEX DOCD: CPT | Mod: CPTII,S$GLB,, | Performed by: FAMILY MEDICINE

## 2023-07-06 PROCEDURE — 3044F PR MOST RECENT HEMOGLOBIN A1C LEVEL <7.0%: ICD-10-PCS | Mod: CPTII,S$GLB,, | Performed by: ORTHOPAEDIC SURGERY

## 2023-07-06 PROCEDURE — 1159F PR MEDICATION LIST DOCUMENTED IN MEDICAL RECORD: ICD-10-PCS | Mod: CPTII,S$GLB,, | Performed by: ORTHOPAEDIC SURGERY

## 2023-07-06 PROCEDURE — 99213 OFFICE O/P EST LOW 20 MIN: CPT | Mod: S$GLB,,, | Performed by: FAMILY MEDICINE

## 2023-07-06 PROCEDURE — 99999 PR PBB SHADOW E&M-EST. PATIENT-LVL IV: CPT | Mod: PBBFAC,,, | Performed by: FAMILY MEDICINE

## 2023-07-06 PROCEDURE — 3044F PR MOST RECENT HEMOGLOBIN A1C LEVEL <7.0%: ICD-10-PCS | Mod: CPTII,S$GLB,, | Performed by: FAMILY MEDICINE

## 2023-07-06 PROCEDURE — 1160F PR REVIEW ALL MEDS BY PRESCRIBER/CLIN PHARMACIST DOCUMENTED: ICD-10-PCS | Mod: CPTII,S$GLB,, | Performed by: FAMILY MEDICINE

## 2023-07-06 PROCEDURE — 1125F AMNT PAIN NOTED PAIN PRSNT: CPT | Mod: CPTII,S$GLB,, | Performed by: ORTHOPAEDIC SURGERY

## 2023-07-06 PROCEDURE — 99999 PR PBB SHADOW E&M-EST. PATIENT-LVL IV: ICD-10-PCS | Mod: PBBFAC,,, | Performed by: FAMILY MEDICINE

## 2023-07-06 PROCEDURE — 3044F HG A1C LEVEL LT 7.0%: CPT | Mod: CPTII,S$GLB,, | Performed by: ORTHOPAEDIC SURGERY

## 2023-07-06 PROCEDURE — 1160F RVW MEDS BY RX/DR IN RCRD: CPT | Mod: CPTII,S$GLB,, | Performed by: FAMILY MEDICINE

## 2023-07-06 PROCEDURE — 3080F PR MOST RECENT DIASTOLIC BLOOD PRESSURE >= 90 MM HG: ICD-10-PCS | Mod: CPTII,S$GLB,, | Performed by: FAMILY MEDICINE

## 2023-07-06 PROCEDURE — 3077F PR MOST RECENT SYSTOLIC BLOOD PRESSURE >= 140 MM HG: ICD-10-PCS | Mod: CPTII,S$GLB,, | Performed by: FAMILY MEDICINE

## 2023-07-06 PROCEDURE — 1159F PR MEDICATION LIST DOCUMENTED IN MEDICAL RECORD: ICD-10-PCS | Mod: CPTII,S$GLB,, | Performed by: FAMILY MEDICINE

## 2023-07-06 PROCEDURE — 1159F MED LIST DOCD IN RCRD: CPT | Mod: CPTII,S$GLB,, | Performed by: ORTHOPAEDIC SURGERY

## 2023-07-06 PROCEDURE — 3080F DIAST BP >= 90 MM HG: CPT | Mod: CPTII,S$GLB,, | Performed by: FAMILY MEDICINE

## 2023-07-06 PROCEDURE — 99214 PR OFFICE/OUTPT VISIT, EST, LEVL IV, 30-39 MIN: ICD-10-PCS | Mod: S$GLB,,, | Performed by: ORTHOPAEDIC SURGERY

## 2023-07-06 PROCEDURE — 1125F PR PAIN SEVERITY QUANTIFIED, PAIN PRESENT: ICD-10-PCS | Mod: CPTII,S$GLB,, | Performed by: ORTHOPAEDIC SURGERY

## 2023-07-06 PROCEDURE — 99214 OFFICE O/P EST MOD 30 MIN: CPT | Mod: S$GLB,,, | Performed by: ORTHOPAEDIC SURGERY

## 2023-07-06 PROCEDURE — 99999 PR PBB SHADOW E&M-EST. PATIENT-LVL III: CPT | Mod: PBBFAC,,, | Performed by: ORTHOPAEDIC SURGERY

## 2023-07-06 PROCEDURE — 3008F BODY MASS INDEX DOCD: CPT | Mod: CPTII,S$GLB,, | Performed by: ORTHOPAEDIC SURGERY

## 2023-07-06 PROCEDURE — 3077F SYST BP >= 140 MM HG: CPT | Mod: CPTII,S$GLB,, | Performed by: FAMILY MEDICINE

## 2023-07-06 PROCEDURE — 3008F PR BODY MASS INDEX (BMI) DOCUMENTED: ICD-10-PCS | Mod: CPTII,S$GLB,, | Performed by: ORTHOPAEDIC SURGERY

## 2023-07-06 PROCEDURE — 99999 PR PBB SHADOW E&M-EST. PATIENT-LVL III: ICD-10-PCS | Mod: PBBFAC,,, | Performed by: ORTHOPAEDIC SURGERY

## 2023-07-06 PROCEDURE — 99213 PR OFFICE/OUTPT VISIT, EST, LEVL III, 20-29 MIN: ICD-10-PCS | Mod: S$GLB,,, | Performed by: FAMILY MEDICINE

## 2023-07-06 PROCEDURE — 1159F MED LIST DOCD IN RCRD: CPT | Mod: CPTII,S$GLB,, | Performed by: FAMILY MEDICINE

## 2023-07-06 PROCEDURE — 3044F HG A1C LEVEL LT 7.0%: CPT | Mod: CPTII,S$GLB,, | Performed by: FAMILY MEDICINE

## 2023-07-06 RX ORDER — MULTIVIT WITH IRON,MINERALS
1 TABLET ORAL DAILY
COMMUNITY
End: 2024-02-28

## 2023-07-06 RX ORDER — MELOXICAM 7.5 MG/1
7.5 TABLET ORAL
COMMUNITY
Start: 2023-05-09 | End: 2023-07-11

## 2023-07-06 RX ORDER — METHOCARBAMOL 500 MG/1
TABLET, FILM COATED ORAL
COMMUNITY
Start: 2023-06-02 | End: 2023-08-09

## 2023-07-06 RX ORDER — CEPHALEXIN 500 MG/1
500 CAPSULE ORAL EVERY 6 HOURS
Qty: 28 CAPSULE | Refills: 0 | Status: SHIPPED | OUTPATIENT
Start: 2023-07-06 | End: 2023-08-09

## 2023-07-06 RX ORDER — DICLOFENAC SODIUM 75 MG/1
TABLET, DELAYED RELEASE ORAL
COMMUNITY
Start: 2023-06-02 | End: 2023-08-09

## 2023-07-06 RX ORDER — CEPHALEXIN 500 MG/1
500 CAPSULE ORAL EVERY 6 HOURS
COMMUNITY
Start: 2023-06-20 | End: 2023-08-09
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
## 2023-07-06 NOTE — PROGRESS NOTES
Subjective:       Patient ID: Elliott Gaspar is a 67 y.o. male.    Chief Complaint: Recurrent Skin Infections    Mr. Gaspar presents today with concern of right lower extremity cellulitis. He was seen by orthopedics and sent an rx for keflex which he just started taking at lunch today.     Review of Systems   Constitutional:  Negative for activity change, appetite change, fatigue and fever.   Respiratory:  Negative for shortness of breath.    Gastrointestinal:  Negative for abdominal pain.   Integumentary:  Positive for rash and wound.       Objective:      Physical Exam  Constitutional:       General: He is not in acute distress.     Appearance: Normal appearance. He is not ill-appearing.   Pulmonary:      Effort: Pulmonary effort is normal. No respiratory distress.   Skin:     Findings: Erythema (right shin cellulitis with wound on right tibial prominence) present.   Neurological:      Mental Status: He is alert.   Psychiatric:         Mood and Affect: Mood normal.         Behavior: Behavior normal.         Thought Content: Thought content normal.         Judgment: Judgment normal.           Assessment:       1. Cellulitis of right lower extremity        Plan:       Problem List Items Addressed This Visit    None  Visit Diagnoses       Cellulitis of right lower extremity    -  Primary            Continue keflex. Close follow up Monday. Call if no improvement or if worsening.

## 2023-07-10 ENCOUNTER — OFFICE VISIT (OUTPATIENT)
Dept: FAMILY MEDICINE | Facility: CLINIC | Age: 68
End: 2023-07-10
Payer: MEDICARE

## 2023-07-10 VITALS
RESPIRATION RATE: 18 BRPM | HEART RATE: 58 BPM | HEIGHT: 69 IN | BODY MASS INDEX: 29.12 KG/M2 | DIASTOLIC BLOOD PRESSURE: 91 MMHG | OXYGEN SATURATION: 98 % | WEIGHT: 196.63 LBS | SYSTOLIC BLOOD PRESSURE: 165 MMHG

## 2023-07-10 DIAGNOSIS — L03.115 CELLULITIS OF RIGHT LOWER EXTREMITY: Primary | ICD-10-CM

## 2023-07-10 PROCEDURE — 99999 PR PBB SHADOW E&M-EST. PATIENT-LVL III: ICD-10-PCS | Mod: PBBFAC,,, | Performed by: FAMILY MEDICINE

## 2023-07-10 PROCEDURE — 3044F PR MOST RECENT HEMOGLOBIN A1C LEVEL <7.0%: ICD-10-PCS | Mod: CPTII,S$GLB,, | Performed by: FAMILY MEDICINE

## 2023-07-10 PROCEDURE — 3044F HG A1C LEVEL LT 7.0%: CPT | Mod: CPTII,S$GLB,, | Performed by: FAMILY MEDICINE

## 2023-07-10 PROCEDURE — 3008F PR BODY MASS INDEX (BMI) DOCUMENTED: ICD-10-PCS | Mod: CPTII,S$GLB,, | Performed by: FAMILY MEDICINE

## 2023-07-10 PROCEDURE — 1160F PR REVIEW ALL MEDS BY PRESCRIBER/CLIN PHARMACIST DOCUMENTED: ICD-10-PCS | Mod: CPTII,S$GLB,, | Performed by: FAMILY MEDICINE

## 2023-07-10 PROCEDURE — 99213 PR OFFICE/OUTPT VISIT, EST, LEVL III, 20-29 MIN: ICD-10-PCS | Mod: S$GLB,,, | Performed by: FAMILY MEDICINE

## 2023-07-10 PROCEDURE — 3077F SYST BP >= 140 MM HG: CPT | Mod: CPTII,S$GLB,, | Performed by: FAMILY MEDICINE

## 2023-07-10 PROCEDURE — 1159F MED LIST DOCD IN RCRD: CPT | Mod: CPTII,S$GLB,, | Performed by: FAMILY MEDICINE

## 2023-07-10 PROCEDURE — 3080F DIAST BP >= 90 MM HG: CPT | Mod: CPTII,S$GLB,, | Performed by: FAMILY MEDICINE

## 2023-07-10 PROCEDURE — 3008F BODY MASS INDEX DOCD: CPT | Mod: CPTII,S$GLB,, | Performed by: FAMILY MEDICINE

## 2023-07-10 PROCEDURE — 3077F PR MOST RECENT SYSTOLIC BLOOD PRESSURE >= 140 MM HG: ICD-10-PCS | Mod: CPTII,S$GLB,, | Performed by: FAMILY MEDICINE

## 2023-07-10 PROCEDURE — 99999 PR PBB SHADOW E&M-EST. PATIENT-LVL III: CPT | Mod: PBBFAC,,, | Performed by: FAMILY MEDICINE

## 2023-07-10 PROCEDURE — 1160F RVW MEDS BY RX/DR IN RCRD: CPT | Mod: CPTII,S$GLB,, | Performed by: FAMILY MEDICINE

## 2023-07-10 PROCEDURE — 1159F PR MEDICATION LIST DOCUMENTED IN MEDICAL RECORD: ICD-10-PCS | Mod: CPTII,S$GLB,, | Performed by: FAMILY MEDICINE

## 2023-07-10 PROCEDURE — 3080F PR MOST RECENT DIASTOLIC BLOOD PRESSURE >= 90 MM HG: ICD-10-PCS | Mod: CPTII,S$GLB,, | Performed by: FAMILY MEDICINE

## 2023-07-10 PROCEDURE — 99213 OFFICE O/P EST LOW 20 MIN: CPT | Mod: S$GLB,,, | Performed by: FAMILY MEDICINE

## 2023-07-10 RX ORDER — DOXYCYCLINE 100 MG/1
100 CAPSULE ORAL 2 TIMES DAILY
Qty: 14 CAPSULE | Refills: 0 | Status: SHIPPED | OUTPATIENT
Start: 2023-07-10 | End: 2023-07-17

## 2023-07-10 NOTE — PROGRESS NOTES
Subjective:       Patient ID: Elliott Gaspar is a 67 y.o. male.    Chief Complaint: Recurrent Skin Infections    Follow up cellulitis   Improving but still painful  Would like to consider broader spectrum antibiotics    Review of Systems   Constitutional:  Negative for activity change, appetite change, fatigue and fever.   Respiratory:  Negative for shortness of breath.    Gastrointestinal:  Negative for abdominal pain.   Integumentary:  Positive for rash and wound.       Objective:      Physical Exam  Constitutional:       General: He is not in acute distress.     Appearance: Normal appearance. He is not ill-appearing.   Pulmonary:      Effort: Pulmonary effort is normal. No respiratory distress.   Skin:     Findings: Erythema (right shin cellulitis with wound on right tibial prominence improving) present.   Neurological:      Mental Status: He is alert.   Psychiatric:         Mood and Affect: Mood normal.         Behavior: Behavior normal.         Thought Content: Thought content normal.         Judgment: Judgment normal.       Assessment:       1. Cellulitis of right lower extremity        Plan:       Problem List Items Addressed This Visit    None  Visit Diagnoses       Cellulitis of right lower extremity    -  Primary    Relevant Medications    doxycycline (MONODOX) 100 MG capsule

## 2023-07-11 ENCOUNTER — OFFICE VISIT (OUTPATIENT)
Dept: PODIATRY | Facility: CLINIC | Age: 68
End: 2023-07-11
Payer: MEDICARE

## 2023-07-11 VITALS
SYSTOLIC BLOOD PRESSURE: 179 MMHG | DIASTOLIC BLOOD PRESSURE: 93 MMHG | WEIGHT: 193.81 LBS | HEIGHT: 69 IN | HEART RATE: 60 BPM | BODY MASS INDEX: 28.71 KG/M2

## 2023-07-11 DIAGNOSIS — L60.0 INGROWN NAIL: Primary | ICD-10-CM

## 2023-07-11 DIAGNOSIS — G60.9 IDIOPATHIC PERIPHERAL NEUROPATHY: ICD-10-CM

## 2023-07-11 PROCEDURE — 3044F PR MOST RECENT HEMOGLOBIN A1C LEVEL <7.0%: ICD-10-PCS | Mod: CPTII,S$GLB,, | Performed by: PODIATRIST

## 2023-07-11 PROCEDURE — 3080F PR MOST RECENT DIASTOLIC BLOOD PRESSURE >= 90 MM HG: ICD-10-PCS | Mod: CPTII,S$GLB,, | Performed by: PODIATRIST

## 2023-07-11 PROCEDURE — 3077F SYST BP >= 140 MM HG: CPT | Mod: CPTII,S$GLB,, | Performed by: PODIATRIST

## 2023-07-11 PROCEDURE — 3077F PR MOST RECENT SYSTOLIC BLOOD PRESSURE >= 140 MM HG: ICD-10-PCS | Mod: CPTII,S$GLB,, | Performed by: PODIATRIST

## 2023-07-11 PROCEDURE — 3080F DIAST BP >= 90 MM HG: CPT | Mod: CPTII,S$GLB,, | Performed by: PODIATRIST

## 2023-07-11 PROCEDURE — 1160F PR REVIEW ALL MEDS BY PRESCRIBER/CLIN PHARMACIST DOCUMENTED: ICD-10-PCS | Mod: CPTII,S$GLB,, | Performed by: PODIATRIST

## 2023-07-11 PROCEDURE — 3288F FALL RISK ASSESSMENT DOCD: CPT | Mod: CPTII,S$GLB,, | Performed by: PODIATRIST

## 2023-07-11 PROCEDURE — 1160F RVW MEDS BY RX/DR IN RCRD: CPT | Mod: CPTII,S$GLB,, | Performed by: PODIATRIST

## 2023-07-11 PROCEDURE — 99999 PR PBB SHADOW E&M-EST. PATIENT-LVL IV: ICD-10-PCS | Mod: PBBFAC,,, | Performed by: PODIATRIST

## 2023-07-11 PROCEDURE — 1125F PR PAIN SEVERITY QUANTIFIED, PAIN PRESENT: ICD-10-PCS | Mod: CPTII,S$GLB,, | Performed by: PODIATRIST

## 2023-07-11 PROCEDURE — 1101F PR PT FALLS ASSESS DOC 0-1 FALLS W/OUT INJ PAST YR: ICD-10-PCS | Mod: CPTII,S$GLB,, | Performed by: PODIATRIST

## 2023-07-11 PROCEDURE — 3008F BODY MASS INDEX DOCD: CPT | Mod: CPTII,S$GLB,, | Performed by: PODIATRIST

## 2023-07-11 PROCEDURE — 3288F PR FALLS RISK ASSESSMENT DOCUMENTED: ICD-10-PCS | Mod: CPTII,S$GLB,, | Performed by: PODIATRIST

## 2023-07-11 PROCEDURE — 1159F MED LIST DOCD IN RCRD: CPT | Mod: CPTII,S$GLB,, | Performed by: PODIATRIST

## 2023-07-11 PROCEDURE — 1101F PT FALLS ASSESS-DOCD LE1/YR: CPT | Mod: CPTII,S$GLB,, | Performed by: PODIATRIST

## 2023-07-11 PROCEDURE — 1125F AMNT PAIN NOTED PAIN PRSNT: CPT | Mod: CPTII,S$GLB,, | Performed by: PODIATRIST

## 2023-07-11 PROCEDURE — 99203 OFFICE O/P NEW LOW 30 MIN: CPT | Mod: S$GLB,,, | Performed by: PODIATRIST

## 2023-07-11 PROCEDURE — 3044F HG A1C LEVEL LT 7.0%: CPT | Mod: CPTII,S$GLB,, | Performed by: PODIATRIST

## 2023-07-11 PROCEDURE — 99203 PR OFFICE/OUTPT VISIT, NEW, LEVL III, 30-44 MIN: ICD-10-PCS | Mod: S$GLB,,, | Performed by: PODIATRIST

## 2023-07-11 PROCEDURE — 3008F PR BODY MASS INDEX (BMI) DOCUMENTED: ICD-10-PCS | Mod: CPTII,S$GLB,, | Performed by: PODIATRIST

## 2023-07-11 PROCEDURE — 1159F PR MEDICATION LIST DOCUMENTED IN MEDICAL RECORD: ICD-10-PCS | Mod: CPTII,S$GLB,, | Performed by: PODIATRIST

## 2023-07-11 PROCEDURE — 99999 PR PBB SHADOW E&M-EST. PATIENT-LVL IV: CPT | Mod: PBBFAC,,, | Performed by: PODIATRIST

## 2023-07-11 RX ORDER — GABAPENTIN 100 MG/1
200 CAPSULE ORAL NIGHTLY
Qty: 60 CAPSULE | Refills: 11 | Status: SHIPPED | OUTPATIENT
Start: 2023-07-11 | End: 2023-08-09

## 2023-07-14 ENCOUNTER — CLINICAL SUPPORT (OUTPATIENT)
Dept: REHABILITATION | Facility: HOSPITAL | Age: 68
End: 2023-07-14
Payer: MEDICARE

## 2023-07-14 DIAGNOSIS — M54.16 LUMBAR RADICULOPATHY: Primary | ICD-10-CM

## 2023-07-14 DIAGNOSIS — M48.062 SPINAL STENOSIS, LUMBAR REGION WITH NEUROGENIC CLAUDICATION: ICD-10-CM

## 2023-07-14 PROCEDURE — 97110 THERAPEUTIC EXERCISES: CPT | Mod: PN

## 2023-07-14 NOTE — PROGRESS NOTES
[facility]   Physical Therapy Treatment / DISCHARGE Note      Name: Elliott Gaspar  Clinic Number: [id number]    Therapy Diagnosis:   Encounter Diagnoses   Name Primary?    Lumbar radiculopathy Yes    Spinal stenosis, lumbar region with neurogenic claudication      Physician: Aaron Fernandez MD    Visit Date: 7/14/2023    Physician Orders: PT Eval and Treat - Neck and Low Back  Medical Diagnosis from Referral: M54.16 (ICD-10-CM) - Lumbar radiculopathy M48.062 (ICD-10-CM) - Spinal stenosis, lumbar region with neurogenic claudication M54.12 (ICD-10-CM) - Cervical radiculopathy   Evaluation Date: 5/4/2023  Authorization Period Expiration: 6/4/2023  Plan of Care Expiration: 8/4/2023  Visit # / Visits authorized: 12 / 20 (Not including Initial Evaluation)  FOTO Visit #:  1/3    PTA Visit #: 4/5     Time In: 10:05 AM  Time Out: 11:00 AM  Total Billable Time: 53 minutes    Subjective     Pt reports: The day after our last visit (6/15/23), I tripped and fell while carrying some packages. I fell onto the edge of the concrete onto my right leg. I went to the ER the following day and had x-rays that were negative for fracture/break in the lower leg. Following that, I developed cellulitis within several days. I've been on antibiotics for the last 4 weeks. This week the swelling has gone down and the knee is no longer locking up, so I feel like that's a major improvement. The lower back, over the last month, has been good and bad. The fall definitely didn't help things.     He was compliant with home exercise program.  Response to previous treatment: N/A  Functional change: None reported.    Pain: 4/10; Worst 10/10 (Worst 7/10), Best 5/10 (Best 2/10)  Location: Lumbosacral (belt line) emanating laterally R/L and into the hips; Occasionally radiates from hips to knees posteriorly, however infrequent  Description: Lumbar: Sharp at base of spine with burning laterally; Walking feels like a constant ache  "around the waist region; Neck: Tight stiff  Aggravating Factors: Bending, Lift/Carry, prolonged positions, walking > 15mins  Easing Factors: Medication; positional changes; Activity avoidance     Pts goals: To reduce the pain without medication;     Objective      Posture: Forward head, rounded shoulders, stiff thoracic, loss of lumbar lordosis; Right head tilt, L shoulder / R ilium elevated     Palpation: Significant TTP at the greater trochanter (L);   Tightness/tenderness: QL and lumbar paraspinals (R>L), R gluteal/piriformis;     CERVICAL ROM (Active in Sitting)     5/4/23 7/14/23  Flexion:  50deg (75% of full) No P! 50deg   Extension:  40deg No P!   40deg  Right Lateral Flexion: 30deg No P!   40deg  Left Lateral Flexion: 20deg R cerv base "tension" 20deg *  Right Rotation: 60deg No P!   65deg  Left Rotation:  55deg L cerv "compression" 55deg *     LUMBAR ROM: % of full, * indicates pain     5/4/23 7/14/23  Flexion   50% burning across lower back 75% *        Extension  50% compression at lower back 75%           Left Side Bending  50% compression   50%   Right Side Bending 50% compression   50%  Left Rotation  75% "tight"    75%  Right Rotation  75% "tight"    75%    Joint Mobility: Hypomobile with thoracic springing; Guarded with springing of lumbar;     Upper Extremity MMT    5/4/23 7/14/23  Left Right  Left Right  Shoulder  Flexion: 5  5  5  5  Abduction: 5 5  5  5  Ext Rot: 5 5  5  5  Int Rot:  5 5  5  5             Germaine-Scapula  Upper Trap: 5 5  5  5  Middle Trap: 4 4-  4- 4-  Lower Trap: 4 4-  4- 4-     Lower Extremity MMT    5/4/23 7/14/23  Left Right  Left Right  HIP  Flexion  5 5  5 5                       Extension  3- * 3 *  3 3                      Abduction 4 4+  5 5         Adduction 4+ 3  5 4                       Int Rot  4 4+  4 5  Ext Rot  4 4-  4 4                      KNEE  Flexion  5 5  5 5  Extension 5 5  5 5  LUMBAR   Extensors 4- 3+  4 4-     Lower Extremity Flexibility: " "     5/4/23 7/14/23  Left Right  Left Right  Ely's Test  110 ** 115 *  110 115      90/90 Hamstring 30 35  35 35  Chirag Test               Iliopsoas (++) (+)  (+) (+) Tightness              Rectus Fem (++) (+)  (+) (+) Tightness     Hip Special Tests:  FELIX:   5/4/23: (+) L / R ; Tightness and pain at lateral hip, L>R  7/14/23: (-) L / (+) R;   FADDIR:   5/4/23: (+) L / R ; Tightness and pain at lateral hip, L>R  7/14/23: (+) L / R;   Scour:  5/4/23:  (+) L / R  7/14/23: (+) L / R    Treatment     Frankie received the treatments listed below:      Therapeutic Exercises to develop strength, endurance, ROM, and flexibility for 53 minutes including:  Nustep Level 2 x 15 minutes  Standing Lumbar AROM in all planes of motion  Mid-Range isometrics for R/L Hip musculature in all planes of motion  Seated Hamstring stretch with foot propped, [id number] R/L  Seated Lumbar Flexion stretch 5 sec hold x 2 min  Seated Thoracic Rotation stretch 5x R/L  LTR x 2 min  SKTC x3 R/L  DKC x 2 min  90/90 Dynamic Hamstring stretch 15x R/L  Hooklying SLR with Strap Assist 10x R/L  Supine (for less posterior pelvic tilt) SLR with Strap Assist 10x R/L  Hooklying Piriformis stretch [id number] R/L  Ball Squeezes x 2 min  Supine Clams x 15  Bridge 20x   Posterior pelvic Tilts 5-10sec holds x 2mins of work  Tr Ab activation x 10 - 3 sec hold  Supine Knee fallouts x 15  Hooklying Braced Alt March 2 rounds x 30sec work  Hooklying BUE "T", green theraband 20x   Hooklying UE "Y", green theraband 20x R/L  Supine Cervical Rotation with towel overpressure x2mins  Cat Camel 15x  Quadruped posterior rock backs 10x 5sec  Kell Pose 2x 15sec  Prone Quad stretch w/ strap [id number] R/L  Prone Passive Hip IR for stretch, [id number] R/L, performed by PT  Prone Contract - Relax stretch for quad 5 x 5sec  Prone Hip Extension (knee straight) 1 x 10 R/L  Heel Cord stretch on wedge 3 x 30 sec  Seated Cervical Lateral Flexion [id number] R/L    Manual Therapy Techniques: were " applied to the low back/neck: for 0 minutes, including:  Joint Mobilizations -  Thoracic: PA mobilizations at Bilateral Transverse Proc of Mid-Lower Thoracic, Grade 2-4, oscillatory  Thoracic: PA mobilizations at R/L Transverse Proc of Mid-Lower Thoracic, Grade 1-2 springing  Lumbar: PA mobilizations at Bilateral Transverse Proc of Lumbar segments, Grade 2-3, oscillatory  Lumbar: PA mobilizations at R/L Transverse Proc of Lumbar segments, Grade 1-2 springing  STM / DTM -  R/L lumbar paravertebrals and QL musculature  Myofascial Release   Bilateral piriformis   Bilateral psoas ( light due to old hernia sx)   Multifidus   Sub occipitals   Upper trap/scalenes    Supervised Modalities after being cleared for contradictions: IFC Electrical Stimulation:  Elliott received IFC Electrical Stimulation for pain control applied to the lower lumbar paravertebrals. Pt received stimulation at 20volts (within patient's tolerance) for 20 minutes. Elliott tolderated treatment well without any adverse effects.      Neuromuscular Re-Education activities to improve: - for - minutes. The following activities were included:  Therapeutic Activities to improve functional performance for - minutes, including:  Direct Contact Modalities after being cleared for contraindications:    Patient Education and Home Exercises       Education provided: HEP review    Written Home Exercises Provided: yes. Exercises were reviewed and Frankie was able to demonstrate them prior to the end of the session.  Frankie demonstrated good  understanding of the education provided. See EMR under Patient Instructions for exercises provided during therapy sessions    Assessment     Patient presents today for follow-up visit and Discharge assessment since initiating physical therapy on 5/4/23 with a medical diagnosis of M54.16 (ICD-10-CM) - Lumbar radiculopathy M48.062 (ICD-10-CM) - Spinal stenosis, lumbar region with neurogenic claudication M54.12 (ICD-10-CM) - Cervical  "radiculopathy. At time of Initial Evaluation, patient presented with complaints of chronic lower back and neck pain. Subjectively, patient reports extended absence due to fall and subsequent right lower extremity cellulitis. Objective measures, denoted above, demonstrate improvement in several areas, but also reveal remaining deficits.  Patient provided with a comprehensive HEP to address remaining deficits in flexibility and strength. Patient demonstrates good understanding and proper technique with performance of each exercise. Patient has several upcoming trips planned that would interfere with scheduling and limit visit frequency prior to plan of care expiration. For this reason, discharge from PT and trial of independent management with HEP is recommended. PT and patient are in agreement with this plan.    Goals:   Short Term Goals: 4 weeks   1. Report decreased lower back and neck pain  < / =  8/10 "At Worst" to increase tolerance for functional mobility and ADLs.  - MET 7/14/23  2. Increase cervical and lumbar ROM to 75% of full in all planes of motion without pain provocation in order to perform ADLs without difficulty.  - PARTIALLY MET 7/14/23  3. Increase strength to 4+/5 MMT grade grossly throughout to increase tolerance for ADL and work activities.  - NOT MET, HOWEVER IMPROVED 7/14/23  4. Demonstrate (-) flexibility limitations in BLE to promote ROM without restriction.  - NOT MET, HOWEVER IMPROVED  7/14/23  5. Demonstrate (-) neural tension testing to improve positional tolerance.  - MET 7/14/23     Long Term Goals: 8 weeks  1. Report decreased pain < / = 6/10 "At Worst" to increase tolerance for functional mobility and ADLs.  - NOT MET, HOWEVER IMPROVED  7/14/23  2. Patient goal: To reduce the pain without medication;   - NOT MET, HOWEVER IMPROVED  7/14/23  3. Increase strength to 5/5 MMT grade grossly throughout to increase tolerance for ADL and work activities.  - NOT MET, HOWEVER IMPROVED " 7/14/23  4. Pt will report at </= 35% limitation on FOTO  to demonstrate increase in LE function with every day tasks.   - NOT MET 7/14/23  5. Patient to demonstrate independence with comprehensive HEP for self-management.  - MET 7/14/23    Plan     Discharge to trial of independent management with comprehensive HEP.     Vannessa Frost, PT

## 2023-08-09 ENCOUNTER — OFFICE VISIT (OUTPATIENT)
Dept: FAMILY MEDICINE | Facility: CLINIC | Age: 68
End: 2023-08-09
Payer: MEDICARE

## 2023-08-09 VITALS
RESPIRATION RATE: 16 BRPM | DIASTOLIC BLOOD PRESSURE: 90 MMHG | HEIGHT: 69 IN | WEIGHT: 196 LBS | SYSTOLIC BLOOD PRESSURE: 154 MMHG | BODY MASS INDEX: 29.03 KG/M2 | OXYGEN SATURATION: 97 % | HEART RATE: 60 BPM

## 2023-08-09 DIAGNOSIS — R73.9 HYPERGLYCEMIA: ICD-10-CM

## 2023-08-09 DIAGNOSIS — L03.115 CELLULITIS OF RIGHT LOWER EXTREMITY: Primary | ICD-10-CM

## 2023-08-09 DIAGNOSIS — E78.2 MIXED HYPERLIPIDEMIA: ICD-10-CM

## 2023-08-09 DIAGNOSIS — I10 ESSENTIAL HYPERTENSION: ICD-10-CM

## 2023-08-09 DIAGNOSIS — R60.0 BILATERAL LOWER EXTREMITY EDEMA: ICD-10-CM

## 2023-08-09 PROCEDURE — 3044F PR MOST RECENT HEMOGLOBIN A1C LEVEL <7.0%: ICD-10-PCS | Mod: CPTII,S$GLB,, | Performed by: FAMILY MEDICINE

## 2023-08-09 PROCEDURE — 1159F PR MEDICATION LIST DOCUMENTED IN MEDICAL RECORD: ICD-10-PCS | Mod: CPTII,S$GLB,, | Performed by: FAMILY MEDICINE

## 2023-08-09 PROCEDURE — 99214 OFFICE O/P EST MOD 30 MIN: CPT | Mod: S$GLB,,, | Performed by: FAMILY MEDICINE

## 2023-08-09 PROCEDURE — 99999 PR PBB SHADOW E&M-EST. PATIENT-LVL III: CPT | Mod: PBBFAC,,, | Performed by: FAMILY MEDICINE

## 2023-08-09 PROCEDURE — 3080F PR MOST RECENT DIASTOLIC BLOOD PRESSURE >= 90 MM HG: ICD-10-PCS | Mod: CPTII,S$GLB,, | Performed by: FAMILY MEDICINE

## 2023-08-09 PROCEDURE — 99214 PR OFFICE/OUTPT VISIT, EST, LEVL IV, 30-39 MIN: ICD-10-PCS | Mod: S$GLB,,, | Performed by: FAMILY MEDICINE

## 2023-08-09 PROCEDURE — 1159F MED LIST DOCD IN RCRD: CPT | Mod: CPTII,S$GLB,, | Performed by: FAMILY MEDICINE

## 2023-08-09 PROCEDURE — 1160F PR REVIEW ALL MEDS BY PRESCRIBER/CLIN PHARMACIST DOCUMENTED: ICD-10-PCS | Mod: CPTII,S$GLB,, | Performed by: FAMILY MEDICINE

## 2023-08-09 PROCEDURE — 3080F DIAST BP >= 90 MM HG: CPT | Mod: CPTII,S$GLB,, | Performed by: FAMILY MEDICINE

## 2023-08-09 PROCEDURE — 3008F BODY MASS INDEX DOCD: CPT | Mod: CPTII,S$GLB,, | Performed by: FAMILY MEDICINE

## 2023-08-09 PROCEDURE — 3077F PR MOST RECENT SYSTOLIC BLOOD PRESSURE >= 140 MM HG: ICD-10-PCS | Mod: CPTII,S$GLB,, | Performed by: FAMILY MEDICINE

## 2023-08-09 PROCEDURE — 3077F SYST BP >= 140 MM HG: CPT | Mod: CPTII,S$GLB,, | Performed by: FAMILY MEDICINE

## 2023-08-09 PROCEDURE — 3044F HG A1C LEVEL LT 7.0%: CPT | Mod: CPTII,S$GLB,, | Performed by: FAMILY MEDICINE

## 2023-08-09 PROCEDURE — 3288F PR FALLS RISK ASSESSMENT DOCUMENTED: ICD-10-PCS | Mod: CPTII,S$GLB,, | Performed by: FAMILY MEDICINE

## 2023-08-09 PROCEDURE — 3288F FALL RISK ASSESSMENT DOCD: CPT | Mod: CPTII,S$GLB,, | Performed by: FAMILY MEDICINE

## 2023-08-09 PROCEDURE — 1100F PTFALLS ASSESS-DOCD GE2>/YR: CPT | Mod: CPTII,S$GLB,, | Performed by: FAMILY MEDICINE

## 2023-08-09 PROCEDURE — 1160F RVW MEDS BY RX/DR IN RCRD: CPT | Mod: CPTII,S$GLB,, | Performed by: FAMILY MEDICINE

## 2023-08-09 PROCEDURE — 3008F PR BODY MASS INDEX (BMI) DOCUMENTED: ICD-10-PCS | Mod: CPTII,S$GLB,, | Performed by: FAMILY MEDICINE

## 2023-08-09 PROCEDURE — 99999 PR PBB SHADOW E&M-EST. PATIENT-LVL III: ICD-10-PCS | Mod: PBBFAC,,, | Performed by: FAMILY MEDICINE

## 2023-08-09 PROCEDURE — 1100F PR PT FALLS ASSESS DOC 2+ FALLS/FALL W/INJURY/YR: ICD-10-PCS | Mod: CPTII,S$GLB,, | Performed by: FAMILY MEDICINE

## 2023-08-09 RX ORDER — DOXYCYCLINE 100 MG/1
100 CAPSULE ORAL 2 TIMES DAILY
Qty: 20 CAPSULE | Refills: 0 | Status: SHIPPED | OUTPATIENT
Start: 2023-08-09 | End: 2023-08-19

## 2023-08-09 RX ORDER — FUROSEMIDE 20 MG/1
20 TABLET ORAL 2 TIMES DAILY PRN
Qty: 60 TABLET | Refills: 0 | Status: SHIPPED | OUTPATIENT
Start: 2023-08-09 | End: 2023-09-18

## 2023-08-09 NOTE — PROGRESS NOTES
Subjective:       Patient ID: Elliott Gaspar is a 68 y.o. male.    Chief Complaint: No chief complaint on file.    Mr. Gaspar presents with concern of recurrent cellulitis of the right leg.     He has had recurrent issues since a fall.     Starting last night he had increased unilateral leg swelling, lateral redness and a different feeling to the skin.       Review of Systems   Constitutional:  Negative for activity change, appetite change, fatigue and fever.   Respiratory:  Negative for shortness of breath.    Gastrointestinal:  Negative for abdominal pain.   Integumentary:  Positive for rash (right leg as per HPI).         Objective:      Physical Exam  Vitals and nursing note reviewed.   Constitutional:       General: He is not in acute distress.     Appearance: He is not ill-appearing.   Cardiovascular:      Rate and Rhythm: Normal rate and regular rhythm.      Heart sounds: No murmur heard.  Pulmonary:      Effort: Pulmonary effort is normal.      Breath sounds: Normal breath sounds. No wheezing.   Skin:     General: Skin is warm and dry.      Findings: Erythema (right lateral lower extremity. Subtle) present.   Neurological:      Mental Status: He is alert.   Psychiatric:         Mood and Affect: Mood normal.         Behavior: Behavior normal.         Assessment:       1. Cellulitis of right lower extremity    2. Bilateral lower extremity edema    3. Essential hypertension    4. Mixed hyperlipidemia    5. Hyperglycemia        Plan:       Problem List Items Addressed This Visit          Cardiac/Vascular    Essential hypertension     Taking bystolic. Blood pressure elevated 160/100 today. Will need repeat BP in 2 weeks.          Relevant Orders    CBC Auto Differential    Comprehensive Metabolic Panel    TSH     Other Visit Diagnoses       Cellulitis of right lower extremity    -  Primary    Relevant Medications    doxycycline (MONODOX) 100 MG capsule    Bilateral lower extremity edema        Relevant  Medications    furosemide (LASIX) 20 MG tablet    Other Relevant Orders    US Lower Extremity Veins Bilateral    Mixed hyperlipidemia        Relevant Orders    Lipid Panel    Hyperglycemia        Relevant Orders    Hemoglobin A1C

## 2023-08-12 ENCOUNTER — LAB VISIT (OUTPATIENT)
Dept: LAB | Facility: HOSPITAL | Age: 68
End: 2023-08-12
Attending: FAMILY MEDICINE
Payer: MEDICARE

## 2023-08-12 DIAGNOSIS — I10 ESSENTIAL HYPERTENSION: ICD-10-CM

## 2023-08-12 DIAGNOSIS — E78.2 MIXED HYPERLIPIDEMIA: ICD-10-CM

## 2023-08-12 DIAGNOSIS — R73.9 HYPERGLYCEMIA: ICD-10-CM

## 2023-08-12 LAB
ALBUMIN SERPL BCP-MCNC: 3.8 G/DL (ref 3.5–5.2)
ALP SERPL-CCNC: 98 U/L (ref 55–135)
ALT SERPL W/O P-5'-P-CCNC: 60 U/L (ref 10–44)
ANION GAP SERPL CALC-SCNC: 10 MMOL/L (ref 8–16)
AST SERPL-CCNC: 41 U/L (ref 10–40)
BASOPHILS # BLD AUTO: 0.02 K/UL (ref 0–0.2)
BASOPHILS NFR BLD: 0.4 % (ref 0–1.9)
BILIRUB SERPL-MCNC: 0.8 MG/DL (ref 0.1–1)
BUN SERPL-MCNC: 12 MG/DL (ref 8–23)
CALCIUM SERPL-MCNC: 9.2 MG/DL (ref 8.7–10.5)
CHLORIDE SERPL-SCNC: 105 MMOL/L (ref 95–110)
CHOLEST SERPL-MCNC: 199 MG/DL (ref 120–199)
CHOLEST/HDLC SERPL: 6.2 {RATIO} (ref 2–5)
CO2 SERPL-SCNC: 29 MMOL/L (ref 23–29)
CREAT SERPL-MCNC: 1.1 MG/DL (ref 0.5–1.4)
DIFFERENTIAL METHOD: ABNORMAL
EOSINOPHIL # BLD AUTO: 0.2 K/UL (ref 0–0.5)
EOSINOPHIL NFR BLD: 3.7 % (ref 0–8)
ERYTHROCYTE [DISTWIDTH] IN BLOOD BY AUTOMATED COUNT: 12.6 % (ref 11.5–14.5)
EST. GFR  (NO RACE VARIABLE): >60 ML/MIN/1.73 M^2
ESTIMATED AVG GLUCOSE: 100 MG/DL (ref 68–131)
GLUCOSE SERPL-MCNC: 120 MG/DL (ref 70–110)
HBA1C MFR BLD: 5.1 % (ref 4–5.6)
HCT VFR BLD AUTO: 42.9 % (ref 40–54)
HDLC SERPL-MCNC: 32 MG/DL (ref 40–75)
HDLC SERPL: 16.1 % (ref 20–50)
HGB BLD-MCNC: 14.6 G/DL (ref 14–18)
IMM GRANULOCYTES # BLD AUTO: 0.03 K/UL (ref 0–0.04)
IMM GRANULOCYTES NFR BLD AUTO: 0.6 % (ref 0–0.5)
LDLC SERPL CALC-MCNC: 127.4 MG/DL (ref 63–159)
LYMPHOCYTES # BLD AUTO: 1.5 K/UL (ref 1–4.8)
LYMPHOCYTES NFR BLD: 30.5 % (ref 18–48)
MCH RBC QN AUTO: 29.7 PG (ref 27–31)
MCHC RBC AUTO-ENTMCNC: 34 G/DL (ref 32–36)
MCV RBC AUTO: 87 FL (ref 82–98)
MONOCYTES # BLD AUTO: 0.4 K/UL (ref 0.3–1)
MONOCYTES NFR BLD: 8.9 % (ref 4–15)
NEUTROPHILS # BLD AUTO: 2.8 K/UL (ref 1.8–7.7)
NEUTROPHILS NFR BLD: 55.9 % (ref 38–73)
NONHDLC SERPL-MCNC: 167 MG/DL
NRBC BLD-RTO: 0 /100 WBC
PLATELET # BLD AUTO: 129 K/UL (ref 150–450)
PMV BLD AUTO: 9.1 FL (ref 9.2–12.9)
POTASSIUM SERPL-SCNC: 3.8 MMOL/L (ref 3.5–5.1)
PROT SERPL-MCNC: 6.3 G/DL (ref 6–8.4)
RBC # BLD AUTO: 4.92 M/UL (ref 4.6–6.2)
SODIUM SERPL-SCNC: 144 MMOL/L (ref 136–145)
TRIGL SERPL-MCNC: 198 MG/DL (ref 30–150)
TSH SERPL DL<=0.005 MIU/L-ACNC: 3.42 UIU/ML (ref 0.4–4)
WBC # BLD AUTO: 4.92 K/UL (ref 3.9–12.7)

## 2023-08-12 PROCEDURE — 84443 ASSAY THYROID STIM HORMONE: CPT | Performed by: FAMILY MEDICINE

## 2023-08-12 PROCEDURE — 83036 HEMOGLOBIN GLYCOSYLATED A1C: CPT | Performed by: FAMILY MEDICINE

## 2023-08-12 PROCEDURE — 80053 COMPREHEN METABOLIC PANEL: CPT | Performed by: FAMILY MEDICINE

## 2023-08-12 PROCEDURE — 80061 LIPID PANEL: CPT | Performed by: FAMILY MEDICINE

## 2023-08-12 PROCEDURE — 85025 COMPLETE CBC W/AUTO DIFF WBC: CPT | Performed by: FAMILY MEDICINE

## 2023-08-12 PROCEDURE — 36415 COLL VENOUS BLD VENIPUNCTURE: CPT | Performed by: FAMILY MEDICINE

## 2023-08-17 ENCOUNTER — HOSPITAL ENCOUNTER (OUTPATIENT)
Dept: RADIOLOGY | Facility: HOSPITAL | Age: 68
Discharge: HOME | End: 2023-08-17
Attending: FAMILY MEDICINE
Payer: MEDICARE

## 2023-08-17 DIAGNOSIS — R60.0 BILATERAL LOWER EXTREMITY EDEMA: ICD-10-CM

## 2023-08-17 PROCEDURE — 93970 US LOWER EXTREMITY VEINS BILATERAL: ICD-10-PCS | Mod: 26,,, | Performed by: RADIOLOGY

## 2023-08-17 PROCEDURE — 93970 EXTREMITY STUDY: CPT | Mod: TC

## 2023-08-17 PROCEDURE — 93970 EXTREMITY STUDY: CPT | Mod: 26,,, | Performed by: RADIOLOGY

## 2023-09-18 ENCOUNTER — OFFICE VISIT (OUTPATIENT)
Dept: FAMILY MEDICINE | Facility: CLINIC | Age: 68
End: 2023-09-18
Payer: MEDICARE

## 2023-09-18 VITALS
BODY MASS INDEX: 29.2 KG/M2 | OXYGEN SATURATION: 98 % | DIASTOLIC BLOOD PRESSURE: 86 MMHG | WEIGHT: 197.19 LBS | HEIGHT: 69 IN | RESPIRATION RATE: 16 BRPM | SYSTOLIC BLOOD PRESSURE: 160 MMHG | HEART RATE: 56 BPM

## 2023-09-18 DIAGNOSIS — R60.0 BILATERAL LOWER EXTREMITY EDEMA: Primary | ICD-10-CM

## 2023-09-18 DIAGNOSIS — I10 ESSENTIAL HYPERTENSION: ICD-10-CM

## 2023-09-18 PROCEDURE — 3079F PR MOST RECENT DIASTOLIC BLOOD PRESSURE 80-89 MM HG: ICD-10-PCS | Mod: CPTII,S$GLB,, | Performed by: FAMILY MEDICINE

## 2023-09-18 PROCEDURE — 99214 OFFICE O/P EST MOD 30 MIN: CPT | Mod: S$GLB,,, | Performed by: FAMILY MEDICINE

## 2023-09-18 PROCEDURE — 99214 PR OFFICE/OUTPT VISIT, EST, LEVL IV, 30-39 MIN: ICD-10-PCS | Mod: S$GLB,,, | Performed by: FAMILY MEDICINE

## 2023-09-18 PROCEDURE — 3077F SYST BP >= 140 MM HG: CPT | Mod: CPTII,S$GLB,, | Performed by: FAMILY MEDICINE

## 2023-09-18 PROCEDURE — 1160F RVW MEDS BY RX/DR IN RCRD: CPT | Mod: CPTII,S$GLB,, | Performed by: FAMILY MEDICINE

## 2023-09-18 PROCEDURE — 1126F AMNT PAIN NOTED NONE PRSNT: CPT | Mod: CPTII,S$GLB,, | Performed by: FAMILY MEDICINE

## 2023-09-18 PROCEDURE — 99999 PR PBB SHADOW E&M-EST. PATIENT-LVL III: CPT | Mod: PBBFAC,,, | Performed by: FAMILY MEDICINE

## 2023-09-18 PROCEDURE — 99999 PR PBB SHADOW E&M-EST. PATIENT-LVL III: ICD-10-PCS | Mod: PBBFAC,,, | Performed by: FAMILY MEDICINE

## 2023-09-18 PROCEDURE — 3077F PR MOST RECENT SYSTOLIC BLOOD PRESSURE >= 140 MM HG: ICD-10-PCS | Mod: CPTII,S$GLB,, | Performed by: FAMILY MEDICINE

## 2023-09-18 PROCEDURE — 1159F MED LIST DOCD IN RCRD: CPT | Mod: CPTII,S$GLB,, | Performed by: FAMILY MEDICINE

## 2023-09-18 PROCEDURE — 1159F PR MEDICATION LIST DOCUMENTED IN MEDICAL RECORD: ICD-10-PCS | Mod: CPTII,S$GLB,, | Performed by: FAMILY MEDICINE

## 2023-09-18 PROCEDURE — 1126F PR PAIN SEVERITY QUANTIFIED, NO PAIN PRESENT: ICD-10-PCS | Mod: CPTII,S$GLB,, | Performed by: FAMILY MEDICINE

## 2023-09-18 PROCEDURE — 3044F HG A1C LEVEL LT 7.0%: CPT | Mod: CPTII,S$GLB,, | Performed by: FAMILY MEDICINE

## 2023-09-18 PROCEDURE — 1160F PR REVIEW ALL MEDS BY PRESCRIBER/CLIN PHARMACIST DOCUMENTED: ICD-10-PCS | Mod: CPTII,S$GLB,, | Performed by: FAMILY MEDICINE

## 2023-09-18 PROCEDURE — 3044F PR MOST RECENT HEMOGLOBIN A1C LEVEL <7.0%: ICD-10-PCS | Mod: CPTII,S$GLB,, | Performed by: FAMILY MEDICINE

## 2023-09-18 PROCEDURE — 3008F PR BODY MASS INDEX (BMI) DOCUMENTED: ICD-10-PCS | Mod: CPTII,S$GLB,, | Performed by: FAMILY MEDICINE

## 2023-09-18 PROCEDURE — 3008F BODY MASS INDEX DOCD: CPT | Mod: CPTII,S$GLB,, | Performed by: FAMILY MEDICINE

## 2023-09-18 PROCEDURE — 3079F DIAST BP 80-89 MM HG: CPT | Mod: CPTII,S$GLB,, | Performed by: FAMILY MEDICINE

## 2023-09-18 RX ORDER — CIPROFLOXACIN 500 MG/1
500 TABLET ORAL 2 TIMES DAILY
COMMUNITY
Start: 2023-03-28 | End: 2023-09-18

## 2023-09-18 RX ORDER — SPIRONOLACTONE 25 MG/1
25 TABLET ORAL DAILY
Qty: 30 TABLET | Refills: 11 | Status: SHIPPED | OUTPATIENT
Start: 2023-09-18 | End: 2024-02-28

## 2023-09-18 NOTE — PROGRESS NOTES
Subjective:       Patient ID: Elliott Gaspar is a 68 y.o. male.    Chief Complaint: Follow-up (Patient states he's here to follow up on his leg pain and lab results. )    Blood pressure has been elevated. He is only on bystolic.   Leg swelling has been persistent.   Blood pressure has increased significant and he has had some slight chest pains that he  has noted at times into the left shoulder and into left arm. He is not having any chest pain today.     He is following with cardiology.   His cardiologist is Dr. Domingo Ro in [place], [place].           Review of Systems   Constitutional:  Negative for activity change, appetite change, fatigue and fever.   Respiratory:  Negative for shortness of breath.    Gastrointestinal:  Negative for abdominal pain.   Integumentary:  Negative for rash.         Objective:      Physical Exam  Vitals and nursing note reviewed.   Constitutional:       General: He is not in acute distress.     Appearance: He is not ill-appearing.   Cardiovascular:      Rate and Rhythm: Normal rate and regular rhythm.      Heart sounds: No murmur heard.  Pulmonary:      Effort: Pulmonary effort is normal.      Breath sounds: Normal breath sounds. No wheezing.   Skin:     General: Skin is warm and dry.      Findings: No rash.   Neurological:      Mental Status: He is alert.   Psychiatric:         Mood and Affect: Mood normal.         Behavior: Behavior normal.         Assessment:       1. Bilateral lower extremity edema    2. Essential hypertension        Plan:       Problem List Items Addressed This Visit          Cardiac/Vascular    Essential hypertension     Worse. Adding aldactone as he reports he did not tolerate ARBs or CCBs. He did well on HCTZ for BP control but had issues with low K and gout flares         Relevant Medications    spironolactone (ALDACTONE) 25 MG tablet     Other Visit Diagnoses       Bilateral lower extremity edema    -  Primary    Relevant Medications    spironolactone  (ALDACTONE) 25 MG tablet

## 2023-09-18 NOTE — ASSESSMENT & PLAN NOTE
Worse. Adding aldactone as he reports he did not tolerate ARBs or CCBs. He did well on HCTZ for BP control but had issues with low K and gout flares

## 2023-10-02 ENCOUNTER — CLINICAL SUPPORT (OUTPATIENT)
Dept: FAMILY MEDICINE | Facility: CLINIC | Age: 68
End: 2023-10-02
Payer: MEDICARE

## 2023-10-02 VITALS — SYSTOLIC BLOOD PRESSURE: 160 MMHG | HEART RATE: 61 BPM | DIASTOLIC BLOOD PRESSURE: 92 MMHG

## 2023-10-02 DIAGNOSIS — I10 ESSENTIAL HYPERTENSION: Primary | ICD-10-CM

## 2023-10-02 DIAGNOSIS — N52.8 OTHER MALE ERECTILE DYSFUNCTION: ICD-10-CM

## 2023-10-02 DIAGNOSIS — I10 ESSENTIAL HYPERTENSION: ICD-10-CM

## 2023-10-02 PROCEDURE — 99999 PR PBB SHADOW E&M-EST. PATIENT-LVL I: CPT | Mod: PBBFAC,,,

## 2023-10-02 PROCEDURE — 99999 PR PBB SHADOW E&M-EST. PATIENT-LVL I: ICD-10-PCS | Mod: PBBFAC,,,

## 2023-10-02 NOTE — PROGRESS NOTES
Elliott Gaspar 68 y.o. male is here today for Blood Pressure check.   History of HTN yes.    Review of patient's allergies indicates:   Allergen Reactions    Morphine Other (See Comments)     Family HX-mother went into anaphylactic shock    Contrast media      Family history of renal failure with iodinated contrast    Demerol [meperidine]     Morphine Other (See Comments)     pts mother had anaphylaxis from Morphine so he does not want to take     Creatinine   Date Value Ref Range Status   08/12/2023 1.1 0.5 - 1.4 mg/dL Final   06/08/2012 1.0 0.2 - 1.4 mg/dl Final     Sodium   Date Value Ref Range Status   08/12/2023 144 136 - 145 mmol/L Final     Potassium   Date Value Ref Range Status   08/12/2023 3.8 3.5 - 5.1 mmol/L Final   ]  Patient verifies taking blood pressure medications on a regular basis at the same time of the day.     Current Outpatient Medications:     ammonium lactate (LAC-HYDRIN) 12 % lotion, Apply topically as needed for Dry Skin., Disp: 225 g, Rfl: 5    b complex vitamins capsule, Take 1 capsule by mouth once daily., Disp: , Rfl:     colchicine (COLCRYS) 0.6 mg tablet, Use as directed, Disp: 60 tablet, Rfl: 3    glucosamine-chondroitin 500-400 mg tablet, Take 1 tablet by mouth 2 (two) times daily., Disp: , Rfl:     ibuprofen (ADVIL,MOTRIN) 600 MG tablet, Take 1 tablet (600 mg total) by mouth every 6 (six) hours as needed for Pain., Disp: 20 tablet, Rfl: 0    nebivoloL (BYSTOLIC) 20 mg Tab, Take 20 mg by mouth 2 (two) times a day., Disp: 180 tablet, Rfl: 3    nystatin-triamcinolone (MYCOLOG II) cream, APPLY  CREAM TOPICALLY TWICE DAILY, Disp: 60 g, Rfl: 2    potassium gluconate 2.5 mEq Tab, Take 1 tablet by mouth once daily., Disp: , Rfl:     spironolactone (ALDACTONE) 25 MG tablet, Take 1 tablet (25 mg total) by mouth once daily., Disp: 30 tablet, Rfl: 11    tadalafiL (CIALIS) 20 MG Tab, Take 1 tablet (20 mg total) by mouth daily as needed (intercourse)., Disp: 7 tablet, Rfl: 12  Does patient  have record of home blood pressure readings-no . Readings have been averaging 160/90.   Last dose of blood pressure medication was taken- about a week ago - Pt states his pharmacy has been closed and he is having issues with getting his medications.  Patient is asymptomatic.       BP: 200/120, Pulse: 60 .    Blood pressure reading after 15 minutes was 160/92, Pulse 61.  Dr. Marcelo notified.

## 2023-10-02 NOTE — TELEPHONE ENCOUNTER
Elliott Gaspar 68 y.o. male is here today for Blood Pressure check.   History of HTN yes.           Review of patient's allergies indicates:   Allergen Reactions    Morphine Other (See Comments)       Family HX-mother went into anaphylactic shock    Contrast media         Family history of renal failure with iodinated contrast    Demerol [meperidine]      Morphine Other (See Comments)       pts mother had anaphylaxis from Morphine so he does not want to take            Creatinine   Date Value Ref Range Status   08/12/2023 1.1 0.5 - 1.4 mg/dL Final   06/08/2012 1.0 0.2 - 1.4 mg/dl Final            Sodium   Date Value Ref Range Status   08/12/2023 144 136 - 145 mmol/L Final            Potassium   Date Value Ref Range Status   08/12/2023 3.8 3.5 - 5.1 mmol/L Final   ]  Patient verifies taking blood pressure medications on a regular basis at the same time of the day.      Current Outpatient Medications:     ammonium lactate (LAC-HYDRIN) 12 % lotion, Apply topically as needed for Dry Skin., Disp: 225 g, Rfl: 5    b complex vitamins capsule, Take 1 capsule by mouth once daily., Disp: , Rfl:     colchicine (COLCRYS) 0.6 mg tablet, Use as directed, Disp: 60 tablet, Rfl: 3    glucosamine-chondroitin 500-400 mg tablet, Take 1 tablet by mouth 2 (two) times daily., Disp: , Rfl:     ibuprofen (ADVIL,MOTRIN) 600 MG tablet, Take 1 tablet (600 mg total) by mouth every 6 (six) hours as needed for Pain., Disp: 20 tablet, Rfl: 0    nebivoloL (BYSTOLIC) 20 mg Tab, Take 20 mg by mouth 2 (two) times a day., Disp: 180 tablet, Rfl: 3    nystatin-triamcinolone (MYCOLOG II) cream, APPLY  CREAM TOPICALLY TWICE DAILY, Disp: 60 g, Rfl: 2    potassium gluconate 2.5 mEq Tab, Take 1 tablet by mouth once daily., Disp: , Rfl:     spironolactone (ALDACTONE) 25 MG tablet, Take 1 tablet (25 mg total) by mouth once daily., Disp: 30 tablet, Rfl: 11    tadalafiL (CIALIS) 20 MG Tab, Take 1 tablet (20 mg total) by mouth daily as needed (intercourse).,  Disp: 7 tablet, Rfl: 12  Does patient have record of home blood pressure readings-no . Readings have been averaging 160/90.   Last dose of blood pressure medication was taken- about a week ago - Pt states his pharmacy has been closed and he is having issues with getting his medications.  Patient is asymptomatic.         BP: 200/120, Pulse: 60 .     Blood pressure reading after 15 minutes was 160/92, Pulse 61.

## 2023-10-02 NOTE — TELEPHONE ENCOUNTER
Please find out if we need to send his blood pressure medications to another pharmacy- then we can send. He will then need another follow up in 2 weeks.

## 2023-10-03 RX ORDER — NEBIVOLOL 20 MG/1
20 TABLET ORAL 2 TIMES DAILY
Qty: 180 TABLET | Refills: 3 | Status: SHIPPED | OUTPATIENT
Start: 2023-10-03 | End: 2023-10-05 | Stop reason: SDUPTHER

## 2023-10-03 RX ORDER — TADALAFIL 20 MG/1
20 TABLET ORAL DAILY PRN
Qty: 7 TABLET | Refills: 12 | Status: SHIPPED | OUTPATIENT
Start: 2023-10-03 | End: 2023-10-05 | Stop reason: SDUPTHER

## 2023-10-03 NOTE — TELEPHONE ENCOUNTER
No care due was identified.  [facility] Embedded Care Due Messages. Reference number: [id number].   10/03/2023 9:24:22 AM CDT

## 2023-10-04 ENCOUNTER — PATIENT MESSAGE (OUTPATIENT)
Dept: FAMILY MEDICINE | Facility: CLINIC | Age: 68
End: 2023-10-04
Payer: MEDICARE

## 2023-10-04 DIAGNOSIS — N52.8 OTHER MALE ERECTILE DYSFUNCTION: ICD-10-CM

## 2023-10-04 DIAGNOSIS — I10 ESSENTIAL HYPERTENSION: ICD-10-CM

## 2023-10-05 RX ORDER — TADALAFIL 20 MG/1
20 TABLET ORAL DAILY
Qty: 30 TABLET | Refills: 11 | Status: SHIPPED | OUTPATIENT
Start: 2023-10-05

## 2023-10-05 RX ORDER — NEBIVOLOL 20 MG/1
20 TABLET ORAL 2 TIMES DAILY
Qty: 180 TABLET | Refills: 3 | Status: SHIPPED | OUTPATIENT
Start: 2023-10-05

## 2023-12-04 ENCOUNTER — LAB VISIT (OUTPATIENT)
Dept: LAB | Facility: HOSPITAL | Age: 68
End: 2023-12-04
Attending: NURSE PRACTITIONER
Payer: MEDICARE

## 2023-12-04 DIAGNOSIS — I10 ESSENTIAL HYPERTENSION: Primary | ICD-10-CM

## 2023-12-04 LAB
ANION GAP SERPL CALC-SCNC: 11 MMOL/L (ref 8–16)
BUN SERPL-MCNC: 14 MG/DL (ref 8–23)
CALCIUM SERPL-MCNC: 8.9 MG/DL (ref 8.7–10.5)
CHLORIDE SERPL-SCNC: 105 MMOL/L (ref 95–110)
CO2 SERPL-SCNC: 26 MMOL/L (ref 23–29)
CREAT SERPL-MCNC: 1.1 MG/DL (ref 0.5–1.4)
EST. GFR  (NO RACE VARIABLE): >60 ML/MIN/1.73 M^2
GLUCOSE SERPL-MCNC: 109 MG/DL (ref 70–110)
POTASSIUM SERPL-SCNC: 3.9 MMOL/L (ref 3.5–5.1)
SODIUM SERPL-SCNC: 142 MMOL/L (ref 136–145)

## 2023-12-04 PROCEDURE — 80048 BASIC METABOLIC PNL TOTAL CA: CPT | Performed by: NURSE PRACTITIONER

## 2023-12-04 PROCEDURE — 36415 COLL VENOUS BLD VENIPUNCTURE: CPT | Performed by: NURSE PRACTITIONER

## 2024-01-21 DIAGNOSIS — R60.0 BILATERAL LOWER EXTREMITY EDEMA: ICD-10-CM

## 2024-01-21 NOTE — TELEPHONE ENCOUNTER
No care due was identified.  [facility] Embedded Care Due Messages. Reference number: [id number].   1/21/2024 10:32:26 AM CST

## 2024-01-22 ENCOUNTER — PATIENT OUTREACH (OUTPATIENT)
Dept: ADMINISTRATIVE | Facility: HOSPITAL | Age: 69
End: 2024-01-22
Payer: MEDICARE

## 2024-01-22 ENCOUNTER — OFFICE VISIT (OUTPATIENT)
Dept: FAMILY MEDICINE | Facility: CLINIC | Age: 69
End: 2024-01-22
Payer: MEDICARE

## 2024-01-22 VITALS
BODY MASS INDEX: 30.51 KG/M2 | SYSTOLIC BLOOD PRESSURE: 142 MMHG | DIASTOLIC BLOOD PRESSURE: 82 MMHG | WEIGHT: 206 LBS | HEIGHT: 69 IN

## 2024-01-22 DIAGNOSIS — D69.6 THROMBOCYTOPENIA: ICD-10-CM

## 2024-01-22 DIAGNOSIS — I87.2 VENOUS STASIS DERMATITIS OF BOTH LOWER EXTREMITIES: ICD-10-CM

## 2024-01-22 DIAGNOSIS — I10 ESSENTIAL HYPERTENSION: Primary | ICD-10-CM

## 2024-01-22 DIAGNOSIS — R09.81 NASAL CONGESTION: ICD-10-CM

## 2024-01-22 DIAGNOSIS — B35.4 TINEA CORPORIS: ICD-10-CM

## 2024-01-22 PROCEDURE — 1159F MED LIST DOCD IN RCRD: CPT | Mod: CPTII,S$GLB,, | Performed by: FAMILY MEDICINE

## 2024-01-22 PROCEDURE — 4010F ACE/ARB THERAPY RXD/TAKEN: CPT | Mod: CPTII,S$GLB,, | Performed by: FAMILY MEDICINE

## 2024-01-22 PROCEDURE — 3077F SYST BP >= 140 MM HG: CPT | Mod: CPTII,S$GLB,, | Performed by: FAMILY MEDICINE

## 2024-01-22 PROCEDURE — 1101F PT FALLS ASSESS-DOCD LE1/YR: CPT | Mod: CPTII,S$GLB,, | Performed by: FAMILY MEDICINE

## 2024-01-22 PROCEDURE — 1160F RVW MEDS BY RX/DR IN RCRD: CPT | Mod: CPTII,S$GLB,, | Performed by: FAMILY MEDICINE

## 2024-01-22 PROCEDURE — 3288F FALL RISK ASSESSMENT DOCD: CPT | Mod: CPTII,S$GLB,, | Performed by: FAMILY MEDICINE

## 2024-01-22 PROCEDURE — 3008F BODY MASS INDEX DOCD: CPT | Mod: CPTII,S$GLB,, | Performed by: FAMILY MEDICINE

## 2024-01-22 PROCEDURE — 3079F DIAST BP 80-89 MM HG: CPT | Mod: CPTII,S$GLB,, | Performed by: FAMILY MEDICINE

## 2024-01-22 PROCEDURE — 99999 PR PBB SHADOW E&M-EST. PATIENT-LVL III: CPT | Mod: PBBFAC,,, | Performed by: FAMILY MEDICINE

## 2024-01-22 PROCEDURE — 99214 OFFICE O/P EST MOD 30 MIN: CPT | Mod: S$GLB,,, | Performed by: FAMILY MEDICINE

## 2024-01-22 RX ORDER — NYSTATIN AND TRIAMCINOLONE ACETONIDE 100000; 1 [USP'U]/G; MG/G
CREAM TOPICAL
Qty: 60 G | Refills: 2 | Status: SHIPPED | OUTPATIENT
Start: 2024-01-22

## 2024-01-22 RX ORDER — FUROSEMIDE 20 MG/1
TABLET ORAL
Qty: 60 TABLET | Refills: 0 | Status: SHIPPED | OUTPATIENT
Start: 2024-01-22 | End: 2024-04-25

## 2024-01-22 RX ORDER — TELMISARTAN 40 MG/1
TABLET ORAL
COMMUNITY
Start: 2023-10-25

## 2024-01-22 RX ORDER — FLUTICASONE PROPIONATE 50 MCG
1 SPRAY, SUSPENSION (ML) NASAL DAILY
Qty: 16 G | Refills: 6 | Status: SHIPPED | OUTPATIENT
Start: 2024-01-22

## 2024-01-22 NOTE — TELEPHONE ENCOUNTER
Refill Routing Note   Medication(s) are not appropriate for processing by [facility] for the following reason(s):        No active prescription written by provider    [facility] action(s):  Defer        Medication Therapy Plan: previously discontinued 9/18/23 for patient not taking but is now requesting refill      Appointments  past 12m or future 3m with PCP    Date Provider   Last Visit   9/18/2023 Twila Marcelo MD   Next Visit   1/22/2024 Twila Marcelo MD   ED visits in past 90 days: 0        Note composed:9:15 AM 01/22/2024

## 2024-01-22 NOTE — PROGRESS NOTES
Updates were requested from care everywhere.  Health Maintenance has been updated.  [facility] immunization registry triggered.  Immunizations were reconciled.  Per LOGAN in basket message, pt declined flu vaccine 01/22/2024.

## 2024-01-22 NOTE — ADDENDUM NOTE
Addended by: HEATHER ANGULO on: 1/22/2024 09:14 AM     Modules accepted: Orders    
< from: MRI Lumbar Spine w/o Cont (09.18.17 @ 10:34) >  INTERPRETATION:  Exam Date: 9/18/2017 10:34 AM  MR lumbar without gadolinium  CLINICAL INFORMATION:   Tremors x5 days  TECHNIQUE:   Sagittal and axial T1-weighted images, sagittal inversion recovery images, and sagittal and axial T1-weighted and T2-weighted images of the lumbar spine were obtained.   FINDINGS:   No prior similar studies are available for review.       Lumbar vertebral alignment is preserved.  Lumbar vertebral body heights are maintained. There is endplate degenerative marrow signal changes with degenerative Schmorl nodes at L1, L2, L4, and L5. There is disc desiccation at L3/L4.  At L3/L4 and L4/L5, there arevery small posterior disc bulges resulting in mild narrowing of the bilateral neural foramen without significant spinal canal stenosis.  No significant foraminal or central spinal canal stenosis at T12/L1-L2/L3 and at L5/S1.  The distal cord maintains intact morphology.  Distal cord signal intensity is preserved.  The conus is normally positioned at the T12 level.  Nerve roots of the cauda equina appear intact.    IMPRESSION: No acute abnormalities. Degenerative changes as above.   end of copied text >  < from: MRI Head w/o Cont (09.17.17 @ 12:15) >  *** ADDENDUM 09/18/2017  ***  In conjunction with the abnormal cervical spine MRI. The patient's white matter changes warranted a differential diagnosis which includes infectious, inflammatory, vascular, autoimmune, and demyelinating etiologies. Postcontrast images are recommended. Less likely in the differential diagnosis is neoplasm  *** END OF ADDENDUM 09/18/2017  ***  PROCEDURE DATE:  09/17/2017    INTERPRETATION:CLINICAL HISTORY: tremor  COMPARISON: CT head dated 9/17/2017  TECHNIQUE: MRI brain: Multiplanar, multisequence MR imaging of the brain are obtained without the administration of intravenous gadolinium.   FINDINGS: Limited by motion artifact. There is no abnormal restricted diffusion to suggest acute infarction. Scattered periventricular and subcortical white matter T2 /FLAIR hyperintensities are seen without mass effect, nonspecific, likely representing moderate to severe chronic microvascular changes. The visualized hippocampal structures are symmetric in morphology and signal intensity.  Normal T2 flow-voids are seen within  the intracranial vasculature. The lateral ventricles and cortical sulci are age-appropriate in size and configuration. There is no mass, mass effect, or extra-axial fluid collection. There is no susceptibility artifact to suggest hemorrhage. Midline structures are normal.  Mild polypoid inflammatory mucosal changes are seen throughoutthe various portions of the paranasal  sinuses. The orbits and mastoid air cells are unremarkable.   IMPRESSION: No acute infarction. Microvascular changes. Sinusitis.  < end of copied text >  < from: MRI Cervical Spine w/o Cont (09.18.17 @ 10:18) >  INTERPRETATION:  CLINICAL HISTORY: Tremors for 5 days, severe  COMPARISON: CT C-spine dated 7/3/2017  TECHNIQUE: Cervical spine MRI: Multiplanar, multisequence MR images of the cervical spine are obtained without administration of intravenous gadolinium.  FINDINGS: Abnormal spinal cord T2/retrocardiac hyperintensity at the craniocervical junction, C2, C3, and C4 levels. Diffuse low T1 marrow signal likely representing red marrow hyperplasia. No bone marrow edema or ligamentous edema. Mild disc desiccation at the C4/C5-C6/C7 levels.  There is no evidence for acute fracture. A normal lordosis is noted. Craniocervical junction is normal. The cervicovertebral body heights and intervertebral disc spaces are preserved. There is no prevertebral soft tissue abnormality. The odontoid process is intact. The spinal canal and foramen are widely patent. There is no evidence of significant disc herniation. Thyroid gland is normal. The airway is patent. The upper lung apices are unremarkable.  Evaluation of the individual levels:  C2/C3 level:    No disc herniation, spinal canal stenosis, or foraminal narrowing.  C3/C4 level:    No disc herniation, spinal canal stenosis, or foraminal narrowing.  C4/C5 level: Broad-based ridging causing mild bilateral foraminal narrowing. No spinal canal stenosis.  C5/C6 level: Broad-based ridging causing moderate bilateral foraminal narrowing. No spinal canal stenosis.  C6/C7 level:    No disc herniation, spinal canal stenosis, or foraminal narrowing.   C7/T1 level:    No disc herniation, spinal canal stenosis, or foraminal narrowing.  IMPRESSION:  Multifocal areas of abnormal spinal cord hyperintensities. Differential diagnosis includes infectious, inflammatory, autoimmune, demyelinating, transverse myelitis, and demyelinating disease. Postcontrast images are recommended.  < end of copied text >  < from: MRI Head w/Cont (09.19.17 @ 16:24) >  INTERPRETATION:  MRI brain with contrast. Comparison precontrast exam performed the prior day History tremors  Contrast Gadavist 8 cc; 2 cc discarded  There is normal physiologic enhancement. Please refer to prior exam for  description of white matter changes and differential considerations.  < end of copied text >  < from: MRI Cervical Spine w/Cont (09.19.17 @ 16:24) >  INTERPRETATION:  MRI cervical spine with contrast  Comparison noncontrast exam performed the prior day  Contrast Gadavist 8 cc; 2 cc discarded  There is normal physiologic enhancement. Please refer to the precontrast exam for description of spinal cord and disc changes and differential considerations.  < end of copied text >

## 2024-01-22 NOTE — PROGRESS NOTES
Subjective:       Patient ID: Elliott Gaspar is a 68 y.o. male.    Chief Complaint: Follow-up (Bp check )    Presents today for follow up.     Blood pressure: Telmisartan ordered by cardiology in October and titrated up in December. Initial BP today was elevated.     His primary complaint today is chronic sinus congestion since he moved here. He has not had any flonase for some time.       Review of Systems   Constitutional:  Negative for activity change, appetite change, fatigue and fever.   HENT:  Positive for sinus pressure/congestion.    Respiratory:  Negative for shortness of breath.    Gastrointestinal:  Negative for abdominal pain.   Integumentary:  Negative for rash.         Objective:      Physical Exam  Vitals and nursing note reviewed.   Constitutional:       General: He is not in acute distress.     Appearance: He is not ill-appearing.   Cardiovascular:      Rate and Rhythm: Normal rate and regular rhythm.      Heart sounds: No murmur heard.  Pulmonary:      Effort: Pulmonary effort is normal.      Breath sounds: Normal breath sounds. No wheezing.   Musculoskeletal:      Right lower leg: Edema present.      Left lower leg: Edema present.   Skin:     General: Skin is warm and dry.      Findings: No rash.   Neurological:      Mental Status: He is alert.   Psychiatric:         Mood and Affect: Mood normal.         Behavior: Behavior normal.         Assessment:       1. Nasal congestion    2. Thrombocytopenia    3. Essential hypertension    4. Venous stasis dermatitis of both lower extremities        Plan:       Problem List Items Addressed This Visit          Cardiac/Vascular    Essential hypertension     Improving control. Down to 142/82 today.   Current Outpatient Medications   Medication Sig    ammonium lactate (LAC-HYDRIN) 12 % lotion Apply topically as needed for Dry Skin.    b complex vitamins capsule Take 1 capsule by mouth once daily.    colchicine (COLCRYS) 0.6 mg tablet Use as directed     glucosamine-chondroitin 500-400 mg tablet Take 1 tablet by mouth 2 (two) times daily.    ibuprofen (ADVIL,MOTRIN) 600 MG tablet Take 1 tablet (600 mg total) by mouth every 6 (six) hours as needed for Pain.    nebivoloL (BYSTOLIC) 20 mg Tab Take 20 mg by mouth 2 (two) times a day.    nystatin-triamcinolone (MYCOLOG II) cream APPLY  CREAM TOPICALLY TWICE DAILY    potassium gluconate 2.5 mEq Tab Take 1 tablet by mouth once daily.    spironolactone (ALDACTONE) 25 MG tablet Take 1 tablet (25 mg total) by mouth once daily.    tadalafiL (CIALIS) 20 MG Tab Take 1 tablet (20 mg total) by mouth once daily.    telmisartan (MICARDIS) 40 MG Tab     fluticasone propionate (FLONASE) 50 mcg/actuation nasal spray 1 spray (50 mcg total) by Each Nostril route once daily.     No current facility-administered medications for this visit.   Continue current medications and cardiology follow up.          Venous stasis dermatitis of both lower extremities     Chronic. Stable.            Hematology    Thrombocytopenia     Lab Results   Component Value Date    WBC 4.92 08/12/2023    HGB 14.6 08/12/2023    HCT 42.9 08/12/2023    MCV 87 08/12/2023     (L) 08/12/2023   Chronic. Stable.             Other Visit Diagnoses       Nasal congestion    -  Primary    Relevant Medications    fluticasone propionate (FLONASE) 50 mcg/actuation nasal spray

## 2024-01-22 NOTE — ASSESSMENT & PLAN NOTE
Improving control. Down to 142/82 today.   Current Outpatient Medications   Medication Sig    ammonium lactate (LAC-HYDRIN) 12 % lotion Apply topically as needed for Dry Skin.    b complex vitamins capsule Take 1 capsule by mouth once daily.    colchicine (COLCRYS) 0.6 mg tablet Use as directed    glucosamine-chondroitin 500-400 mg tablet Take 1 tablet by mouth 2 (two) times daily.    ibuprofen (ADVIL,MOTRIN) 600 MG tablet Take 1 tablet (600 mg total) by mouth every 6 (six) hours as needed for Pain.    nebivoloL (BYSTOLIC) 20 mg Tab Take 20 mg by mouth 2 (two) times a day.    nystatin-triamcinolone (MYCOLOG II) cream APPLY  CREAM TOPICALLY TWICE DAILY    potassium gluconate 2.5 mEq Tab Take 1 tablet by mouth once daily.    spironolactone (ALDACTONE) 25 MG tablet Take 1 tablet (25 mg total) by mouth once daily.    tadalafiL (CIALIS) 20 MG Tab Take 1 tablet (20 mg total) by mouth once daily.    telmisartan (MICARDIS) 40 MG Tab     fluticasone propionate (FLONASE) 50 mcg/actuation nasal spray 1 spray (50 mcg total) by Each Nostril route once daily.     No current facility-administered medications for this visit.     Continue current medications and cardiology follow up.

## 2024-01-22 NOTE — ASSESSMENT & PLAN NOTE
Lab Results   Component Value Date    WBC 4.92 08/12/2023    HGB 14.6 08/12/2023    HCT 42.9 08/12/2023    MCV 87 08/12/2023     (L) 08/12/2023     Chronic. Stable.

## 2024-02-06 DIAGNOSIS — Z12.11 COLON CANCER SCREENING: ICD-10-CM

## 2024-02-28 ENCOUNTER — LAB VISIT (OUTPATIENT)
Dept: LAB | Facility: HOSPITAL | Age: 69
End: 2024-02-28
Attending: FAMILY MEDICINE
Payer: MEDICARE

## 2024-02-28 ENCOUNTER — OFFICE VISIT (OUTPATIENT)
Dept: FAMILY MEDICINE | Facility: CLINIC | Age: 69
End: 2024-02-28
Payer: MEDICARE

## 2024-02-28 VITALS
HEART RATE: 68 BPM | OXYGEN SATURATION: 95 % | HEIGHT: 69 IN | SYSTOLIC BLOOD PRESSURE: 140 MMHG | WEIGHT: 205.19 LBS | DIASTOLIC BLOOD PRESSURE: 86 MMHG | BODY MASS INDEX: 30.39 KG/M2 | RESPIRATION RATE: 18 BRPM

## 2024-02-28 DIAGNOSIS — I10 ESSENTIAL HYPERTENSION: ICD-10-CM

## 2024-02-28 DIAGNOSIS — M10.9 GOUT, UNSPECIFIED CAUSE, UNSPECIFIED CHRONICITY, UNSPECIFIED SITE: ICD-10-CM

## 2024-02-28 DIAGNOSIS — D69.6 THROMBOCYTOPENIA: ICD-10-CM

## 2024-02-28 DIAGNOSIS — E78.2 MIXED HYPERLIPIDEMIA: ICD-10-CM

## 2024-02-28 DIAGNOSIS — D69.6 THROMBOCYTOPENIA: Primary | ICD-10-CM

## 2024-02-28 DIAGNOSIS — M70.22 OLECRANON BURSITIS OF LEFT ELBOW: ICD-10-CM

## 2024-02-28 LAB
ALBUMIN SERPL BCP-MCNC: 3.7 G/DL (ref 3.5–5.2)
ALP SERPL-CCNC: 90 U/L (ref 55–135)
ALT SERPL W/O P-5'-P-CCNC: 59 U/L (ref 10–44)
ANION GAP SERPL CALC-SCNC: 11 MMOL/L (ref 8–16)
AST SERPL-CCNC: 46 U/L (ref 10–40)
BASOPHILS # BLD AUTO: 0.03 K/UL (ref 0–0.2)
BASOPHILS NFR BLD: 0.6 % (ref 0–1.9)
BILIRUB SERPL-MCNC: 1 MG/DL (ref 0.1–1)
BUN SERPL-MCNC: 15 MG/DL (ref 8–23)
CALCIUM SERPL-MCNC: 8.9 MG/DL (ref 8.7–10.5)
CHLORIDE SERPL-SCNC: 104 MMOL/L (ref 95–110)
CHOLEST SERPL-MCNC: 196 MG/DL (ref 120–199)
CHOLEST/HDLC SERPL: 6.5 {RATIO} (ref 2–5)
CO2 SERPL-SCNC: 25 MMOL/L (ref 23–29)
CREAT SERPL-MCNC: 1.1 MG/DL (ref 0.5–1.4)
DIFFERENTIAL METHOD BLD: ABNORMAL
EOSINOPHIL # BLD AUTO: 0.3 K/UL (ref 0–0.5)
EOSINOPHIL NFR BLD: 6.5 % (ref 0–8)
ERYTHROCYTE [DISTWIDTH] IN BLOOD BY AUTOMATED COUNT: 12.7 % (ref 11.5–14.5)
EST. GFR  (NO RACE VARIABLE): >60 ML/MIN/1.73 M^2
GLUCOSE SERPL-MCNC: 111 MG/DL (ref 70–110)
HCT VFR BLD AUTO: 42.8 % (ref 40–54)
HDLC SERPL-MCNC: 30 MG/DL (ref 40–75)
HDLC SERPL: 15.3 % (ref 20–50)
HGB BLD-MCNC: 14.6 G/DL (ref 14–18)
IMM GRANULOCYTES # BLD AUTO: 0.04 K/UL (ref 0–0.04)
IMM GRANULOCYTES NFR BLD AUTO: 0.8 % (ref 0–0.5)
LDLC SERPL CALC-MCNC: 121 MG/DL (ref 63–159)
LYMPHOCYTES # BLD AUTO: 1.5 K/UL (ref 1–4.8)
LYMPHOCYTES NFR BLD: 28.6 % (ref 18–48)
MCH RBC QN AUTO: 30.2 PG (ref 27–31)
MCHC RBC AUTO-ENTMCNC: 34.1 G/DL (ref 32–36)
MCV RBC AUTO: 88 FL (ref 82–98)
MONOCYTES # BLD AUTO: 0.5 K/UL (ref 0.3–1)
MONOCYTES NFR BLD: 8.6 % (ref 4–15)
NEUTROPHILS # BLD AUTO: 2.9 K/UL (ref 1.8–7.7)
NEUTROPHILS NFR BLD: 54.9 % (ref 38–73)
NONHDLC SERPL-MCNC: 166 MG/DL
NRBC BLD-RTO: 0 /100 WBC
PLATELET # BLD AUTO: 130 K/UL (ref 150–450)
PMV BLD AUTO: 10 FL (ref 9.2–12.9)
POTASSIUM SERPL-SCNC: 3.7 MMOL/L (ref 3.5–5.1)
PROT SERPL-MCNC: 6.5 G/DL (ref 6–8.4)
RBC # BLD AUTO: 4.84 M/UL (ref 4.6–6.2)
SODIUM SERPL-SCNC: 140 MMOL/L (ref 136–145)
TRIGL SERPL-MCNC: 225 MG/DL (ref 30–150)
URATE SERPL-MCNC: 7.9 MG/DL (ref 3.4–7)
WBC # BLD AUTO: 5.24 K/UL (ref 3.9–12.7)

## 2024-02-28 PROCEDURE — 4010F ACE/ARB THERAPY RXD/TAKEN: CPT | Mod: CPTII,S$GLB,, | Performed by: FAMILY MEDICINE

## 2024-02-28 PROCEDURE — 84550 ASSAY OF BLOOD/URIC ACID: CPT | Performed by: FAMILY MEDICINE

## 2024-02-28 PROCEDURE — 1160F RVW MEDS BY RX/DR IN RCRD: CPT | Mod: CPTII,S$GLB,, | Performed by: FAMILY MEDICINE

## 2024-02-28 PROCEDURE — 99214 OFFICE O/P EST MOD 30 MIN: CPT | Mod: S$GLB,,, | Performed by: FAMILY MEDICINE

## 2024-02-28 PROCEDURE — 36415 COLL VENOUS BLD VENIPUNCTURE: CPT | Performed by: FAMILY MEDICINE

## 2024-02-28 PROCEDURE — 3288F FALL RISK ASSESSMENT DOCD: CPT | Mod: CPTII,S$GLB,, | Performed by: FAMILY MEDICINE

## 2024-02-28 PROCEDURE — 1101F PT FALLS ASSESS-DOCD LE1/YR: CPT | Mod: CPTII,S$GLB,, | Performed by: FAMILY MEDICINE

## 2024-02-28 PROCEDURE — 80053 COMPREHEN METABOLIC PANEL: CPT | Performed by: FAMILY MEDICINE

## 2024-02-28 PROCEDURE — 85025 COMPLETE CBC W/AUTO DIFF WBC: CPT | Performed by: FAMILY MEDICINE

## 2024-02-28 PROCEDURE — 1159F MED LIST DOCD IN RCRD: CPT | Mod: CPTII,S$GLB,, | Performed by: FAMILY MEDICINE

## 2024-02-28 PROCEDURE — 80061 LIPID PANEL: CPT | Performed by: FAMILY MEDICINE

## 2024-02-28 PROCEDURE — 3079F DIAST BP 80-89 MM HG: CPT | Mod: CPTII,S$GLB,, | Performed by: FAMILY MEDICINE

## 2024-02-28 PROCEDURE — 3008F BODY MASS INDEX DOCD: CPT | Mod: CPTII,S$GLB,, | Performed by: FAMILY MEDICINE

## 2024-02-28 PROCEDURE — 1125F AMNT PAIN NOTED PAIN PRSNT: CPT | Mod: CPTII,S$GLB,, | Performed by: FAMILY MEDICINE

## 2024-02-28 PROCEDURE — 99999 PR PBB SHADOW E&M-EST. PATIENT-LVL IV: CPT | Mod: PBBFAC,,, | Performed by: FAMILY MEDICINE

## 2024-02-28 PROCEDURE — 3077F SYST BP >= 140 MM HG: CPT | Mod: CPTII,S$GLB,, | Performed by: FAMILY MEDICINE

## 2024-02-28 RX ORDER — IBUPROFEN 800 MG/1
800 TABLET ORAL EVERY 8 HOURS PRN
Qty: 30 TABLET | Refills: 1 | Status: SHIPPED | OUTPATIENT
Start: 2024-02-28

## 2024-02-28 RX ORDER — POTASSIUM CHLORIDE 20 MEQ/1
20 TABLET, EXTENDED RELEASE ORAL DAILY
Qty: 30 TABLET | Refills: 1 | Status: SHIPPED | OUTPATIENT
Start: 2024-02-28 | End: 2024-05-08

## 2024-02-28 NOTE — PROGRESS NOTES
Subjective:       Patient ID: Elliott Gaspar is a 68 y.o. male.    Chief Complaint: Lump on left elbow (Started last saturday)    Presents today with complaint of left elbow pain and swelling.     Left elbow with swollen red tender area that is improving some since it started. Started last Saturday. Was very painful, but it is improving some. Leaning on it is also painful.       Review of Systems   Constitutional:  Negative for activity change, appetite change, fatigue and fever.   Respiratory:  Negative for shortness of breath.    Gastrointestinal:  Negative for abdominal pain.   Integumentary:  Negative for rash.         Objective:      Physical Exam  Vitals and nursing note reviewed.   Constitutional:       General: He is not in acute distress.     Appearance: He is not ill-appearing.   Cardiovascular:      Rate and Rhythm: Normal rate and regular rhythm.      Heart sounds: No murmur heard.  Pulmonary:      Effort: Pulmonary effort is normal.      Breath sounds: Normal breath sounds. No wheezing.   Skin:     General: Skin is warm and dry.      Findings: No rash.   Neurological:      Mental Status: He is alert.   Psychiatric:         Mood and Affect: Mood normal.         Behavior: Behavior normal.         Assessment:       1. Thrombocytopenia    2. Essential hypertension    3. Mixed hyperlipidemia    4. Gout, unspecified cause, unspecified chronicity, unspecified site    5. Olecranon bursitis of left elbow        Plan:       Problem List Items Addressed This Visit          Cardiac/Vascular    Essential hypertension    Relevant Orders    Comprehensive Metabolic Panel (Completed)       Hematology    Thrombocytopenia - Primary    Relevant Orders    CBC Auto Differential (Completed)     Other Visit Diagnoses       Mixed hyperlipidemia        Relevant Orders    Lipid Panel (Completed)    Gout, unspecified cause, unspecified chronicity, unspecified site        Relevant Orders    Uric acid (Completed)    Olecranon  bursitis of left elbow        anti-inflammatories and supportive care. Ortho if does not improve

## 2024-04-25 ENCOUNTER — LAB VISIT (OUTPATIENT)
Dept: LAB | Facility: HOSPITAL | Age: 69
End: 2024-04-25
Attending: FAMILY MEDICINE
Payer: MEDICARE

## 2024-04-25 ENCOUNTER — OFFICE VISIT (OUTPATIENT)
Dept: FAMILY MEDICINE | Facility: CLINIC | Age: 69
End: 2024-04-25
Payer: MEDICARE

## 2024-04-25 VITALS
OXYGEN SATURATION: 95 % | HEIGHT: 69 IN | WEIGHT: 204.31 LBS | BODY MASS INDEX: 30.26 KG/M2 | RESPIRATION RATE: 18 BRPM | DIASTOLIC BLOOD PRESSURE: 84 MMHG | SYSTOLIC BLOOD PRESSURE: 158 MMHG | HEART RATE: 75 BPM

## 2024-04-25 DIAGNOSIS — I10 ESSENTIAL HYPERTENSION: Primary | ICD-10-CM

## 2024-04-25 DIAGNOSIS — Z12.5 SCREENING FOR PROSTATE CANCER: ICD-10-CM

## 2024-04-25 DIAGNOSIS — M10.9 GOUTY ARTHRITIS: ICD-10-CM

## 2024-04-25 DIAGNOSIS — N40.0 BENIGN PROSTATIC HYPERPLASIA WITHOUT LOWER URINARY TRACT SYMPTOMS: ICD-10-CM

## 2024-04-25 PROBLEM — I34.0 NONRHEUMATIC MITRAL VALVE REGURGITATION: Status: ACTIVE | Noted: 2023-04-18

## 2024-04-25 LAB — COMPLEXED PSA SERPL-MCNC: 1.7 NG/ML (ref 0–4)

## 2024-04-25 PROCEDURE — 99999 PR PBB SHADOW E&M-EST. PATIENT-LVL III: CPT | Mod: PBBFAC,,, | Performed by: FAMILY MEDICINE

## 2024-04-25 PROCEDURE — 3079F DIAST BP 80-89 MM HG: CPT | Mod: CPTII,S$GLB,, | Performed by: FAMILY MEDICINE

## 2024-04-25 PROCEDURE — 1126F AMNT PAIN NOTED NONE PRSNT: CPT | Mod: CPTII,S$GLB,, | Performed by: FAMILY MEDICINE

## 2024-04-25 PROCEDURE — 3008F BODY MASS INDEX DOCD: CPT | Mod: CPTII,S$GLB,, | Performed by: FAMILY MEDICINE

## 2024-04-25 PROCEDURE — 3288F FALL RISK ASSESSMENT DOCD: CPT | Mod: CPTII,S$GLB,, | Performed by: FAMILY MEDICINE

## 2024-04-25 PROCEDURE — 84153 ASSAY OF PSA TOTAL: CPT | Performed by: FAMILY MEDICINE

## 2024-04-25 PROCEDURE — 1101F PT FALLS ASSESS-DOCD LE1/YR: CPT | Mod: CPTII,S$GLB,, | Performed by: FAMILY MEDICINE

## 2024-04-25 PROCEDURE — 1160F RVW MEDS BY RX/DR IN RCRD: CPT | Mod: CPTII,S$GLB,, | Performed by: FAMILY MEDICINE

## 2024-04-25 PROCEDURE — 36415 COLL VENOUS BLD VENIPUNCTURE: CPT | Performed by: FAMILY MEDICINE

## 2024-04-25 PROCEDURE — 99214 OFFICE O/P EST MOD 30 MIN: CPT | Mod: S$GLB,,, | Performed by: FAMILY MEDICINE

## 2024-04-25 PROCEDURE — 1159F MED LIST DOCD IN RCRD: CPT | Mod: CPTII,S$GLB,, | Performed by: FAMILY MEDICINE

## 2024-04-25 PROCEDURE — 3077F SYST BP >= 140 MM HG: CPT | Mod: CPTII,S$GLB,, | Performed by: FAMILY MEDICINE

## 2024-04-25 PROCEDURE — 4010F ACE/ARB THERAPY RXD/TAKEN: CPT | Mod: CPTII,S$GLB,, | Performed by: FAMILY MEDICINE

## 2024-04-25 NOTE — ASSESSMENT & PLAN NOTE
Control varies. Refuses digital medicine.   He reports that today has been a day with increased stress.

## 2024-04-25 NOTE — PROGRESS NOTES
Subjective:       Patient ID: Elliott Gaspar is a 68 y.o. male.    Chief Complaint: Follow-up    Presents today for follow up to discuss chronic conditions.   When we last met he was having trouble with blood pressure and medications were being changed by cardiology.   Blood pressure remains elevated.      He reports that he has had some decrease in size in his olecranon bursitis and he has had some improvement but he is going to see orthopedics soon for this and his fingers soon. Seeing at [facility].         .  Patient Active Problem List   Diagnosis    BPH (benign prostatic hypertrophy)    Penile curvature, acquired    History of thrombocytopenia    Essential hypertension    Deviated septum    Peyronie's disease    Gouty arthritis    Ganglion cyst    Left retinal detachment    Left cataract    Hiatal hernia    History of Helicobacter pylori infection    Dupuytren's contracture of both hands    Leg edema    Stiffness of finger joint of left hand    Colon cancer screening    Lumbar radiculopathy    Ingrown nail    Idiopathic peripheral neuropathy    Thrombocytopenia    Venous stasis dermatitis of both lower extremities    Nonrheumatic mitral valve regurgitation     Elliott has a current medication list which includes the following prescription(s): b complex vitamins, fluticasone propionate, glucosamine-chondroitin, ibuprofen, nebivolol, nystatin-triamcinolone, potassium chloride sa, tadalafil, telmisartan, and colchicine.    Review of Systems   Constitutional:  Negative for activity change, appetite change, fatigue and fever.   Respiratory:  Negative for shortness of breath.    Gastrointestinal:  Negative for abdominal pain.   Integumentary:  Negative for rash.         Health Maintenance Due   Topic Date Due    Colorectal Cancer Screening  Never done    Shingles Vaccine (1 of 2) Never done    RSV Vaccine (Age 60+ and Pregnant patients) (1 - 1-dose 60+ series) Never done    COVID-19 Vaccine (3 - 2023-24  season) 09/01/2023    PROSTATE-SPECIFIC ANTIGEN  02/02/2024      Health Maintenance reviewed and discussed- PSA ordered for next labs- has FIT kit at home.   Objective:      Physical Exam  Vitals and nursing note reviewed.   Constitutional:       General: He is not in acute distress.     Appearance: He is not ill-appearing.   Cardiovascular:      Rate and Rhythm: Normal rate and regular rhythm.      Heart sounds: No murmur heard.  Pulmonary:      Effort: Pulmonary effort is normal.      Breath sounds: Normal breath sounds. No wheezing.   Skin:     General: Skin is warm and dry.      Findings: No rash.   Neurological:      Mental Status: He is alert.   Psychiatric:         Mood and Affect: Mood normal.         Behavior: Behavior normal.         Assessment:       1. Essential hypertension    2. Benign prostatic hyperplasia without lower urinary tract symptoms    3. Gouty arthritis    4. Screening for prostate cancer        Plan:       1. Essential hypertension  Assessment & Plan:  Control varies. Refuses digital medicine.   He reports that today has been a day with increased stress.       2. Benign prostatic hyperplasia without lower urinary tract symptoms  Overview:  Dx updated per 2019 IMO Load      3. Gouty arthritis  Overview:  Right podagra    Assessment & Plan:  Occasional exacerbations; 1-2 days/ year.       4. Screening for prostate cancer  -     PSA, Screening; Future; Expected date: 04/25/2024

## 2024-04-30 ENCOUNTER — LAB VISIT (OUTPATIENT)
Dept: LAB | Facility: HOSPITAL | Age: 69
End: 2024-04-30
Attending: FAMILY MEDICINE
Payer: MEDICARE

## 2024-04-30 DIAGNOSIS — Z12.11 COLON CANCER SCREENING: ICD-10-CM

## 2024-04-30 LAB — HEMOCCULT STL QL IA: NEGATIVE

## 2024-04-30 PROCEDURE — 82274 ASSAY TEST FOR BLOOD FECAL: CPT | Performed by: FAMILY MEDICINE
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
## 2024-05-08 RX ORDER — POTASSIUM CHLORIDE 20 MEQ/1
20 TABLET, EXTENDED RELEASE ORAL DAILY
Qty: 90 TABLET | Refills: 3 | Status: SHIPPED | OUTPATIENT
Start: 2024-05-08

## 2024-05-08 NOTE — TELEPHONE ENCOUNTER
Refill Routing Note   Medication(s) are not appropriate for processing by [facility] for the following reason(s):        New or recently adjusted medication    [facility] action(s):  Defer             Appointments  past 12m or future 3m with PCP    Date Provider   Last Visit   4/25/2024 Twila Marcelo MD   Next Visit   9/4/2024 Twila Marcelo MD   ED visits in past 90 days: 0        Note composed:8:08 AM 05/08/2024

## 2024-05-08 NOTE — TELEPHONE ENCOUNTER
No care due was identified.  Health [facility] Embedded Care Due Messages. Reference number: [id number].   5/08/2024 12:28:18 AM CDT

## 2024-05-13 ENCOUNTER — HOSPITAL ENCOUNTER (OUTPATIENT)
Dept: RADIOLOGY | Facility: HOSPITAL | Age: 69
Discharge: HOME | End: 2024-05-13
Attending: ORTHOPAEDIC SURGERY
Payer: MEDICARE
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
## 2024-05-13 PROCEDURE — 73218 MRI UPPER EXTREMITY W/O DYE: CPT | Mod: TC,PO,RT

## 2024-05-13 PROCEDURE — 73218 MRI UPPER EXTREMITY W/O DYE: CPT | Mod: 26,RT,, | Performed by: RADIOLOGY

## 2024-07-03 ENCOUNTER — OFFICE VISIT (OUTPATIENT)
Dept: OTOLARYNGOLOGY | Facility: CLINIC | Age: 69
End: 2024-07-03
Payer: MEDICARE

## 2024-07-03 ENCOUNTER — TELEPHONE (OUTPATIENT)
Dept: FAMILY MEDICINE | Facility: CLINIC | Age: 69
End: 2024-07-03
Payer: MEDICARE

## 2024-07-03 VITALS — HEIGHT: 69 IN | WEIGHT: 203.63 LBS | BODY MASS INDEX: 30.16 KG/M2

## 2024-07-03 DIAGNOSIS — J30.9 CHRONIC ALLERGIC RHINITIS: ICD-10-CM

## 2024-07-03 DIAGNOSIS — R51.9 FREQUENT HEADACHES: Primary | ICD-10-CM

## 2024-07-03 PROCEDURE — 99999 PR PBB SHADOW E&M-EST. PATIENT-LVL III: CPT | Mod: PBBFAC,,, | Performed by: OTOLARYNGOLOGY

## 2024-07-03 PROCEDURE — 99204 OFFICE O/P NEW MOD 45 MIN: CPT | Mod: S$GLB,,, | Performed by: OTOLARYNGOLOGY

## 2024-07-03 PROCEDURE — 1160F RVW MEDS BY RX/DR IN RCRD: CPT | Mod: CPTII,S$GLB,, | Performed by: OTOLARYNGOLOGY

## 2024-07-03 PROCEDURE — 3288F FALL RISK ASSESSMENT DOCD: CPT | Mod: CPTII,S$GLB,, | Performed by: OTOLARYNGOLOGY

## 2024-07-03 PROCEDURE — 4010F ACE/ARB THERAPY RXD/TAKEN: CPT | Mod: CPTII,S$GLB,, | Performed by: OTOLARYNGOLOGY

## 2024-07-03 PROCEDURE — 1125F AMNT PAIN NOTED PAIN PRSNT: CPT | Mod: CPTII,S$GLB,, | Performed by: OTOLARYNGOLOGY

## 2024-07-03 PROCEDURE — 1159F MED LIST DOCD IN RCRD: CPT | Mod: CPTII,S$GLB,, | Performed by: OTOLARYNGOLOGY

## 2024-07-03 PROCEDURE — 3008F BODY MASS INDEX DOCD: CPT | Mod: CPTII,S$GLB,, | Performed by: OTOLARYNGOLOGY

## 2024-07-03 PROCEDURE — 1101F PT FALLS ASSESS-DOCD LE1/YR: CPT | Mod: CPTII,S$GLB,, | Performed by: OTOLARYNGOLOGY

## 2024-07-03 RX ORDER — TOPIRAMATE 25 MG/1
25 TABLET ORAL 2 TIMES DAILY
Qty: 60 TABLET | Refills: 11 | Status: SHIPPED | OUTPATIENT
Start: 2024-07-03 | End: 2025-07-03

## 2024-07-03 RX ORDER — AZELASTINE 1 MG/ML
SPRAY, METERED NASAL
Qty: 30 ML | Refills: 11 | Status: SHIPPED | OUTPATIENT
Start: 2024-07-03

## 2024-07-03 NOTE — TELEPHONE ENCOUNTER
----- Message from Thuy Kraus, Patient Care Assistant sent at 7/3/2024 10:04 AM CDT -----  Regarding: appointment  Contact: pt  Type:  Sooner Appointment Request    Caller is requesting a sooner appointment.  Caller declined first available appointment listed below.  Caller will not accept being placed on the waitlist and is requesting a message be sent to doctor.    Name of Caller:  pt     When is the first available appointment?  7/12/24    Symptoms:  sinus headaches    Would the patient rather a call back or a response via [facility]? callback    Best Call Back Number:  [phone]     Additional Information:  please call to advise. Thanks!

## 2024-07-03 NOTE — PROGRESS NOTES
"Subjective:       Patient ID: Elliott Gaspar is a 68 y.o. male.    Chief Complaint: Otalgia (Pt c/o ear pain and headaches for three weeks )      This 68-year-old gentleman comes in with "sinus headaches".  He has a long history of such problems but this past weeks been especially bad for him.  He mentions having moved from the [place] area to [place] and having more allergy type symptoms attributed to the larger amount of [place] such around his property.    He has had three different sinus surgeries and he had a CT scan done in 2022 of his sinuses and I have reviewed that independently and at least at that time it shows well perform sinus surgery widely patent sinuses a small septal perforation and at that time a little mucosal swelling but nothing of great significance.    He mentions recently taking Claritin D to help with the "sinus headaches" and he has used Flonase before but does not find it very helpful.        Objective:      ENT Physical Exam    His nasal exam shows evidence of a previous septoplasty a small perforation in the septum he has had a nosebleed recently but I do not see any blood crusting around the perforation.  I can see up towards his middle turbinate bilaterally I do not see any obvious purulence or inflammation.      His tympanic membranes and ear canals look normal he has had some ear stuffiness it is unclear exactly what that is from.  His oropharynx is unremarkable.        Assessment:       1. Frequent headaches    2. Chronic allergic rhinitis         Plan:          I think his story is most consistent with headaches that are probably not from blocked or infected sinuses.  Based on the CT scan that I independently reviewed from 2022 that shows well perform surgery and no obvious site where blockage would be prone to occur in the absence of any infected material being blown out or an abnormal exam and the headaches being his primary complaint currently I think we should treat " them as such.  He does mentioned some postnasal drip and a flare-up of allergy type symptoms I wanted him to try some azelastine and see if that is helpful.    I am beginning him on topiramate 25 twice a day I have discussed with him the potential and common side effects the hope that it will reduce these sinus headaches and if it is helpful to him and he does not have much in the way of side effects we will continue on it for I would guess a few months and then see what happens.

## 2024-08-07 ENCOUNTER — OFFICE VISIT (OUTPATIENT)
Dept: FAMILY MEDICINE | Facility: CLINIC | Age: 69
End: 2024-08-07
Payer: MEDICARE

## 2024-08-07 VITALS
BODY MASS INDEX: 29.89 KG/M2 | DIASTOLIC BLOOD PRESSURE: 92 MMHG | HEIGHT: 69 IN | SYSTOLIC BLOOD PRESSURE: 170 MMHG | RESPIRATION RATE: 18 BRPM | WEIGHT: 201.81 LBS | OXYGEN SATURATION: 98 % | HEART RATE: 63 BPM

## 2024-08-07 DIAGNOSIS — N40.1 BPH WITH OBSTRUCTION/LOWER URINARY TRACT SYMPTOMS: ICD-10-CM

## 2024-08-07 DIAGNOSIS — G60.9 IDIOPATHIC PERIPHERAL NEUROPATHY: ICD-10-CM

## 2024-08-07 DIAGNOSIS — E87.6 HYPOKALEMIA: ICD-10-CM

## 2024-08-07 DIAGNOSIS — N13.8 BPH WITH OBSTRUCTION/LOWER URINARY TRACT SYMPTOMS: ICD-10-CM

## 2024-08-07 DIAGNOSIS — D69.6 THROMBOCYTOPENIA: ICD-10-CM

## 2024-08-07 DIAGNOSIS — R51.9 NEW ONSET OF HEADACHES AFTER AGE 50: Primary | ICD-10-CM

## 2024-08-07 DIAGNOSIS — K52.9 CHRONIC DIARRHEA: ICD-10-CM

## 2024-08-07 PROCEDURE — 3008F BODY MASS INDEX DOCD: CPT | Mod: CPTII,S$GLB,, | Performed by: FAMILY MEDICINE

## 2024-08-07 PROCEDURE — 99214 OFFICE O/P EST MOD 30 MIN: CPT | Mod: S$GLB,,, | Performed by: FAMILY MEDICINE

## 2024-08-07 PROCEDURE — 3077F SYST BP >= 140 MM HG: CPT | Mod: CPTII,S$GLB,, | Performed by: FAMILY MEDICINE

## 2024-08-07 PROCEDURE — 1159F MED LIST DOCD IN RCRD: CPT | Mod: CPTII,S$GLB,, | Performed by: FAMILY MEDICINE

## 2024-08-07 PROCEDURE — 1101F PT FALLS ASSESS-DOCD LE1/YR: CPT | Mod: CPTII,S$GLB,, | Performed by: FAMILY MEDICINE

## 2024-08-07 PROCEDURE — 99999 PR PBB SHADOW E&M-EST. PATIENT-LVL III: CPT | Mod: PBBFAC,,, | Performed by: FAMILY MEDICINE

## 2024-08-07 PROCEDURE — 3288F FALL RISK ASSESSMENT DOCD: CPT | Mod: CPTII,S$GLB,, | Performed by: FAMILY MEDICINE

## 2024-08-07 PROCEDURE — 3080F DIAST BP >= 90 MM HG: CPT | Mod: CPTII,S$GLB,, | Performed by: FAMILY MEDICINE

## 2024-08-07 PROCEDURE — 4010F ACE/ARB THERAPY RXD/TAKEN: CPT | Mod: CPTII,S$GLB,, | Performed by: FAMILY MEDICINE

## 2024-08-07 PROCEDURE — 1125F AMNT PAIN NOTED PAIN PRSNT: CPT | Mod: CPTII,S$GLB,, | Performed by: FAMILY MEDICINE

## 2024-08-07 RX ORDER — TAMSULOSIN HYDROCHLORIDE 0.4 MG/1
0.4 CAPSULE ORAL DAILY
Qty: 30 CAPSULE | Refills: 11 | Status: SHIPPED | OUTPATIENT
Start: 2024-08-07 | End: 2025-08-07

## 2024-08-12 ENCOUNTER — HOSPITAL ENCOUNTER (OUTPATIENT)
Dept: RADIOLOGY | Facility: HOSPITAL | Age: 69
Discharge: HOME | End: 2024-08-12
Attending: FAMILY MEDICINE
Payer: MEDICARE

## 2024-08-12 DIAGNOSIS — R51.9 NEW ONSET OF HEADACHES AFTER AGE 50: ICD-10-CM

## 2024-08-12 PROCEDURE — 70551 MRI BRAIN STEM W/O DYE: CPT | Mod: 26,,, | Performed by: RADIOLOGY

## 2024-08-12 PROCEDURE — 70551 MRI BRAIN STEM W/O DYE: CPT | Mod: TC

## 2024-08-14 ENCOUNTER — PATIENT MESSAGE (OUTPATIENT)
Dept: FAMILY MEDICINE | Facility: CLINIC | Age: 69
End: 2024-08-14
Payer: MEDICARE

## 2024-08-14 ENCOUNTER — PATIENT MESSAGE (OUTPATIENT)
Dept: ADMINISTRATIVE | Facility: HOSPITAL | Age: 69
End: 2024-08-14
Payer: MEDICARE

## 2024-08-14 DIAGNOSIS — K76.0 FATTY LIVER: Primary | ICD-10-CM

## 2024-08-15 ENCOUNTER — TELEPHONE (OUTPATIENT)
Dept: FAMILY MEDICINE | Facility: CLINIC | Age: 69
End: 2024-08-15
Payer: MEDICARE

## 2024-08-15 RX ORDER — BUTALBITAL, ASPIRIN, AND CAFFEINE 325; 50; 40 MG/1; MG/1; MG/1
1 CAPSULE ORAL EVERY 6 HOURS PRN
Qty: 20 CAPSULE | Refills: 0 | Status: SHIPPED | OUTPATIENT
Start: 2024-08-15 | End: 2024-08-20

## 2024-08-15 NOTE — TELEPHONE ENCOUNTER
Please review MRI 8/12/24  Informed pt once MD reviews, we will contact him. Pt voiced understanding

## 2024-08-15 NOTE — TELEPHONE ENCOUNTER
----- Message from Ayde Estrada, Patient Care Assistant sent at 8/15/2024 10:02 AM CDT -----  Regarding: migraine and MRI results  PT is requesting that he be called to discuss the MRI results and PT is having bad migraines and has facial swelling. Please call [phone]. PT is upset that the results are in the mychart and no one has contacted him about these results.

## 2024-08-15 NOTE — TELEPHONE ENCOUNTER
Fib-4 score is 3.07 points which is high risk for advanced fibrosis    FIB-4 below 1.30 is considered as low-risk for advanced fibrosis  FIB-4 over 2.67 is considered as high-risk for advanced fibrosis  FIB-4 values between 1.30 and 2.67 are considered as intermediate-risk of advanced fibrosis for ages 36-64.     For ages > 64 the cut-off for low-risk goes to < 2.  This is a screening tool and clinical judgement should be used in the interpretation of these results.

## 2024-08-19 ENCOUNTER — OFFICE VISIT (OUTPATIENT)
Dept: FAMILY MEDICINE | Facility: CLINIC | Age: 69
End: 2024-08-19
Payer: MEDICARE

## 2024-08-19 ENCOUNTER — TELEPHONE (OUTPATIENT)
Dept: OPHTHALMOLOGY | Facility: CLINIC | Age: 69
End: 2024-08-19
Payer: MEDICARE

## 2024-08-19 DIAGNOSIS — J30.1 ALLERGY TO TREES: ICD-10-CM

## 2024-08-19 DIAGNOSIS — G43.519 INTRACTABLE PERSISTENT MIGRAINE AURA WITHOUT CEREBRAL INFARCTION AND WITHOUT STATUS MIGRAINOSUS: Primary | ICD-10-CM

## 2024-08-19 PROCEDURE — 4010F ACE/ARB THERAPY RXD/TAKEN: CPT | Mod: CPTII,95,, | Performed by: FAMILY MEDICINE

## 2024-08-19 PROCEDURE — 99213 OFFICE O/P EST LOW 20 MIN: CPT | Mod: 95,,, | Performed by: FAMILY MEDICINE

## 2024-08-19 RX ORDER — SUMATRIPTAN 50 MG/1
TABLET, FILM COATED ORAL
Qty: 27 TABLET | Refills: 1 | Status: SHIPPED | OUTPATIENT
Start: 2024-08-19

## 2024-08-19 RX ORDER — FAMOTIDINE 20 MG/1
20 TABLET, FILM COATED ORAL 2 TIMES DAILY
Qty: 60 TABLET | Refills: 11 | Status: SHIPPED | OUTPATIENT
Start: 2024-08-19 | End: 2025-08-19

## 2024-08-19 RX ORDER — CETIRIZINE HYDROCHLORIDE 10 MG/1
10 TABLET ORAL DAILY
Qty: 30 TABLET | Refills: 11 | Status: SHIPPED | OUTPATIENT
Start: 2024-08-19 | End: 2025-08-19

## 2024-08-19 RX ORDER — DOXYCYCLINE 100 MG/1
100 CAPSULE ORAL 2 TIMES DAILY
Qty: 20 CAPSULE | Refills: 0 | Status: SHIPPED | OUTPATIENT
Start: 2024-08-19 | End: 2024-08-29

## 2024-08-19 NOTE — TELEPHONE ENCOUNTER
----- Message from Quyen Abarca sent at 8/17/2024 11:17 AM CDT -----  Regarding: Same day appt request  Contact: pt  Type:  Same Day Appointment Request    Caller is requesting a same day appointment.  Caller declined first available appointment listed below.      Name of Caller:pt    When is the first available appointment?sept    Symptoms:extreme pain in left eye.  Seeing double    Would the PT rather a call back or a response via [facility]? Call back    Best Call Back Number:[phone]

## 2024-08-19 NOTE — PROGRESS NOTES
The patient location is: [place]  The chief complaint leading to consultation is: Left eye swollen, double vision, headaches    Visit type: audiovisual    Face to Face time with patient: 10 minutes - disconnected at 4 minutes due to power outage and we had to change to phone call.   12 minutes of total time spent on the encounter, which includes face to face time and non-face to face time preparing to see the patient (eg, review of tests), Obtaining and/or reviewing separately obtained history, Documenting clinical information in the electronic or other health record, Independently interpreting results (not separately reported) and communicating results to the patient/family/caregiver, or Care coordination (not separately reported).         Each patient to whom he or she provides medical services by telemedicine is:  (1) informed of the relationship between the physician and patient and the respective role of any other health care provider with respect to management of the patient; and (2) notified that he or she may decline to receive medical services by telemedicine and may withdraw from such care at any time.    Notes:  Subjective:       Patient ID: Elliott Gaspar is a 69 y.o. male.    Chief Complaint: No chief complaint on file.      Ear is improved.   Left sided head pain.   Has a history of an oak allergy    He is starting to have double vision and significant pain in the left eye.   This started Wednesday/Thursday  He thought at firs that it was his old contacts that were causing him an issue, but with the new contacts no issues  He has upcoming appt with ophthalmology tomorrow. Reports    Medication prescribed took the edge off the headache. Took one time each of the last few days  The only thing he took       Review of Systems   Constitutional:  Negative for activity change and unexpected weight change.   HENT:  Negative for hearing loss, rhinorrhea and trouble swallowing.    Eyes:  Positive for visual  disturbance. Negative for discharge.   Respiratory:  Negative for chest tightness and wheezing.    Cardiovascular:  Negative for chest pain and palpitations.   Gastrointestinal:  Negative for blood in stool, constipation, diarrhea and vomiting.   Endocrine: Negative for polydipsia and polyuria.   Genitourinary:  Negative for difficulty urinating, hematuria and urgency.   Musculoskeletal:  Negative for arthralgias, joint swelling and neck pain.   Neurological:  Positive for headaches. Negative for weakness.   Psychiatric/Behavioral:  Negative for confusion and dysphoric mood.          Objective:      Physical Exam  Constitutional:       General: He is not in acute distress.     Appearance: Normal appearance. He is not ill-appearing.   Eyes:      Comments: Left eyelid swollen. Reporting double vision   Pulmonary:      Effort: Pulmonary effort is normal. No respiratory distress.   Neurological:      Mental Status: He is alert.   Psychiatric:         Mood and Affect: Mood normal.         Behavior: Behavior normal.         Thought Content: Thought content normal.         Judgment: Judgment normal.         Assessment:       1. Intractable persistent migraine aura without cerebral infarction and without status migrainosus    2. 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   Will send medication for migraines  Will refer for consideration of treatment of bad oak allergy to Allergy and iMmunology.

## 2024-08-20 ENCOUNTER — OFFICE VISIT (OUTPATIENT)
Dept: OPTOMETRY | Facility: CLINIC | Age: 69
End: 2024-08-20
Payer: MEDICARE

## 2024-08-20 VITALS — DIASTOLIC BLOOD PRESSURE: 90 MMHG | SYSTOLIC BLOOD PRESSURE: 199 MMHG

## 2024-08-20 DIAGNOSIS — H50.112 EXOTROPIA, LEFT EYE: ICD-10-CM

## 2024-08-20 DIAGNOSIS — H25.13 NUCLEAR SCLEROSIS, BILATERAL: ICD-10-CM

## 2024-08-20 DIAGNOSIS — H02.402 PTOSIS, LEFT: ICD-10-CM

## 2024-08-20 DIAGNOSIS — H53.2 DIPLOPIA: ICD-10-CM

## 2024-08-20 DIAGNOSIS — H49.02 PARTIAL THIRD NERVE PALSY, LEFT: Primary | ICD-10-CM

## 2024-08-20 PROCEDURE — 1101F PT FALLS ASSESS-DOCD LE1/YR: CPT | Mod: CPTII,S$GLB,, | Performed by: OPTOMETRIST

## 2024-08-20 PROCEDURE — 3288F FALL RISK ASSESSMENT DOCD: CPT | Mod: CPTII,S$GLB,, | Performed by: OPTOMETRIST

## 2024-08-20 PROCEDURE — 3077F SYST BP >= 140 MM HG: CPT | Mod: CPTII,S$GLB,, | Performed by: OPTOMETRIST

## 2024-08-20 PROCEDURE — 1160F RVW MEDS BY RX/DR IN RCRD: CPT | Mod: CPTII,S$GLB,, | Performed by: OPTOMETRIST

## 2024-08-20 PROCEDURE — 1125F AMNT PAIN NOTED PAIN PRSNT: CPT | Mod: CPTII,S$GLB,, | Performed by: OPTOMETRIST

## 2024-08-20 PROCEDURE — 99999 PR PBB SHADOW E&M-EST. PATIENT-LVL III: CPT | Mod: PBBFAC,,, | Performed by: OPTOMETRIST

## 2024-08-20 PROCEDURE — 1159F MED LIST DOCD IN RCRD: CPT | Mod: CPTII,S$GLB,, | Performed by: OPTOMETRIST

## 2024-08-20 PROCEDURE — 3080F DIAST BP >= 90 MM HG: CPT | Mod: CPTII,S$GLB,, | Performed by: OPTOMETRIST

## 2024-08-20 PROCEDURE — 99204 OFFICE O/P NEW MOD 45 MIN: CPT | Mod: S$GLB,,, | Performed by: OPTOMETRIST

## 2024-08-20 PROCEDURE — 4010F ACE/ARB THERAPY RXD/TAKEN: CPT | Mod: CPTII,S$GLB,, | Performed by: OPTOMETRIST

## 2024-08-20 NOTE — PROGRESS NOTES
HPI    New pt here for eval of ghosted vision x 1 week- constant, if covers OS it   goes away. Images diagonal. Keeping OS closed.     +RGP wearer, stopped wearing lenses since doubled vision started.    +headaches x 6 weeks on left side of head  Pain can fluctuating 2/10-20/10    Hx of metal removed OS   Hx of retinal detachment OS-fell off of truck 7-10 years ago, no sx no   laser tx    LIZETT x 6 months with Dr. Esposito  Last edited by Barrett Davalos, OD on 8/20/2024 10:11 AM.            Assessment /Plan     For exam results, see Encounter Report.    Partial third nerve palsy, left    Ptosis, left    Exotropia, left eye    Diplopia  -     CTA Head and Neck (xpd); Future; Expected date: 08/20/2024    Nuclear sclerosis, bilateral        1. Partial third nerve palsy, left  2. Ptosis, left  3. Exotropia, left eye  4. Diplopia  Suspected partial third nerve palsy OS  1+ ptosis, mild exotropia  Pupil sparing -- PERRL  Diplopia in primary gaze    Reviewed recent MRI w/o contrast from 08/12/24  1. No acute intracranial abnormality.  2. Cortical atrophy with periventricular deep white matter change consistent with chronic small vessel ischemic disease.    Discussed possible etiologies  BP running high, recent change in meds    Recommend CTA -- pt weary to contrast, strong fam hx of kidney disease   Refer to neuro-ophth for eval    - CTA Head and Neck (xpd); Future  - Ambulatory referral/consult to Ophthalmology; Future    5. Nuclear sclerosis, bilateral  Moderate OU, does not appear visually significant  Observe

## 2024-08-23 ENCOUNTER — TELEPHONE (OUTPATIENT)
Dept: OPHTHALMOLOGY | Facility: CLINIC | Age: 69
End: 2024-08-23
Payer: MEDICARE

## 2024-08-26 ENCOUNTER — PATIENT MESSAGE (OUTPATIENT)
Dept: OPTOMETRY | Facility: CLINIC | Age: 69
End: 2024-08-26
Payer: MEDICARE

## 2024-08-27 ENCOUNTER — TELEPHONE (OUTPATIENT)
Dept: OPTOMETRY | Facility: CLINIC | Age: 69
End: 2024-08-27
Payer: MEDICARE

## 2024-08-27 NOTE — TELEPHONE ENCOUNTER
Spoke to pt -- notes much improved diplopia, some lid drooping still OS  Will defer CT with contrast at this time  Report back if worsening   Keep f/u with neuro ophth prn  Pt requests routine and cls -- scheduled appt

## 2024-09-04 ENCOUNTER — LAB VISIT (OUTPATIENT)
Dept: LAB | Facility: HOSPITAL | Age: 69
End: 2024-09-04
Attending: FAMILY MEDICINE
Payer: MEDICARE

## 2024-09-04 ENCOUNTER — OFFICE VISIT (OUTPATIENT)
Dept: FAMILY MEDICINE | Facility: CLINIC | Age: 69
End: 2024-09-04
Payer: MEDICARE

## 2024-09-04 VITALS
HEIGHT: 69 IN | SYSTOLIC BLOOD PRESSURE: 130 MMHG | WEIGHT: 201.88 LBS | RESPIRATION RATE: 18 BRPM | BODY MASS INDEX: 29.9 KG/M2 | HEART RATE: 87 BPM | DIASTOLIC BLOOD PRESSURE: 78 MMHG | OXYGEN SATURATION: 97 %

## 2024-09-04 DIAGNOSIS — H53.9 VISION CHANGES: ICD-10-CM

## 2024-09-04 DIAGNOSIS — G89.29 CHRONIC INTRACTABLE HEADACHE, UNSPECIFIED HEADACHE TYPE: ICD-10-CM

## 2024-09-04 DIAGNOSIS — R51.9 CHRONIC INTRACTABLE HEADACHE, UNSPECIFIED HEADACHE TYPE: ICD-10-CM

## 2024-09-04 DIAGNOSIS — I20.89 STABLE ANGINA: Primary | ICD-10-CM

## 2024-09-04 DIAGNOSIS — I10 ESSENTIAL HYPERTENSION: ICD-10-CM

## 2024-09-04 LAB
CRP SERPL-MCNC: 1.3 MG/L (ref 0–8.2)
ERYTHROCYTE [SEDIMENTATION RATE] IN BLOOD BY WESTERGREN METHOD: 5 MM/HR (ref 0–10)

## 2024-09-04 PROCEDURE — 3288F FALL RISK ASSESSMENT DOCD: CPT | Mod: CPTII,S$GLB,, | Performed by: FAMILY MEDICINE

## 2024-09-04 PROCEDURE — 1159F MED LIST DOCD IN RCRD: CPT | Mod: CPTII,S$GLB,, | Performed by: FAMILY MEDICINE

## 2024-09-04 PROCEDURE — 99214 OFFICE O/P EST MOD 30 MIN: CPT | Mod: S$GLB,,, | Performed by: FAMILY MEDICINE

## 2024-09-04 PROCEDURE — 36415 COLL VENOUS BLD VENIPUNCTURE: CPT | Performed by: FAMILY MEDICINE

## 2024-09-04 PROCEDURE — 1101F PT FALLS ASSESS-DOCD LE1/YR: CPT | Mod: CPTII,S$GLB,, | Performed by: FAMILY MEDICINE

## 2024-09-04 PROCEDURE — 4010F ACE/ARB THERAPY RXD/TAKEN: CPT | Mod: CPTII,S$GLB,, | Performed by: FAMILY MEDICINE

## 2024-09-04 PROCEDURE — 3075F SYST BP GE 130 - 139MM HG: CPT | Mod: CPTII,S$GLB,, | Performed by: FAMILY MEDICINE

## 2024-09-04 PROCEDURE — 3078F DIAST BP <80 MM HG: CPT | Mod: CPTII,S$GLB,, | Performed by: FAMILY MEDICINE

## 2024-09-04 PROCEDURE — 86140 C-REACTIVE PROTEIN: CPT | Performed by: FAMILY MEDICINE

## 2024-09-04 PROCEDURE — 1160F RVW MEDS BY RX/DR IN RCRD: CPT | Mod: CPTII,S$GLB,, | Performed by: FAMILY MEDICINE

## 2024-09-04 PROCEDURE — 85651 RBC SED RATE NONAUTOMATED: CPT | Performed by: FAMILY MEDICINE

## 2024-09-04 PROCEDURE — 99999 PR PBB SHADOW E&M-EST. PATIENT-LVL III: CPT | Mod: PBBFAC,,, | Performed by: FAMILY MEDICINE

## 2024-09-04 PROCEDURE — 3008F BODY MASS INDEX DOCD: CPT | Mod: CPTII,S$GLB,, | Performed by: FAMILY MEDICINE

## 2024-09-04 NOTE — PROGRESS NOTES
Subjective:       Patient ID: Elliott Gaspar is a 69 y.o. male.    Chief Complaint: Follow-up (Chest pain lasting about a week. )    Mr. Gaspar presents today for regular 4 month follow up.     He tells me that for the past week at least once a day he will have a pain in his chest that will extend across his chest and will sometimes have pain in the left arm at the same time. He tells me that this is not the elephant on the chest pain that he had many years ago but rather a subtle tightness that he will notice almost like having a belt around his chest. Generally does not happen when at rest and sitting down will improve it. He always gets the arm tingling from the elbow down to his hand when this happens.   He tells me that he is not having any cough or cold symptoms outside of his standard allergy symptoms.     He has had these allergy symptoms for the last two years.     In trying to sort out the etiology of the chest pain- discussed whether the chest pain would be likely to come about with increased walking. Patient reports that he does not do a lot of walking with his sore hips.     Discussed at length that I cannot rule out acute heart attack in the clinic.     He tells me that he has a feeling of general malaise. This has been ongoing for the last 1-2 weeks as well.     The headaches that he was having and his vision changes are mostly back to normal but he tells me that he does not feel like he is seeing as sharply as he did 3-4 weeks ago.     He tells me that his blood pressure has been oddly variable as well.     Ranges at the end of the day are very high.     Has also had persistent headaches in the left temple.     Follow-up  Associated symptoms include chest pain (recurrent). Pertinent negatives include no abdominal pain, fatigue, fever or rash.     FIB-4 Calculation: 3.07 at 9/4/2024  8:40 AM     FIB-4 below 1.30 is considered as low-risk for advanced fibrosis  FIB-4 over 2.67 is considered as  high-risk for advanced fibrosis  FIB-4 values between 1.30 and 2.67 are considered as intermediate-risk of advanced fibrosis for ages 36-64.     For ages > 64 the cut-off for low-risk goes to < 2.  This is a screening tool and clinical judgement should be used in the interpretation of these results.    .  Patient Active Problem List   Diagnosis    BPH (benign prostatic hypertrophy)    Penile curvature, acquired    History of thrombocytopenia    Essential hypertension    Deviated septum    Peyronie's disease    Gouty arthritis    Ganglion cyst    Left retinal detachment    Left cataract    Hiatal hernia    History of Helicobacter pylori infection    Dupuytren's contracture of both hands    Leg edema    Stiffness of finger joint of left hand    Colon cancer screening    Lumbar radiculopathy    Ingrown nail    Idiopathic peripheral neuropathy    Thrombocytopenia    Venous stasis dermatitis of both lower extremities    Nonrheumatic mitral valve regurgitation     Elliott has a current medication list which includes the following prescription(s): azelastine, b complex vitamins, cetirizine, colchicine, famotidine, glucosamine-chondroitin, nebivolol, nystatin-triamcinolone, potassium chloride sa, sumatriptan, tadalafil, and tamsulosin.    Review of Systems   Constitutional:  Negative for activity change, appetite change, fatigue and fever.   Respiratory:  Negative for shortness of breath.    Cardiovascular:  Positive for chest pain (recurrent).   Gastrointestinal:  Negative for abdominal pain.   Integumentary:  Negative for rash.         Health Maintenance Due   Topic Date Due    Shingles Vaccine (1 of 2) Never done    RSV Vaccine (Age 60+ and Pregnant patients) (1 - 1-dose 60+ series) Never done    Colorectal Cancer Screening  05/01/2024    Influenza Vaccine (1) 09/01/2024    COVID-19 Vaccine (3 - 2023-24 season) 09/01/2024      Health Maintenance reviewed- not discussed today with acute chest pain complaint.   Objective:       Physical Exam  Vitals and nursing note reviewed.   Constitutional:       General: He is not in acute distress.     Appearance: He is not ill-appearing.   Cardiovascular:      Rate and Rhythm: Normal rate and regular rhythm.      Heart sounds: Murmur heard.   Pulmonary:      Effort: Pulmonary effort is normal.      Breath sounds: Normal breath sounds. No wheezing.   Skin:     General: Skin is warm and dry.      Findings: No rash.   Neurological:      Mental Status: He is alert.   Psychiatric:         Mood and Affect: Mood normal.         Behavior: Behavior normal.         Assessment:       1. Stable angina    2. Chronic intractable headache, unspecified headache type    3. Vision changes    4. Essential hypertension        Plan:       1. Stable angina    2. Chronic intractable headache, unspecified headache type  -     Sedimentation rate; Future; Expected date: 09/04/2024  -     C-reactive protein; Future; Expected date: 09/04/2024    3. Vision changes  -     Sedimentation rate; Future; Expected date: 09/04/2024  -     C-reactive protein; Future; Expected date: 09/04/2024    4. Essential hypertension  -     Hypertension Digital Medicine (HDMP) Enrollment Order       Will get ESR and CRP for the possibility of GCA    Will get blood pressures in both arms    Digital med order if interested    Also discussed that I cannot rule out acute cardiac event in the clinic. Spoke about this at length. Last EKG I have is from 5 years ago. EKG that was done in the clinic today is not terribly different but there are some changes in the inferior leads. Currently sounds like stable angina. Discussed that if any features of unstable- happens at rest- comes and stays etc. Discussed that I have a super low threshold to recommend ER especially given his fatigue.

## 2024-09-05 LAB
OHS QRS DURATION: 96 MS
OHS QTC CALCULATION: 469 MS

## 2024-09-06 ENCOUNTER — OFFICE VISIT (OUTPATIENT)
Dept: HEPATOLOGY | Facility: CLINIC | Age: 69
End: 2024-09-06
Payer: MEDICARE

## 2024-09-06 VITALS — BODY MASS INDEX: 30.74 KG/M2 | HEIGHT: 69 IN | WEIGHT: 207.56 LBS

## 2024-09-06 DIAGNOSIS — R74.8 ELEVATED LIVER ENZYMES: ICD-10-CM

## 2024-09-06 DIAGNOSIS — I10 ESSENTIAL HYPERTENSION: ICD-10-CM

## 2024-09-06 DIAGNOSIS — E78.1 HYPERTRIGLYCERIDEMIA: ICD-10-CM

## 2024-09-06 DIAGNOSIS — K76.0 FATTY LIVER: Primary | ICD-10-CM

## 2024-09-06 DIAGNOSIS — D69.6 THROMBOCYTOPENIA: ICD-10-CM

## 2024-09-06 PROCEDURE — 99999 PR PBB SHADOW E&M-EST. PATIENT-LVL IV: CPT | Mod: PBBFAC,,, | Performed by: NURSE PRACTITIONER

## 2024-09-06 NOTE — PROGRESS NOTES
[facility] New Patient Visit    Reason for Visit:  Fatty Liver    PCP: Twila Marcelo    HPI:  This is a 69 y.o. male with PMH noted below, here for evaluation of fatty liver.    The patient's risk factors for fatty liver disease include:     Overweight/Obesity                     Yes; BMI 30.65  Dyslipidemia                                Yes; Refer to most recent lipid panel results below:   Latest Reference Range & Units 02/28/24 09:17   Cholesterol Total 120 - 199 mg/dL 196   HDL 40 - 75 mg/dL 30 (L)   HDL/Cholesterol Ratio 20.0 - 50.0 % 15.3 (L)   Non-HDL Cholesterol mg/dL 166   Total Cholesterol/HDL Ratio 2.0 - 5.0  6.5 (H)   Triglycerides 30 - 150 mg/dL 225 (H)   LDL Cholesterol 63.0 - 159.0 mg/dL 121.0     Insulin resistance/Diabetes         No; Last HgbA1c was 5.1% (8/2023)  Family history of diabetes           No    He has had elevated liver enzymes in a hepatocellular pattern since at least 3/2014. Of note, he also has a history of intermittently low platelet counts (100's - 140's) since at least 6/2012. He has never seen Hematology.    He has not undergone any recent abdominal imaging. Prior abdominal CT in 6/2019 showed:    FINDINGS:    The liver is of normal size and contour.  It is mildly hypodense compared to the spleen consistent with fatty infiltration.  A focal liver mass is not seen.  The gallbladder is of normal size without CT evidence of stone.  The pancreas is of normal contour and CT density without edema or mass.  The spleen is of normal size and CT density without focal defect.     The adrenal glands are not enlarged.  The kidneys are of normal size contour and CT density for a noncontrast study.  No mass stone or hydronephrosis is identified.  The abdominal aorta and inferior vena cava are of normal caliber.  Retroperitoneal adenopathy or masses are not seen.     The stomach is of normal configuration.  Bowel dilatation or air-fluid levels are not seen.  A normal  appendix is noted.  The no free fluid is noted.     Within the pelvis the bladder is of normal contour without mass or asymmetry.  There is diverticulosis noted of the proximal sigmoid colon without CT evidence of diverticulitis.  No free fluid is noted.  The prostate is not enlarged.     Impression:     Mild diverticulosis coli without CT evidence of diverticulitis.  Fatty infiltration of the liver parenchyma.    He has never undergone a liver biopsy or non-invasive staging exam.    FIB-4 Calculation: 3.07 at 9/6/2024 10:54 AM   Calculated from:  Last SGOT/AST : 51 at 9/6/2024 10:54 AM  Last SGPT/ALT: 74 at 9/6/2024 10:54 AM   Platelets: 137 at 9/6/2024 10:54 AM   Age: 69 y.o.     FIB-4 below 1.30 is considered as low-risk for advanced fibrosis  FIB-4 over 2.67 is considered as high-risk for advanced fibrosis  FIB-4 values between 1.30 and 2.67 are considered as intermediate-risk of advanced fibrosis for ages 36-64.     For ages > 64 the cut-off for low-risk goes to < 2.  This is a screening tool and clinical judgement should be used in the interpretation of these results.    He has no known family history of liver disease. He denies any history of heavy alcohol intake and does not use illicit drugs. HAV, HBV, HCV negative on prior labs. He is well appearing, and has no signs or symptoms of hepatic decompensation including jaundice, dark urine, pruritus, abdominal distention, lower extremity edema, hematemesis, melena, or periods of confusion suggestive of hepatic encephalopathy.      PMHX:  has a past medical history of Contact lens/glasses fitting, Deviated septum, Fatty liver disease, nonalcoholic, Hypertension, Hypokalemia (4/18/2015), Kidney stone, Persistent headaches, Peyronie's disease, and Thrombocytopenia.    PSHX:  has a past surgical history that includes Hernia repair; Hernia repair (2008); Sinus surgery (2012); Nasal septum surgery; and Cardiac catheterization (2015).    The patient's social and  family histories were reviewed by me and updated in the appropriate section of the electronic medical record.    Review of patient's allergies indicates:   Allergen Reactions    Morphine Other (See Comments)     Family HX-mother went into anaphylactic shock    Contrast media      Family history of renal failure with iodinated contrast    Demerol [meperidine]     Iodinated contrast media Other (See Comments)     Family history of renal failure with iodinated contrast    Morphine Other (See Comments)     pts mother had anaphylaxis from Morphine so he does not want to take     Current Outpatient Medications on File Prior to Visit   Medication Sig Dispense Refill    azelastine (ASTELIN) 137 mcg (0.1 %) nasal spray 2 sprays into each side of nose before bed every night, may use in AM also 30 mL 11    b complex vitamins capsule Take 1 capsule by mouth once daily.      cetirizine (ZYRTEC) 10 MG tablet Take 1 tablet (10 mg total) by mouth once daily. 30 tablet 11    colchicine (COLCRYS) 0.6 mg tablet Use as directed 60 tablet 3    famotidine (PEPCID) 20 MG tablet Take 1 tablet (20 mg total) by mouth 2 (two) times daily. 60 tablet 11    glucosamine-chondroitin 500-400 mg tablet Take 1 tablet by mouth 2 (two) times daily.      nebivoloL (BYSTOLIC) 20 mg Tab Take 20 mg by mouth 2 (two) times a day. 180 tablet 3    nystatin-triamcinolone (MYCOLOG II) cream APPLY  CREAM TOPICALLY TWICE DAILY AS NEEDED FOR RASH 60 g 2    potassium chloride SA (K-DUR,KLOR-CON) 20 MEQ tablet TAKE 1 TABLET (20 MEQ TOTAL) BY MOUTH ONCE DAILY. ON DAYS THAT YOU TAKE LASIX 90 tablet 3    sumatriptan (IMITREX) 50 MG tablet Take 1 tablet (50mg) at onset of migraine. May repeat x 1 in 2 hours if symptoms persist. Do not exceed 2 doses per day 27 tablet 1    tadalafiL (CIALIS) 20 MG Tab Take 1 tablet (20 mg total) by mouth once daily. 30 tablet 11    tamsulosin (FLOMAX) 0.4 mg Cap Take 1 capsule (0.4 mg total) by mouth once daily. 30 capsule 11     No  "current facility-administered medications on file prior to visit.     SOCIAL HISTORY:   Social History     Tobacco Use   Smoking Status Former    Current packs/day: 0.00    Average packs/day: 3.0 packs/day for 3.0 years (9.0 ttl pk-yrs)    Types: Cigarettes, Cigars    Start date:     Quit date:     Years since quittin.7    Passive exposure: Past   Smokeless Tobacco Never   Tobacco Comments    quit age 18     Social History     Substance and Sexual Activity   Alcohol Use No     Social History     Substance and Sexual Activity   Drug Use No     ROS:   GENERAL: Denies fever, chills, weight loss/gain, fatigue  HEENT: Denies headaches, dizziness, vision/hearing changes  CARDIOVASCULAR: Denies chest pain, palpitations, or edema  RESPIRATORY: Denies dyspnea, cough  GI: Denies abdominal pain, rectal bleeding, nausea, vomiting. No change in bowel pattern or color  : Denies dysuria, hematuria   SKIN: Denies rash, itching   NEURO: Denies confusion, memory loss, or mood changes  PSYCH: Denies depression or anxiety  HEME/LYMPH: Denies easy bruising or bleeding    Objective Findings:    PHYSICAL EXAM:   Friendly White male, in no acute distress; alert and oriented to person, place and time.  VITALS: Ht 5' 9" (1.753 m)   Wt 94.2 kg (207 lb 9 oz)   BMI 30.65 kg/m²   HENT: Normocephalic, without obvious abnormality.   EYES: Sclerae anicteric.   NECK: No obvious masses.  CARDIOVASCULAR: No peripheral edema.  RESPIRATORY: Normal respiratory effort.   GI: Non-distended abdomen.  EXTREMITIES:  No clubbing, cyanosis or edema.  SKIN: Warm and dry. No jaundice. No rashes noted to exposed skin.   NEURO: No asterixis.  PSYCH:  Memory intact. Thought and speech pattern appropriate. Behavior normal. No depression or anxiety noted.    DIAGNOSTIC STUDIES:    CT ABDOMEN PELVIS WITHOUT CONTRAST 2019:    FINDINGS:    The liver is of normal size and contour.  It is mildly hypodense compared to the spleen consistent with fatty " infiltration.  A focal liver mass is not seen.  The gallbladder is of normal size without CT evidence of stone.  The pancreas is of normal contour and CT density without edema or mass.  The spleen is of normal size and CT density without focal defect.     The adrenal glands are not enlarged.  The kidneys are of normal size contour and CT density for a noncontrast study.  No mass stone or hydronephrosis is identified.  The abdominal aorta and inferior vena cava are of normal caliber.  Retroperitoneal adenopathy or masses are not seen.     The stomach is of normal configuration.  Bowel dilatation or air-fluid levels are not seen.  A normal appendix is noted.  The no free fluid is noted.     Within the pelvis the bladder is of normal contour without mass or asymmetry.  There is diverticulosis noted of the proximal sigmoid colon without CT evidence of diverticulitis.  No free fluid is noted.  The prostate is not enlarged.     Impression:     Mild diverticulosis coli without CT evidence of diverticulitis.  Fatty infiltration of the liver parenchyma.    MR Elastography - Ordered at visit.    EDUCATION:  Per AVS.    ASSESSMENT & PLAN:  69 y.o. White male with:    1. Fatty liver  Ambulatory referral/consult to Hepatology    MR Elastography      2. Elevated liver enzymes  MR Elastography      3. Thrombocytopenia  MR Elastography      4. Hypertriglyceridemia        5. Essential hypertension  MR Elastography        - Schedule MR Elastography now to non-invasively stage liver disease.  - Repeat liver function tests in 3-6 months.   - Recommend initial weight loss goal of 20 lbs, through diet and exercise.  - Recommend nutritionist referral to discuss dietary changes (patient declined).   - Recommend good control of cholesterol, blood pressure, & blood sugar levels.  - Avoid alcohol and herbal supplements/alternative remedies.    Follow up in about 2 months (around 11/6/2024). with MR Elastography before visit.    Thank you for  allowing me to participate in the care of Elliott RADHA Gaspar       Hepatology Nurse Practitioner  [facility]    CC'ed note to:   Twila Marcelo MD [place], Sarah E., MD

## 2024-09-19 NOTE — PROGRESS NOTES
"ALLERGY & IMMUNOLOGY CLINIC -  Established Patient     HISTORY OF PRESENT ILLNESS     Patient ID: Elliott Gaspar is a 69 y.o. male    CC: follow up visit    HPI: Elliott Gaspar is a 69 y.o. male presents for evaluation of:    Office Visit 09/23/2024  Endorses lifelong episodes of itchy/watery eyes, sneezing and nasal congestion. Symptoms progressively worsened in previous few years since moving to [place] where he states he has an oak tree "forest" in his backyard. Notices symptoms always bothersome during spring, seems to have become perennial in nature. Had tried cetirizine and loratadine but discontinued as he felt they were not working. Administered Mucinex-D yesterday and had some relief. Nasal steroids cause headaches and azelastine provides mixed results, maybe using once monthly. Denies further atopic history such as asthma, atopic dermatitis, food and medication allergies. Reports previous allergy testing +[place]     REVIEW OF SYSTEMS     CONST: no F/C/NS, no unintentional weight changes  Balance of review of systems negative except as mentioned above     MEDICAL HISTORY     MedHx: active problems reviewed  SurgHx:   Past Surgical History:   Procedure Laterality Date    CARDIAC CATHETERIZATION  2015    NSA of coronaries    HERNIA REPAIR      groin bilat    HERNIA REPAIR  2008    Dr. Kincaid    NASAL SEPTUM SURGERY      SINUS SURGERY  2012     Social Hx:  Dogs and Cats at home  Denies smoking    Allergies: see below  Medications: MAR reviewed    No pertinent allergy changes in medical history since last visit     PHYSICAL EXAM     VS: Ht 5' 9" (1.753 m)   Wt 91.9 kg (202 lb 9.6 oz)   BMI 29.92 kg/m²   GENERAL: awake, alert, cooperative with exam  EYES: PERRL, EOMI, no conjunctival injection, no discharge, no infraorbital shiners  EARS: external auditory canals normal B/L, TM normal B/L  NOSE: NT 3+ and pale B/L, no stringing mucous, no polyps  ORAL: MMM, no ulcers, no thrush, no cobblestoning  LUNGS: CTAB, no " "w/r/c, no increased WOB  HEART: Normal Rate and regular rhythm, normal S1/S2, no m/g/r  EXTREMITIES: +2 distal pulses, no c/c/e  DERM: no rashes, no skin breaks  NEURO: normal gait, no facial asymmetry     ASSESSMENT/PLAN     Elliott Gaspar is a 69 y.o. male with       1. Chronic rhinitis    2. Essential hypertension      Return in 3 weeks for aeroallergen skin prick testing and discussion of allergen immunotherapy given desire to achieve "Curative treatment"  Hold beta blocker the morning of allergy testing; would place him at elevated risk of difficult to control anaphylaxis     Follow up: 2 weeks      Eros Reyes MD    I spent a total of 30 minutes on the day of the visit. This includes face to face time and non-face to face time preparing to see the patient (eg, review of tests), obtaining and/or reviewing separately obtained history, documenting clinical information in the electronic or other health record, independently interpreting results and communicating results to the patient/family/caregiver, or care coordinator.      "

## 2024-09-20 ENCOUNTER — OFFICE VISIT (OUTPATIENT)
Dept: FAMILY MEDICINE | Facility: CLINIC | Age: 69
End: 2024-09-20
Payer: MEDICARE

## 2024-09-20 VITALS
SYSTOLIC BLOOD PRESSURE: 130 MMHG | DIASTOLIC BLOOD PRESSURE: 84 MMHG | OXYGEN SATURATION: 97 % | BODY MASS INDEX: 30.01 KG/M2 | RESPIRATION RATE: 18 BRPM | WEIGHT: 202.63 LBS | HEART RATE: 101 BPM | HEIGHT: 69 IN

## 2024-09-20 DIAGNOSIS — R53.83 FATIGUE, UNSPECIFIED TYPE: ICD-10-CM

## 2024-09-20 DIAGNOSIS — E78.1 HYPERTRIGLYCERIDEMIA: ICD-10-CM

## 2024-09-20 DIAGNOSIS — I10 ESSENTIAL HYPERTENSION: Primary | ICD-10-CM

## 2024-09-20 PROCEDURE — 99999 PR PBB SHADOW E&M-EST. PATIENT-LVL III: CPT | Mod: PBBFAC,,, | Performed by: FAMILY MEDICINE

## 2024-09-20 NOTE — PROGRESS NOTES
"Subjective:       Patient ID: Elliott Gaspar is a 69 y.o. male.    Chief Complaint: Follow-up (6 wk f/u)    Mr. Gaspar presents today for follow up.     He tells me that he has been "passable" since the last time I saw him.     He has an upcoming cardiology appointment with Dr. Ro on 9/24. He tells me that he has canceled this.         Follow-up  Pertinent negatives include no abdominal pain, fatigue, fever or rash.       .  Patient Active Problem List   Diagnosis    BPH (benign prostatic hypertrophy)    Penile curvature, acquired    History of thrombocytopenia    Essential hypertension    Deviated septum    Peyronie's disease    Gouty arthritis    Ganglion cyst    Left retinal detachment    Left cataract    Hiatal hernia    History of Helicobacter pylori infection    Dupuytren's contracture of both hands    Leg edema    Stiffness of finger joint of left hand    Colon cancer screening    Lumbar radiculopathy    Ingrown nail    Idiopathic peripheral neuropathy    Thrombocytopenia    Venous stasis dermatitis of both lower extremities    Nonrheumatic mitral valve regurgitation    Fatty liver    Elevated liver enzymes    Hypertriglyceridemia     Elliott has a current medication list which includes the following prescription(s): azelastine, b complex vitamins, cetirizine, colchicine, famotidine, glucosamine-chondroitin, nebivolol, nystatin-triamcinolone, potassium chloride sa, sumatriptan, tadalafil, and tamsulosin.    Review of Systems   Constitutional:  Negative for activity change, appetite change, fatigue and fever.   Respiratory:  Negative for shortness of breath.    Gastrointestinal:  Negative for abdominal pain.   Integumentary:  Negative for rash.         Health Maintenance Due   Topic Date Due    Shingles Vaccine (1 of 2) Never done    RSV Vaccine (Age 60+ and Pregnant patients) (1 - 1-dose 60+ series) Never done    Colorectal Cancer Screening  05/01/2024    Influenza Vaccine (1) 09/01/2024    COVID-19 " Vaccine (3 - 2023-24 season) 09/01/2024      Health Maintenance reviewed and discussed- Does not do colon cancer screening, Does not get flu shots.    Objective:      Physical Exam  Vitals and nursing note reviewed.   Constitutional:       General: He is not in acute distress.     Appearance: He is not ill-appearing.   Cardiovascular:      Rate and Rhythm: Normal rate and regular rhythm.      Heart sounds: No murmur heard.  Pulmonary:      Effort: Pulmonary effort is normal.      Breath sounds: Normal breath sounds. No wheezing.   Skin:     General: Skin is warm and dry.      Findings: No rash.   Neurological:      Mental Status: He is alert.   Psychiatric:         Mood and Affect: Mood normal.         Behavior: Behavior normal.         Assessment:       1. Essential hypertension    2. Hypertriglyceridemia    3. Fatigue, unspecified type        Plan:       1. Essential hypertension  Assessment & Plan:  Control varies.Control is better at home. Ranges 120-140s/ 79-84      2. Hypertriglyceridemia    3. Fatigue, unspecified type  Comments:  Needs cardiac work up. DIscussed could be related to past covid infection/long covid but I recommend cardiac work up with chest pain recurrences

## 2024-09-23 ENCOUNTER — OFFICE VISIT (OUTPATIENT)
Dept: ALLERGY | Facility: CLINIC | Age: 69
End: 2024-09-23
Payer: MEDICARE

## 2024-09-23 VITALS — BODY MASS INDEX: 30.01 KG/M2 | WEIGHT: 202.63 LBS | HEIGHT: 69 IN

## 2024-09-23 DIAGNOSIS — I10 ESSENTIAL HYPERTENSION: ICD-10-CM

## 2024-09-23 DIAGNOSIS — J31.0 CHRONIC RHINITIS: Primary | ICD-10-CM

## 2024-09-23 PROCEDURE — 1159F MED LIST DOCD IN RCRD: CPT | Mod: CPTII,S$GLB,, | Performed by: STUDENT IN AN ORGANIZED HEALTH CARE EDUCATION/TRAINING PROGRAM

## 2024-09-23 PROCEDURE — 1126F AMNT PAIN NOTED NONE PRSNT: CPT | Mod: CPTII,S$GLB,, | Performed by: STUDENT IN AN ORGANIZED HEALTH CARE EDUCATION/TRAINING PROGRAM

## 2024-09-23 PROCEDURE — 99999 PR PBB SHADOW E&M-EST. PATIENT-LVL IV: CPT | Mod: PBBFAC,,, | Performed by: STUDENT IN AN ORGANIZED HEALTH CARE EDUCATION/TRAINING PROGRAM

## 2024-09-23 PROCEDURE — 99204 OFFICE O/P NEW MOD 45 MIN: CPT | Mod: S$GLB,,, | Performed by: STUDENT IN AN ORGANIZED HEALTH CARE EDUCATION/TRAINING PROGRAM

## 2024-09-23 PROCEDURE — 3008F BODY MASS INDEX DOCD: CPT | Mod: CPTII,S$GLB,, | Performed by: STUDENT IN AN ORGANIZED HEALTH CARE EDUCATION/TRAINING PROGRAM

## 2024-09-23 PROCEDURE — 4010F ACE/ARB THERAPY RXD/TAKEN: CPT | Mod: CPTII,S$GLB,, | Performed by: STUDENT IN AN ORGANIZED HEALTH CARE EDUCATION/TRAINING PROGRAM

## 2024-09-23 NOTE — PATIENT INSTRUCTIONS
" Instructions for skin testing  Instructions for your next visit:    1. Hold ALL antihistamines for 7 days before your next visit.    a. Allegra (fexofenadine)   b. Claritin (loratadine)   c. Zyrtec (cetirizine)   d. Benadryl (diphenhydramine)   e. Any medication with "PM" in the name, like Tylenol-PM   f. Any medication that says it's for "cold and sinus" or "allergies"    2. Hold any Beta-blocker you might be on just on the day of your visit (you can take it after your appointment)   a. Atenolol   b. Metoprolol   C. Nevibolol     3. Hold Azelastine nasal spray (if you are on it) for 7 days before your         visit.    4. Hold antacids for 3 days before:   a. Zantac (ranitidine)   b. Pepcid (famotidine)   c. Tagamet (cimetidine)    If you start any new medications before your visit, you can contact us at [facility] [phone]    Thank you,    Dr. Reyes    "

## 2024-09-27 ENCOUNTER — HOSPITAL ENCOUNTER (OUTPATIENT)
Dept: RADIOLOGY | Facility: HOSPITAL | Age: 69
Discharge: HOME | End: 2024-09-27
Attending: NURSE PRACTITIONER
Payer: MEDICARE

## 2024-09-27 DIAGNOSIS — K76.0 FATTY LIVER: ICD-10-CM

## 2024-09-27 DIAGNOSIS — R74.8 ELEVATED LIVER ENZYMES: ICD-10-CM

## 2024-09-27 DIAGNOSIS — I10 ESSENTIAL HYPERTENSION: ICD-10-CM

## 2024-09-27 DIAGNOSIS — D69.6 THROMBOCYTOPENIA: ICD-10-CM

## 2024-09-27 PROCEDURE — 76391 MR ELASTOGRAPHY: CPT | Mod: 26,,, | Performed by: STUDENT IN AN ORGANIZED HEALTH CARE EDUCATION/TRAINING PROGRAM

## 2024-09-27 PROCEDURE — 76391 MR ELASTOGRAPHY: CPT | Mod: TC,PO

## 2024-09-30 NOTE — PROGRESS NOTES
ALLERGY & IMMUNOLOGY CLINIC - Skin Testing     HISTORY OF PRESENT ILLNESS     Patient ID: Elliott Gaspar is a 69 y.o. male    CC: skin testing    HPI: Elliott Gaspar is a 69 y.o. male here for aeroallergen skin testing. Has been off antihistamines the previous week and denies acute illness today.       REVIEW OF SYSTEMS     Balance of review of systems negative except as mentioned above     MEDICAL HISTORY     MedHx: active problems reviewed  SurgHx:   Past Surgical History:   Procedure Laterality Date    CARDIAC CATHETERIZATION  2015    NSA of coronaries    HERNIA REPAIR      groin bilat    HERNIA REPAIR  2008    Dr. Kincaid    NASAL SEPTUM SURGERY      SINUS SURGERY  2012     Allergies: see below  Medications: MAR reviewed     PHYSICAL EXAM     General: Awake, Alert, Oriented  Heart: RRR, No Murmurs  Lungs: CTAB, No wheezes  Skin: No rashes or skin breaks     ALLERGEN TESTING     After explaining risks and benefits, elected to proceed with aeroallergen skin prick testing.   Skin Prick:   +Dust mites, dogs, cats  +Trees: [place], [place], [place], [place], [place], [place], [place], [place]  +All grasses  +Weeds: Lamb's Q, Marsh elder, Mugwort, ragweed, pigweed, English Plantain, Mosotho thistle     ASSESSMENT & PLAN     Elliott Gaspar is a 69 y.o. male with     1. Chronic allergic rhinitis      Sensitized to numerous indoor and outdoor allergens.     Spent an additional 20 minutes discussing allergen mitigation, medical management with oral antihistamines and nasal sprays, as well as allergen immunotherapy compared to these therapies. Will start fluticasone and azelastine 2 sprays each nostril daily and increase to BID as needed. Not interested in time commitment of allergen immunotherapy     Follow up: as needed      Eros Reyes MD  Allergy&Immunology

## 2024-10-02 ENCOUNTER — OFFICE VISIT (OUTPATIENT)
Dept: ALLERGY | Facility: CLINIC | Age: 69
End: 2024-10-02
Payer: MEDICARE

## 2024-10-02 VITALS — BODY MASS INDEX: 30.01 KG/M2 | HEIGHT: 69 IN | WEIGHT: 202.63 LBS

## 2024-10-02 DIAGNOSIS — J30.9 CHRONIC ALLERGIC RHINITIS: Primary | ICD-10-CM

## 2024-10-02 PROCEDURE — 1126F AMNT PAIN NOTED NONE PRSNT: CPT | Mod: CPTII,S$GLB,, | Performed by: STUDENT IN AN ORGANIZED HEALTH CARE EDUCATION/TRAINING PROGRAM

## 2024-10-02 PROCEDURE — 99213 OFFICE O/P EST LOW 20 MIN: CPT | Mod: 25,S$GLB,, | Performed by: STUDENT IN AN ORGANIZED HEALTH CARE EDUCATION/TRAINING PROGRAM

## 2024-10-02 PROCEDURE — 99999 PR PBB SHADOW E&M-EST. PATIENT-LVL III: CPT | Mod: PBBFAC,,, | Performed by: STUDENT IN AN ORGANIZED HEALTH CARE EDUCATION/TRAINING PROGRAM

## 2024-10-02 PROCEDURE — 1159F MED LIST DOCD IN RCRD: CPT | Mod: CPTII,S$GLB,, | Performed by: STUDENT IN AN ORGANIZED HEALTH CARE EDUCATION/TRAINING PROGRAM

## 2024-10-02 PROCEDURE — 95004 PERQ TESTS W/ALRGNC XTRCS: CPT | Mod: S$GLB,,, | Performed by: STUDENT IN AN ORGANIZED HEALTH CARE EDUCATION/TRAINING PROGRAM

## 2024-10-02 PROCEDURE — 3008F BODY MASS INDEX DOCD: CPT | Mod: CPTII,S$GLB,, | Performed by: STUDENT IN AN ORGANIZED HEALTH CARE EDUCATION/TRAINING PROGRAM

## 2024-10-02 PROCEDURE — 4010F ACE/ARB THERAPY RXD/TAKEN: CPT | Mod: CPTII,S$GLB,, | Performed by: STUDENT IN AN ORGANIZED HEALTH CARE EDUCATION/TRAINING PROGRAM

## 2024-10-02 RX ORDER — AZELASTINE 1 MG/ML
2 SPRAY, METERED NASAL DAILY
Qty: 60 ML | Refills: 3 | Status: SHIPPED | OUTPATIENT
Start: 2024-10-02 | End: 2025-10-02

## 2024-10-02 RX ORDER — FLUTICASONE PROPIONATE 50 MCG
2 SPRAY, SUSPENSION (ML) NASAL DAILY
Qty: 32 G | Refills: 6 | Status: SHIPPED | OUTPATIENT
Start: 2024-10-02 | End: 2025-09-27

## 2024-10-02 NOTE — PATIENT INSTRUCTIONS
Start using fluticasone and azelastine 2 sprays each nostril daily for the next 2 weeks  If symptoms persist, increase usage from once daily to twice daily for 2 weeks    If symptoms continue, try using a sinus rinse prior to using nasal spray at least once daily

## 2024-10-07 ENCOUNTER — OFFICE VISIT (OUTPATIENT)
Dept: OPHTHALMOLOGY | Facility: CLINIC | Age: 69
End: 2024-10-07
Payer: MEDICARE

## 2024-10-07 DIAGNOSIS — H49.02 THIRD NERVE PALSY OF LEFT EYE: Primary | ICD-10-CM

## 2024-10-07 DIAGNOSIS — H50.012 ESOTROPIA OF LEFT EYE: ICD-10-CM

## 2024-10-07 PROCEDURE — 99204 OFFICE O/P NEW MOD 45 MIN: CPT | Mod: S$GLB,,, | Performed by: STUDENT IN AN ORGANIZED HEALTH CARE EDUCATION/TRAINING PROGRAM

## 2024-10-07 PROCEDURE — 99999 PR PBB SHADOW E&M-EST. PATIENT-LVL III: CPT | Mod: PBBFAC,,, | Performed by: STUDENT IN AN ORGANIZED HEALTH CARE EDUCATION/TRAINING PROGRAM

## 2024-10-07 PROCEDURE — 4010F ACE/ARB THERAPY RXD/TAKEN: CPT | Mod: CPTII,S$GLB,, | Performed by: STUDENT IN AN ORGANIZED HEALTH CARE EDUCATION/TRAINING PROGRAM

## 2024-10-07 PROCEDURE — 1160F RVW MEDS BY RX/DR IN RCRD: CPT | Mod: CPTII,S$GLB,, | Performed by: STUDENT IN AN ORGANIZED HEALTH CARE EDUCATION/TRAINING PROGRAM

## 2024-10-07 PROCEDURE — 1101F PT FALLS ASSESS-DOCD LE1/YR: CPT | Mod: CPTII,S$GLB,, | Performed by: STUDENT IN AN ORGANIZED HEALTH CARE EDUCATION/TRAINING PROGRAM

## 2024-10-07 PROCEDURE — 1126F AMNT PAIN NOTED NONE PRSNT: CPT | Mod: CPTII,S$GLB,, | Performed by: STUDENT IN AN ORGANIZED HEALTH CARE EDUCATION/TRAINING PROGRAM

## 2024-10-07 PROCEDURE — 1159F MED LIST DOCD IN RCRD: CPT | Mod: CPTII,S$GLB,, | Performed by: STUDENT IN AN ORGANIZED HEALTH CARE EDUCATION/TRAINING PROGRAM

## 2024-10-07 PROCEDURE — 3288F FALL RISK ASSESSMENT DOCD: CPT | Mod: CPTII,S$GLB,, | Performed by: STUDENT IN AN ORGANIZED HEALTH CARE EDUCATION/TRAINING PROGRAM

## 2024-10-07 PROCEDURE — 92060 SENSORIMOTOR EXAMINATION: CPT | Mod: S$GLB,,, | Performed by: STUDENT IN AN ORGANIZED HEALTH CARE EDUCATION/TRAINING PROGRAM

## 2024-10-08 PROBLEM — H49.02 THIRD NERVE PALSY OF LEFT EYE: Status: ACTIVE | Noted: 2024-10-08

## 2024-10-08 PROBLEM — H50.012 ESOTROPIA OF LEFT EYE: Status: ACTIVE | Noted: 2024-10-08

## 2024-10-08 NOTE — ASSESSMENT & PLAN NOTE
"Patient with new onset headaches in July 2024 in setting of high BP  MRI Brain w/o contrast 8/12/24: "1. No acute intracranial abnormality.  2. Cortical atrophy with periventricular deep white matter change consistent with chronic small vessel ischemic disease."  Seen with optometry (Dr. Davalos) 8/20/24 who noted KRISTA ptosis and exotropia in primary with diplopia -concern for third nerve palsy  Patient deferred CTA Head to rule out aneurysm    10/8/24: patient reports ptosis and diplopia resolved about a month ago  Exam today with no ptosis and full adduction, elevation, depression and no anisocoria to suggest any residual third nerve palsy. However, patient has very mild Abduction deficit of left eye and small ET ion far left gaze. Patient reports this has been stable for years and does not bother him. Patient reports remote injury to his left eye and possible retinal detachment?  Patient reports the small diplopia in far left gaze has been present for years and is patently different from his diploip he noted over the past two months.    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

## 2024-10-08 NOTE — PROGRESS NOTES
"HPI    DLS: Dr. Davalos 08/20/2024  Frankie Gaspar is a 69 y.o. male who comes in for an evaluation of left partial   third nerve palsy.   Patient reports a sharp headache pain that occurred on the left side of   his head for a few days in mid July. During this time, he was told his   blood pressure was high and he was working on controlling it with his PCP.   Given the onset of headaches, his PCP ordered an MRI Brain.  MRI Brain w/o contrast 8/12/24 showed: "1. No acute intracranial   abnormality. 2. Cortical atrophy with periventricular deep white matter   change consistent with chronic small vessel ischemic disease.  He started to notice double vision and left eyelid droop sometime in mid   August. He was seen by Dr. Anoop BURDEN, who noted KRISTA ptosis and mild   exotropia with diplopia in primary and was suspicious for a third nerve   palsy. She recommended a CTA, but the patient was weary of obtaining   contrast. He was referred to strabismus clinic   Today, patient repros the double vision and ptosis has resolved itself   and. He denies diplopia, headaches and eye pain.  He does however report double vision in far left gaze which he says has   been present for years and is unchanging.   Eye meds: none  Last edited by Feng Jiménez MD on 10/8/2024  7:44 AM.        ROS    Negative for: Constitutional, Gastrointestinal, Neurological, Skin,   Genitourinary, Musculoskeletal, HENT, Endocrine, Cardiovascular, Eyes,   Respiratory, Psychiatric, Allergic/Imm, Heme/Lymph  Last edited by Feng Jiménez MD on 10/8/2024  7:44 AM.        Assessment /Plan     For exam results, see Encounter Report.    Third nerve palsy of left eye    Esotropia of left eye        Problem List Items Addressed This Visit          Ophtho    Third nerve palsy of left eye - Primary    Current Assessment & Plan     Patient with new onset headaches in July 2024 in setting of high BP  MRI Brain w/o contrast 8/12/24: "1. No acute intracranial abnormality.  2. " "Cortical atrophy with periventricular deep white matter change consistent with chronic small vessel ischemic disease."  Seen with optometry (Dr. Davalos) 8/20/24 who noted KRISTA ptosis and exotropia in primary with diplopia -concern for third nerve palsy  Patient deferred CTA Head to rule out aneurysm    10/8/24: patient reports ptosis and diplopia resolved about a month ago  Exam today with no ptosis and full adduction, elevation, depression and no anisocoria to suggest any residual third nerve palsy. However, patient has very mild Abduction deficit of left eye and small ET ion far left gaze. Patient reports this has been stable for years and does not bother him. Patient reports remote injury to his left eye and possible retinal detachment?  Patient reports the small diplopia in far left gaze has been present for years and is patently different from his diploip he noted over the past two months.    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 This includes face to face time and non-face to face time preparing to see the patient (eg, review of tests, records, etc.), obtaining and/or reviewing separately obtained history, documenting clinical information in the electronic or other health record, examining patient, independently interpreting results and communicating results to the patient/family/caregiver, or care coordinator.                  "

## 2024-10-09 ENCOUNTER — PATIENT MESSAGE (OUTPATIENT)
Dept: FAMILY MEDICINE | Facility: CLINIC | Age: 69
End: 2024-10-09
Payer: MEDICARE

## 2024-10-13 ENCOUNTER — HOSPITAL ENCOUNTER (EMERGENCY)
Facility: HOSPITAL | Age: 69
Discharge: HOME | End: 2024-10-13
Payer: MEDICARE

## 2024-10-13 VITALS
WEIGHT: 190 LBS | RESPIRATION RATE: 20 BRPM | HEIGHT: 69 IN | DIASTOLIC BLOOD PRESSURE: 74 MMHG | SYSTOLIC BLOOD PRESSURE: 188 MMHG | OXYGEN SATURATION: 99 % | HEART RATE: 70 BPM | BODY MASS INDEX: 28.14 KG/M2 | TEMPERATURE: 98 F

## 2024-10-13 DIAGNOSIS — S05.01XA ABRASION OF RIGHT CORNEA, INITIAL ENCOUNTER: Primary | ICD-10-CM

## 2024-10-13 PROCEDURE — 99283 EMERGENCY DEPT VISIT LOW MDM: CPT

## 2024-10-13 PROCEDURE — 25000003 PHARM REV CODE 250: Performed by: NURSE PRACTITIONER

## 2024-10-13 RX ORDER — KETOROLAC TROMETHAMINE 5 MG/ML
1 SOLUTION OPHTHALMIC 3 TIMES DAILY
Qty: 3 ML | Refills: 0 | Status: SHIPPED | OUTPATIENT
Start: 2024-10-13 | End: 2024-10-23

## 2024-10-13 RX ORDER — ERYTHROMYCIN 5 MG/G
OINTMENT OPHTHALMIC
Qty: 1 G | Refills: 0 | Status: SHIPPED | OUTPATIENT
Start: 2024-10-13 | End: 2024-10-15

## 2024-10-13 RX ORDER — TETRACAINE HYDROCHLORIDE 5 MG/ML
2 SOLUTION OPHTHALMIC
Status: COMPLETED | OUTPATIENT
Start: 2024-10-13 | End: 2024-10-13

## 2024-10-13 RX ORDER — ERYTHROMYCIN 5 MG/G
OINTMENT OPHTHALMIC
Status: COMPLETED | OUTPATIENT
Start: 2024-10-13 | End: 2024-10-13

## 2024-10-13 RX ADMIN — TETRACAINE HYDROCHLORIDE 2 DROP: 5 SOLUTION/ DROPS OPHTHALMIC at 07:10

## 2024-10-13 RX ADMIN — ERYTHROMYCIN: 5 OINTMENT OPHTHALMIC at 08:10

## 2024-10-13 RX ADMIN — FLUORESCEIN SODIUM 1 EACH: 1 STRIP OPHTHALMIC at 07:10

## 2024-10-14 ENCOUNTER — TELEPHONE (OUTPATIENT)
Dept: ADMINISTRATIVE | Facility: CLINIC | Age: 69
End: 2024-10-14
Payer: MEDICARE

## 2024-10-14 NOTE — TELEPHONE ENCOUNTER
Spoke with patient and scheduled optometry appointment with Barrett Davalos OD on 10/15/2024 at 7:30 AM.  Patient denied no other needs to be addressed at this time.  ED Navigator to close encounter.

## 2024-10-14 NOTE — ED PROVIDER NOTES
"Encounter Date: 10/13/2024       History     Chief Complaint   Patient presents with    Eye Pain     Patient reports he removed a foreign body from under his right eye contact this morning.  Pain present since with tearing reported.  Reports blurred vision.     Presents to ED with complaints of right eye problems.  He states that yesterday he had a piece of" rash" under his right contact.  He took the contact helped but thigh has continued to be red and inflamed.  No changes in vision      Eye Pain   This is a new problem. The current episode started yesterday. The problem occurs constantly. The problem has been gradually worsening. The right eye is affected. The injury mechanism was a foreign body. Associated symptoms include discharge and eye redness.     Review of patient's allergies indicates:   Allergen Reactions    Morphine Other (See Comments)     Family HX-mother went into anaphylactic shock    Contrast media      Family history of renal failure with iodinated contrast    Demerol [meperidine]     Iodinated contrast media Other (See Comments)     Family history of renal failure with iodinated contrast    Morphine Other (See Comments)     pts mother had anaphylaxis from Morphine so he does not want to take     Past Medical History:   Diagnosis Date    Contact lens/glasses fitting     wears contacts    Deviated septum     Fatty liver disease, nonalcoholic     Negative Hep A, B, C in past    Hypertension     Hypokalemia 4/18/2015    Kidney stone     Persistent headaches     related to sinus congestion    Peyronie's disease     Thrombocytopenia     Variable; low normal; Hematology eval in past and no specific f/u advised     Past Surgical History:   Procedure Laterality Date    CARDIAC CATHETERIZATION  2015    NSA of coronaries    HERNIA REPAIR      groin bilat    HERNIA REPAIR  2008    Dr. Kincaid    NASAL SEPTUM SURGERY      SINUS SURGERY  2012     Family History   Problem Relation Name Age of Onset    Heart " disease Mother      Atrial fibrillation Mother      Heart disease Father      Hypertension Father      Heart failure Father      Psoriasis Father      Psoriasis Paternal Grandmother      Alzheimer's disease Maternal Grandmother      Melanoma Neg Hx      Lupus Neg Hx      Eczema Neg Hx       Social History     Tobacco Use    Smoking status: Former     Current packs/day: 0.00     Average packs/day: 3.0 packs/day for 3.0 years (9.0 ttl pk-yrs)     Types: Cigarettes, Cigars     Start date:      Quit date:      Years since quittin.8     Passive exposure: Past    Smokeless tobacco: Never    Tobacco comments:     quit age 18   Substance Use Topics    Alcohol use: No    Drug use: No     Review of Systems   Constitutional:  Negative for fever.   HENT:  Negative for congestion and sore throat.    Eyes:  Positive for pain, discharge and redness. Negative for visual disturbance.   Respiratory:  Positive for wheezing. Negative for cough and shortness of breath.    Gastrointestinal:  Negative for abdominal pain.   Skin:  Negative for rash.   Neurological:  Negative for dizziness, light-headedness and headaches.       Physical Exam     Initial Vitals [10/13/24 1720]   BP Pulse Resp Temp SpO2   (!) 180/79 76 16 98 °F (36.7 °C) 98 %      MAP       --         Physical Exam    Nursing note and vitals reviewed.  Constitutional: He appears well-developed and well-nourished.   HENT:   Head: Normocephalic and atraumatic.   Eyes: EOM and lids are normal. Lids are everted and swept, no foreign bodies found. Right eye exhibits discharge and exudate. No foreign body present in the right eye. Right conjunctiva is injected. Left conjunctiva is not injected.   Fluorescein exam reveals small abrasion right cornea at 9:00 a.m. position near the iris.  No foreign body present.  Visual acuity accurate   Neck: Neck supple.   Normal range of motion.  Cardiovascular:  Normal rate and regular rhythm.           Pulmonary/Chest: Breath sounds  normal.   Musculoskeletal:         General: Normal range of motion.      Cervical back: Normal range of motion and neck supple.     Lymphadenopathy:     He has no cervical adenopathy.   Neurological: He is alert and oriented to person, place, and time.   Skin: Skin is warm and dry. Capillary refill takes less than 2 seconds.   Psychiatric: He has a normal mood and affect. Thought content normal.         ED Course   Procedures  Labs Reviewed - No data to display       Imaging Results    None          Medications   fluorescein ophthalmic strip 1 each (1 each Left Eye Given 10/13/24 1902)   TETRAcaine HCL 0.5% ophthalmic solution 2 drop (2 drops Right Eye Given 10/13/24 1901)   erythromycin 5 mg/gram (0.5 %) ophthalmic ointment ( Right Eye Given 10/13/24 2001)     Medical Decision Making  69-year-old male with right irritation after removing a foreign body from his right eye under the contact.      Differential diagnoses:  Conjunctivitis, corneal abrasion, corneal irritation, foreign body    Risk  Prescription drug management.                                      Clinical Impression:  Final diagnoses:  [S05.01XA] Abrasion of right cornea, initial encounter (Primary)          ED Disposition Condition    Discharge Good          ED Prescriptions       Medication Sig Dispense Start Date End Date Auth. Provider    erythromycin (ROMYCIN) ophthalmic ointment Place a 1/2 inch ribbon of ointment into the lower eyelid. 1 g 10/13/2024 -- Yue Cordero FNP    ketorolac 0.5% (ACULAR) 0.5 % Drop Place 1 drop into the right eye 3 (three) times daily. for 10 days 3 mL 10/13/2024 10/23/2024 Yue Cordero FNP          Follow-up Information       Follow up With Specialties Details Why Contact Info    Twila Marcelo MD Family Medicine  As needed [address]  [address]  [place] [address] [phone]               Yue Cordero FNP  10/13/24 2054

## 2024-10-15 ENCOUNTER — OFFICE VISIT (OUTPATIENT)
Dept: OPTOMETRY | Facility: CLINIC | Age: 69
End: 2024-10-15
Payer: MEDICARE

## 2024-10-15 DIAGNOSIS — H18.821 CORNEAL ABRASION DUE TO CONTACT LENS, RIGHT: Primary | ICD-10-CM

## 2024-10-15 DIAGNOSIS — H16.141 SPK (SUPERFICIAL PUNCTATE KERATITIS), RIGHT: ICD-10-CM

## 2024-10-15 DIAGNOSIS — H25.13 NUCLEAR SCLEROSIS, BILATERAL: ICD-10-CM

## 2024-10-15 PROCEDURE — 3288F FALL RISK ASSESSMENT DOCD: CPT | Mod: CPTII,S$GLB,, | Performed by: OPTOMETRIST

## 2024-10-15 PROCEDURE — 1160F RVW MEDS BY RX/DR IN RCRD: CPT | Mod: CPTII,S$GLB,, | Performed by: OPTOMETRIST

## 2024-10-15 PROCEDURE — 4010F ACE/ARB THERAPY RXD/TAKEN: CPT | Mod: CPTII,S$GLB,, | Performed by: OPTOMETRIST

## 2024-10-15 PROCEDURE — 99214 OFFICE O/P EST MOD 30 MIN: CPT | Mod: S$GLB,,, | Performed by: OPTOMETRIST

## 2024-10-15 PROCEDURE — 1101F PT FALLS ASSESS-DOCD LE1/YR: CPT | Mod: CPTII,S$GLB,, | Performed by: OPTOMETRIST

## 2024-10-15 PROCEDURE — 1159F MED LIST DOCD IN RCRD: CPT | Mod: CPTII,S$GLB,, | Performed by: OPTOMETRIST

## 2024-10-15 PROCEDURE — 1126F AMNT PAIN NOTED NONE PRSNT: CPT | Mod: CPTII,S$GLB,, | Performed by: OPTOMETRIST

## 2024-10-15 PROCEDURE — 99999 PR PBB SHADOW E&M-EST. PATIENT-LVL III: CPT | Mod: PBBFAC,,, | Performed by: OPTOMETRIST

## 2024-10-15 RX ORDER — NEOMYCIN SULFATE, POLYMYXIN B SULFATE AND DEXAMETHASONE 3.5; 10000; 1 MG/ML; [USP'U]/ML; MG/ML
1 SUSPENSION/ DROPS OPHTHALMIC 4 TIMES DAILY
Qty: 5 ML | Refills: 0 | Status: SHIPPED | OUTPATIENT
Start: 2024-10-15 | End: 2024-10-22

## 2024-10-15 NOTE — PROGRESS NOTES
HPI    Pt states felt something under OD CL on Sunday, removed CL and cleaned   away some (dirt)? Eye was extremely watery and discomfort    Went to ER that night and was prescribed Ketorolac TID, Erythromycin   ointment as needed    Says it is feeling a little bit better today, not currently wearing CL    Wears RGPs  Current lens ~ 1 year old  Fit by Dr. Esposito at [facility]  Last edited by Barrett Davalos, OD on 10/15/2024  7:56 AM.            Assessment /Plan     For exam results, see Encounter Report.    Corneal abrasion due to contact lens, right  -     neomycin-polymyxin-dexamethasone (MAXITROL) 3.5mg/mL-10,000 unit/mL-0.1 % DrpS; Place 1 drop into the right eye 4 (four) times daily. for 7 days  Dispense: 5 mL; Refill: 0    SPK (superficial punctate keratitis), right    Nuclear sclerosis, bilateral      1. Corneal abrasion due to contact lens, right (Primary)  2. SPK (superficial punctate keratitis), right  D/C cls wear until resolved  Discussed proper wear/care of RGPs -- recommend DW only  Discontinue erythromycin richard / ketorolac gtts  Start Maxitrol: 1 gt qid x 7 days, then d/c -- shake well before use  Report back if worsening or no resolution     - neomycin-polymyxin-dexamethasone (MAXITROL) 3.5mg/mL-10,000 unit/mL-0.1 % DrpS; Place 1 drop into the right eye 4 (four) times daily. for 7 days  Dispense: 5 mL; Refill: 0    3. Nuclear sclerosis, bilateral  Moderate OU, not VS  Recheck with DFE / refraction prn

## 2024-10-27 ENCOUNTER — OFFICE VISIT (OUTPATIENT)
Dept: URGENT CARE | Facility: CLINIC | Age: 69
End: 2024-10-27
Payer: MEDICARE

## 2024-10-27 VITALS
BODY MASS INDEX: 28.88 KG/M2 | OXYGEN SATURATION: 97 % | RESPIRATION RATE: 17 BRPM | SYSTOLIC BLOOD PRESSURE: 174 MMHG | WEIGHT: 195 LBS | HEART RATE: 92 BPM | TEMPERATURE: 98 F | HEIGHT: 69 IN | DIASTOLIC BLOOD PRESSURE: 96 MMHG

## 2024-10-27 DIAGNOSIS — J32.9 BACTERIAL SINUSITIS: Primary | ICD-10-CM

## 2024-10-27 DIAGNOSIS — R05.1 ACUTE COUGH: ICD-10-CM

## 2024-10-27 DIAGNOSIS — B96.89 BACTERIAL SINUSITIS: Primary | ICD-10-CM

## 2024-10-27 PROCEDURE — 99213 OFFICE O/P EST LOW 20 MIN: CPT | Mod: S$GLB,,, | Performed by: NURSE PRACTITIONER

## 2024-10-27 RX ORDER — DOXYCYCLINE 100 MG/1
100 CAPSULE ORAL EVERY 12 HOURS
Qty: 20 CAPSULE | Refills: 0 | Status: SHIPPED | OUTPATIENT
Start: 2024-10-27 | End: 2024-11-06

## 2024-10-27 RX ORDER — BENZONATATE 100 MG/1
100 CAPSULE ORAL 3 TIMES DAILY PRN
Qty: 15 CAPSULE | Refills: 0 | Status: SHIPPED | OUTPATIENT
Start: 2024-10-27 | End: 2024-11-01

## 2024-10-27 RX ORDER — PREDNISONE 10 MG/1
10 TABLET ORAL DAILY
Qty: 5 TABLET | Refills: 0 | Status: SHIPPED | OUTPATIENT
Start: 2024-10-27 | End: 2024-11-01

## 2024-10-27 RX ORDER — NEOMYCIN SULFATE, POLYMYXIN B SULFATE AND DEXAMETHASONE 3.5; 10000; 1 MG/ML; [USP'U]/ML; MG/ML
SUSPENSION/ DROPS OPHTHALMIC
COMMUNITY
Start: 2024-10-15

## 2024-11-11 ENCOUNTER — TELEPHONE (OUTPATIENT)
Dept: OPHTHALMOLOGY | Facility: CLINIC | Age: 69
End: 2024-11-11
Payer: MEDICARE

## 2024-11-11 ENCOUNTER — OFFICE VISIT (OUTPATIENT)
Dept: URGENT CARE | Facility: CLINIC | Age: 69
End: 2024-11-11
Payer: MEDICARE

## 2024-11-11 VITALS
WEIGHT: 195 LBS | DIASTOLIC BLOOD PRESSURE: 81 MMHG | HEIGHT: 69 IN | RESPIRATION RATE: 18 BRPM | OXYGEN SATURATION: 96 % | HEART RATE: 74 BPM | SYSTOLIC BLOOD PRESSURE: 147 MMHG | BODY MASS INDEX: 28.88 KG/M2 | TEMPERATURE: 98 F

## 2024-11-11 DIAGNOSIS — H57.89 RED EYE ASSOCIATED WITH CONTACT LENS: Primary | ICD-10-CM

## 2024-11-11 DIAGNOSIS — H18.821 CORNEAL ABRASION DUE TO CONTACT LENS, RIGHT: ICD-10-CM

## 2024-11-11 DIAGNOSIS — H57.11 PAIN OF RIGHT EYE: ICD-10-CM

## 2024-11-11 DIAGNOSIS — Z97.3 RED EYE ASSOCIATED WITH CONTACT LENS: Primary | ICD-10-CM

## 2024-11-11 PROCEDURE — 99213 OFFICE O/P EST LOW 20 MIN: CPT | Mod: S$GLB,,, | Performed by: NURSE PRACTITIONER

## 2024-11-11 NOTE — TELEPHONE ENCOUNTER
----- Message from Liz sent at 11/11/2024  4:16 PM CST -----  Contact: Ruben (wife)  Type:  Same Day Appointment Request    Caller is requesting a same day appointment.  Caller declined first available appointment listed below.      Name of Caller: Ruben    When is the first available appointment? Department book    Symptoms:scratched right cornea    Best Call Back Number:[phone]    Additional Information: went to [facility] and they told him to get in with you asap    Please call to advise    Thanks

## 2024-11-11 NOTE — PROGRESS NOTES
"Subjective:      Patient ID: Elliott Gaspar is a 69 y.o. male.    Vitals:  height is 5' 9" (1.753 m) and weight is 88.5 kg (195 lb). His oral temperature is 97.9 °F (36.6 °C). His blood pressure is 147/81 (abnormal) and his pulse is 74. His respiration is 18 and oxygen saturation is 96%.     Chief Complaint: Foreign Body in Eye    Pt reports to clinic with foreign body sensation to right eye and he is not able to remove his contact. Pt here for evaluation and removal of contact.     Foreign Body in Eye  This is a new problem. The current episode started today. The problem occurs constantly. The problem has been unchanged. Nothing aggravates the symptoms. The treatment provided no relief.       Constitution: Negative.   Eyes:  Positive for eye pain. Negative for vision loss and double vision.      Objective:     Physical Exam   Constitutional: He is oriented to person, place, and time. He appears well-developed.   HENT:   Head: Normocephalic and atraumatic.   Ears:   Right Ear: External ear normal.   Left Ear: External ear normal.   Nose: Nose normal.   Mouth/Throat: Oropharynx is clear and moist.   Eyes: Conjunctivae, EOM and lids are normal. Pupils are equal, round, and reactive to light. Extraocular movement intact      Comments: Contact lenses removed from left eye and irrigated with eye saline solution. No other foreign body identified.    Neck: Trachea normal and phonation normal. Neck supple.   Musculoskeletal: Normal range of motion.         General: Normal range of motion.   Neurological: He is alert and oriented to person, place, and time.   Skin: Skin is warm, dry and intact.   Psychiatric: His speech is normal and behavior is normal. Judgment and thought content normal.   Nursing note and vitals reviewed.      Assessment:     1. Red eye associated with contact lens    2. Pain of right eye    3. Corneal abrasion due to contact lens, right        Plan:       Red eye associated with contact lens    Pain of " right eye    Corneal abrasion due to contact lens, right    I removed contact lens from right eye. Irrigated eye with eye saline solution and no other foreign body noted. Pt tolerated well. Pt has erythromycin eye ointment that he will apply to eye and reports that he does not need a prescription for this. Pt will follow up with eye doctor for any other concerns.       Patient Instructions   Treat both eyes as directed.   Avoid contact with eyes and perform good hand hygiene.   Recommend Erythromycin ointment as directed.   Do not wear contacts for one week.   Follow up with Eye Doctor if no improvement in symptoms or for any worsening of symptoms.           Tyler Rivas, AARONP-C

## 2024-11-11 NOTE — PATIENT INSTRUCTIONS
Treat both eyes as directed.   Avoid contact with eyes and perform good hand hygiene.   Recommend Erythromycin ointment as directed.   Do not wear contacts for one week.   Follow up with Eye Doctor if no improvement in symptoms or for any worsening of symptoms.

## 2024-11-13 ENCOUNTER — OFFICE VISIT (OUTPATIENT)
Dept: OPTOMETRY | Facility: CLINIC | Age: 69
End: 2024-11-13
Payer: MEDICARE

## 2024-11-13 DIAGNOSIS — H25.13 NUCLEAR SCLEROSIS, BILATERAL: ICD-10-CM

## 2024-11-13 DIAGNOSIS — H16.141 SPK (SUPERFICIAL PUNCTATE KERATITIS), RIGHT: Primary | ICD-10-CM

## 2024-11-13 DIAGNOSIS — H21.239 PIGMENT DISPERSION SYNDROME, UNSPECIFIED LATERALITY: ICD-10-CM

## 2024-11-13 PROCEDURE — 99214 OFFICE O/P EST MOD 30 MIN: CPT | Mod: S$GLB,,, | Performed by: OPTOMETRIST

## 2024-11-13 PROCEDURE — 1160F RVW MEDS BY RX/DR IN RCRD: CPT | Mod: CPTII,S$GLB,, | Performed by: OPTOMETRIST

## 2024-11-13 PROCEDURE — 4010F ACE/ARB THERAPY RXD/TAKEN: CPT | Mod: CPTII,S$GLB,, | Performed by: OPTOMETRIST

## 2024-11-13 PROCEDURE — 3288F FALL RISK ASSESSMENT DOCD: CPT | Mod: CPTII,S$GLB,, | Performed by: OPTOMETRIST

## 2024-11-13 PROCEDURE — 1101F PT FALLS ASSESS-DOCD LE1/YR: CPT | Mod: CPTII,S$GLB,, | Performed by: OPTOMETRIST

## 2024-11-13 PROCEDURE — 1159F MED LIST DOCD IN RCRD: CPT | Mod: CPTII,S$GLB,, | Performed by: OPTOMETRIST

## 2024-11-13 PROCEDURE — 99999 PR PBB SHADOW E&M-EST. PATIENT-LVL III: CPT | Mod: PBBFAC,,, | Performed by: OPTOMETRIST

## 2024-11-13 PROCEDURE — 1126F AMNT PAIN NOTED NONE PRSNT: CPT | Mod: CPTII,S$GLB,, | Performed by: OPTOMETRIST

## 2024-11-13 RX ORDER — PREDNISOLONE ACETATE 10 MG/ML
1 SUSPENSION/ DROPS OPHTHALMIC 4 TIMES DAILY
Qty: 5 ML | Refills: 0 | Status: SHIPPED | OUTPATIENT
Start: 2024-11-13 | End: 2024-11-20

## 2024-11-13 NOTE — PROGRESS NOTES
HPI    Pt here today for ED f/u for k abrasion OD due to cls overwear.       Pt states had fbs OD on Monday so went to ED.     Eye wash flushed with saline, no foreign body seen.    Was given rx for erythromycin richard that he did not get and just started   using Maxitrol gtts bid that was prescribed last month.    States still having fbs in upper right corner of eye.    Has not resumed   cls wear yet.    Denies any eye pain.      Last edited by Barrett Davalos, OD on 11/13/2024  9:54 AM.            Assessment /Plan     For exam results, see Encounter Report.    SPK (superficial punctate keratitis), right  -     prednisoLONE acetate (PRED FORTE) 1 % DrpS; Place 1 drop into the right eye 4 (four) times daily. for 7 days  Dispense: 5 mL; Refill: 0    Nuclear sclerosis, bilateral    Pigment dispersion syndrome, unspecified laterality      1. SPK (superficial punctate keratitis), right (Primary)  Mild inferior SPK  No fb on upper/lower lid eversion  Start PF 1%: 1 gt qid OD x 1 week, then d/c  RTC if worsening or no resolution     - prednisoLONE acetate (PRED FORTE) 1 % DrpS; Place 1 drop into the right eye 4 (four) times daily. for 7 days  Dispense: 5 mL; Refill: 0    2. Nuclear sclerosis, bilateral  Moderate OU  Discussed switching to soft cls / glasses     3. Pigment dispersion syndrome, unspecified laterality  Pigment anterior lens   Discussed glc workup in future

## 2024-11-22 ENCOUNTER — TELEPHONE (OUTPATIENT)
Dept: FAMILY MEDICINE | Facility: CLINIC | Age: 69
End: 2024-11-22
Payer: MEDICARE

## 2024-11-22 ENCOUNTER — E-VISIT (OUTPATIENT)
Dept: FAMILY MEDICINE | Facility: CLINIC | Age: 69
End: 2024-11-22
Payer: MEDICARE

## 2024-11-22 DIAGNOSIS — J32.9 SINUSITIS, UNSPECIFIED CHRONICITY, UNSPECIFIED LOCATION: Primary | ICD-10-CM

## 2024-11-22 NOTE — TELEPHONE ENCOUNTER
----- Message from Mary sent at 11/22/2024  8:32 AM CST -----  Contact: self  Type:  Sooner Appointment Request    Caller is requesting a sooner appointment.  Caller declined first available appointment listed below.  Caller will not accept being placed on the waitlist and is requesting a message be sent to doctor.    Name of Caller:  the patient  When is the first available appointment?  Monday  Symptoms:  uppre RI  Would the patient rather a call back or a response via [email or web address]? call  Best Call Back Number:  [phone]  Additional Information:  pt called to see if he may get in sooner than Monday. Please call

## 2024-11-22 NOTE — PROGRESS NOTES
Patient ID: Elliott Gaspar is a 69 y.o. male.    Chief Complaint: General Illness (Entered automatically based on patient selection in [employer].)    The patient initiated a request through [employer] on 11/22/2024 for evaluation and management with a chief complaint of General Illness (Entered automatically based on patient selection in [employer].)     I evaluated the questionnaire submission on 11/22/24.    [facility] Evisit [facility]-Cough And Cold    11/22/2024  9:24 AM CST - Filed by Patient   What do you need help with? Cough, Cold, Sore Throat   Do you agree to participate in an E-Visit? Yes   If you have any of the following symptoms, go to your local emergency room or call 911: I acknowledge   What is the main issue you would like addressed today? Sinus problems leading to upper respiratory infection   Do you think you might have COVID or the Flu? No   Have you tested positive for COVID or Flu? No   What symptoms do you currently have?  Cough;  Fatigue;  Headache;  Nasal Congestion;  Runny nose   Describe your cough: Productive (containing mucus)   Describe the mucus: Yellow   Have you ever smoked? I smoked in the past   Have you had a fever? No   When did your symptoms first appear? 11/8/2024   In the last two weeks, have you been in close contact with someone who has COVID-19 or the Flu? No   List what you have done or taken to help your symptoms. Mucinex-D, Claritin-D - finished doxycycline 2 weeks ago   How severe are your symptoms? Moderate   Have your symptoms gotten better or worse since they started?  Worse   Do you have transportation to get testing if it is needed and ordered for you at an [facility] location? Yes   Provide any additional information you feel is important. Coughing and occasional wheezing   Please attach any relevant images or files    Are you able to take your vital signs? Yes   Systolic Blood Pressure: 153   Diastolic Blood Pressure: 83   Weight: 194   Height: 69   Pulse: 73    Temperature:    Respiration rate:    Pulse Oxygen:          No diagnosis found.     No orders of the defined types were placed in this encounter.           No follow-ups on file.      E-Visit Time Tracking:

## 2024-11-25 ENCOUNTER — HOSPITAL ENCOUNTER (OUTPATIENT)
Dept: RADIOLOGY | Facility: HOSPITAL | Age: 69
Discharge: HOME | End: 2024-11-25
Attending: NURSE PRACTITIONER
Payer: MEDICARE

## 2024-11-25 ENCOUNTER — OFFICE VISIT (OUTPATIENT)
Dept: HEPATOLOGY | Facility: CLINIC | Age: 69
End: 2024-11-25
Payer: MEDICARE

## 2024-11-25 ENCOUNTER — OFFICE VISIT (OUTPATIENT)
Dept: FAMILY MEDICINE | Facility: CLINIC | Age: 69
End: 2024-11-25
Payer: MEDICARE

## 2024-11-25 VITALS
BODY MASS INDEX: 30.98 KG/M2 | HEIGHT: 69 IN | RESPIRATION RATE: 15 BRPM | WEIGHT: 209.19 LBS | OXYGEN SATURATION: 97 % | DIASTOLIC BLOOD PRESSURE: 84 MMHG | SYSTOLIC BLOOD PRESSURE: 132 MMHG | HEART RATE: 68 BPM

## 2024-11-25 DIAGNOSIS — E78.1 HYPERTRIGLYCERIDEMIA: ICD-10-CM

## 2024-11-25 DIAGNOSIS — R60.0 PERIPHERAL EDEMA: ICD-10-CM

## 2024-11-25 DIAGNOSIS — R09.89 CHEST CONGESTION: Primary | ICD-10-CM

## 2024-11-25 DIAGNOSIS — R09.89 CHEST CONGESTION: ICD-10-CM

## 2024-11-25 DIAGNOSIS — I10 ESSENTIAL HYPERTENSION: ICD-10-CM

## 2024-11-25 DIAGNOSIS — R74.8 ELEVATED LIVER ENZYMES: ICD-10-CM

## 2024-11-25 DIAGNOSIS — K74.00 LIVER FIBROSIS: ICD-10-CM

## 2024-11-25 DIAGNOSIS — E66.811 CLASS 1 OBESITY WITH BODY MASS INDEX (BMI) OF 30.0 TO 30.9 IN ADULT, UNSPECIFIED OBESITY TYPE, UNSPECIFIED WHETHER SERIOUS COMORBIDITY PRESENT: ICD-10-CM

## 2024-11-25 DIAGNOSIS — K75.81 METABOLIC DYSFUNCTION-ASSOCIATED STEATOHEPATITIS (MASH): Primary | ICD-10-CM

## 2024-11-25 PROCEDURE — 4010F ACE/ARB THERAPY RXD/TAKEN: CPT | Mod: CPTII,95,, | Performed by: NURSE PRACTITIONER

## 2024-11-25 PROCEDURE — 1159F MED LIST DOCD IN RCRD: CPT | Mod: CPTII,95,, | Performed by: NURSE PRACTITIONER

## 2024-11-25 PROCEDURE — 71046 X-RAY EXAM CHEST 2 VIEWS: CPT | Mod: TC,PN

## 2024-11-25 PROCEDURE — 3008F BODY MASS INDEX DOCD: CPT | Mod: CPTII,S$GLB,, | Performed by: NURSE PRACTITIONER

## 2024-11-25 PROCEDURE — 3075F SYST BP GE 130 - 139MM HG: CPT | Mod: CPTII,S$GLB,, | Performed by: NURSE PRACTITIONER

## 2024-11-25 PROCEDURE — 4010F ACE/ARB THERAPY RXD/TAKEN: CPT | Mod: CPTII,S$GLB,, | Performed by: NURSE PRACTITIONER

## 2024-11-25 PROCEDURE — 99214 OFFICE O/P EST MOD 30 MIN: CPT | Mod: S$GLB,,, | Performed by: NURSE PRACTITIONER

## 2024-11-25 PROCEDURE — 3288F FALL RISK ASSESSMENT DOCD: CPT | Mod: CPTII,S$GLB,, | Performed by: NURSE PRACTITIONER

## 2024-11-25 PROCEDURE — 99999 PR PBB SHADOW E&M-EST. PATIENT-LVL IV: CPT | Mod: PBBFAC,,, | Performed by: NURSE PRACTITIONER

## 2024-11-25 PROCEDURE — 71046 X-RAY EXAM CHEST 2 VIEWS: CPT | Mod: 26,,, | Performed by: RADIOLOGY

## 2024-11-25 PROCEDURE — 1101F PT FALLS ASSESS-DOCD LE1/YR: CPT | Mod: CPTII,S$GLB,, | Performed by: NURSE PRACTITIONER

## 2024-11-25 PROCEDURE — 3079F DIAST BP 80-89 MM HG: CPT | Mod: CPTII,S$GLB,, | Performed by: NURSE PRACTITIONER

## 2024-11-25 PROCEDURE — 1125F AMNT PAIN NOTED PAIN PRSNT: CPT | Mod: CPTII,S$GLB,, | Performed by: NURSE PRACTITIONER

## 2024-11-25 PROCEDURE — 99214 OFFICE O/P EST MOD 30 MIN: CPT | Mod: 95,,, | Performed by: NURSE PRACTITIONER

## 2024-11-25 PROCEDURE — 1159F MED LIST DOCD IN RCRD: CPT | Mod: CPTII,S$GLB,, | Performed by: NURSE PRACTITIONER

## 2024-11-25 PROCEDURE — 1160F RVW MEDS BY RX/DR IN RCRD: CPT | Mod: CPTII,95,, | Performed by: NURSE PRACTITIONER

## 2024-11-25 RX ORDER — HYDROCHLOROTHIAZIDE 25 MG/1
25 TABLET ORAL
COMMUNITY
Start: 2024-11-05

## 2024-11-25 RX ORDER — HYDRALAZINE HYDROCHLORIDE 50 MG/1
50 TABLET, FILM COATED ORAL 2 TIMES DAILY
COMMUNITY
Start: 2024-11-11

## 2024-11-25 RX ORDER — NEBIVOLOL 20 MG/1
20 TABLET ORAL 2 TIMES DAILY
Qty: 180 TABLET | Refills: 3 | Status: SHIPPED | OUTPATIENT
Start: 2024-11-25

## 2024-11-25 NOTE — PROGRESS NOTES
"Subjective:       Patient ID: Elliott Gaspar is a 69 y.o. male.    Chief Complaint: URI (Been ongoing for several weeks now, patient states that it has gotten worse steadily.)    Elliott Gaspar (Ken) presents to the clinic today for URI concerns  Established patient within the clinic, new patient to myself    Under the care of the following:  PCP: Dr. Marcelo  Cardiology: Dr. Hammer  Hepatology: BRADEN Limon  Allergy: Eros Landeros  Otolaryngologist: Dr. Contreras  Optometry: Shade      Urgent Care: 10/27/2024  Prescribed Doxycycline 100 mg BID x10 days- Bacterial Sinusitis  Reports completed antibiotics- initial sinus concerns improved.  Chest congestion/wheezing/sob have not  - feels like is worse  -dyspnea- worsens with activity  -productive thick/milky consistency  -using Mucinex D/Claritin D    Reports has been experiencing swelling to legs/ankles past 6-8 weeks  Followed by cardiology- Dr. Ro  -last seen 8/2024  - was instructed to follow up in 6 weeks after medication adjustments- did not do so  Hx of cellulitis         Review of Systems   Constitutional:  Negative for chills and fever.   HENT:  Positive for congestion.    Respiratory:  Positive for cough, shortness of breath and wheezing. Negative for chest tightness.    Cardiovascular:  Negative for chest pain and palpitations.   Allergic/Immunologic: Positive for environmental allergies.       Patient Active Problem List   Diagnosis    BPH (benign prostatic hypertrophy)    Penile curvature, acquired    History of thrombocytopenia    Essential hypertension    Deviated septum    Peyronie's disease    Gouty arthritis    Ganglion cyst    Left retinal detachment    Left cataract    Hiatal hernia    History of Helicobacter pylori infection    Dupuytren's contracture of both hands    Leg edema    Stiffness of finger joint of left hand    Colon cancer screening    Lumbar radiculopathy    Ingrown nail    Idiopathic peripheral neuropathy    " Thrombocytopenia    Venous stasis dermatitis of both lower extremities    Nonrheumatic mitral valve regurgitation    Fatty liver    Elevated liver enzymes    Hypertriglyceridemia    Third nerve palsy of left eye    Esotropia of left eye       Objective:      Physical Exam  Vitals and nursing note reviewed.   Constitutional:       General: He is not in acute distress.     Appearance: He is not ill-appearing.   HENT:      Right Ear: Tympanic membrane normal.      Left Ear: Tympanic membrane normal.      Mouth/Throat:      Lips: Pink.      Pharynx: Oropharynx is clear. No pharyngeal swelling or posterior oropharyngeal erythema.      Comments: Absent uvula   Eyes:      Conjunctiva/sclera: Conjunctivae normal.   Cardiovascular:      Rate and Rhythm: Normal rate and regular rhythm.      Heart sounds: Normal heart sounds. No murmur heard.     Comments: Edema noted up to knees bilaterally   Pulmonary:      Effort: Pulmonary effort is normal. No respiratory distress.      Breath sounds: Normal breath sounds. No wheezing.   Musculoskeletal:      Right lower le+ Edema present.      Left lower le+ Edema present.   Skin:     General: Skin is warm and dry.      Findings: No rash.   Neurological:      Mental Status: He is alert.   Psychiatric:         Mood and Affect: Mood normal.         Behavior: Behavior normal.         Lab Results   Component Value Date    WBC 5.51 2024    HGB 14.6 2024    HCT 41.9 2024     (L) 2024    CHOL 196 2024    TRIG 225 (H) 2024    HDL 30 (L) 2024    ALT 74 (H) 2024    AST 51 (H) 2024     2024    K 3.7 2024     2024    CREATININE 1.0 2024    BUN 10 2024    CO2 26 2024    TSH 2.789 2024    PSA 1.7 2024    INR 1.0 2019    HGBA1C 5.1 2023     The 10-year ASCVD risk score (Sushma CHRISTIAN, et al., 2019) is: 28.2%    Values used to calculate the score:      Age: 69 years      " Sex: Male      Is Non- : No      Diabetic: No      Tobacco smoker: No      Systolic Blood Pressure: 132 mmHg      Is BP treated: Yes      HDL Cholesterol: 30 mg/dL      Total Cholesterol: 196 mg/dL  Visit Vitals  /84 (BP Location: Left arm, Patient Position: Sitting)   Pulse 68   Resp 15   Ht 5' 9" (1.753 m)   Wt 94.9 kg (209 lb 3.2 oz)   SpO2 97%   BMI 30.89 kg/m²      Assessment:       1. Chest congestion    2. Essential hypertension    3. Peripheral edema        Plan:       1. Chest congestion  -     X-Ray Chest PA And Lateral; Future; Expected date: 11/25/2024  -     CBC auto differential; Future; Expected date: 11/25/2024  Obtain image for review.   Followed by ENT/Allergy for chronic sinus/allergy- follow up with providers as previously recommended.   2. Essential hypertension  -     nebivoloL (BYSTOLIC) 20 mg Tab; Take 1 tablet (20 mg total) by mouth 2 (two) times a day.  Dispense: 180 tablet; Refill: 3  Schedule follow up appointment with Dr. Ro as previously recommended.  The patient was counseled on HTN education, management and recommendations. The need for weight reduction was reinforced and a BMI goal of 19 to 25 was set.  Patient was encouraged to adhere to a low sodium diet and a DASH diet was recommended. Patient was also encouraged to engage in routine exercise such as walking most days of the week greater than 30 minutes. Patient education materials were provided to the patient for home review and further reinforcement of topics discussed.   3. Peripheral edema  -     BNP; Future; Expected date: 11/25/2024  -     Comprehensive metabolic panel; Future; Expected date: 11/25/2024  Obtain blood work for review.   Currently no s/sx of infection/cellulitis to legs  Elevate extremities, compression stockings.        No follow-ups on file.      Future Appointments       Date Provider Specialty Appt Notes    11/25/2024 Dania Limon NP Hepatology  Arrive at: " Telehealth Follow up visit    12/10/2024 Barrett Davalos, OD Optometry [facility] -- comprehensive / cls fitting    12/10/2024 Barrett Davalos, OD Optometry $50 cl fitting

## 2024-11-25 NOTE — PROGRESS NOTES
The patient location is: [place], [place]  The chief complaint leading to consultation is: Fatty liver    Visit type: audiovisual    Face to Face time with patient: 10  30 minutes of total time spent on the encounter, which includes face to face time and non-face to face time preparing to see the patient (eg, review of tests), Obtaining and/or reviewing separately obtained history, Documenting clinical information in the electronic or other health record, Independently interpreting results (not separately reported) and communicating results to the patient/family/caregiver, or Care coordination (not separately reported).   Each patient to whom he or she provides medical services by telemedicine is:  (1) informed of the relationship between the physician and patient and the respective role of any other health care provider with respect to management of the patient; and (2) notified that he or she may decline to receive medical services by telemedicine and may withdraw from such care at any time.    Notes:      [facility] Established Patient Visit    Reason for Visit:  Fatty Liver    PCP: Twila Marcelo    HPI:  This is a 69 y.o. male with PMH noted below, here for follow up for fatty liver. He was last seen in clinic by myself in 9/2024.    The patient's risk factors for fatty liver disease include:     Overweight/Obesity                     Yes; BMI 30.89  Dyslipidemia                                Yes; Refer to most recent lipid panel results below:   Latest Reference Range & Units 02/28/24 09:17   Cholesterol Total 120 - 199 mg/dL 196   HDL 40 - 75 mg/dL 30 (L)   HDL/Cholesterol Ratio 20.0 - 50.0 % 15.3 (L)   Non-HDL Cholesterol mg/dL 166   Total Cholesterol/HDL Ratio 2.0 - 5.0  6.5 (H)   Triglycerides 30 - 150 mg/dL 225 (H)   LDL Cholesterol 63.0 - 159.0 mg/dL 121.0     Insulin resistance/Diabetes         No; Last HgbA1c was 5.1% (8/2023)  Family history of diabetes           No    He has had  elevated liver enzymes in a hepatocellular pattern since at least 3/2014. Of note, he also has a history of intermittently low platelet counts (100's - 140's) since at least 6/2012. He has never seen Hematology.    Prior abdominal CT in 6/2019 showed:    FINDINGS:    The liver is of normal size and contour.  It is mildly hypodense compared to the spleen consistent with fatty infiltration.  A focal liver mass is not seen.  The gallbladder is of normal size without CT evidence of stone.  The pancreas is of normal contour and CT density without edema or mass.  The spleen is of normal size and CT density without focal defect.     The adrenal glands are not enlarged.  The kidneys are of normal size contour and CT density for a noncontrast study.  No mass stone or hydronephrosis is identified.  The abdominal aorta and inferior vena cava are of normal caliber.  Retroperitoneal adenopathy or masses are not seen.     The stomach is of normal configuration.  Bowel dilatation or air-fluid levels are not seen.  A normal appendix is noted.  The no free fluid is noted.     Within the pelvis the bladder is of normal contour without mass or asymmetry.  There is diverticulosis noted of the proximal sigmoid colon without CT evidence of diverticulitis.  No free fluid is noted.  The prostate is not enlarged.     Impression:     Mild diverticulosis coli without CT evidence of diverticulitis.  Fatty infiltration of the liver parenchyma.    He has never undergone a liver biopsy.    FIB-4 Calculation: 3.07 at 9/6/2024 10:54 AM   Calculated from:  51  Last SGPT/ALT: 74 at 9/6/2024 10:54 AM   137   Age: 69 y.o.     FIB-4 below 1.30 is considered as low-risk for advanced fibrosis  FIB-4 over 2.67 is considered as high-risk for advanced fibrosis  FIB-4 values between 1.30 and 2.67 are considered as intermediate-risk of advanced fibrosis for ages 36-64.     For ages > 64 the cut-off for low-risk goes to < 2.  This is a screening tool and  clinical judgement should be used in the interpretation of these results.    He has no known family history of liver disease. He denies any history of heavy alcohol intake and does not use illicit drugs. HAV, HBV, HCV negative on prior labs.     MR Elastography to non-invasively stage his liver disease in 9/2024 showed:    FINDINGS:    Liver is normal size.  Diffuse loss of parenchymal signal on out of phase imaging consistent with steatosis.  Gallbladder is unremarkable.  No biliary ductal dilatation.  Spleen and adrenal glands are unremarkable.  9 mm T2 hyperintense cystic focus in the pancreatic body (series 2, image 25).  No pancreatic ductal dilatation.  1 cm right renal cyst.  Visualized bowel loops are unremarkable.     Liver fat fraction: 15.1%     Liver elasticity measures 4.64 kPa     Liver R2* value of 42 Hz. (LIC estimated at 1.34 mg/g).     Impression:     1. Liver fat fraction measures 15.1%, consistent with mild-to-moderate steatosis.  2. Liver elasticity measures 4.64 kPa consistent with F3 or F4 fibrosis.  3. Liver R2* value of 42 Hz, within normal limits.  No evidence of iron overload.  4. 9 mm cystic focus in the pancreatic body which could represent a cyst, pseudocyst, or cystic neoplasm such as side branch IPMN.  Recommend follow-up contrast enhanced MRI MRCP in 2 years.  REFERENCE RANGES:     Fat fraction analysis:     <5%: Normal.     5 to 15%: Mild steatosis.     >15%: Moderate or severe steatosis.     Elastography:     <2.93 kPa: Normal or F0 fibrosis.     2.93 to 4.15 kPa: F1 or F2 fibrosis.     >4.15 kPa: F3 or F4 fibrosis.     R2*:     > 65 Hz: Mild iron overload     > 215 Hz: Moderate     > 440 Hz: Severe iron overload     Estimated LIC=0.032 * R2*     * If steatosis is present, elevated liver stiffness (>2.74 kPa) may be secondary to inflammation (SCHULTZ).    Based on MRE results, we discussed starting treatment with Rezdiffra. We discussed the goals of treatment, MOA and side effects.      He is well appearing, and has no signs or symptoms of hepatic decompensation including jaundice, dark urine, pruritus, abdominal distention, lower extremity edema, hematemesis, melena, or periods of confusion suggestive of hepatic encephalopathy.      PMHX:  has a past medical history of Contact lens/glasses fitting, Deviated septum, Fatty liver disease, nonalcoholic, Hypertension, Hypokalemia (4/18/2015), Kidney stone, Persistent headaches, Peyronie's disease, and Thrombocytopenia.    PSHX:  has a past surgical history that includes Hernia repair; Hernia repair (2008); Sinus surgery (2012); Nasal septum surgery; and Cardiac catheterization (2015).    The patient's social and family histories were reviewed by me and updated in the appropriate section of the electronic medical record.    Review of patient's allergies indicates:   Allergen Reactions    Morphine Other (See Comments)     Family HX-mother went into anaphylactic shock    Contrast media      Family history of renal failure with iodinated contrast    Demerol [meperidine]     Iodinated contrast media Other (See Comments)     Family history of renal failure with iodinated contrast    Morphine Other (See Comments)     pts mother had anaphylaxis from Morphine so he does not want to take     Current Outpatient Medications on File Prior to Visit   Medication Sig Dispense Refill    azelastine (ASTELIN) 137 mcg (0.1 %) nasal spray 2 sprays (274 mcg total) by Nasal route once daily. 60 mL 3    b complex vitamins capsule Take 1 capsule by mouth once daily.      colchicine (COLCRYS) 0.6 mg tablet Use as directed 60 tablet 3    famotidine (PEPCID) 20 MG tablet Take 1 tablet (20 mg total) by mouth 2 (two) times daily. 60 tablet 11    fluticasone propionate (FLONASE) 50 mcg/actuation nasal spray 2 sprays (100 mcg total) by Each Nostril route once daily. (Patient not taking: Reported on 11/25/2024) 32 g 6    glucosamine-chondroitin 500-400 mg tablet Take 1 tablet by  mouth 2 (two) times daily.      nystatin-triamcinolone (MYCOLOG II) cream APPLY  CREAM TOPICALLY TWICE DAILY AS NEEDED FOR RASH 60 g 2    potassium chloride SA (K-DUR,KLOR-CON) 20 MEQ tablet TAKE 1 TABLET (20 MEQ TOTAL) BY MOUTH ONCE DAILY. ON DAYS THAT YOU TAKE LASIX 90 tablet 3    tadalafiL (CIALIS) 20 MG Tab Take 1 tablet (20 mg total) by mouth once daily. 30 tablet 11    tamsulosin (FLOMAX) 0.4 mg Cap Take 1 capsule (0.4 mg total) by mouth once daily. 30 capsule 11    [DISCONTINUED] nebivoloL (BYSTOLIC) 20 mg Tab Take 20 mg by mouth 2 (two) times a day. 180 tablet 3     No current facility-administered medications on file prior to visit.     SOCIAL HISTORY:   Social History     Tobacco Use   Smoking Status Former    Current packs/day: 0.00    Average packs/day: 3.0 packs/day for 3.0 years (9.0 ttl pk-yrs)    Types: Cigarettes, Cigars    Start date:     Quit date:     Years since quittin.9    Passive exposure: Past   Smokeless Tobacco Never   Tobacco Comments    quit age 18     Social History     Substance and Sexual Activity   Alcohol Use No     Social History     Substance and Sexual Activity   Drug Use No     ROS:   GENERAL: Denies fever, chills, weight loss/gain, fatigue  HEENT: Denies headaches, dizziness, vision/hearing changes  CARDIOVASCULAR: Denies chest pain, palpitations, or edema  RESPIRATORY: Denies dyspnea, cough  GI: Denies abdominal pain, rectal bleeding, nausea, vomiting. No change in bowel pattern or color  : Denies dysuria, hematuria   SKIN: Denies rash, itching   NEURO: Denies confusion, memory loss, or mood changes  PSYCH: Denies depression or anxiety  HEME/LYMPH: Denies easy bruising or bleeding    Objective Findings:    PHYSICAL EXAM:   Friendly White male, in no acute distress; alert and oriented to person, place and time.  VITALS: There were no vitals taken for this visit.  HENT: Normocephalic, without obvious abnormality.   EYES: Sclerae anicteric.   NECK: No obvious  masses.  CARDIOVASCULAR: No peripheral edema, per patient report.  RESPIRATORY: Normal respiratory effort.   GI: Non-distended abdomen, per patient report.  EXTREMITIES:  No peripheral edema, per patient report.  SKIN: No jaundice. No rashes noted to exposed skin.   NEURO: No asterixis.  PSYCH:  Memory intact. Thought and speech pattern appropriate. Behavior normal. No depression or anxiety noted.    DIAGNOSTIC STUDIES:    CT ABDOMEN PELVIS WITHOUT CONTRAST 6/28/2019:    FINDINGS:    The liver is of normal size and contour.  It is mildly hypodense compared to the spleen consistent with fatty infiltration.  A focal liver mass is not seen.  The gallbladder is of normal size without CT evidence of stone.  The pancreas is of normal contour and CT density without edema or mass.  The spleen is of normal size and CT density without focal defect.     The adrenal glands are not enlarged.  The kidneys are of normal size contour and CT density for a noncontrast study.  No mass stone or hydronephrosis is identified.  The abdominal aorta and inferior vena cava are of normal caliber.  Retroperitoneal adenopathy or masses are not seen.     The stomach is of normal configuration.  Bowel dilatation or air-fluid levels are not seen.  A normal appendix is noted.  The no free fluid is noted.     Within the pelvis the bladder is of normal contour without mass or asymmetry.  There is diverticulosis noted of the proximal sigmoid colon without CT evidence of diverticulitis.  No free fluid is noted.  The prostate is not enlarged.     Impression:     Mild diverticulosis coli without CT evidence of diverticulitis.  Fatty infiltration of the liver parenchyma.    MR Elastography 9/30/2024:    FINDINGS:    Liver is normal size.  Diffuse loss of parenchymal signal on out of phase imaging consistent with steatosis.  Gallbladder is unremarkable.  No biliary ductal dilatation.  Spleen and adrenal glands are unremarkable.  9 mm T2 hyperintense cystic  focus in the pancreatic body (series 2, image 25).  No pancreatic ductal dilatation.  1 cm right renal cyst.  Visualized bowel loops are unremarkable.     Liver fat fraction: 15.1%     Liver elasticity measures 4.64 kPa     Liver R2* value of 42 Hz. (LIC estimated at 1.34 mg/g).     Impression:     1. Liver fat fraction measures 15.1%, consistent with mild-to-moderate steatosis.  2. Liver elasticity measures 4.64 kPa consistent with F3 or F4 fibrosis.  3. Liver R2* value of 42 Hz, within normal limits.  No evidence of iron overload.  4. 9 mm cystic focus in the pancreatic body which could represent a cyst, pseudocyst, or cystic neoplasm such as side branch IPMN.  Recommend follow-up contrast enhanced MRI MRCP in 2 years.  REFERENCE RANGES:     Fat fraction analysis:     <5%: Normal.     5 to 15%: Mild steatosis.     >15%: Moderate or severe steatosis.     Elastography:     <2.93 kPa: Normal or F0 fibrosis.     2.93 to 4.15 kPa: F1 or F2 fibrosis.     >4.15 kPa: F3 or F4 fibrosis.     R2*:     > 65 Hz: Mild iron overload     > 215 Hz: Moderate     > 440 Hz: Severe iron overload     Estimated LIC=0.032 * R2*     * If steatosis is present, elevated liver stiffness (>2.74 kPa) may be secondary to inflammation (SCHULTZ).    EDUCATION:  Per AVS.    ASSESSMENT & PLAN:  69 y.o. White male with:    1. Metabolic dysfunction-associated steatohepatitis (MASH)  Comprehensive Metabolic Panel    resmetirom (REZDIFFRA) 80 mg Tab      2. Liver fibrosis  Comprehensive Metabolic Panel    resmetirom (REZDIFFRA) 80 mg Tab      3. Elevated liver enzymes  Comprehensive Metabolic Panel      4. Class 1 obesity with body mass index (BMI) of 30.0 to 30.9 in adult, unspecified obesity type, unspecified whether serious comorbidity present  Comprehensive Metabolic Panel      5. Hypertriglyceridemia  Comprehensive Metabolic Panel      6. Essential hypertension  Comprehensive Metabolic Panel        - Start Rezdiffra 80 mg PO daily for the treatment  of [facility] with fibrosis.  - Repeat liver and kidney function tests in 3 months.   - Recommend abdominal US with AFP measurement every 6 months for HCC surveillance, next due 3/2025.  - Recommend initial weight loss goal of 20 lbs, through diet and exercise.  - Recommend good control of cholesterol, blood pressure, & blood sugar levels.  - Avoid alcohol and herbal supplements/alternative remedies.  - Return to clinic in 3-4 months.    Thank you for allowing me to participate in the care of Elliott Gaspar       Hepatology Nurse Practitioner  [facility]    CC'ed note to:   No ref. provider found  Twila Marcelo MD

## 2024-11-25 NOTE — PATIENT INSTRUCTIONS
The FDA approved a new medication for patients with advanced fatty liver disease called Rezdiffra for the treatment of fatty liver with fibrosis.    The most common side effects including nausea, diarrhea, itching, vomiting, abdominal pain, constipation, gallstones, cholecystitis.    Medications that you cannot take with Rezdiffra: include Gemfibrozil (ok to take Fenofibrate, just not Gemfibrozil)    Medications that need dose adjustments:  -- Plavix (can only use 60-80 mg of Rezdiffra)  -- statins  Max dose of Pravastatin/Atorvastatin = 40 mg daily  Max dose of Simvastatin/Rosuvastatin = 20 mg daily    Recommended lab monitoring after starting  -- labs 3 and 6 months after starting  -- if labs stable then, can do yearly    Avoid with GFR <30

## 2024-11-26 RX ORDER — RESMETIROM 80 MG/1
80 TABLET, COATED ORAL DAILY
Qty: 30 TABLET | Refills: 11 | Status: SHIPPED | OUTPATIENT
Start: 2024-11-26

## 2024-12-10 ENCOUNTER — OFFICE VISIT (OUTPATIENT)
Dept: OPTOMETRY | Facility: CLINIC | Age: 69
End: 2024-12-10
Payer: MEDICARE

## 2024-12-10 ENCOUNTER — OFFICE VISIT (OUTPATIENT)
Dept: OPTOMETRY | Facility: CLINIC | Age: 69
End: 2024-12-10
Payer: COMMERCIAL

## 2024-12-10 DIAGNOSIS — Z46.0 CONTACT LENS/GLASSES FITTING: Primary | ICD-10-CM

## 2024-12-10 DIAGNOSIS — H25.13 NUCLEAR SCLEROSIS, BILATERAL: ICD-10-CM

## 2024-12-10 DIAGNOSIS — H52.7 REFRACTIVE ERROR: ICD-10-CM

## 2024-12-10 DIAGNOSIS — Z01.00 EXAMINATION OF EYES AND VISION: Primary | ICD-10-CM

## 2024-12-10 PROCEDURE — 99999 PR PBB SHADOW E&M-EST. PATIENT-LVL I: CPT | Mod: PBBFAC,,, | Performed by: OPTOMETRIST

## 2024-12-10 PROCEDURE — 99499 UNLISTED E&M SERVICE: CPT | Mod: ,,, | Performed by: OPTOMETRIST

## 2024-12-10 PROCEDURE — 92015 DETERMINE REFRACTIVE STATE: CPT | Mod: S$GLB,,, | Performed by: OPTOMETRIST

## 2024-12-10 PROCEDURE — 99999 PR PBB SHADOW E&M-EST. PATIENT-LVL III: CPT | Mod: PBBFAC,,, | Performed by: OPTOMETRIST

## 2024-12-10 PROCEDURE — 92014 COMPRE OPH EXAM EST PT 1/>: CPT | Mod: S$GLB,,, | Performed by: OPTOMETRIST

## 2024-12-10 PROCEDURE — 92310 CONTACT LENS FITTING OU: CPT | Mod: CSM,,, | Performed by: OPTOMETRIST

## 2024-12-10 NOTE — PROGRESS NOTES
HPI    Pt states no changes in vision noticed, doing well  Denies any issues with dryness or related symptoms    (+) AREDS2 most days   (-) gtts  (+) headaches  (+)floaters     [place] [facility] CL, sleeps in them    Last edited by Barrett Davalos, OD on 12/10/2024  8:25 AM.            Assessment /Plan     For exam results, see Encounter Report.    Examination of eyes and vision    Nuclear sclerosis, bilateral    Refractive error      1. Examination of eyes and vision (Primary)    2. Nuclear sclerosis, bilateral  Mild to moderate OU  Not VS  Discussed possible ocular affects of cataracts. Acceptable BCVA OU.   Discussed treatment options. Surgery not recommended at this time.   Monitor yearly.     3. Refractive error  Dispensed updated spectacle Rx. Discussed various spectacle lens options. Discussed adaptation period to new specs.     Discussed cls options -- declines switching to soft cls  Discussed proper wear/care of lenses at length  Ordered new RGPs for distance only (clear OD - no green available / blue OS)  [facility] ref #[id number]  Return for dispense

## 2024-12-24 ENCOUNTER — TELEPHONE (OUTPATIENT)
Dept: OPHTHALMOLOGY | Facility: CLINIC | Age: 69
End: 2024-12-24
Payer: MEDICARE

## 2025-01-08 ENCOUNTER — OFFICE VISIT (OUTPATIENT)
Dept: OPTOMETRY | Facility: CLINIC | Age: 70
End: 2025-01-08
Payer: MEDICARE

## 2025-01-08 DIAGNOSIS — Z97.3 WEARS CONTACT LENSES: Primary | ICD-10-CM

## 2025-01-08 PROCEDURE — 99499 UNLISTED E&M SERVICE: CPT | Mod: S$GLB,,, | Performed by: OPTOMETRIST

## 2025-01-08 NOTE — PROGRESS NOTES
HPI    Pt here today for cls follow up.     Had pt insert rgps OU.   Pt states   good fit & comfort both physically & visually.     Asked for lens to not be ordered as clear for either, requested a blue & a   green due to can not see clear lenses.  Last edited by Berenice Kwok on 1/8/2025  4:38 PM.            Assessment /Plan     For exam results, see Encounter Report.    Wears contact lenses      Good rgp dispense #1  Good centration  Adequate lid attachment  BCVA 20/20 OD, 20/25 OS    Lens not available in green, ordered clear  Pt unable to see clear rgp  Order blue lens OD / OS to dispense at f/u in 3 weeks   Small steeping of BC to reduce movement   [facility] ref #[id number]

## 2025-01-29 ENCOUNTER — OFFICE VISIT (OUTPATIENT)
Dept: OPTOMETRY | Facility: CLINIC | Age: 70
End: 2025-01-29
Payer: MEDICARE

## 2025-01-29 DIAGNOSIS — Z97.3 WEARS CONTACT LENSES: Primary | ICD-10-CM

## 2025-01-29 PROCEDURE — 99499 UNLISTED E&M SERVICE: CPT | Mod: S$GLB,,, | Performed by: OPTOMETRIST

## 2025-02-06 ENCOUNTER — HOSPITAL ENCOUNTER (EMERGENCY)
Facility: HOSPITAL | Age: 70
Discharge: HOME | End: 2025-02-06
Attending: EMERGENCY MEDICINE
Payer: MEDICARE

## 2025-02-06 VITALS
DIASTOLIC BLOOD PRESSURE: 85 MMHG | HEIGHT: 69 IN | OXYGEN SATURATION: 97 % | TEMPERATURE: 98 F | BODY MASS INDEX: 28.88 KG/M2 | RESPIRATION RATE: 20 BRPM | SYSTOLIC BLOOD PRESSURE: 159 MMHG | HEART RATE: 74 BPM | WEIGHT: 195 LBS
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
## 2025-02-06 DIAGNOSIS — M79.18 MUSCULOSKELETAL PAIN: ICD-10-CM

## 2025-02-06 DIAGNOSIS — M25.562 ACUTE PAIN OF LEFT KNEE: ICD-10-CM

## 2025-02-06 DIAGNOSIS — M79.642 BILATERAL HAND PAIN: ICD-10-CM

## 2025-02-06 DIAGNOSIS — M79.641 BILATERAL HAND PAIN: ICD-10-CM

## 2025-02-06 DIAGNOSIS — S89.91XA RIGHT KNEE INJURY, INITIAL ENCOUNTER: ICD-10-CM

## 2025-02-06 PROCEDURE — 73560 X-RAY EXAM OF KNEE 1 OR 2: CPT | Mod: TC,LT

## 2025-02-06 PROCEDURE — 73130 X-RAY EXAM OF HAND: CPT | Mod: TC,RT

## 2025-02-06 PROCEDURE — 99284 EMERGENCY DEPT VISIT MOD MDM: CPT | Mod: 25

## 2025-02-06 PROCEDURE — 25000003 PHARM REV CODE 250: Performed by: PHYSICIAN ASSISTANT

## 2025-02-06 PROCEDURE — 72100 X-RAY EXAM L-S SPINE 2/3 VWS: CPT | Mod: TC

## 2025-02-06 PROCEDURE — 73130 X-RAY EXAM OF HAND: CPT | Mod: 26,RT,, | Performed by: RADIOLOGY

## 2025-02-06 PROCEDURE — 73130 X-RAY EXAM OF HAND: CPT | Mod: 26,LT,, | Performed by: RADIOLOGY

## 2025-02-06 PROCEDURE — 72100 X-RAY EXAM L-S SPINE 2/3 VWS: CPT | Mod: 26,,, | Performed by: RADIOLOGY

## 2025-02-06 PROCEDURE — 73560 X-RAY EXAM OF KNEE 1 OR 2: CPT | Mod: 26,RT,, | Performed by: RADIOLOGY

## 2025-02-06 PROCEDURE — 73130 X-RAY EXAM OF HAND: CPT | Mod: TC,LT

## 2025-02-06 PROCEDURE — 73560 X-RAY EXAM OF KNEE 1 OR 2: CPT | Mod: TC,RT

## 2025-02-06 PROCEDURE — 73560 X-RAY EXAM OF KNEE 1 OR 2: CPT | Mod: 26,LT,, | Performed by: RADIOLOGY

## 2025-02-06 RX ORDER — IBUPROFEN 600 MG/1
600 TABLET ORAL
Status: COMPLETED | OUTPATIENT
Start: 2025-02-06 | End: 2025-02-06

## 2025-02-06 RX ADMIN — IBUPROFEN 600 MG: 600 TABLET ORAL at 02:02

## 2025-02-06 NOTE — DISCHARGE INSTRUCTIONS
As we discussed, you may continue to take Aleve, or Motrin.  Avoid Tylenol due to your history of liver problems.  Monitor for worsening symptoms including numbness, tingling, loss of function, loss of sensation, incontinence.  Please follow up with your previously established orthopedic surgeon.  Return to the emergency department for anything new, worse, or concerning.

## 2025-02-06 NOTE — FIRST PROVIDER EVALUATION
Emergency Department TeleTriage Encounter Note      CHIEF COMPLAINT    Chief Complaint   Patient presents with    Fall     Tripped Tuesday night over some wires. Denies hitting head/loc/blood thinners. Still having pain to bilateral palms, bilateral knees and back       VITAL SIGNS   Initial Vitals [02/06/25 1259]   BP Pulse Resp Temp SpO2   (!) 183/91 77 20 97.6 °F (36.4 °C) 96 %      MAP       --            ALLERGIES    Review of patient's allergies indicates:   Allergen Reactions    Morphine Other (See Comments)     Family HX-mother went into anaphylactic shock    Contrast media      Family history of renal failure with iodinated contrast    Demerol [meperidine]     Iodinated contrast media Other (See Comments)     Family history of renal failure with iodinated contrast    Morphine Other (See Comments)     pts mother had anaphylaxis from Morphine so he does not want to take       PROVIDER TRIAGE NOTE  Patient presents with pain in bilateral hands, knees and lower back secondary to trip and fall. No numbness or focal weakness.       ORDERS  Labs Reviewed - No data to display    ED Orders (720h ago, onward)      None              Virtual Visit Note: The provider triage portion of this emergency department evaluation and documentation was performed via [employer], a HIPAA-compliant telemedicine application, in concert with a tele-presenter in the room. A face to face patient evaluation with one of my colleagues will occur once the patient is placed in an emergency department room.      DISCLAIMER: This note was prepared with [employer] voice recognition transcription software. Garbled syntax, mangled pronouns, and other bizarre constructions may be attributed to that software system.

## 2025-02-06 NOTE — ED PROVIDER NOTES
Encounter Date: 2/6/2025       History     Chief Complaint   Patient presents with    Fall     Tripped Tuesday night over some wires. Denies hitting head/loc/blood thinners. Still having pain to bilateral palms, bilateral knees and back     69-year-old male with a history of hypertension, fatty liver disease, kidney stones presents with pain in bilateral hands, knees and lower back secondary to trip and fall. No numbness or focal weakness.  Patient reports a trip and fall incident 2 nights ago without loss of consciousness.  States that he [place] some wires [place] falling forward to outstretched hands and went to his knees.  He reports taking a leave with mild relief, and topical pain relievers with minimal relief.  He reports increasing tenderness and pain, prompting his visit today.  He denies chest pain, shortness of breath, dizziness, nausea vomiting, loss of consciousness, saddle anesthesia, incontinence    The history is provided by the patient. No  was used.     Review of patient's allergies indicates:   Allergen Reactions    Morphine Other (See Comments)     Family HX-mother went into anaphylactic shock    Contrast media      Family history of renal failure with iodinated contrast    Demerol [meperidine]     Iodinated contrast media Other (See Comments)     Family history of renal failure with iodinated contrast    Morphine Other (See Comments)     pts mother had anaphylaxis from Morphine so he does not want to take     Past Medical History:   Diagnosis Date    Contact lens/glasses fitting     wears contacts    Deviated septum     Fatty liver disease, nonalcoholic     Negative Hep A, B, C in past    Hypertension     Hypokalemia 4/18/2015    Kidney stone     Persistent headaches     related to sinus congestion    Peyronie's disease     Thrombocytopenia     Variable; low normal; Hematology eval in past and no specific f/u advised     Past Surgical History:   Procedure  Laterality Date    CARDIAC CATHETERIZATION      NSA of coronaries    HERNIA REPAIR      groin bilat    HERNIA REPAIR      Dr. Kincaid    NASAL SEPTUM SURGERY      SINUS SURGERY  2012     Family History   Problem Relation Name Age of Onset    Heart disease Mother      Atrial fibrillation Mother      Heart disease Father      Hypertension Father      Heart failure Father      Psoriasis Father      Psoriasis Paternal Grandmother      Alzheimer's disease Maternal Grandmother      Melanoma Neg Hx      Lupus Neg Hx      Eczema Neg Hx       Social History     Tobacco Use    Smoking status: Former     Current packs/day: 0.00     Average packs/day: 3.0 packs/day for 3.0 years (9.0 ttl pk-yrs)     Types: Cigarettes, Cigars     Start date:      Quit date:      Years since quittin.1     Passive exposure: Past    Smokeless tobacco: Never    Tobacco comments:     quit age 18   Substance Use Topics    Alcohol use: No    Drug use: No     Review of Systems   Constitutional:  Positive for activity change.   Musculoskeletal:  Positive for arthralgias, myalgias, neck pain and neck stiffness.        Paraspinal tenderness bilaterally   Neurological:  Negative for dizziness, tremors, syncope, weakness, light-headedness, numbness and headaches.   All other systems reviewed and are negative.      Physical Exam     Initial Vitals [25 1259]   BP Pulse Resp Temp SpO2   (!) 183/91 77 20 97.6 °F (36.4 °C) 96 %      MAP       --         Physical Exam    Nursing note and vitals reviewed.  Constitutional: He appears well-developed and well-nourished. No distress.   HENT:   Head: Normocephalic and atraumatic.   Right Ear: External ear normal.   Left Ear: External ear normal. Mouth/Throat: No oropharyngeal exudate.   Eyes: Conjunctivae and EOM are normal. Pupils are equal, round, and reactive to light. No scleral icterus.   Neck: Neck supple. No thyromegaly present. No tracheal deviation present. No JVD present.    Paraspinal tenderness to the left.  No central cervical spine tenderness or step-off noted.   Normal range of motion.  Cardiovascular:  Normal rate, regular rhythm, normal heart sounds and intact distal pulses.           No murmur heard.  Pulmonary/Chest: Breath sounds normal. No respiratory distress. He has no rhonchi.   Abdominal: Abdomen is soft. Bowel sounds are normal. He exhibits no distension. There is no abdominal tenderness. There is no guarding.   Musculoskeletal:         General: Tenderness and edema present. Normal range of motion.      Cervical back: Normal range of motion and neck supple.      Comments: Patient has bilateral swelling to lower extremities.  This is at baseline.  Patient has tenderness to palpation of the left and right knee.  There is no anterior drawer bilaterally.  There is no palpable crepitus.  There are no obvious deformities, contusions, abrasions.  Extremities are neurovascularly intact.     Lymphadenopathy:     He has no cervical adenopathy.   Neurological: He is alert and oriented to person, place, and time. He has normal strength. No cranial nerve deficit or sensory deficit. GCS score is 15. GCS eye subscore is 4. GCS verbal subscore is 5. GCS motor subscore is 6.   Skin: Skin is warm and dry. Capillary refill takes less than 2 seconds. No rash noted. No erythema.   Psychiatric: He has a normal mood and affect. Thought content normal.         ED Course   Procedures  Labs Reviewed - No data to display       Imaging Results              X-Ray Hand 3 View Left (Final result)  Result time 02/06/25 14:53:49      Final result by Meghna Callahan MD (02/06/25 14:53:49)                   Impression:      No acute abnormality.      Electronically signed by: Meghna Callahan  Date:    02/06/2025  Time:    14:53               Narrative:    EXAMINATION:  XR HAND COMPLETE 3 VIEW LEFT    CLINICAL HISTORY:  left hand injury, fall;.    TECHNIQUE:  PA, lateral, and oblique views of  the left hand were performed.    COMPARISON:  09/16/2019    FINDINGS:  There is no fracture, dislocation or radiopaque foreign body.  Scattered osteoarthritic changes are present.                                       X-Ray Hand 3 view Right (Final result)  Result time 02/06/25 14:52:48      Final result by Meghna Callahan MD (02/06/25 14:52:48)                   Impression:      No acute traumatic abnormality.      Electronically signed by: Meghna Callahan  Date:    02/06/2025  Time:    14:52               Narrative:    EXAMINATION:  XR HAND COMPLETE 3 VIEW RIGHT    CLINICAL HISTORY:  right hand injury, fall;    TECHNIQUE:  PA, lateral, and oblique views of the right hand were performed.    COMPARISON:  None    FINDINGS:  There is no fracture, dislocation or radiopaque foreign body.  There are scattered mild osteoarthritic changes.                                       X-Ray Knee 1 or 2 View Right (Final result)  Result time 02/06/25 14:51:33      Final result by Meghna Callahan MD (02/06/25 14:51:33)                   Impression:      Slight prepatellar soft tissue swelling.  No acute bony abnormality.      Electronically signed by: Meghna Callahan  Date:    02/06/2025  Time:    14:51               Narrative:    EXAMINATION:  XR KNEE 1 OR 2 VIEW RIGHT    CLINICAL HISTORY:  Unspecified injury of right lower leg, initial encounter    TECHNIQUE:  AP and lateral views of the right knee were performed.    COMPARISON:  02/16/2023    FINDINGS:  Slight prepatellar soft tissue swelling noted, less pronounced compared to prior study.  No fracture, dislocation or joint effusion.  Minimal degenerative changes are present.                                       X-Ray Knee 1 or 2 View Left (Final result)  Result time 02/06/25 14:50:51      Final result by Meghna Callahan MD (02/06/25 14:50:51)                   Impression:      No acute abnormality.      Electronically signed by: Meghna Gomez  [place]  Date:    02/06/2025  Time:    14:50               Narrative:    EXAMINATION:  XR KNEE 1 OR 2 VIEW LEFT    CLINICAL HISTORY:  left knee injury, fall;    TECHNIQUE:  One or two views of the left knee were performed.    COMPARISON:  02/16/2023    FINDINGS:  No fracture, dislocation or significant knee joint effusion is noted.  There are minor degenerative changes of the patellofemoral and medial compartments.                                       X-Ray Lumbar Spine Ap And Lateral (Final result)  Result time 02/06/25 14:49:58      Final result by Meghna Callahan MD (02/06/25 14:49:58)                   Impression:      No acute traumatic abnormality.      Electronically signed by: Meghna Callahan  Date:    02/06/2025  Time:    14:49               Narrative:    EXAMINATION:  XR LUMBAR SPINE AP AND LATERAL    CLINICAL HISTORY:  low back pain, fall;    TECHNIQUE:  AP, lateral and spot images were performed of the lumbar spine.    COMPARISON:  Previous MRI 12/14/2021    FINDINGS:  There is 6 lumbar type vertebra, normally aligned without fracture or subluxation.  There are multilevel degenerative changes at the disc spaces and facet joints.  Normal bony mineralization.                                       Medications   ibuprofen tablet 600 mg (600 mg Oral Given 2/6/25 2532)     Medical Decision Making  presenting after a fall that occurred two days prior to arrival.  The mechanism of injury was a mechanical ground level fall without syncope or near-syncope. The current level of pain is moderate.  The patient does not take blood thinner medications.  There is no laceration(s) associated with the injury.  There was no loss of consciousness, confusion, seizure, or memory impairment.  Denies vomiting, numbness/weakness, fever    Workup:  Imaging: X rays. Bilateral hands, bilateral knees, and L-spine. See intrepretations.    Given history, exam, and workup, low suspicion for ICH, skull fx, spine fx or other  "acute spinal syndrome, PTX, pulmonary contusion, cardiac contusion, aortic/vertebral dissection, significant hemorrhage, extremity fracture.    Disposition: Discharge home with strict return precautions and instructions for prompt primary care follow up.  Patient advises he does have an established orthopedic surgeon secondary to previous history of fractures and ortho injuries.  Will follow up with this provider as soon as possible.  The patient was offered muscle relaxers and pain medications however he states that he does not feel that this is necessary as "I really do not like taking medicines anyway. " He advises that he is comfortable taking NSAIDs as needed.  He was advised to monitor for worsening symptoms and return to the emergency department for anything new, worse, or concerning.  He verbalized understanding of all education and instructions provided and was able to repeat these in an intelligible manner.    Amount and/or Complexity of Data Reviewed  Radiology:  Decision-making details documented in ED Course.               ED Course as of 02/06/25 1523   Thu Feb 06, 2025   1503 X-Ray Hand 3 View Left  Impression:     No acute abnormality.   [NC]   1504 X-Ray Hand 3 view Right     Impression:     No acute traumatic abnormality.   [NC]   1504 X-Ray Knee 1 or 2 View Right  Impression:     Slight prepatellar soft tissue swelling.  No acute bony abnormality.   [NC]   1504 X-Ray Knee 1 or 2 View Left  Impression:     No acute abnormality.   [NC]   1504 X-Ray Lumbar Spine Ap And Lateral  Impression:     No acute traumatic abnormality.   [NC]      ED Course User Index  [NC] Siri Montiel NP                           Clinical Impression:  Final diagnoses:  [S89.91XA] Right knee injury, initial encounter  [W19.XXXA] Fall, initial encounter (Primary)  [M25.562] Acute pain of left knee  [M79.641, M79.642] Bilateral hand pain  [M79.18] Musculoskeletal pain          ED Disposition Condition    Discharge Stable      "     ED Prescriptions    None       Follow-up Information       Follow up With Specialties Details Why Contact Info    Twila Marcelo MD Family Medicine In 2 days  [address]  [address]  [place] [address] [phone]      [facility] Emergency Dept Emergency Medicine  As needed, If symptoms worsen [address] [address] [phone]             Siri Montiel NP  02/06/25 1920

## 2025-02-07 ENCOUNTER — OFFICE VISIT (OUTPATIENT)
Dept: FAMILY MEDICINE | Facility: CLINIC | Age: 70
End: 2025-02-07
Payer: MEDICARE

## 2025-02-07 VITALS
WEIGHT: 206.63 LBS | SYSTOLIC BLOOD PRESSURE: 130 MMHG | RESPIRATION RATE: 18 BRPM | DIASTOLIC BLOOD PRESSURE: 84 MMHG | HEIGHT: 69 IN | OXYGEN SATURATION: 93 % | HEART RATE: 69 BPM | BODY MASS INDEX: 30.61 KG/M2
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
## 2025-02-07 DIAGNOSIS — M54.2 CERVICAL PAIN: ICD-10-CM

## 2025-02-07 DIAGNOSIS — B35.4 TINEA CORPORIS: ICD-10-CM

## 2025-02-07 DIAGNOSIS — M25.561 ACUTE PAIN OF BOTH KNEES: Primary | ICD-10-CM

## 2025-02-07 DIAGNOSIS — N52.8 OTHER MALE ERECTILE DYSFUNCTION: ICD-10-CM

## 2025-02-07 DIAGNOSIS — M54.50 ACUTE BILATERAL LOW BACK PAIN WITHOUT SCIATICA: ICD-10-CM

## 2025-02-07 DIAGNOSIS — M25.562 ACUTE PAIN OF BOTH KNEES: Primary | ICD-10-CM

## 2025-02-07 PROCEDURE — 99214 OFFICE O/P EST MOD 30 MIN: CPT | Mod: S$GLB,,, | Performed by: FAMILY MEDICINE

## 2025-02-07 PROCEDURE — 1159F MED LIST DOCD IN RCRD: CPT | Mod: CPTII,S$GLB,, | Performed by: FAMILY MEDICINE

## 2025-02-07 PROCEDURE — 3079F DIAST BP 80-89 MM HG: CPT | Mod: CPTII,S$GLB,, | Performed by: FAMILY MEDICINE

## 2025-02-07 PROCEDURE — 3075F SYST BP GE 130 - 139MM HG: CPT | Mod: CPTII,S$GLB,, | Performed by: FAMILY MEDICINE

## 2025-02-07 PROCEDURE — 3288F FALL RISK ASSESSMENT DOCD: CPT | Mod: CPTII,S$GLB,, | Performed by: FAMILY MEDICINE

## 2025-02-07 PROCEDURE — 1125F AMNT PAIN NOTED PAIN PRSNT: CPT | Mod: CPTII,S$GLB,, | Performed by: FAMILY MEDICINE

## 2025-02-07 PROCEDURE — 3008F BODY MASS INDEX DOCD: CPT | Mod: CPTII,S$GLB,, | Performed by: FAMILY MEDICINE

## 2025-02-07 PROCEDURE — 1160F RVW MEDS BY RX/DR IN RCRD: CPT | Mod: CPTII,S$GLB,, | Performed by: FAMILY MEDICINE

## 2025-02-07 PROCEDURE — 1101F PT FALLS ASSESS-DOCD LE1/YR: CPT | Mod: CPTII,S$GLB,, | Performed by: FAMILY MEDICINE

## 2025-02-07 PROCEDURE — 99999 PR PBB SHADOW E&M-EST. PATIENT-LVL V: CPT | Mod: PBBFAC,,, | Performed by: FAMILY MEDICINE

## 2025-02-07 RX ORDER — TRAMADOL HYDROCHLORIDE 50 MG/1
50 TABLET ORAL EVERY 6 HOURS PRN
Qty: 20 TABLET | Refills: 0 | Status: SHIPPED | OUTPATIENT
Start: 2025-02-07

## 2025-02-07 RX ORDER — METHOCARBAMOL 500 MG/1
1000 TABLET, FILM COATED ORAL 4 TIMES DAILY
Qty: 80 TABLET | Refills: 0 | Status: SHIPPED | OUTPATIENT
Start: 2025-02-07 | End: 2025-02-17

## 2025-02-07 RX ORDER — NYSTATIN AND TRIAMCINOLONE ACETONIDE 100000; 1 [USP'U]/G; MG/G
CREAM TOPICAL
Qty: 60 G | Refills: 2 | Status: SHIPPED | OUTPATIENT
Start: 2025-02-07

## 2025-02-07 RX ORDER — TADALAFIL 20 MG/1
20 TABLET ORAL DAILY
Qty: 30 TABLET | Refills: 11 | Status: SHIPPED | OUTPATIENT
Start: 2025-02-07

## 2025-02-07 NOTE — PROGRESS NOTES
Subjective:       Patient ID: Elliott Gaspar is a 69 y.o. male.    Chief Complaint: Follow-up    History of Present Illness    CHIEF COMPLAINT:  Mr. Gaspar presents today for evaluation of injuries sustained from a fall at [employer].    HISTORY OF PRESENT ILLNESS:  He tripped on loose wires during a bingo event on Tuesday night. While attempting to free his entangled right foot, he lost balance and fell onto his hands and knees. Following the incident, he visited the ER where x-rays showed no fractures, though there was concern for deep tissue injury.    CURRENT SYMPTOMS:  He reports severe neck and back pain with stiffness, which he describes as the most painful areas despite no direct impact. His hands are swollen with pain described as feeling like touching a hot skillet. He has anterior knee pain with resolved swelling. He also experiences radiating leg pain extending bilaterally to the hips, with asymmetric severity.    PAIN MANAGEMENT:  Over-the-counter pain relief with Aleve and Ibuprofen has been insufficient, particularly affecting his sleep. He initially declined pain medications and muscle relaxants but is now requesting stronger pain relief for 7-10 days. He expresses concern about potential liver damage and seeks options that would provide better pain control without exacerbating liver issues. He previously used tramadol in 2015.    BLOOD PRESSURE:  He reports elevated blood pressure at today's appointment and during the recent ER visit. Home blood pressure monitoring typically shows readings of 131/84 mmHg, and he reports achieving readings below 130 mmHg when relaxed or focused.    MEDICAL HISTORY:  He has a history of liver issues.      ROS:  Musculoskeletal: +back pain  Psychiatric: +sleep difficulty             Patient Active Problem List   Diagnosis    BPH (benign prostatic hypertrophy)    Penile curvature, acquired    History of thrombocytopenia    Essential hypertension     "Deviated septum    Peyronie's disease    Gouty arthritis    Ganglion cyst    Left retinal detachment    Left cataract    Hiatal hernia    History of Helicobacter pylori infection    Dupuytren's contracture of both hands    Leg edema    Stiffness of finger joint of left hand    Colon cancer screening    Lumbar radiculopathy    Ingrown nail    Idiopathic peripheral neuropathy    Thrombocytopenia    Venous stasis dermatitis of both lower extremities    Nonrheumatic mitral valve regurgitation    Metabolic dysfunction-associated steatohepatitis (MASH)    Elevated liver enzymes    Hypertriglyceridemia    Third nerve palsy of left eye    Esotropia of left eye    Liver fibrosis    Class 1 obesity with body mass index (BMI) of 30.0 to 30.9 in adult    Right knee injury, initial encounter    Acute pain of left knee    Fall    Bilateral hand pain    Musculoskeletal pain     Elliott has a current medication list which includes the following prescription(s): azelastine, b complex vitamins, colchicine, famotidine, glucosamine-chondroitin, hydralazine, hydrochlorothiazide, nebivolol, potassium chloride sa, rezdiffra, tamsulosin, fluticasone propionate, methocarbamol, nystatin-triamcinolone, tadalafil, and tramadol.        Health Maintenance Due   Topic Date Due    Shingles Vaccine (1 of 2) Never done    RSV Vaccine (Age 60+ and Pregnant patients) (1 - Risk 60-74 years 1-dose series) Never done    Influenza Vaccine (1) 09/01/2024    COVID-19 Vaccine (3 - 2024-25 season) 09/01/2024    Colorectal Cancer Screening  04/30/2025      Health Maintenance reviewed  Objective:      Vitals:    02/07/25 1502 02/07/25 1522   BP: (!) 150/98 130/84   Pulse: 69    Resp: 18    SpO2: (!) 93%    Weight: 93.7 kg (206 lb 9.6 oz)    Height: 5' 9" (1.753 m)    PainSc:   9    PainLoc: Generalized      Body mass index is 30.51 kg/m².  Physical Exam  Vitals and nursing note reviewed.   Constitutional:       General: He is not in acute distress.     " Appearance: He is not ill-appearing.   Cardiovascular:      Rate and Rhythm: Normal rate and regular rhythm.      Heart sounds: No murmur heard.  Pulmonary:      Effort: Pulmonary effort is normal.      Breath sounds: Normal breath sounds. No wheezing.   Musculoskeletal:      Comments: Photos with swelling of right anterior knee. Swelling improved. Pain overlying patellas bilateral   Skin:     General: Skin is warm and dry.      Findings: No rash.   Neurological:      Mental Status: He is alert.   Psychiatric:         Mood and Affect: Mood normal.         Behavior: Behavior normal.               Assessment:       Assessment & Plan    1. Considered deep tissue damage despite normal X-rays from ER  2. Evaluated for pain management, considering liver health concerns  3. Assessed tramadol as appropriate pain medication based on patient's history  4. Recommend orthopedic evaluation to determine need for MRI given mechanism of injury  5. Noted elevated blood pressure, potentially stress-related           Plan:       1. Acute pain of both knees  -     traMADoL (ULTRAM) 50 mg tablet; Take 1 tablet (50 mg total) by mouth every 6 (six) hours as needed for Pain.  Dispense: 20 tablet; Refill: 0  -     Cancel: Ambulatory referral/consult to Physical/Occupational Therapy; Future; Expected date: 02/14/2025  -     Ambulatory referral/consult to Physical/Occupational Therapy; Future; Expected date: 02/14/2025  -     Ambulatory referral/consult to Orthopedics; Future; Expected date: 02/14/2025    2. Tinea corporis  -     nystatin-triamcinolone (MYCOLOG II) cream; APPLY  CREAM TOPICALLY TWICE DAILY AS NEEDED FOR RASH  Dispense: 60 g; Refill: 2    3. Other male erectile dysfunction  -     tadalafiL (CIALIS) 20 MG Tab; Take 1 tablet (20 mg total) by mouth once daily.  Dispense: 30 tablet; Refill: 11    4. Cervical pain  -     Cancel: Ambulatory referral/consult to Physical/Occupational Therapy; Future; Expected date: 02/14/2025  -      Ambulatory referral/consult to Physical/Occupational Therapy; Future; Expected date: 02/14/2025    5. Acute bilateral low back pain without sciatica  -     Cancel: Ambulatory referral/consult to Physical/Occupational Therapy; Future; Expected date: 02/14/2025  -     methocarbamoL (ROBAXIN) 500 MG Tab; Take 2 tablets (1,000 mg total) by mouth 4 (four) times daily. for 10 days  Dispense: 80 tablet; Refill: 0  -     Ambulatory referral/consult to Physical/Occupational Therapy; Future; Expected date: 02/14/2025    6. Fall on same level from tripping  -     Ambulatory referral/consult to Orthopedics; Future; Expected date: 02/14/2025         This note was generated with the assistance of ambient listening technology. Verbal consent was obtained by the patient and accompanying visitor(s) for the recording of patient appointment to facilitate this note. I attest to having reviewed and edited the generated note for accuracy, though some syntax or spelling errors may persist. Please contact the author of this note for any clarification.

## 2025-02-11 ENCOUNTER — OFFICE VISIT (OUTPATIENT)
Dept: URGENT CARE | Facility: CLINIC | Age: 70
End: 2025-02-11
Payer: MEDICARE

## 2025-02-11 VITALS
RESPIRATION RATE: 18 BRPM | DIASTOLIC BLOOD PRESSURE: 82 MMHG | HEART RATE: 78 BPM | OXYGEN SATURATION: 99 % | WEIGHT: 202.19 LBS | TEMPERATURE: 100 F | BODY MASS INDEX: 29.95 KG/M2 | HEIGHT: 69 IN | SYSTOLIC BLOOD PRESSURE: 154 MMHG

## 2025-02-11 DIAGNOSIS — R05.9 COUGH, UNSPECIFIED TYPE: ICD-10-CM

## 2025-02-11 DIAGNOSIS — J11.1 INFLUENZA: Primary | ICD-10-CM

## 2025-02-11 LAB
CTP QC/QA: YES
POC MOLECULAR INFLUENZA A AGN: POSITIVE
POC MOLECULAR INFLUENZA B AGN: NEGATIVE

## 2025-02-11 PROCEDURE — 99214 OFFICE O/P EST MOD 30 MIN: CPT | Mod: S$GLB,,, | Performed by: NURSE PRACTITIONER

## 2025-02-11 PROCEDURE — 87502 INFLUENZA DNA AMP PROBE: CPT | Mod: QW,,, | Performed by: NURSE PRACTITIONER

## 2025-02-11 RX ORDER — BENZONATATE 100 MG/1
100 CAPSULE ORAL 3 TIMES DAILY PRN
Qty: 15 CAPSULE | Refills: 0 | Status: SHIPPED | OUTPATIENT
Start: 2025-02-11 | End: 2025-02-16

## 2025-02-11 RX ORDER — OSELTAMIVIR PHOSPHATE 75 MG/1
75 CAPSULE ORAL EVERY 12 HOURS
Qty: 10 CAPSULE | Refills: 0 | Status: SHIPPED | OUTPATIENT
Start: 2025-02-11 | End: 2025-02-16

## 2025-02-11 NOTE — PROGRESS NOTES
"Subjective:      Patient ID: Elliott Gaspar is a 69 y.o. male.    Vitals:  height is 5' 9" (1.753 m) and weight is 91.7 kg (202 lb 2.6 oz). His oral temperature is 99.6 °F (37.6 °C). His blood pressure is 154/82 (abnormal) and his pulse is 78. His respiration is 18 and oxygen saturation is 99%.     Chief Complaint: Cough, Sinus Problem, Generalized Body Aches, and Fever    69-year-old afebrile male who presents today with chief complaint of cough, congestion, body aches, and fever for the past 48 hours.        Cough  This is a new problem. The current episode started in the past 7 days. The problem has been unchanged. The problem occurs every few minutes. Associated symptoms include chills, a fever, headaches and a sore throat. Nothing aggravates the symptoms. He has tried nothing for the symptoms. The treatment provided no relief.   Sinus Problem  This is a new problem. The current episode started in the past 7 days. The problem is unchanged. There has been no fever. His pain is at a severity of 4/10. Associated symptoms include chills, coughing, headaches and a sore throat. Past treatments include nothing. The treatment provided no relief.   Fever   This is a new problem. The current episode started 3 days ago. The problem occurs constantly. The problem has been unchanged. He has not experienced a heat injury.The maximum temperature noted was 100 to 100.9 F. The temperature was taken using an oral thermometer. Associated symptoms include coughing, headaches and a sore throat. He has tried NSAIDs for the symptoms. The treatment provided mild relief.       Constitution: Positive for chills and fever.   HENT:  Positive for sore throat.    Respiratory:  Positive for cough.    Neurological:  Positive for headaches.      Objective:     Physical Exam   Constitutional: He is oriented to person, place, and time.  Non-toxic appearance. He does not appear ill. No distress. normal  HENT:   Head: Normocephalic and atraumatic. "   Ears:   Right Ear: Tympanic membrane, external ear and ear canal normal.   Left Ear: Tympanic membrane, external ear and ear canal normal.   Nose: Congestion present.   Mouth/Throat: Mucous membranes are moist. No posterior oropharyngeal erythema. Oropharynx is clear.   Eyes: Conjunctivae are normal. Extraocular movement intact   Neck: Neck supple. No neck rigidity present.   Cardiovascular: Normal rate, regular rhythm, normal heart sounds and normal pulses.   Pulmonary/Chest: Effort normal and breath sounds normal.   Abdominal: Normal appearance.   Musculoskeletal: Normal range of motion.         General: Normal range of motion.      Cervical back: He exhibits no tenderness.   Lymphadenopathy:     He has no cervical adenopathy.   Neurological: He is alert and oriented to person, place, and time.   Skin: Skin is warm, dry and not diaphoretic.   Psychiatric: His behavior is normal.   Vitals reviewed.    Results for orders placed or performed in visit on 02/11/25   POCT Influenza A/B MOLECULAR    Collection Time: 02/11/25  9:39 AM   Result Value Ref Range    POC Molecular Influenza A Ag Positive (A) Negative    POC Molecular Influenza B Ag Negative Negative     Acceptable Yes     XR CHEST PA AND LATERAL    Result Date: 2/11/2025  EXAMINATION: XR CHEST PA AND LATERAL CLINICAL HISTORY: Cough, unspecified TECHNIQUE: PA and lateral views of the chest were performed. COMPARISON: 11/25/2024. FINDINGS: The lungs are clear.  No focal consolidation.  Heart size is top-normal.  Mediastinal contours unremarkable. Bony thorax intact.     No acute chest disease. Electronically signed by: Eliu Jorgensen Date:    02/11/2025 Time:    10:00      Assessment:     1. Influenza    2. Cough, unspecified type        Plan:       Influenza  -     oseltamivir (TAMIFLU) 75 MG capsule; Take 1 capsule (75 mg total) by mouth every 12 (twelve) hours. for 5 days  Dispense: 10 capsule; Refill: 0    Cough, unspecified type  -     POCT  Influenza A/B MOLECULAR  -     XR CHEST PA AND LATERAL; Future; Expected date: 02/11/2025  -     benzonatate (TESSALON) 100 MG capsule; Take 1 capsule (100 mg total) by mouth 3 (three) times daily as needed for Cough.  Dispense: 15 capsule; Refill: 0      INSTRUCTIONS  Medication as prescribed.  Rest.  Increase oral fluids.  Follow up with your primary care doctor as advised.  To ER for worsening of symptoms, or for any new symptoms as discussed.

## 2025-02-13 ENCOUNTER — OFFICE VISIT (OUTPATIENT)
Dept: ORTHOPEDICS | Facility: CLINIC | Age: 70
End: 2025-02-13
Payer: MEDICARE

## 2025-02-13 VITALS — HEIGHT: 69 IN | BODY MASS INDEX: 29.95 KG/M2 | WEIGHT: 202.19 LBS

## 2025-02-13 DIAGNOSIS — M25.561 ACUTE PAIN OF BOTH KNEES: ICD-10-CM

## 2025-02-13 DIAGNOSIS — M25.562 ACUTE PAIN OF BOTH KNEES: ICD-10-CM
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
## 2025-02-13 PROCEDURE — 99205 OFFICE O/P NEW HI 60 MIN: CPT | Mod: S$GLB,,, | Performed by: ORTHOPAEDIC SURGERY

## 2025-02-13 PROCEDURE — 1160F RVW MEDS BY RX/DR IN RCRD: CPT | Mod: CPTII,S$GLB,, | Performed by: ORTHOPAEDIC SURGERY

## 2025-02-13 PROCEDURE — 3008F BODY MASS INDEX DOCD: CPT | Mod: CPTII,S$GLB,, | Performed by: ORTHOPAEDIC SURGERY

## 2025-02-13 PROCEDURE — 1101F PT FALLS ASSESS-DOCD LE1/YR: CPT | Mod: CPTII,S$GLB,, | Performed by: ORTHOPAEDIC SURGERY

## 2025-02-13 PROCEDURE — 99999 PR PBB SHADOW E&M-EST. PATIENT-LVL III: CPT | Mod: PBBFAC,,, | Performed by: ORTHOPAEDIC SURGERY

## 2025-02-13 PROCEDURE — 1159F MED LIST DOCD IN RCRD: CPT | Mod: CPTII,S$GLB,, | Performed by: ORTHOPAEDIC SURGERY

## 2025-02-13 PROCEDURE — 3288F FALL RISK ASSESSMENT DOCD: CPT | Mod: CPTII,S$GLB,, | Performed by: ORTHOPAEDIC SURGERY

## 2025-02-13 PROCEDURE — 1125F AMNT PAIN NOTED PAIN PRSNT: CPT | Mod: CPTII,S$GLB,, | Performed by: ORTHOPAEDIC SURGERY
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
## 2025-02-17 ENCOUNTER — CLINICAL SUPPORT (OUTPATIENT)
Dept: REHABILITATION | Facility: HOSPITAL | Age: 70
End: 2025-02-17
Attending: FAMILY MEDICINE
Payer: MEDICARE

## 2025-02-17 DIAGNOSIS — M25.561 ACUTE PAIN OF BOTH KNEES: ICD-10-CM

## 2025-02-17 DIAGNOSIS — M25.562 ACUTE PAIN OF BOTH KNEES: ICD-10-CM

## 2025-02-17 PROBLEM — G89.29 BILATERAL CHRONIC KNEE PAIN: Status: ACTIVE | Noted: 2025-02-06

## 2025-02-17 PROCEDURE — 97162 PT EVAL MOD COMPLEX 30 MIN: CPT | Mod: PN

## 2025-02-17 NOTE — PROGRESS NOTES
Outpatient Rehab    Physical Therapy Evaluation (only)    Patient Name: Frankie Gaspar  MRN: [id number]  YOB: 1955  Today's Date: 2/18/2025    Therapy Diagnosis:   Encounter Diagnosis   Name Primary?    Acute pain of both knees      Physician: Twila Marcelo MD    Physician Orders: Eval and Treat  Medical Diagnosis: M25.562, M25.562, W01.0XXA    Date of Evaluation:  2/17/2025   Plan of Care Certification:  2/17/2025 to  5/19/2025     Time In: 1500   Time Out: 1530  Total Time: 30   Total Billable Time: 30    Intake Outcome Measure for FOTO Survey    Therapist reviewed FOTO scores for Frankie Gaspar on 2/17/2025.   FOTO report - see Media section or FOTO account episode details.     Intake Score:  %         Subjective   History of Present Illness  Frankie is a 69 y.o. male who reports to physical therapy with a chief concern of patient with bilateral knee pain, left worse than right. According to the patient's chart, Frankie has a past medical history of Contact lens/glasses fitting, Deviated septum, Fatty liver disease, nonalcoholic, Hypertension, Hypokalemia, Kidney stone, Persistent headaches, Peyronie's disease, and Thrombocytopenia. Frankie has a past surgical history that includes Hernia repair; Hernia repair (2008); Sinus surgery (2012); Nasal septum surgery; and Cardiac catheterization (2015).    The patient reports a medical diagnosis of M25.561, M25.562, W01.0XXA.            History of Present Condition/Illness: Patient had a trip and fall on 2/4/2025 when he tripped over a cable and landed on all 4's. Patient went to the ED at the time and had X-rays done which revealed no fracture. Per patient the ED doc recommended an MRI but advised he follow up with ortho. Patient saw Dr. Hastings and was told there was no indication for an MRI. He was not offered an injection or any other treatment. Patient was not satisfied with his ortho visit so he followed up with Dr Marcelo who referred him to PT. Patient reports  he feels his left knee is unstable and wants to buckle, indicating a feeling of hyperextension and valgus instability.     Activities of Daily Living  Social history was obtained from Patient.    General Prior Level of Function Comments: independent  General Current Level of Function Comments: independent but requires more time       Previously independent with activities of daily living? Yes     Currently independent with activities of daily living? Yes              Pain     Patient reports a current pain level of 7/10. Pain at best is reported as 6/10. Pain at worst is reported as 10/10.   Location: left distal quad to tibial tuberosity with pain on medial and lateral sides  Clinical Progression (since onset): Worsening  Pain Qualities: Aching, Throbbing, Tenderness  Pain-Relieving Factors: Rest, Other (Comment)  Other Pain-Relieving Factors: aleve and/or tramadol  Pain-Aggravating Factors: Standing, Squatting, Walking, Twisting, Transfers, Other (Comment)  Other Pain-Aggravating Factors: getting into/out of vehicle         Living Arrangements  Living Situation  Housing: Home independently  Living Arrangements: Spouse/significant other    Home Setup  Type of Structure: House  Number of Levels in Home: One level        Employment  Employment Status: Not employed          Past Medical History/Physical Systems Review:   Elliott Gaspar  has a past medical history of Contact lens/glasses fitting, Deviated septum, Fatty liver disease, nonalcoholic, Hypertension, Hypokalemia, Kidney stone, Persistent headaches, Peyronie's disease, and Thrombocytopenia.    Elliott Gaspar  has a past surgical history that includes Hernia repair; Hernia repair (2008); Sinus surgery (2012); Nasal septum surgery; and Cardiac catheterization (2015).    Elliott has a current medication list which includes the following prescription(s): azelastine, b complex vitamins, colchicine, famotidine, fluticasone propionate, glucosamine-chondroitin,  hydralazine, hydrochlorothiazide, nebivolol, nystatin-triamcinolone, potassium chloride sa, rezdiffra, tadalafil, tamsulosin, and tramadol.    Review of patient's allergies indicates:   Allergen Reactions    Morphine Other (See Comments)     Family HX-mother went into anaphylactic shock    Contrast media      Family history of renal failure with iodinated contrast    Demerol [meperidine]     Iodinated contrast media Other (See Comments)     Family history of renal failure with iodinated contrast    Morphine Other (See Comments)     pts mother had anaphylaxis from Morphine so he does not want to take        Objective       Knee Range of Motion   Right Knee   Active (deg) Passive (deg) Pain   Flexion 0       Extension 126           Left Knee   Active (deg) Passive (deg) Pain   Flexion 19 13     Extension 115 115              Ankle/Foot Range of Motion      WFL bilateral               Hip Strength - Planes of Motion   Right Strength Right Pain Left Strength Left  Pain   Flexion (L2) 4+   4     Extension           ABduction           ADduction           Internal Rotation 4   3+     External Rotation 4+   3+         Knee Strength   Right Strength Right Pain Left Strength Left  Pain   Flexion (S2)           Prone Flexion           Extension (L3) 5   4-            Ankle/Foot Strength - Planes of Motion   Right Strength Right Pain Left Strength Left  Pain   Dorsiflexion (L4) 5   5     Plantar Flexion (S1)           Inversion           Eversion           Great Toe Flexion           Great Toe Extension (L5)           Lesser Toes Flexion           Lesser Toes Extension                  Lumbar/Pelvic Girdle Special Tests  Pelvic Girdle / Sacrum Tests  Negative: Right FELIX, Left FELIX, Right FADIR, and Left FADIR         Hip Special Tests  Intra-Articular/Impingement Tests  Negative: Right FELIX, Left FELIX, Right FADIR, and Left FADIR       Knee Special Tests  Knee Ligament Tests  Negative: Right Lachman and Left  Lachman  Negative: Right Valgus Stress at 0 Degrees, Left Valgus Stress at 0 Degrees, Right Varus Stress at 0 Degrees, Left Varus Stress at 0 Degrees, Right Valgus Stress at 30 Degrees, Left Valgus Stress at 30 Degrees, Left Varus Stress at 30 Degrees, and Right Varus Stress at 30 Degrees  L LCL stress negative for laxity, positive for pain    Knee Meniscal Tests  Positive: Left Lateral Sarah  Negative: Right Lateral Sarah                     Assessment & Plan   Assessment  Frankie presents with a condition of Moderate complexity.   Presentation of Symptoms: Changing  Will Comorbidities Impact Care: No       Functional Limitations: Activity tolerance, Ambulating on uneven surfaces, Bed mobility, Carrying objects, Functional mobility, Gait limitations, Maintaining balance, Pain with ADLs/IADLs, Painful locomotion/ambulation, Range of motion, Standing tolerance, Transfers, Completing self-care activities, Completing work/school activities, Decreased ambulation distance/endurance, Increased risk of fall  Impairments: Abnormal coordination, Abnormal gait, Abnormal muscle tone, Abnormal muscle firing, Abnormal or restricted range of motion, Activity intolerance, Impaired balance, Impaired physical strength, Pain with functional activity, Lack of appropriate home exercise program, Weight-bearing intolerance  Personal Factors Affecting Prognosis: Pain    Prognosis: Good  Assessment Details: Patient presents to PT with pain in left knee limiting functional mobility and activity tolerance. Patient with intact ligamentous testing, yet pain with LCL stress on left. Lateral meniscus compression test on left positive for pain and clunk. Patient with Active and Passive ROM deficits in left knee due to pain and stiffness. Unable to achieve functional extension in left knee due to pain. Patient has an appointment to see another orthopedic surgeon on 3/3/2025. Patient to benefit from therapy to improve pain, AROM, and tolerance to  weightbearing and ambulation.     Plan  From a physical therapy perspective, the patient would benefit from: Skilled Rehab Services    Planned therapy interventions include: Therapeutic activities, Therapeutic exercise, Neuromuscular re-education, Manual therapy, ADLs/IADLs, and Gait training.    Planned modalities to include: Electrical stimulation - attended, Electrical stimulation - passive/unattended, Ultrasound, Thermotherapy (hot pack), Low-level laser therapy, and Other (Comment). Dry needling       Visit Frequency: 12 times In Total for 3 Months.       This plan was discussed with Patient, Therapy assistant, and Caregiver.   Discussion participants: Agreed Upon Plan of Care             Patient's spiritual, cultural, and educational needs considered and patient agreeable to plan of care and goals.     Education  Education was done with Patient. The patient's learning style includes Listening. The patient Verbalizes understanding.         Role of therapy, therapy diagnosis, expected prognosis       Goals:   Active       LTG       Ambulate required community distances without limitation due to left knee pain       Expected End:  04/04/25            Restore AROM of Left knee to 5 - 115 or better for improved functional performance       Expected End:  04/04/25            Improve ability to ascend/descend stairs with minimal limitation due to left knee pain       Expected End:  04/04/25               STG       Report improvement in symptoms for progression toward LTGs       Start:  02/18/25    Expected End:  03/14/25            Improve weightbearing tolerance on left lower extremity for improved standing and ambulation       Start:  02/18/25    Expected End:  03/14/25            demonstrate compliance with initial home exercise program       Start:  02/18/25    Expected End:  03/14/25                Gerda Anderson, PT

## 2025-02-20 ENCOUNTER — TELEPHONE (OUTPATIENT)
Dept: HEPATOLOGY | Facility: CLINIC | Age: 70
End: 2025-02-20
Payer: MEDICARE

## 2025-02-20 ENCOUNTER — CLINICAL SUPPORT (OUTPATIENT)
Dept: REHABILITATION | Facility: HOSPITAL | Age: 70
End: 2025-02-20
Payer: MEDICARE

## 2025-02-20 DIAGNOSIS — Z74.09 IMPAIRED FUNCTIONAL MOBILITY, BALANCE, GAIT, AND ENDURANCE: Primary | ICD-10-CM

## 2025-02-20 PROCEDURE — 97530 THERAPEUTIC ACTIVITIES: CPT | Mod: PN,CQ

## 2025-02-20 PROCEDURE — 97110 THERAPEUTIC EXERCISES: CPT | Mod: PN,CQ

## 2025-02-20 NOTE — TELEPHONE ENCOUNTER
----- Message from Dania Limon NP sent at 2/20/2025  8:55 AM CST -----  Regarding: FW: Rezdiffra  Please call patient once more, and leave the detailed message from specialty pharmacy, as below, if he doesn't answer the phone. Thanks  ----- Message -----  From: Laya Bauer, PharmD  Sent: 2/19/2025  11:13 AM CST  To: Dania Limon NP  Subject: Rezdiffra                                        Good afternoon, I just wanted to inform you that Rezdiffra was denied on this patient's insurance due to a requirement for a biopsy confirmed diagnosis. We intended to appeal this determination but the patient's insurance requires a form with his signature to file an appeal and we have been unable to reach him regarding this form.We will be closing out of OSP at this time. Thanks,Laya Bauer, PharmDSpecialty Pharmacy - Clinical PharmacistOchsner Specialty [phone]

## 2025-02-20 NOTE — PROGRESS NOTES
"  Outpatient Rehab    Physical Therapy Visit    Patient Name: Frankie Gaspar  MRN: [id number]  YOB: 1955  Today's Date: 2/20/2025    Therapy Diagnosis:   Encounter Diagnosis   Name Primary?    Impaired functional mobility, balance, gait, and endurance Yes     Physician: Twila Marcelo MD    Physician Orders: Eval and Treat  Medical Diagnosis:  M25.562, M25.562, W01.0XXA     Visit # / Visits Authorized:  2 / 12   Date of Evaluation:  2/17/2025  Insurance Authorization Period: 2/17/2025 to 12/31/2025  Plan of Care Certification:  2/17/2025 to 5/17/2025      Time In:   2:40 PM  Time Out:  3:40 PM  Total Time:   60 Minutes  Total Billable Time: 53 Minutes    FOTO:  Intake Score:  %  Survey Score 1:  %  Survey Score 2:  %         Subjective   I had a recent fall and my knees have been bothering me.  Pain reported as 5/10. Bilateral knees- left>right    Objective            Treatment:  23 Minutes  Therapeutic Exercise  Therapeutic Exercise Activity 1: Heel Raises x 20  Therapeutic Exercise Activity 2: Ball Squeezes x 2 min  Therapeutic Exercise Activity 3: Bridges x 10  Therapeutic Exercise Activity 4: QS x 2 min  Therapeutic Exercise Activity 5: SLR x 10  Therapeutic Exercise Activity 6: Supine Hip Abd w/ YTB x 15  Therapeutic Exercise Activity 7: SAQ on small gray roll x 2 min  Therapeutic Exercise Activity 8: LAQ x 15  Therapeutic Exercise Activity 9: Supine Knee Fallouts w/ YTB x 15    30 Minutes  Therapeutic Activity  Therapeutic Activity 1: Nustep level 5 x 15 min  Therapeutic Activity 2: Toe Taps on 6" step x 2 min  Therapeutic Activity 3: FSU on 4" step x 15  Therapeutic Activity 4: LSU on 4" step x 15  Therapeutic Activity 5: Heel Cord stretch on wedge 3 x 30 sec  Therapeutic Activity 6: Standing Hip Flex/Abd/Ext x 10    Assessment & Plan   Assessment: Patient did ok with exercises; limited by bilateral knee pain; general LE weakness noted.       Patient will continue to benefit from skilled " outpatient physical therapy to address the deficits listed in the problem list box on initial evaluation, provide pt/family education and to maximize pt's level of independence in the home and community environment.     Patient's spiritual, cultural, and educational needs considered and patient agreeable to plan of care and goals.           Plan: Continue per POC and progress with strength/mobility exercises as patient tolerates.    Goals:   Active       LTG       Ambulate required community distances without limitation due to left knee pain (Progressing)       Expected End:  04/04/25            Restore AROM of Left knee to 5 - 115 or better for improved functional performance (Progressing)       Expected End:  04/04/25            Improve ability to ascend/descend stairs with minimal limitation due to left knee pain (Progressing)       Expected End:  04/04/25               STG       Report improvement in symptoms for progression toward LTGs (Progressing)       Start:  02/18/25    Expected End:  03/14/25            Improve weightbearing tolerance on left lower extremity for improved standing and ambulation (Progressing)       Start:  02/18/25    Expected End:  03/14/25            demonstrate compliance with initial home exercise program (Progressing)       Start:  02/18/25    Expected End:  03/14/25                Jonathan Favre, PTA

## 2025-02-23 ENCOUNTER — OFFICE VISIT (OUTPATIENT)
Dept: URGENT CARE | Facility: CLINIC | Age: 70
End: 2025-02-23
Payer: MEDICARE

## 2025-02-23 VITALS
HEART RATE: 66 BPM | DIASTOLIC BLOOD PRESSURE: 88 MMHG | RESPIRATION RATE: 17 BRPM | OXYGEN SATURATION: 99 % | BODY MASS INDEX: 28.14 KG/M2 | WEIGHT: 190 LBS | SYSTOLIC BLOOD PRESSURE: 166 MMHG | TEMPERATURE: 98 F | HEIGHT: 69 IN

## 2025-02-23 DIAGNOSIS — R05.1 ACUTE COUGH: Primary | ICD-10-CM

## 2025-02-23 DIAGNOSIS — J32.9 BACTERIAL SINUSITIS: ICD-10-CM

## 2025-02-23 DIAGNOSIS — B96.89 BACTERIAL SINUSITIS: ICD-10-CM

## 2025-02-23 LAB
CTP QC/QA: YES
SARS CORONAVIRUS 2 ANTIGEN: NEGATIVE

## 2025-02-23 PROCEDURE — 87811 SARS-COV-2 COVID19 W/OPTIC: CPT | Mod: QW,S$GLB,, | Performed by: NURSE PRACTITIONER

## 2025-02-23 PROCEDURE — 99214 OFFICE O/P EST MOD 30 MIN: CPT | Mod: S$GLB,,, | Performed by: NURSE PRACTITIONER

## 2025-02-23 RX ORDER — DOXYCYCLINE 100 MG/1
100 CAPSULE ORAL EVERY 12 HOURS
Qty: 20 CAPSULE | Refills: 0 | Status: SHIPPED | OUTPATIENT
Start: 2025-02-23 | End: 2025-03-05

## 2025-02-23 RX ORDER — PROMETHAZINE HYDROCHLORIDE AND DEXTROMETHORPHAN HYDROBROMIDE 6.25; 15 MG/5ML; MG/5ML
5 SYRUP ORAL EVERY 4 HOURS PRN
Qty: 118 ML | Refills: 0 | Status: SHIPPED | OUTPATIENT
Start: 2025-02-23 | End: 2025-03-05

## 2025-02-23 RX ORDER — ALBUTEROL SULFATE 90 UG/1
2 INHALANT RESPIRATORY (INHALATION) EVERY 6 HOURS PRN
Qty: 18 G | Refills: 0 | Status: SHIPPED | OUTPATIENT
Start: 2025-02-23 | End: 2025-03-02

## 2025-02-23 RX ORDER — PREDNISONE 20 MG/1
20 TABLET ORAL DAILY
Qty: 5 TABLET | Refills: 0 | Status: SHIPPED | OUTPATIENT
Start: 2025-02-23 | End: 2025-02-28

## 2025-02-23 NOTE — PROGRESS NOTES
"Subjective:      Patient ID: Elliott Gaspar is a 69 y.o. male.    Vitals:  height is 5' 9" (1.753 m) and weight is 86.2 kg (190 lb). His oral temperature is 98.4 °F (36.9 °C). His blood pressure is 166/88 (abnormal) and his pulse is 66. His respiration is 17 and oxygen saturation is 99%.     Chief Complaint: Cough (Symptoms started on : 21 days ago. Symptoms are the following: dx w/ flu on 2/11, cough w/ mucus, chest congestion, nasal congestion. Symptoms treated with the following: mucinex)    69-year-old afebrile male who presents today with chief complaint of cough, sinus congestion, chest congestion, and nasal congestion.  He reports his symptoms have been present for 21 days.  He was seen here on February 11, 2025 and diagnosed with influenza at that time.  He reports that his cough has persisted.  He denies any fever, chills, body aches or malaise.    Cough  This is a new problem. The current episode started 1 to 4 weeks ago. The problem has been gradually worsening. The problem occurs constantly. The cough is Productive of sputum. Associated symptoms include nasal congestion. Associated symptoms comments: Chest congestion. Treatments tried: mucinex. The treatment provided mild relief. His past medical history is significant for bronchitis.       HENT:  Positive for congestion and sinus pressure.    Respiratory:  Positive for cough.    Skin:  Negative for erythema.      Objective:     Physical Exam   Constitutional: He is oriented to person, place, and time.  Non-toxic appearance. He does not appear ill. No distress. obesity  HENT:   Head: Normocephalic and atraumatic.   Ears:   Right Ear: Tympanic membrane, external ear and ear canal normal.   Left Ear: Tympanic membrane, external ear and ear canal normal.   Nose: Congestion present. Right sinus exhibits maxillary sinus tenderness. Left sinus exhibits maxillary sinus tenderness.   Mouth/Throat: Mucous membranes are moist. No posterior oropharyngeal erythema. " Oropharynx is clear.   Eyes: Conjunctivae are normal. Extraocular movement intact   Neck: Neck supple. No neck rigidity present.   Cardiovascular: Normal rate, regular rhythm, normal heart sounds and normal pulses.   Pulmonary/Chest: Effort normal and breath sounds normal.   Abdominal: Normal appearance.   Musculoskeletal: Normal range of motion.         General: Normal range of motion.      Cervical back: He exhibits no tenderness.   Lymphadenopathy:     He has no cervical adenopathy.   Neurological: He is alert and oriented to person, place, and time.   Skin: Skin is warm, dry, not diaphoretic, not pale and no rash. Capillary refill takes less than 2 seconds. No bruising, No erythema and No lesion no jaundice  Psychiatric: His behavior is normal.   Vitals reviewed.    Results for orders placed or performed in visit on 02/23/25   SARS Coronavirus 2 Antigen, POCT Manual Read    Collection Time: 02/23/25 12:23 PM   Result Value Ref Range    SARS Coronavirus 2 Antigen Negative Negative, Presumptive Negative     Acceptable Yes          Assessment:     1. Acute cough    2. Bacterial sinusitis        Plan:       Acute cough  -     SARS Coronavirus 2 Antigen, POCT Manual Read  -     XR CHEST PA AND LATERAL; Future; Expected date: 02/23/2025  -     predniSONE (DELTASONE) 20 MG tablet; Take 1 tablet (20 mg total) by mouth once daily. for 5 days  Dispense: 5 tablet; Refill: 0  -     albuterol (PROAIR HFA) 90 mcg/actuation inhaler; Inhale 2 puffs into the lungs every 6 (six) hours as needed for Wheezing. Rescue  Dispense: 18 g; Refill: 0  -     promethazine-dextromethorphan (PROMETHAZINE-DM) 6.25-15 mg/5 mL Syrp; Take 5 mLs by mouth every 4 (four) hours as needed (cough).  Dispense: 118 mL; Refill: 0    Bacterial sinusitis  -     doxycycline (VIBRAMYCIN) 100 MG Cap; Take 1 capsule (100 mg total) by mouth every 12 (twelve) hours. for 10 days  Dispense: 20 capsule; Refill: 0      INSTRUCTIONS  Medications as  prescribed.  Rest.  Increase oral fluids.  Follow up with your doctor as advised.  To ER for worsening of symptoms, or for any new symptoms as discussed.

## 2025-02-24 ENCOUNTER — CLINICAL SUPPORT (OUTPATIENT)
Dept: REHABILITATION | Facility: HOSPITAL | Age: 70
End: 2025-02-24
Payer: MEDICARE

## 2025-02-24 DIAGNOSIS — Z74.09 IMPAIRED FUNCTIONAL MOBILITY, BALANCE, GAIT, AND ENDURANCE: Primary | ICD-10-CM

## 2025-02-24 PROCEDURE — 97530 THERAPEUTIC ACTIVITIES: CPT | Mod: PN

## 2025-02-24 PROCEDURE — 97110 THERAPEUTIC EXERCISES: CPT | Mod: PN

## 2025-02-24 NOTE — PROGRESS NOTES
"    Outpatient Rehab    Physical Therapy Visit    Patient Name: Frankie Gaspar  MRN: [id number]  YOB: 1955  Encounter Date: 2/24/2025    Therapy Diagnosis:   Encounter Diagnosis   Name Primary?    Impaired functional mobility, balance, gait, and endurance Yes     Physician: Twila Marcelo MD    Physician Orders: Eval and Treat       Time In: 1230   Time Out: 1330  Total Time: 60   Total Billable Time: 60    FOTO:  Intake Score:  %  Survey Score 1:  %  Survey Score 2:  %         Subjective   No real change since eval. He did fine with the exercises given last visit..  Pain reported as 5/10. Bilateral knees- left>right    Objective            Treatment:  Therapeutic Exercise  Therapeutic Exercise Activity 1: Heel Raises x 20  Therapeutic Exercise Activity 2: Ball Squeezes x 2 min  Therapeutic Exercise Activity 3: Bridges x 15  Therapeutic Exercise Activity 5: SLR x 10  Therapeutic Exercise Activity 6: Supine Hip Abd w/ YTB x 20  Therapeutic Exercise Activity 7: SAQ on small gray roll x 2 min  Therapeutic Exercise Activity 8: LAQ 2 x 10  Therapeutic Exercise Activity 9: Supine Knee Fallouts w/ YTB 2 x 10  Therapeutic Exercise Activity 10: Seated HS curls GTB 2 x 10 ea              Therapeutic Activity  Therapeutic Activity 1: Nustep level 5 x 15 min  Therapeutic Activity 2: Toe Taps on 6" step x 2 min  Therapeutic Activity 3: FSU on 4" step x 15  Therapeutic Activity 4: LSU on 4" step x 15  Therapeutic Activity 5: Heel Cord stretch on wedge 3 x 30 sec  Therapeutic Activity 6: Standing Hip Flex/Abd/Ext x 10                   Assessment & Plan   Assessment: Patient with good effort and participation in therapy with focus on strengthening bilateral hips and knees. Patient struggled with step ups, but able to complete requested reps without complaint. Patient with minimal progressions this date due to continued pain.  Evaluation/Treatment Tolerance: Patient tolerated treatment well    Patient will continue to " benefit from skilled outpatient physical therapy to address the deficits listed in the problem list box on initial evaluation, provide pt/family education and to maximize pt's level of independence in the home and community environment.     Patient's spiritual, cultural, and educational needs considered and patient agreeable to plan of care and goals.           Plan: Continue per POC and progress with strength/mobility exercises as patient tolerates.    Goals:   Active       LTG       Ambulate required community distances without limitation due to left knee pain (Progressing)       Expected End:  04/04/25            Restore AROM of Left knee to 5 - 115 or better for improved functional performance (Progressing)       Expected End:  04/04/25            Improve ability to ascend/descend stairs with minimal limitation due to left knee pain (Progressing)       Expected End:  04/04/25               STG       Report improvement in symptoms for progression toward LTGs (Progressing)       Start:  02/18/25    Expected End:  03/14/25            Improve weightbearing tolerance on left lower extremity for improved standing and ambulation (Progressing)       Start:  02/18/25    Expected End:  03/14/25            demonstrate compliance with initial home exercise program (Progressing)       Start:  02/18/25    Expected End:  03/14/25                Gerda Anderson, PT

## 2025-02-26 ENCOUNTER — CLINICAL SUPPORT (OUTPATIENT)
Dept: REHABILITATION | Facility: HOSPITAL | Age: 70
End: 2025-02-26
Payer: MEDICARE

## 2025-02-26 DIAGNOSIS — Z74.09 IMPAIRED FUNCTIONAL MOBILITY, BALANCE, GAIT, AND ENDURANCE: Primary | ICD-10-CM

## 2025-02-26 PROCEDURE — 97110 THERAPEUTIC EXERCISES: CPT | Mod: PN,CQ

## 2025-02-26 PROCEDURE — 97530 THERAPEUTIC ACTIVITIES: CPT | Mod: PN,CQ

## 2025-02-26 NOTE — PROGRESS NOTES
"  Outpatient Rehab    Physical Therapy Visit    Patient Name: Frankie Gaspar  MRN: [id number]  YOB: 1955  Encounter Date: 2/26/2025    Therapy Diagnosis:   Encounter Diagnosis   Name Primary?    Impaired functional mobility, balance, gait, and endurance Yes     Physician: Twila Marcelo MD    Physician Orders: Eval and Treat    Medical Diagnosis:  M25.562, M25.562, W01.0XXA      Visit # / Visits Authorized:  3 / 12   Date of Evaluation:  2/17/2025  Insurance Authorization Period: 2/17/2025 to 12/31/2025  Plan of Care Certification:  2/17/2025 to 5/17/2025        Time In:   1:35 PM  Time Out:  2:35 PM  Total Time:   60 Minutes  Total Billable Time: 53 Minutes    FOTO:  Intake Score:  %  Survey Score 1:  %  Survey Score 2:  %         Subjective   No new c/o's; knee pain continues.  Pain reported as 5/10. Bilateral knees- left>right    Objective            Treatment:  23 Minutes  Therapeutic Exercise  Therapeutic Exercise Activity 1: Heel Raises x 20  Therapeutic Exercise Activity 2: Ball Squeezes x 2 min  Therapeutic Exercise Activity 3: Bridges x 15  Therapeutic Exercise Activity 4: QS x 2 min  Therapeutic Exercise Activity 5: SLR x 10  Therapeutic Exercise Activity 6: Supine Hip Abd w/ YTB x 20  Therapeutic Exercise Activity 7: SAQ on small gray roll x 2 min  Therapeutic Exercise Activity 8: LAQ 2 x 10  Therapeutic Exercise Activity 9: Supine Knee Fallouts w/ YTB 2 x 10  Therapeutic Exercise Activity 10: Seated HS curls GTB 2 x 10 ea    30 Minutes  Therapeutic Activity  Therapeutic Activity 1: Nustep level 5 x 15 min  Therapeutic Activity 2: Toe Taps on 6" step x 2 min  Therapeutic Activity 3: FSU on 4" step x 15  Therapeutic Activity 4: LSU on 4" step x 15  Therapeutic Activity 5: Heel Cord stretch on wedge 3 x 30 sec  Therapeutic Activity 6: Standing Hip Flex/Abd/Ext x 10    Assessment & Plan   Assessment: Patient did well with treatment. No exacerbations; general LE weakness noted.       Patient will " continue to benefit from skilled outpatient physical therapy to address the deficits listed in the problem list box on initial evaluation, provide pt/family education and to maximize pt's level of independence in the home and community environment.     Patient's spiritual, cultural, and educational needs considered and patient agreeable to plan of care and goals.           Plan: Continue per POC and progress with strength/mobility exercises as patient tolerates.    Goals:   Active       LTG       Ambulate required community distances without limitation due to left knee pain (Progressing)       Expected End:  04/04/25            Restore AROM of Left knee to 5 - 115 or better for improved functional performance (Progressing)       Expected End:  04/04/25            Improve ability to ascend/descend stairs with minimal limitation due to left knee pain (Progressing)       Expected End:  04/04/25               STG       Report improvement in symptoms for progression toward LTGs (Progressing)       Start:  02/18/25    Expected End:  03/14/25            Improve weightbearing tolerance on left lower extremity for improved standing and ambulation (Progressing)       Start:  02/18/25    Expected End:  03/14/25            demonstrate compliance with initial home exercise program (Progressing)       Start:  02/18/25    Expected End:  03/14/25                Jonathan Favre, PTA

## 2025-02-28 ENCOUNTER — OFFICE VISIT (OUTPATIENT)
Dept: FAMILY MEDICINE | Facility: CLINIC | Age: 70
End: 2025-02-28
Payer: MEDICARE

## 2025-02-28 VITALS
SYSTOLIC BLOOD PRESSURE: 150 MMHG | HEART RATE: 58 BPM | WEIGHT: 200.5 LBS | DIASTOLIC BLOOD PRESSURE: 90 MMHG | HEIGHT: 69 IN | BODY MASS INDEX: 29.7 KG/M2 | RESPIRATION RATE: 18 BRPM | OXYGEN SATURATION: 98 %

## 2025-02-28 DIAGNOSIS — I10 ESSENTIAL HYPERTENSION: Primary | ICD-10-CM

## 2025-02-28 DIAGNOSIS — T78.40XD ALLERGY, SUBSEQUENT ENCOUNTER: ICD-10-CM

## 2025-02-28 PROCEDURE — 99999 PR PBB SHADOW E&M-EST. PATIENT-LVL IV: CPT | Mod: PBBFAC,,, | Performed by: FAMILY MEDICINE

## 2025-02-28 NOTE — PROGRESS NOTES
Subjective:       Patient ID: Elliott Gaspar is a 69 y.o. male.    Chief Complaint: Follow-up    History of Present Illness    CHIEF COMPLAINT:  Mr. Gaspar presents today with persistent cough and respiratory symptoms following flu illness    FLU-LIKE SYMPTOMS:  He reports flu-like symptoms starting three weeks ago, post-Giulia. He experiences morning cough with sputum production, initially producing heavy yellow sputum which has now transitioned to thick milky white sputum. He is currently taking doxycycline with three weeks of treatment remaining. Chest XRs were normal.    SLEEP:  He reports difficulty maintaining sleep with prolonged periods of wakefulness during the night. He has altered his sleep schedule, now going to bed at 9 or 10 PM despite typically being a night owl.    MUSCULOSKELETAL:  He reports severe knee, hip, and back pain that significantly limits physical activity. He is currently attending physical therapy for knee issues but experiences significant fatigue by the end of each hour-long session due to associated hip movements during exercises.    ALLERGIES:  Previous allergy testing revealed oak allergy. Prior treatment with combination of Flonase and ASTN caused severe headaches, leading to discontinuation. He is not currently taking any allergy medications.    SOCIAL HISTORY:  He has past smoking history of 2-3 years from approximately 50 years ago. He denies current exposure to secondhand smoke.      ROS:  General: +fatigue  Respiratory: +cough, +sputum production  Musculoskeletal: +joint pain  Neurological: +headache  Psychiatric: +sleep difficulty         Review of Systems    Problem List[1]  Elliott has a current medication list which includes the following prescription(s): albuterol, b complex vitamins, colchicine, doxycycline, glucosamine-chondroitin, nebivolol, nystatin-triamcinolone, potassium chloride sa, prednisone, promethazine-dextromethorphan, tadalafil, azelastine,  "famotidine, fluticasone propionate, hydralazine, hydrochlorothiazide, rezdiffra, tamsulosin, and tramadol.        Health Maintenance Due   Topic Date Due    Shingles Vaccine (1 of 2) Never done    RSV Vaccine (Age 60+ and Pregnant patients) (1 - Risk 60-74 years 1-dose series) Never done    Influenza Vaccine (1) 09/01/2024    COVID-19 Vaccine (3 - 2024-25 season) 09/01/2024    Colorectal Cancer Screening  04/30/2025      Health Maintenance reviewed  Objective:      Vitals:    02/28/25 1127   BP: (!) 150/90   Pulse: (!) 58   Resp: 18   SpO2: 98%   Weight: 90.9 kg (200 lb 8 oz)   Height: 5' 9" (1.753 m)   PainSc: 0-No pain     Body mass index is 29.61 kg/m².  Physical Exam  Constitutional:       General: He is not in acute distress.     Appearance: Normal appearance. He is not ill-appearing.   Pulmonary:      Effort: Pulmonary effort is normal. No respiratory distress.   Neurological:      Mental Status: He is alert.   Psychiatric:         Mood and Affect: Mood normal.         Behavior: Behavior normal.         Thought Content: Thought content normal.         Judgment: Judgment normal.             Assessment:       Assessment & Plan    1. Assessed patient's prolonged respiratory symptoms following flu 3 weeks ago  2. Reviewed chest XRs, which appeared normal  3. Suspected allergies as primary cause of ongoing symptoms rather than infectious etiology  4. Considered thyroid function and other lab work, as not checked recently  5. Noted previous BNP ordered by Dr. Baltazar           Plan:       1. Essential hypertension  -     CBC Auto Differential; Future; Expected date: 02/28/2025  -     Comprehensive Metabolic Panel; Future; Expected date: 02/28/2025    2. Allergy, subsequent encounter  Comments:  Encouraged to follow with allergist         This note was generated with the assistance of ambient listening technology. Verbal consent was obtained by the patient and accompanying visitor(s) for the recording of patient " appointment to facilitate this note. I attest to having reviewed and edited the generated note for accuracy, though some syntax or spelling errors may persist. Please contact the author of this note for any clarification.         [1]   Patient Active Problem List  Diagnosis    BPH (benign prostatic hypertrophy)    Penile curvature, acquired    History of thrombocytopenia    Essential hypertension    Deviated septum    Peyronie's disease    Gouty arthritis    Ganglion cyst    Left retinal detachment    Left cataract    Hiatal hernia    History of Helicobacter pylori infection    Dupuytren's contracture of both hands    Leg edema    Stiffness of finger joint of left hand    Colon cancer screening    Lumbar radiculopathy    Ingrown nail    Idiopathic peripheral neuropathy    Thrombocytopenia    Venous stasis dermatitis of both lower extremities    Nonrheumatic mitral valve regurgitation    Metabolic dysfunction-associated steatohepatitis (MASH)    Elevated liver enzymes    Hypertriglyceridemia    Third nerve palsy of left eye    Esotropia of left eye    Liver fibrosis    Class 1 obesity with body mass index (BMI) of 30.0 to 30.9 in adult    Right knee injury, initial encounter    Bilateral chronic knee pain    Fall    Bilateral hand pain    Musculoskeletal pain    Impaired functional mobility, balance, gait, and endurance

## 2025-03-03 ENCOUNTER — LAB VISIT (OUTPATIENT)
Dept: LAB | Facility: HOSPITAL | Age: 70
End: 2025-03-03
Attending: FAMILY MEDICINE
Payer: MEDICARE

## 2025-03-03 DIAGNOSIS — I10 ESSENTIAL HYPERTENSION: ICD-10-CM

## 2025-03-03 LAB
ALBUMIN SERPL BCP-MCNC: 3.4 G/DL (ref 3.5–5.2)
ALP SERPL-CCNC: 125 U/L (ref 40–150)
ALT SERPL W/O P-5'-P-CCNC: 129 U/L (ref 10–44)
ANION GAP SERPL CALC-SCNC: 15 MMOL/L (ref 8–16)
AST SERPL-CCNC: 84 U/L (ref 10–40)
BASOPHILS # BLD AUTO: 0.02 K/UL (ref 0–0.2)
BASOPHILS NFR BLD: 0.4 % (ref 0–1.9)
BILIRUB SERPL-MCNC: 1 MG/DL (ref 0.1–1)
BUN SERPL-MCNC: 16 MG/DL (ref 8–23)
CALCIUM SERPL-MCNC: 8.6 MG/DL (ref 8.7–10.5)
CHLORIDE SERPL-SCNC: 104 MMOL/L (ref 95–110)
CO2 SERPL-SCNC: 22 MMOL/L (ref 23–29)
CREAT SERPL-MCNC: 1.1 MG/DL (ref 0.5–1.4)
DIFFERENTIAL METHOD BLD: ABNORMAL
EOSINOPHIL # BLD AUTO: 0.2 K/UL (ref 0–0.5)
EOSINOPHIL NFR BLD: 3.2 % (ref 0–8)
ERYTHROCYTE [DISTWIDTH] IN BLOOD BY AUTOMATED COUNT: 13.3 % (ref 11.5–14.5)
EST. GFR  (NO RACE VARIABLE): >60 ML/MIN/1.73 M^2
GLUCOSE SERPL-MCNC: 225 MG/DL (ref 70–110)
HCT VFR BLD AUTO: 43.8 % (ref 40–54)
HGB BLD-MCNC: 14.6 G/DL (ref 14–18)
IMM GRANULOCYTES # BLD AUTO: 0.04 K/UL (ref 0–0.04)
IMM GRANULOCYTES NFR BLD AUTO: 0.9 % (ref 0–0.5)
LYMPHOCYTES # BLD AUTO: 1.4 K/UL (ref 1–4.8)
LYMPHOCYTES NFR BLD: 30.8 % (ref 18–48)
MCH RBC QN AUTO: 29.7 PG (ref 27–31)
MCHC RBC AUTO-ENTMCNC: 33.3 G/DL (ref 32–36)
MCV RBC AUTO: 89 FL (ref 82–98)
MONOCYTES # BLD AUTO: 0.4 K/UL (ref 0.3–1)
MONOCYTES NFR BLD: 8.4 % (ref 4–15)
NEUTROPHILS # BLD AUTO: 2.6 K/UL (ref 1.8–7.7)
NEUTROPHILS NFR BLD: 56.3 % (ref 38–73)
NRBC BLD-RTO: 0 /100 WBC
PLATELET # BLD AUTO: 125 K/UL (ref 150–450)
PMV BLD AUTO: 10.6 FL (ref 9.2–12.9)
POTASSIUM SERPL-SCNC: 3.5 MMOL/L (ref 3.5–5.1)
PROT SERPL-MCNC: 5.9 G/DL (ref 6–8.4)
RBC # BLD AUTO: 4.91 M/UL (ref 4.6–6.2)
SODIUM SERPL-SCNC: 141 MMOL/L (ref 136–145)
WBC # BLD AUTO: 4.67 K/UL (ref 3.9–12.7)

## 2025-03-03 PROCEDURE — 36415 COLL VENOUS BLD VENIPUNCTURE: CPT | Performed by: FAMILY MEDICINE

## 2025-03-03 PROCEDURE — 80053 COMPREHEN METABOLIC PANEL: CPT | Performed by: FAMILY MEDICINE

## 2025-03-03 PROCEDURE — 85025 COMPLETE CBC W/AUTO DIFF WBC: CPT | Performed by: FAMILY MEDICINE

## 2025-03-05 ENCOUNTER — TELEPHONE (OUTPATIENT)
Dept: FAMILY MEDICINE | Facility: CLINIC | Age: 70
End: 2025-03-05
Payer: MEDICARE

## 2025-03-05 ENCOUNTER — CLINICAL SUPPORT (OUTPATIENT)
Dept: REHABILITATION | Facility: HOSPITAL | Age: 70
End: 2025-03-05
Payer: MEDICARE

## 2025-03-05 DIAGNOSIS — Z74.09 IMPAIRED FUNCTIONAL MOBILITY, BALANCE, GAIT, AND ENDURANCE: Primary | ICD-10-CM

## 2025-03-05 PROCEDURE — 97110 THERAPEUTIC EXERCISES: CPT | Mod: PN

## 2025-03-05 PROCEDURE — 97530 THERAPEUTIC ACTIVITIES: CPT | Mod: PN

## 2025-03-05 NOTE — PROGRESS NOTES
"  Outpatient Rehab    Physical Therapy Visit    Patient Name: Frankie Gaspar  MRN: [id number]  YOB: 1955  Encounter Date: 3/5/2025    Therapy Diagnosis:   Encounter Diagnosis   Name Primary?    Impaired functional mobility, balance, gait, and endurance Yes     Physician: Twila Marcelo MD    Physician Orders: Eval and Treat   Medical Diagnosis:  M25.562, M25.562, W01.0XXA      Visit # / Visits Authorized:  4 / 12   Date of Evaluation:  2/17/2025  Insurance Authorization Period: 2/17/2025 to 12/31/2025  Plan of Care Certification:  2/17/2025 to 5/17/2025      Time In: 1530   Time Out: 1630  Total Time: 60   Total Billable Time: 60    FOTO:  Intake Score:  %  Survey Score 1:  %  Survey Score 2:  %         Subjective   Got a steroid shot in both knees this past Monday. It seems to be helping. His back is also hurting him but says we'll deal with that later..  Pain reported as 5/10.      Objective            Treatment:  Therapeutic Exercise  Therapeutic Exercise Activity 1: Heel Raises x 20  Therapeutic Exercise Activity 2: Ball Squeezes x 2 min  Therapeutic Exercise Activity 3: Bridges x 15  Therapeutic Exercise Activity 4: Quad sets x 20 ea leg  Therapeutic Exercise Activity 5: SLR x 2 x 10 ea w/ 2#  Therapeutic Exercise Activity 6: Supine Hip Abd w/ Red x 20  Therapeutic Exercise Activity 7: SAQ on small gray roll x 2 min w/ 2#  Therapeutic Exercise Activity 8: LAQ x 25 w/ 2#  Therapeutic Exercise Activity 9: Standing TKE w/ band (green) x 20 ea  Therapeutic Exercise Activity 10: Seated HS curls GTB 2 x 10 ea    Therapeutic Activity  Therapeutic Activity 1: Nustep level 5 x 10 min  Therapeutic Activity 2: Toe Taps on 6" step x 2 min  Therapeutic Activity 3: FSU on 4" step x 20 each  Therapeutic Activity 4: LSU on 4" step x 20 each  Therapeutic Activity 5: Heel Cord stretch on wedge 3 x 30 sec  Therapeutic Activity 6: Standing Hip Flex/Abd/Ext x 15  Therapeutic Activity 7: Seated hamstring stretch 3 x 15 " sec each    Assessment & Plan   Assessment: Patient did well with treatment. No exacerbations; general LE weakness noted. Educated pt about condition/treatment. Advancing as tolerated.       Patient will continue to benefit from skilled outpatient physical therapy to address the deficits listed in the problem list box on initial evaluation, provide pt/family education and to maximize pt's level of independence in the home and community environment.     Patient's spiritual, cultural, and educational needs considered and patient agreeable to plan of care and goals.           Plan: Continue per POC and progress with strength/mobility exercises as patient tolerates.    Goals:   Active       LTG       Ambulate required community distances without limitation due to left knee pain (Progressing)       Expected End:  04/04/25            Restore AROM of Left knee to 5 - 115 or better for improved functional performance (Progressing)       Expected End:  04/04/25            Improve ability to ascend/descend stairs with minimal limitation due to left knee pain (Progressing)       Expected End:  04/04/25               STG       Report improvement in symptoms for progression toward LTGs (Progressing)       Start:  02/18/25    Expected End:  03/14/25            Improve weightbearing tolerance on left lower extremity for improved standing and ambulation (Progressing)       Start:  02/18/25    Expected End:  03/14/25            demonstrate compliance with initial home exercise program (Progressing)       Start:  02/18/25    Expected End:  03/14/25                Tresa Alvarado, PT

## 2025-03-07 ENCOUNTER — LAB VISIT (OUTPATIENT)
Dept: LAB | Facility: HOSPITAL | Age: 70
End: 2025-03-07
Attending: FAMILY MEDICINE
Payer: MEDICARE

## 2025-03-07 ENCOUNTER — RESULTS FOLLOW-UP (OUTPATIENT)
Dept: FAMILY MEDICINE | Facility: CLINIC | Age: 70
End: 2025-03-07

## 2025-03-07 DIAGNOSIS — R73.9 HYPERGLYCEMIA: Primary | ICD-10-CM

## 2025-03-07 DIAGNOSIS — R73.9 HYPERGLYCEMIA: ICD-10-CM

## 2025-03-07 LAB
ESTIMATED AVG GLUCOSE: 117 MG/DL (ref 68–131)
HBA1C MFR BLD: 5.7 % (ref 4–5.6)

## 2025-03-07 PROCEDURE — 36415 COLL VENOUS BLD VENIPUNCTURE: CPT | Performed by: FAMILY MEDICINE

## 2025-03-07 PROCEDURE — 83036 HEMOGLOBIN GLYCOSYLATED A1C: CPT | Performed by: FAMILY MEDICINE

## 2025-03-08 ENCOUNTER — RESULTS FOLLOW-UP (OUTPATIENT)
Dept: FAMILY MEDICINE | Facility: CLINIC | Age: 70
End: 2025-03-08

## 2025-03-08 DIAGNOSIS — R73.03 PREDIABETES: Primary | ICD-10-CM

## 2025-03-10 ENCOUNTER — PATIENT MESSAGE (OUTPATIENT)
Dept: OPTOMETRY | Facility: CLINIC | Age: 70
End: 2025-03-10
Payer: MEDICARE

## 2025-03-10 ENCOUNTER — CLINICAL SUPPORT (OUTPATIENT)
Dept: REHABILITATION | Facility: HOSPITAL | Age: 70
End: 2025-03-10
Payer: MEDICARE

## 2025-03-10 DIAGNOSIS — Z74.09 IMPAIRED FUNCTIONAL MOBILITY, BALANCE, GAIT, AND ENDURANCE: Primary | ICD-10-CM

## 2025-03-10 PROCEDURE — 97110 THERAPEUTIC EXERCISES: CPT | Mod: PN,CQ

## 2025-03-10 PROCEDURE — 97530 THERAPEUTIC ACTIVITIES: CPT | Mod: PN,CQ

## 2025-03-10 NOTE — PROGRESS NOTES
"  Outpatient Rehab    Physical Therapy Visit    Patient Name: Frankie Gaspar  MRN: [id number]  YOB: 1955  Encounter Date: 3/10/2025    Therapy Diagnosis:   Encounter Diagnosis   Name Primary?    Impaired functional mobility, balance, gait, and endurance Yes     Physician: Twila Marcelo MD    Physician Orders: Eval and Treat     Medical Diagnosis:  M25.562, M25.562, W01.0XXA      Visit # / Visits Authorized:  5 / 12   Date of Evaluation:  2/17/2025  Insurance Authorization Period: 2/17/2025 to 12/31/2025  Plan of Care Certification:  2/17/2025 to 5/17/2025        Time In:   12:30 PM  Time Out:  1:30 PM  Total Time:   60 Minutes  Total Billable Time: 53 Minutes    FOTO:  Intake Score:  %  Survey Score 1:  %  Survey Score 2:  %         Subjective   My knees are hurting today.  Pain reported as 5/10. Bilateral knees- left>right    Objective            Treatment:  23 Minutes  Therapeutic Exercise  Therapeutic Exercise Activity 1: Heel Raises x 20  Therapeutic Exercise Activity 2: Ball Squeezes x 2 min  Therapeutic Exercise Activity 3: Bridges x 15  Therapeutic Exercise Activity 4: Quad sets x 20 ea leg  Therapeutic Exercise Activity 5: SLR x 2 x 10 ea w/ 2#  Therapeutic Exercise Activity 6: Supine Hip Abd w/ Red x 20  Therapeutic Exercise Activity 7: SAQ on small gray roll x 2 min w/ 2#  Therapeutic Exercise Activity 8: LAQ x 25 w/ 2#  Therapeutic Exercise Activity 9: Standing TKE w/ band (green) x 20 ea  Therapeutic Exercise Activity 10: Seated HS curls GTB 2 x 10 ea    30 Minutes  Therapeutic Activity  Therapeutic Activity 1: Nustep level 3 x 15 min  Therapeutic Activity 2: Toe Taps on 6" step x 2 min  Therapeutic Activity 3: FSU on 4" step x 20 each  Therapeutic Activity 4: LSU on 4" step x 20 each  Therapeutic Activity 5: Heel Cord stretch on wedge 3 x 30 sec  Therapeutic Activity 6: Standing Hip Flex/Abd/Ext x 15  Therapeutic Activity 7: Seated hamstring stretch 3 x 15 sec each    Assessment & Plan "   Assessment: Patient did well with treatment. No exacerbations; general LE weakness noted.       Patient will continue to benefit from skilled outpatient physical therapy to address the deficits listed in the problem list box on initial evaluation, provide pt/family education and to maximize pt's level of independence in the home and community environment.     Patient's spiritual, cultural, and educational needs considered and patient agreeable to plan of care and goals.           Plan: Continue per POC and progress with strength/mobility exercises as patient tolerates.    Goals:   Active       LTG       Ambulate required community distances without limitation due to left knee pain (Progressing)       Expected End:  04/04/25            Restore AROM of Left knee to 5 - 115 or better for improved functional performance (Progressing)       Expected End:  04/04/25            Improve ability to ascend/descend stairs with minimal limitation due to left knee pain (Progressing)       Expected End:  04/04/25               STG       Report improvement in symptoms for progression toward LTGs (Progressing)       Start:  02/18/25    Expected End:  03/14/25            Improve weightbearing tolerance on left lower extremity for improved standing and ambulation (Progressing)       Start:  02/18/25    Expected End:  03/14/25            demonstrate compliance with initial home exercise program (Progressing)       Start:  02/18/25    Expected End:  03/14/25                Jonathan Favre, PTA

## 2025-03-12 ENCOUNTER — CLINICAL SUPPORT (OUTPATIENT)
Dept: REHABILITATION | Facility: HOSPITAL | Age: 70
End: 2025-03-12
Payer: MEDICARE

## 2025-03-12 DIAGNOSIS — Z74.09 IMPAIRED FUNCTIONAL MOBILITY, BALANCE, GAIT, AND ENDURANCE: Primary | ICD-10-CM

## 2025-03-12 PROCEDURE — 97530 THERAPEUTIC ACTIVITIES: CPT | Mod: PN,CQ

## 2025-03-12 PROCEDURE — 97110 THERAPEUTIC EXERCISES: CPT | Mod: PN,CQ

## 2025-03-12 NOTE — PROGRESS NOTES
"  Outpatient Rehab    Physical Therapy Visit    Patient Name: Frankie Gaspar  MRN: [id number]  YOB: 1955  Encounter Date: 3/12/2025    Therapy Diagnosis:   Encounter Diagnosis   Name Primary?    Impaired functional mobility, balance, gait, and endurance Yes     Physician: Twila Marcelo MD    Physician Orders: Eval and Treat    Medical Diagnosis:  M25.562, M25.562, W01.0XXA      Visit # / Visits Authorized:  6 / 12   Date of Evaluation:  2/17/2025  Insurance Authorization Period: 2/17/2025 to 12/31/2025  Plan of Care Certification:  2/17/2025 to 5/17/2025        Time In:   1:00 PM  Time Out:  2:00 PM  Total Time:   60 Minutes  Total Billable Time: 53 Minutes    FOTO:  Intake Score:  %  Survey Score 1:  %  Survey Score 2:  %         Subjective   My knees are hurting, but my low back is the issue today..  Pain reported as 5/10. Bilateral knees- left>right/ Low Back    Objective            Treatment:  23 Minutes  Therapeutic Exercise  Therapeutic Exercise Activity 1: Heel Raises x 20  Therapeutic Exercise Activity 2: Ball Squeezes x 2 min  Therapeutic Exercise Activity 3: Bridges x 15  Therapeutic Exercise Activity 4: Quad sets x 20 ea leg  Therapeutic Exercise Activity 5: SLR x 2 x 10 ea w/ 2#  Therapeutic Exercise Activity 6: Supine Hip Abd w/ Red x 20  Therapeutic Exercise Activity 7: SAQ on small gray roll x 2 min w/ 2#  Therapeutic Exercise Activity 8: LAQ x 25 w/ 2#  Therapeutic Exercise Activity 9: Standing TKE w/ band (green) x 20 ea  Therapeutic Exercise Activity 10: Seated HS curls GTB 2 x 10 ea    30 Minutes  Therapeutic Activity  Therapeutic Activity 1: Nustep level 4 x 15 min  Therapeutic Activity 2: Toe Taps on 6" step x 2 min  Therapeutic Activity 3: FSU on 4" step x 20 each  Therapeutic Activity 4: LSU on 4" step x 20 each  Therapeutic Activity 5: Heel Cord stretch on wedge 3 x 30 sec  Therapeutic Activity 6: Standing Hip Flex/Abd/Ext x 15  Therapeutic Activity 7: Seated hamstring stretch 3 " x 15 sec each    Assessment & Plan   Assessment: Patient did well with treatment. No exacerbations; general LE weakness noted.       Patient will continue to benefit from skilled outpatient physical therapy to address the deficits listed in the problem list box on initial evaluation, provide pt/family education and to maximize pt's level of independence in the home and community environment.     Patient's spiritual, cultural, and educational needs considered and patient agreeable to plan of care and goals.           Plan: Continue per POC and progress with strength/mobility exercises as patient tolerates.    Goals:   Active       LTG       Ambulate required community distances without limitation due to left knee pain (Progressing)       Expected End:  04/04/25            Restore AROM of Left knee to 5 - 115 or better for improved functional performance (Progressing)       Expected End:  04/04/25            Improve ability to ascend/descend stairs with minimal limitation due to left knee pain (Progressing)       Expected End:  04/04/25               STG       Report improvement in symptoms for progression toward LTGs (Progressing)       Start:  02/18/25    Expected End:  03/14/25            Improve weightbearing tolerance on left lower extremity for improved standing and ambulation (Progressing)       Start:  02/18/25    Expected End:  03/14/25            demonstrate compliance with initial home exercise program (Progressing)       Start:  02/18/25    Expected End:  03/14/25                Jonathan Favre, PTA

## 2025-03-20 ENCOUNTER — CLINICAL SUPPORT (OUTPATIENT)
Dept: REHABILITATION | Facility: HOSPITAL | Age: 70
End: 2025-03-20
Payer: MEDICARE

## 2025-03-20 DIAGNOSIS — Z74.09 IMPAIRED FUNCTIONAL MOBILITY, BALANCE, GAIT, AND ENDURANCE: Primary | ICD-10-CM

## 2025-03-20 PROCEDURE — 97110 THERAPEUTIC EXERCISES: CPT | Mod: PN,CQ

## 2025-03-20 PROCEDURE — 97530 THERAPEUTIC ACTIVITIES: CPT | Mod: PN,CQ

## 2025-03-20 NOTE — PROGRESS NOTES
"  Outpatient Rehab    Physical Therapy Visit    Patient Name: Frankie Gaspar  MRN: [id number]  YOB: 1955  Encounter Date: 3/20/2025    Therapy Diagnosis:   Encounter Diagnosis   Name Primary?    Impaired functional mobility, balance, gait, and endurance Yes     Physician: Twila Marcelo MD    Physician Orders: Eval and Treat  Medical Diagnosis: Acute pain of both knees  Cervical pain  Acute bilateral low back pain without sciatica    Visit # / Visits Authorized:  7 / 10  Date of Evaluation: 2/17/2025  Insurance Authorization Period: 2/18/2025 to 12/31/2025  Plan of Care Certification:  2/17/2025 to 5/17/2025      PT/PTA:     Number of PTA visits since last PT visit:  3  Time In:   9:00 AM  Time Out:  10:00 AM  Total Time:   60 Minutes  Total Billable Time: 53 Minutes    FOTO:  Intake Score:  %  Survey Score 1:  %  Survey Score 2:  %         Subjective   I am passable today.  Pain reported as 6/10. Bilateral knees- left>right/ Low Back    Objective            Treatment:  Therapeutic Exercise  TE 1: Heel Raises x 20  TE 2: Ball Squeezes x 2 min  TE 3: Bridges x 15  TE 4: Quad sets x 2 min  TE 5: SLR x 2 x 10 ea w/ 2#  TE 6: Supine Hip Abd w/ Red x 20  TE 7: SAQ on small gray roll x 2 min w/ 2#  TE 8: LAQ x 25 w/ 2#  TE 9: Standing TKE w/ band (green) x 20 ea  TE 10: Seated HS curls GTB 2 x 10 ea  Therapeutic Activity  TA 1: Nustep level 4 x 15 min  TA 2: Toe Taps on 6" step x 2 min  TA 3: FSU on 4" step x 20 each  TA 4: LSU on 4" step x 20 each  TA 5: Heel Cord stretch on wedge 3 x 30 sec  TA 6: Standing Hip Flex/Abd/Ext x 15  TA 7: Seated hamstring stretch 3 x 15 sec each    Time Entry(in minutes):  Therapeutic Activity Time Entry: 30  Therapeutic Exercise Time Entry: 23    Assessment & Plan   Assessment: Patient did well with treatment. No exacerbations; general LE weakness noted.       Patient will continue to benefit from skilled outpatient physical therapy to address the deficits listed in the " problem list box on initial evaluation, provide pt/family education and to maximize pt's level of independence in the home and community environment.     Patient's spiritual, cultural, and educational needs considered and patient agreeable to plan of care and goals.           Plan: Continue per POC and progress with strength/mobility exercises as patient tolerates.    Goals:   Active       LTG       Ambulate required community distances without limitation due to left knee pain (Progressing)       Expected End:  04/04/25            Restore AROM of Left knee to 5 - 115 or better for improved functional performance (Progressing)       Expected End:  04/04/25            Improve ability to ascend/descend stairs with minimal limitation due to left knee pain (Progressing)       Expected End:  04/04/25               STG       Report improvement in symptoms for progression toward LTGs (Progressing)       Start:  02/18/25    Expected End:  03/14/25            Improve weightbearing tolerance on left lower extremity for improved standing and ambulation (Progressing)       Start:  02/18/25    Expected End:  03/14/25            demonstrate compliance with initial home exercise program (Progressing)       Start:  02/18/25    Expected End:  03/14/25                Jonathan Favre, PTA

## 2025-03-21 ENCOUNTER — CLINICAL SUPPORT (OUTPATIENT)
Dept: REHABILITATION | Facility: HOSPITAL | Age: 70
End: 2025-03-21
Payer: MEDICARE

## 2025-03-21 DIAGNOSIS — Z74.09 IMPAIRED FUNCTIONAL MOBILITY, BALANCE, GAIT, AND ENDURANCE: Primary | ICD-10-CM

## 2025-03-21 PROCEDURE — 97110 THERAPEUTIC EXERCISES: CPT | Mod: PN,CQ

## 2025-03-21 PROCEDURE — 97530 THERAPEUTIC ACTIVITIES: CPT | Mod: PN,CQ

## 2025-03-21 NOTE — PROGRESS NOTES
"  Outpatient Rehab    Physical Therapy Visit    Patient Name: Frankie Gaspar  MRN: [id number]  YOB: 1955  Encounter Date: 3/21/2025    Therapy Diagnosis:   Encounter Diagnosis   Name Primary?    Impaired functional mobility, balance, gait, and endurance Yes     Physician: Twila Marcelo MD    Physician Orders: Eval and Treat  Medical Diagnosis: Acute pain of both knees  Cervical pain  Acute bilateral low back pain without sciatica    Visit # / Visits Authorized:  8 / 10  Date of Evaluation: 2/17/2025  Insurance Authorization Period: 2/18/2025 to 12/31/2025  Plan of Care Certification:  2/17/2025 to 5/17/2025      PT/PTA:     Number of PTA visits since last PT visit: 4  Time In:   1:35 PM  Time Out:  2:35 PM  Total Time:   60 Minutes  Total Billable Time: 53 Minutes    FOTO:  Intake Score:  %  Survey Score 1:  %  Survey Score 2:  %         Subjective   Knees hurting today.  Pain reported as 7/10. Bilateral knees- left>right/ Low Back    Objective            Treatment:  Therapeutic Exercise  TE 1: Heel Raises x 20  TE 2: Ball Squeezes x 2 min  TE 3: Bridges x 15  TE 4: Quad sets x 2 min  TE 5: SLR x 2 x 10 ea w/ 2#  TE 6: Supine Hip Abd w/ Red x 20  TE 7: SAQ on small gray roll x 2 min w/ 2#  TE 8: LAQ x 25 w/ 2#  TE 9: Standing TKE w/ band (green) x 20 ea  TE 10: Seated HS curls GTB 2 x 10 ea  Therapeutic Activity  TA 1: Nustep level 4 x 15 min  TA 2: Toe Taps on 6" step x 2 min  TA 3: FSU on 4" step x 20 each  TA 4: LSU on 4" step x 20 each  TA 5: Heel Cord stretch on wedge 3 x 30 sec  TA 6: Standing Hip Flex/Abd/Ext x 15  TA 7: Seated hamstring stretch 3 x 15 sec each    Time Entry(in minutes):       Assessment & Plan   Assessment: Patient did well with treatment. No exacerbations; general LE weakness noted.       Patient will continue to benefit from skilled outpatient physical therapy to address the deficits listed in the problem list box on initial evaluation, provide pt/family education and to " maximize pt's level of independence in the home and community environment.     Patient's spiritual, cultural, and educational needs considered and patient agreeable to plan of care and goals.           Plan: Continue per POC and progress with strength/mobility exercises as patient tolerates.    Goals:   Active       LTG       Ambulate required community distances without limitation due to left knee pain (Progressing)       Expected End:  04/04/25            Restore AROM of Left knee to 5 - 115 or better for improved functional performance (Progressing)       Expected End:  04/04/25            Improve ability to ascend/descend stairs with minimal limitation due to left knee pain (Progressing)       Expected End:  04/04/25               STG       Report improvement in symptoms for progression toward LTGs (Progressing)       Start:  02/18/25    Expected End:  03/14/25            Improve weightbearing tolerance on left lower extremity for improved standing and ambulation (Progressing)       Start:  02/18/25    Expected End:  03/14/25            demonstrate compliance with initial home exercise program (Progressing)       Start:  02/18/25    Expected End:  03/14/25                Jonathan Favre, PTA

## 2025-03-24 ENCOUNTER — CLINICAL SUPPORT (OUTPATIENT)
Dept: REHABILITATION | Facility: HOSPITAL | Age: 70
End: 2025-03-24
Payer: MEDICARE

## 2025-03-24 DIAGNOSIS — Z74.09 IMPAIRED FUNCTIONAL MOBILITY, BALANCE, GAIT, AND ENDURANCE: Primary | ICD-10-CM

## 2025-03-24 PROCEDURE — 97530 THERAPEUTIC ACTIVITIES: CPT | Mod: PN,CQ

## 2025-03-24 PROCEDURE — 97110 THERAPEUTIC EXERCISES: CPT | Mod: PN,CQ

## 2025-03-24 NOTE — PROGRESS NOTES
"  Outpatient Rehab    Physical Therapy Visit    Patient Name: Frankie Gaspar  MRN: [id number]  YOB: 1955  Encounter Date: 3/24/2025    Therapy Diagnosis:   Encounter Diagnosis   Name Primary?    Impaired functional mobility, balance, gait, and endurance Yes     Physician: Twila Marcelo MD    Physician Orders: Eval and Treat  Medical Diagnosis: Acute pain of both knees  Cervical pain  Acute bilateral low back pain without sciatica    Visit # / Visits Authorized:  9 / 10  Insurance Authorization Period: 2/18/2025 to 12/31/2025  Date of Evaluation: 2/17/2025  Plan of Care Certification: 2/17/2025 to 5/17/2025     PT/PTA:     Number of PTA visits since last PT visit: 5  Time In:   12:35 PM  Time Out:  1:35 PM  Total Time:   60 Minutes  Total Billable Time:  53 Minutes    FOTO:  Intake Score:  %  Survey Score 1:  %  Survey Score 2:  %         Subjective   A little sore today.  Pain reported as 7/10. Bilateral knees- left>right/ Low Back    Objective            Treatment:  Therapeutic Exercise  TE 1: Heel Raises x 20  TE 2: Ball Squeezes x 2 min  TE 3: Bridges x 15  TE 4: Quad sets x 2 min  TE 5: SLR x 2 x 10 ea w/ 2#  TE 6: Supine Hip Abd w/ Red x 20  TE 7: SAQ on small gray roll x 2 min w/ 2#  TE 8: LAQ x 25 w/ 2#  TE 9: Standing TKE w/ band (green) x 20 ea  TE 10: Seated HS curls GTB 2 x 10 ea  Therapeutic Activity  TA 1: Nustep level 4 x 15 min  TA 2: Toe Taps on 6" step x 2 min  TA 3: FSU on 6" step x 20 each  TA 4: LSU on 6" step x 20 each  TA 5: Heel Cord stretch on wedge 3 x 30 sec  TA 6: Standing Hip Flex/Abd/Ext x 15  TA 7: Seated hamstring stretch 3 x 15 sec each    Time Entry(in minutes):  Therapeutic Activity Time Entry: 30  Therapeutic Exercise Time Entry: 23    Assessment & Plan   Assessment: Patient did well with treatment. No exacerbations; general LE weakness noted.       Patient will continue to benefit from skilled outpatient physical therapy to address the deficits listed in the problem " list box on initial evaluation, provide pt/family education and to maximize pt's level of independence in the home and community environment.     Patient's spiritual, cultural, and educational needs considered and patient agreeable to plan of care and goals.           Plan: Continue per POC and progress with strength/mobility exercises as patient tolerates.    Goals:   Active       LTG       Ambulate required community distances without limitation due to left knee pain (Progressing)       Expected End:  04/04/25            Restore AROM of Left knee to 5 - 115 or better for improved functional performance (Progressing)       Expected End:  04/04/25            Improve ability to ascend/descend stairs with minimal limitation due to left knee pain (Progressing)       Expected End:  04/04/25               STG       Report improvement in symptoms for progression toward LTGs (Progressing)       Start:  02/18/25    Expected End:  03/14/25            Improve weightbearing tolerance on left lower extremity for improved standing and ambulation (Progressing)       Start:  02/18/25    Expected End:  03/14/25            demonstrate compliance with initial home exercise program (Progressing)       Start:  02/18/25    Expected End:  03/14/25                Jonathan Favre, PTA

## 2025-03-26 ENCOUNTER — CLINICAL SUPPORT (OUTPATIENT)
Dept: REHABILITATION | Facility: HOSPITAL | Age: 70
End: 2025-03-26
Payer: MEDICARE

## 2025-03-26 DIAGNOSIS — Z74.09 IMPAIRED FUNCTIONAL MOBILITY, BALANCE, GAIT, AND ENDURANCE: Primary | ICD-10-CM

## 2025-03-26 PROCEDURE — 97110 THERAPEUTIC EXERCISES: CPT | Mod: PN

## 2025-03-26 PROCEDURE — 97530 THERAPEUTIC ACTIVITIES: CPT | Mod: PN

## 2025-03-26 NOTE — PROGRESS NOTES
"  Outpatient Rehab    Physical Therapy Visit discharge     Patient Name: Frankie Gaspar  MRN: [id number]  YOB: 1955  Encounter Date: 3/26/2025    Therapy Diagnosis:   Encounter Diagnosis   Name Primary?    Impaired functional mobility, balance, gait, and endurance Yes     Physician: Twila Marcelo MD    Physician Orders: Eval and Treat  Medical Diagnosis: Acute pain of both knees  Cervical pain  Acute bilateral low back pain without sciatica    Visit # / Visits Authorized:  10 / 10  Insurance Authorization Period: 2/18/2025 to 12/31/2025  Date of Evaluation: 2.18.25  Plan of Care Certification: 2.18.25 to 5.18.25       PT/PTA:     Number of PTA visits since last PT visit:   Time In: 1230   Time Out: 1325  Total Time: 55   Total Billable Time:      FOTO:  Intake Score:  %  Survey Score 1:  %  Survey Score 2:  %         Subjective   He is not feeling great today in his back.  Pain reported as 5/10.      Objective            Treatment:  Therapeutic Exercise  TE 1: Heel Raises x 20  TE 2: Ball Squeezes x 2 min  TE 3: Bridges x 15  TE 4: Quad sets x 2 min  TE 5: SLR x 2 x 10 ea w/ 2#  TE 7: SAQ on small gray roll x 2 min w/ 2#  TE 8: LAQ x 25 w/ 2#  Therapeutic Activity  TA 1: Nustep level 4 x 15 min  TA 2: Toe Taps on 6" step x 2 min  TA 3: FSU on 6" step x 20 each  TA 4: LSU on 6" step x 20 each  TA 5: Heel Cord stretch on wedge 3 x 30 sec  TA 6: Standing Hip Flex/Abd/Ext x 15 GTB  TA 7: Seated hamstring stretch 3 x 15 sec each    Time Entry(in minutes):  Therapeutic Activity Time Entry: 30  Therapeutic Exercise Time Entry: 23    Assessment & Plan   Assessment: Pt did good at this time. Pt has met goals and is safe for DC from wokring on his knees at this time. Pt is aware of HEP with good understanding.  Evaluation/Treatment Tolerance: Patient tolerated treatment well    Patient will continue to benefit from skilled outpatient physical therapy to address the deficits listed in the problem list box on " initial evaluation, provide pt/family education and to maximize pt's level of independence in the home and community environment.     Patient's spiritual, cultural, and educational needs considered and patient agreeable to plan of care and goals.           Plan:      Goals:   Active       LTG       Ambulate required community distances without limitation due to left knee pain (Progressing)       Expected End:  04/04/25            Restore AROM of Left knee to 5 - 115 or better for improved functional performance (Progressing)       Expected End:  04/04/25            Improve ability to ascend/descend stairs with minimal limitation due to left knee pain (Progressing)       Expected End:  04/04/25               STG       Report improvement in symptoms for progression toward LTGs (Progressing)       Start:  02/18/25    Expected End:  03/14/25            Improve weightbearing tolerance on left lower extremity for improved standing and ambulation (Progressing)       Start:  02/18/25    Expected End:  03/14/25            demonstrate compliance with initial home exercise program (Progressing)       Start:  02/18/25    Expected End:  03/14/25                Twila Mondragon, PT

## 2025-04-17 ENCOUNTER — TELEPHONE (OUTPATIENT)
Dept: PODIATRY | Facility: CLINIC | Age: 70
End: 2025-04-17
Payer: MEDICARE

## 2025-04-17 ENCOUNTER — TELEPHONE (OUTPATIENT)
Dept: FAMILY MEDICINE | Facility: CLINIC | Age: 70
End: 2025-04-17
Payer: MEDICARE

## 2025-04-17 DIAGNOSIS — M54.59 OTHER LOW BACK PAIN: ICD-10-CM

## 2025-04-17 DIAGNOSIS — M54.50 ACUTE BILATERAL LOW BACK PAIN WITHOUT SCIATICA: Primary | ICD-10-CM

## 2025-04-17 NOTE — TELEPHONE ENCOUNTER
----- Message from Aleja sent at 4/17/2025  9:12 AM CDT -----  Regarding: Acupuncture  Good Morning, Mr Gaspar called in requesting an appointment with acupuncture for his back pain.  If you feel this is an appropriate course of treatment could you please add a referral using order [facility] for acupuncture.  In order for Medicare to authorize the treatment at least one of his diagnoses needs to be for back pain.  If you have any questions at all please feel free to call me [phone], Lindsey

## 2025-04-17 NOTE — TELEPHONE ENCOUNTER
----- Message from Nicolette sent at 4/17/2025  9:03 AM CDT -----  Contact: Self  Type:  Same Day Appointment RequestCaller is requesting a same day appointment.  Caller declined first available appointment listed below.  Name of Caller:  Piyush is the first available appointment?  04/22-scheduledSymptoms:  Having extreme pain in both feetBest Call Back Number:  [phone] Information:   Pt scheduled for Tuesday 04/22 but wanted to see if we could possibly get him in today for this, Can we please call pt back to advise. Thank You.

## 2025-04-22 ENCOUNTER — OFFICE VISIT (OUTPATIENT)
Dept: PODIATRY | Facility: CLINIC | Age: 70
End: 2025-04-22
Payer: MEDICARE

## 2025-04-22 VITALS
HEART RATE: 68 BPM | BODY MASS INDEX: 29.68 KG/M2 | SYSTOLIC BLOOD PRESSURE: 186 MMHG | DIASTOLIC BLOOD PRESSURE: 91 MMHG | WEIGHT: 200.38 LBS | HEIGHT: 69 IN

## 2025-04-22 DIAGNOSIS — L60.0 INGROWN NAIL: Primary | ICD-10-CM

## 2025-04-22 DIAGNOSIS — G60.9 IDIOPATHIC PERIPHERAL NEUROPATHY: ICD-10-CM

## 2025-04-22 PROCEDURE — 3077F SYST BP >= 140 MM HG: CPT | Mod: CPTII,S$GLB,, | Performed by: PODIATRIST

## 2025-04-22 PROCEDURE — 3008F BODY MASS INDEX DOCD: CPT | Mod: CPTII,S$GLB,, | Performed by: PODIATRIST

## 2025-04-22 PROCEDURE — 1101F PT FALLS ASSESS-DOCD LE1/YR: CPT | Mod: CPTII,S$GLB,, | Performed by: PODIATRIST

## 2025-04-22 PROCEDURE — 1159F MED LIST DOCD IN RCRD: CPT | Mod: CPTII,S$GLB,, | Performed by: PODIATRIST

## 2025-04-22 PROCEDURE — 1125F AMNT PAIN NOTED PAIN PRSNT: CPT | Mod: CPTII,S$GLB,, | Performed by: PODIATRIST

## 2025-04-22 PROCEDURE — 3080F DIAST BP >= 90 MM HG: CPT | Mod: CPTII,S$GLB,, | Performed by: PODIATRIST

## 2025-04-22 PROCEDURE — 99999 PR PBB SHADOW E&M-EST. PATIENT-LVL IV: CPT | Mod: PBBFAC,,, | Performed by: PODIATRIST

## 2025-04-22 PROCEDURE — 3288F FALL RISK ASSESSMENT DOCD: CPT | Mod: CPTII,S$GLB,, | Performed by: PODIATRIST

## 2025-04-22 PROCEDURE — 99213 OFFICE O/P EST LOW 20 MIN: CPT | Mod: S$GLB,,, | Performed by: PODIATRIST

## 2025-04-22 PROCEDURE — 1160F RVW MEDS BY RX/DR IN RCRD: CPT | Mod: CPTII,S$GLB,, | Performed by: PODIATRIST

## 2025-04-22 PROCEDURE — 3044F HG A1C LEVEL LT 7.0%: CPT | Mod: CPTII,S$GLB,, | Performed by: PODIATRIST

## 2025-04-26 NOTE — PROGRESS NOTES
Subjective:       Patient ID: Elliott Gaspar is a 69 y.o. male.    Chief Complaint: Foot Pain  Patient presents today he is complaining about an ingrown toenail on his left great toe he had does have numbness in the last 3 digits on the right foot he states the left big toes been bothering him.   Past Medical History:   Diagnosis Date    Contact lens/glasses fitting     wears contacts    Deviated septum     Fatty liver disease, nonalcoholic     Negative Hep A, B, C in past    Hypertension     Hypokalemia 2015    Kidney stone     Persistent headaches     related to sinus congestion    Peyronie's disease     Thrombocytopenia     Variable; low normal; Hematology eval in past and no specific f/u advised     Past Surgical History:   Procedure Laterality Date    CARDIAC CATHETERIZATION      NSA of coronaries    HERNIA REPAIR      groin bilat    HERNIA REPAIR      Dr. Kincaid    NASAL SEPTUM SURGERY      SINUS SURGERY       Family History   Problem Relation Name Age of Onset    Heart disease Mother      Atrial fibrillation Mother      Heart disease Father      Hypertension Father      Heart failure Father      Psoriasis Father      Psoriasis Paternal Grandmother      Alzheimer's disease Maternal Grandmother      Melanoma Neg Hx      Lupus Neg Hx      Eczema Neg Hx       Social History     Socioeconomic History    Marital status:    Tobacco Use    Smoking status: Former     Current packs/day: 0.00     Average packs/day: 3.0 packs/day for 3.0 years (9.0 ttl pk-yrs)     Types: Cigarettes, Cigars     Start date:      Quit date:      Years since quittin.3     Passive exposure: Past    Smokeless tobacco: Never    Tobacco comments:     quit age 18   Substance and Sexual Activity    Alcohol use: No    Drug use: No    Sexual activity: Yes     Partners: Female   Social History Narrative    ** Merged History Encounter **         ** Merged History Encounter **          Social Drivers of Health      Financial Resource Strain: Low Risk  (2/26/2024)    Overall Financial Resource Strain (CARDIA)     Difficulty of Paying Living Expenses: Not hard at all   Food Insecurity: No Food Insecurity (2/26/2024)    Hunger Vital Sign     Worried About Running Out of Food in the Last Year: Never true     Ran Out of Food in the Last Year: Never true   Transportation Needs: No Transportation Needs (2/26/2024)    PRAPARE - Transportation     Lack of Transportation (Medical): No     Lack of Transportation (Non-Medical): No   Physical Activity: Insufficiently Active (2/26/2024)    Exercise Vital Sign     Days of Exercise per Week: 1 day     Minutes of Exercise per Session: 10 min   Stress: No Stress Concern Present (2/26/2024)    [employer] [place] [employer] - Occupational Stress Questionnaire     Feeling of Stress : Not at all   Housing Stability: Low Risk  (2/26/2024)    Housing Stability Vital Sign     Unable to Pay for Housing in the Last Year: No     Number of Places Lived in the Last Year: 1     Unstable Housing in the Last Year: No       Current Outpatient Medications   Medication Sig Dispense Refill    azelastine (ASTELIN) 137 mcg (0.1 %) nasal spray 2 sprays (274 mcg total) by Nasal route once daily. 60 mL 3    b complex vitamins capsule Take 1 capsule by mouth once daily.      colchicine (COLCRYS) 0.6 mg tablet Use as directed 60 tablet 3    famotidine (PEPCID) 20 MG tablet Take 1 tablet (20 mg total) by mouth 2 (two) times daily. 60 tablet 11    fluticasone propionate (FLONASE) 50 mcg/actuation nasal spray 2 sprays (100 mcg total) by Each Nostril route once daily. 32 g 6    glucosamine-chondroitin 500-400 mg tablet Take 1 tablet by mouth 2 (two) times daily.      hydrALAZINE (APRESOLINE) 50 MG tablet Take 50 mg by mouth 2 (two) times daily.      hydroCHLOROthiazide (HYDRODIURIL) 25 MG tablet Take 25 mg by mouth.      nebivoloL (BYSTOLIC) 20 mg Tab Take 1 tablet (20 mg total) by mouth 2 (two) times a day.  "180 tablet 3    nystatin-triamcinolone (MYCOLOG II) cream APPLY  CREAM TOPICALLY TWICE DAILY AS NEEDED FOR RASH 60 g 2    potassium chloride SA (K-DUR,KLOR-CON) 20 MEQ tablet TAKE 1 TABLET (20 MEQ TOTAL) BY MOUTH ONCE DAILY. ON DAYS THAT YOU TAKE LASIX 90 tablet 3    tadalafiL (CIALIS) 20 MG Tab Take 1 tablet (20 mg total) by mouth once daily. 30 tablet 11    tamsulosin (FLOMAX) 0.4 mg Cap Take 1 capsule (0.4 mg total) by mouth once daily. 30 capsule 11    traMADoL (ULTRAM) 50 mg tablet Take 1 tablet (50 mg total) by mouth every 6 (six) hours as needed for Pain. 20 tablet 0    albuterol (PROAIR HFA) 90 mcg/actuation inhaler Inhale 2 puffs into the lungs every 6 (six) hours as needed for Wheezing. Rescue 18 g 0     No current facility-administered medications for this visit.     Review of patient's allergies indicates:   Allergen Reactions    Morphine Other (See Comments)     Family HX-mother went into anaphylactic shock    Contrast media      Family history of renal failure with iodinated contrast    Demerol [meperidine]     Iodinated contrast media Other (See Comments)     Family history of renal failure with iodinated contrast    Morphine Other (See Comments)     pts mother had anaphylaxis from Morphine so he does not want to take       Review of Systems   Neurological:  Positive for numbness.   All other systems reviewed and are negative.      Objective:      Vitals:    04/22/25 1322   BP: (!) 186/91   Pulse: 68   Weight: 90.9 kg (200 lb 6.4 oz)   Height: 5' 9" (1.753 m)     Physical Exam  Vitals and nursing note reviewed.   Constitutional:       Appearance: Normal appearance.   Cardiovascular:      Pulses:           Dorsalis pedis pulses are 2+ on the right side and 2+ on the left side.        Posterior tibial pulses are 1+ on the right side and 1+ on the left side.   Pulmonary:      Effort: Pulmonary effort is normal.   Musculoskeletal:         General: Tenderness present.   Feet:      Right foot:      " Protective Sensation: 3 sites tested.   1 site sensed.     Left foot:      Protective Sensation: 3 sites tested.  3 sites sensed.      Skin integrity: Erythema and warmth present.      Toenail Condition: Left toenails are ingrown.   Skin:     Capillary Refill: Capillary refill takes 2 to 3 seconds.      Findings: Erythema present.   Neurological:      Mental Status: He is alert.      Sensory: Sensory deficit present.   Psychiatric:         Mood and Affect: Mood normal.         Behavior: Behavior normal.                      Assessment:       1. Ingrown nail    2. Idiopathic peripheral neuropathy        Plan:       Patient presents today he is complaining about an ingrown toenail on his left great toe he had does have numbness in the last 3 digits on the right foot he states the left big toes been bothering him for about a month he is in the past been able to cut in trim and remove the nail himself.    Patient does relate a history of having crushed his right foot at the age of 15.  A comprehensive  patient evaluation was performed today patient does have some loss of sensitivity in the 3rd 4th and 5th digits on the right foot.   I did advised the patient certainly this is related to his neck and back problems he is had some falls because of this.  Patient did have significantly ingrown toenail left greater than right on both big toes this did require aggressive debridement the most ingrown nail appeared to be the lateral border of the left hallux which required deep aggressive removal of the nail but the patient did note relief after removal of this portion of the nail as well as others.  Patient did not require a nail avulsion the time this did relieve the patient's discomfort recommended follow-up as needed bacitracin ointment and a Band-Aid applied to the areas where the nail was most ingrowing requiring removal.  This note was created using [employer] voice recognition software that occasionally misinterpreted  phrases or words.

## 2025-05-01 ENCOUNTER — PATIENT MESSAGE (OUTPATIENT)
Dept: ADMINISTRATIVE | Facility: HOSPITAL | Age: 70
End: 2025-05-01
Payer: MEDICARE

## 2025-05-02 ENCOUNTER — PATIENT OUTREACH (OUTPATIENT)
Dept: ADMINISTRATIVE | Facility: HOSPITAL | Age: 70
End: 2025-05-02
Payer: MEDICARE

## 2025-05-02 DIAGNOSIS — Z12.11 SCREEN FOR COLON CANCER: Primary | ICD-10-CM

## 2025-05-02 NOTE — PROGRESS NOTES
Population Health Chart Review & Patient Outreach Details      Additional [facility] Health Notes:               Updates Requested / Reviewed:      Updated Care Coordination Note, Care Everywhere, , and Immunizations Reconciliation Completed or Queried: [facility] Topics Overdue:      [facility] Score: 2     Colon Cancer Screening  Uncontrolled BP    Shingles/Zoster Vaccine  RSV Vaccine                  Health Maintenance Topic(s) Outreach Outcomes & Actions Taken:    Colorectal Cancer Screening - Outreach Outcomes & Actions Taken  : Colonoscopy Case Request / Referral / Home Test Order Placed [employer] was mailed to patient on 5/2/2025 11:34 AM

## 2025-05-05 ENCOUNTER — CLINICAL SUPPORT (OUTPATIENT)
Facility: HOSPITAL | Age: 70
End: 2025-05-05
Attending: FAMILY MEDICINE
Payer: MEDICARE

## 2025-05-05 DIAGNOSIS — M54.59 OTHER LOW BACK PAIN: Primary | Chronic | ICD-10-CM

## 2025-05-05 DIAGNOSIS — M54.50 ACUTE BILATERAL LOW BACK PAIN WITHOUT SCIATICA: ICD-10-CM

## 2025-05-05 PROCEDURE — 97810 ACUP 1/> WO ESTIM 1ST 15 MIN: CPT | Mod: PO

## 2025-05-05 PROCEDURE — 97811 ACUP 1/> W/O ESTIM EA ADD 15: CPT | Mod: PO

## 2025-05-06 NOTE — PROGRESS NOTES
Acupuncture Evaluation Note     Name: Elliott Gaspar  Clinic Number: [id number]    Traditional Chinese Medicine (TCM) Diagnosis: Qi Stagnation  Medical Diagnosis:   Encounter Diagnoses   Name Primary?    Other low back pain Yes    Acute bilateral low back pain without sciatica         Evaluation Date: 5/5/25    Visit #/Visits authorized: 1/12  - check auth?    Precautions: Standard    Subjective     Chief Concern: Chronic and acute low back pain, spine pain and stiffness, some neck pain as well    Medical necessity is demonstrated by the following IMPAIRMENTS: Medical Necessity: Decreased mobility limits day to day activities, social, and emergent situations              Aggravating Factors:  movement and pressure   Relieving Factors:  rest    Symptom Description:     Quality:  Aching  Severity:  5 but varies  Frequency:  continuously    Previous Treatments Tried:  Medication    HEENT:  not noted    Chest:   not noted    Digestion:     Diet: normal   Fluids: normal, ,   Taste/Appetite: normal   Symptoms: no blood in stool    Sleep: could improve    Energy Levels:  could improve    Psychological Symptoms:  not noted    Other Symptoms: not noted    GYN Symptoms: none    Objective     Observation: clean, calm    Tongue:      Body:  normal   Color:  pink   Coating:  thin,     Pulse:        wiry       New Findings:  none    Treatment     Treatment Principles:  move Qi    Acupuncture points used:  Du20-24, GB20-20.5, LI4, SI3/4, TB 5  Needles In: 10  Needles Out: 10  Needles W/O STIM placed: 8am  Needles W/O STIM removed: 8:30am      Other Traditional Chinese Medicine Modalities - none    Assessment     After treatment, patient felt more relaxed and less strain     Patient prognosis is Good.     Patient will continue to benefit from acupuncture treatment to address the deficits listed in the problem list box on initial evaluation, provide patient family education and to maximize pt's level of independence in the home and  community environment.     Patient's spiritual, cultural and educational needs considered and pt agreeable to plan of care and goals.     Anticipated barriers to treatment: none    Plan     Recommend every week or two for 12 sessions with midway re-assess.      Education:  Patient is aware of cumulative benefit of acupuncture

## 2025-05-14 ENCOUNTER — CLINICAL SUPPORT (OUTPATIENT)
Facility: HOSPITAL | Age: 70
End: 2025-05-14
Payer: MEDICARE

## 2025-05-14 DIAGNOSIS — M54.59 OTHER LOW BACK PAIN: Primary | ICD-10-CM

## 2025-05-14 PROCEDURE — 97811 ACUP 1/> W/O ESTIM EA ADD 15: CPT | Mod: PO

## 2025-05-14 PROCEDURE — 97810 ACUP 1/> WO ESTIM 1ST 15 MIN: CPT | Mod: PO

## 2025-05-17 NOTE — PROGRESS NOTES
Acupuncture Evaluation Note     Name: Elliott Gaspar  Clinic Number: [id number]    Traditional Chinese Medicine (TCM) Diagnosis: Qi Stagnation  Medical Diagnosis:   Encounter Diagnosis   Name Primary?    Other low back pain Yes        Evaluation Date: 5/14/25    Visit #/Visits authorized: 2/12  - check auth?    Precautions: Standard    Subjective     Chief Concern: Chronic and acute low back pain, spine pain and stiffness, some neck pain as well    Medical necessity is demonstrated by the following IMPAIRMENTS: Medical Necessity: Decreased mobility limits day to day activities, social, and emergent situations              Aggravating Factors:  movement and pressure   Relieving Factors:  rest    Symptom Description:     Quality:  Aching  Severity:  5 but varies  Frequency:  continuously    Previous Treatments Tried:  Medication    HEENT:  not noted    Chest:   not noted    Digestion:     Diet: normal   Fluids: normal, ,   Taste/Appetite: normal   Symptoms: no blood in stool    Sleep: could improve    Energy Levels:  could improve    Psychological Symptoms:  not noted    Other Symptoms: not noted    GYN Symptoms: none    Objective     Observation: clean, calm    Tongue:      Body:  normal   Color:  pink   Coating:  thin,     Pulse:        wiry       New Findings:  none    Treatment     Treatment Principles:  move Qi    Acupuncture points used:  Du20-24, GB20-20.5, LI4, SI3/4, TB 5  Needles In: 10  Needles Out: 10  Needles W/O STIM placed: 9:30am  Needles W/O STIM removed: 10am      Other Traditional Chinese Medicine Modalities - none    Assessment     After treatment, patient felt more relaxed and less strain     Patient prognosis is Good.     Patient will continue to benefit from acupuncture treatment to address the deficits listed in the problem list box on initial evaluation, provide patient family education and to maximize pt's level of independence in the home and community environment.     Patient's spiritual,  cultural and educational needs considered and pt agreeable to plan of care and goals.     Anticipated barriers to treatment: none    Plan     Recommend every week or two for 12 sessions with midway re-assess.      Education:  Patient is aware of cumulative benefit of acupuncture

## 2025-05-20 ENCOUNTER — CLINICAL SUPPORT (OUTPATIENT)
Facility: HOSPITAL | Age: 70
End: 2025-05-20
Payer: MEDICARE

## 2025-05-20 DIAGNOSIS — M54.59 OTHER LOW BACK PAIN: Primary | ICD-10-CM

## 2025-05-20 PROCEDURE — 97811 ACUP 1/> W/O ESTIM EA ADD 15: CPT | Mod: PO

## 2025-05-20 PROCEDURE — 97810 ACUP 1/> WO ESTIM 1ST 15 MIN: CPT | Mod: PO

## 2025-05-26 ENCOUNTER — CLINICAL SUPPORT (OUTPATIENT)
Facility: HOSPITAL | Age: 70
End: 2025-05-26
Payer: MEDICARE

## 2025-05-26 DIAGNOSIS — M54.59 OTHER LOW BACK PAIN: Primary | ICD-10-CM

## 2025-05-26 PROCEDURE — 97810 ACUP 1/> WO ESTIM 1ST 15 MIN: CPT | Mod: PO

## 2025-05-26 PROCEDURE — 97811 ACUP 1/> W/O ESTIM EA ADD 15: CPT | Mod: PO

## 2025-05-27 NOTE — PROGRESS NOTES
Acupuncture Evaluation Note     Name: Elliott Gaspar  Clinic Number: [id number]    Traditional Chinese Medicine (TCM) Diagnosis: Qi Stagnation  Medical Diagnosis:   Encounter Diagnosis   Name Primary?    Other low back pain Yes        Evaluation Date: 5/20/25    Visit #/Visits authorized: 3/12  - check auth?    Precautions: Standard    Subjective     Chief Concern: Chronic and acute low back pain, spine pain and stiffness, some neck pain as well    Medical necessity is demonstrated by the following IMPAIRMENTS: Medical Necessity: Decreased mobility limits day to day activities, social, and emergent situations              Aggravating Factors:  movement and pressure   Relieving Factors:  rest    Symptom Description:     Quality:  Aching  Severity:  5 but varies  Frequency:  continuously    Previous Treatments Tried:  Medication    HEENT:  not noted    Chest:   not noted    Digestion:     Diet: normal   Fluids: normal, ,   Taste/Appetite: normal   Symptoms: no blood in stool    Sleep: could improve    Energy Levels:  could improve    Psychological Symptoms:  not noted    Other Symptoms: not noted    GYN Symptoms: none    Objective     Observation: clean, calm    Tongue:      Body:  normal   Color:  pink   Coating:  thin,     Pulse:        wiry       New Findings:  none    Treatment     Treatment Principles:  move Qi    Acupuncture points used:  Du20-24, GB20-20.5, LI4, SI3/4, TB 5  Needles In: 10  Needles Out: 10  Needles W/O STIM placed: 10:30am  Needles W/O STIM removed: 11am      Other Traditional Chinese Medicine Modalities - none    Assessment     After treatment, patient felt more relaxed and less strain     Patient prognosis is Good.     Patient will continue to benefit from acupuncture treatment to address the deficits listed in the problem list box on initial evaluation, provide patient family education and to maximize pt's level of independence in the home and community environment.     Patient's spiritual,  cultural and educational needs considered and pt agreeable to plan of care and goals.     Anticipated barriers to treatment: none    Plan     Recommend every week or two for 12 sessions with midway re-assess.      Education:  Patient is aware of cumulative benefit of acupuncture

## 2025-05-29 NOTE — PROGRESS NOTES
Acupuncture Evaluation Note     Name: Elliott Gaspar  Clinic Number: [id number]    Traditional Chinese Medicine (TCM) Diagnosis: Qi Stagnation  Medical Diagnosis:   Encounter Diagnosis   Name Primary?    Other low back pain Yes        Evaluation Date: 5/26/25    Visit #/Visits authorized: 4/12  - check auth?    Precautions: Standard    Subjective     Chief Concern: Chronic and acute low back pain, spine pain and stiffness, some neck pain as well    Medical necessity is demonstrated by the following IMPAIRMENTS: Medical Necessity: Decreased mobility limits day to day activities, social, and emergent situations              Aggravating Factors:  movement and pressure   Relieving Factors:  rest    Symptom Description:     Quality:  Aching  Severity:  5 but varies  Frequency:  continuously    Previous Treatments Tried:  Medication    HEENT:  not noted    Chest:   not noted    Digestion:     Diet: normal   Fluids: normal, ,   Taste/Appetite: normal   Symptoms: no blood in stool    Sleep: could improve    Energy Levels:  could improve    Psychological Symptoms:  not noted    Other Symptoms: not noted    GYN Symptoms: none    Objective     Observation: clean, calm    Tongue:      Body:  normal   Color:  pink   Coating:  thin,     Pulse:        wiry       New Findings:  none    Treatment     Treatment Principles:  move Qi    Acupuncture points used:  Du20-24, GB20-20.5, LI4, SI3/4, TB 5  Needles In: 10  Needles Out: 10  Needles W/O STIM placed: 9am  Needles W/O STIM removed: 9:30am      Other Traditional Chinese Medicine Modalities - none    Assessment     After treatment, patient felt more relaxed and less strain     Patient prognosis is Good.     Patient will continue to benefit from acupuncture treatment to address the deficits listed in the problem list box on initial evaluation, provide patient family education and to maximize pt's level of independence in the home and community environment.     Patient's spiritual,  cultural and educational needs considered and pt agreeable to plan of care and goals.     Anticipated barriers to treatment: none    Plan     Recommend every week or two for 12 sessions with midway re-assess.      Education:  Patient is aware of cumulative benefit of acupuncture

## 2025-06-05 ENCOUNTER — TELEPHONE (OUTPATIENT)
Dept: OPHTHALMOLOGY | Facility: CLINIC | Age: 70
End: 2025-06-05
Payer: MEDICARE

## 2025-06-11 ENCOUNTER — OFFICE VISIT (OUTPATIENT)
Dept: OPTOMETRY | Facility: CLINIC | Age: 70
End: 2025-06-11
Payer: MEDICARE

## 2025-06-11 ENCOUNTER — PATIENT MESSAGE (OUTPATIENT)
Dept: FAMILY MEDICINE | Facility: CLINIC | Age: 70
End: 2025-06-11
Payer: MEDICARE

## 2025-06-11 DIAGNOSIS — H16.141 SPK (SUPERFICIAL PUNCTATE KERATITIS), RIGHT: Primary | ICD-10-CM

## 2025-06-11 DIAGNOSIS — Z13.220 ENCOUNTER FOR LIPID SCREENING FOR CARDIOVASCULAR DISEASE: ICD-10-CM

## 2025-06-11 DIAGNOSIS — I10 ESSENTIAL HYPERTENSION: ICD-10-CM

## 2025-06-11 DIAGNOSIS — H02.843 EYELID EDEMA, RIGHT: ICD-10-CM

## 2025-06-11 DIAGNOSIS — Z12.5 SCREENING PSA (PROSTATE SPECIFIC ANTIGEN): ICD-10-CM

## 2025-06-11 DIAGNOSIS — N52.8 OTHER MALE ERECTILE DYSFUNCTION: ICD-10-CM

## 2025-06-11 DIAGNOSIS — R73.03 PREDIABETES: Primary | ICD-10-CM

## 2025-06-11 DIAGNOSIS — H25.13 NUCLEAR SCLEROSIS, BILATERAL: ICD-10-CM

## 2025-06-11 DIAGNOSIS — Z13.6 ENCOUNTER FOR LIPID SCREENING FOR CARDIOVASCULAR DISEASE: ICD-10-CM

## 2025-06-11 DIAGNOSIS — G51.4 EYELID MYOKYMIA: ICD-10-CM

## 2025-06-11 PROCEDURE — 1160F RVW MEDS BY RX/DR IN RCRD: CPT | Mod: CPTII,S$GLB,, | Performed by: OPTOMETRIST

## 2025-06-11 PROCEDURE — 99214 OFFICE O/P EST MOD 30 MIN: CPT | Mod: S$GLB,,, | Performed by: OPTOMETRIST

## 2025-06-11 PROCEDURE — 3044F HG A1C LEVEL LT 7.0%: CPT | Mod: CPTII,S$GLB,, | Performed by: OPTOMETRIST

## 2025-06-11 PROCEDURE — 99999 PR PBB SHADOW E&M-EST. PATIENT-LVL III: CPT | Mod: PBBFAC,,, | Performed by: OPTOMETRIST

## 2025-06-11 PROCEDURE — 1126F AMNT PAIN NOTED NONE PRSNT: CPT | Mod: CPTII,S$GLB,, | Performed by: OPTOMETRIST

## 2025-06-11 PROCEDURE — 1159F MED LIST DOCD IN RCRD: CPT | Mod: CPTII,S$GLB,, | Performed by: OPTOMETRIST

## 2025-06-11 RX ORDER — PREDNISOLONE ACETATE 10 MG/ML
1 SUSPENSION/ DROPS OPHTHALMIC 4 TIMES DAILY
Qty: 5 ML | Refills: 0 | Status: SHIPPED | OUTPATIENT
Start: 2025-06-11

## 2025-06-11 NOTE — PROGRESS NOTES
HPI    Pt states for a few weeks now, OD has been swelling on and off, twitchy   underneath  Some yellow discharge during the night   No change in vision noticed  Has tried cool compresses on the eye   Tried claritin but no improvement    Denies any burning/itching    Twitching for couple of weeks with pressure   Last edited by Barrett Davalos, OD on 6/11/2025  3:02 PM.            Assessment /Plan     For exam results, see Encounter Report.    SPK (superficial punctate keratitis), right  -     prednisoLONE acetate (PRED FORTE) 1 % DrpS; Place 1 drop into the right eye 4 (four) times daily.  Dispense: 5 mL; Refill: 0    Eyelid edema, right    Eyelid myokymia    Nuclear sclerosis, bilateral      1. SPK (superficial punctate keratitis), right (Primary)  2. Eyelid edema, right  SPK OD with mild lid edema  1+ palpebral conj papilla  D/C cls wear until resolved  Start PF 1%: 1 gt qid OD -- shake well before use  F/u in 1 week, sooner prn    - prednisoLONE acetate (PRED FORTE) 1 % DrpS; Place 1 drop into the right eye 4 (four) times daily.  Dispense: 5 mL; Refill: 0    3. Eyelid myokymia  Discussed possible etiologies  Notes increased stress, sleeping fine  F/u in 1 week, consider neuro workup prn     4. Nuclear sclerosis, bilateral  Moderate OU, not VS  Not affecting ADLs  Monitor

## 2025-06-13 NOTE — PATIENT INSTRUCTIONS
Controlling High Blood Pressure  High blood pressure (hypertension) is often called the silent killer. This is because many people who have it dont know it. High blood pressure is defined as 140/90 mm Hg or higher. Know your blood pressure and remember to check it regularly. Doing so can save your life. Here are some things you can do to help control your blood pressure.    Choose heart-healthy foods  · Select low-salt, low-fat foods. Limit sodium intake to 2,400 mg per day or the amount suggested by your healthcare provider.  · Limit canned, dried, cured, packaged, and fast foods. These can contain a lot of salt.  · Eat 8 to 10 servings of fruits and vegetables every day.  · Choose lean meats, fish, or chicken.  · Eat whole-grain pasta, brown rice, and beans.  · Eat 2 to 3 servings of low-fat or fat-free dairy products.  · Ask your doctor about the DASH eating plan. This plan helps reduce blood pressure.  · When you go to a restaurant, ask that your meal be prepared with no added salt.  Maintain a healthy weight  · Ask your healthcare provider how many calories to eat a day. Then stick to that number.  · Ask your healthcare provider what weight range is healthiest for you. If you are overweight, a weight loss of only 3% to 5% of your body weight can help lower blood pressure. Generally, a good weight loss goal is to lose 10% of your body weight in a year.  · Limit snacks and sweets.  · Get regular exercise.  Get up and get active  · Choose activities you enjoy. Find ones you can do with friends or family. This includes bicycling, dancing, walking, and jogging.  · Park farther away from building entrances.  · Use stairs instead of the elevator.  · When you can, walk or bike instead of driving.  · [place] leaves, garden, or do household repairs.  · Be active at a moderate to vigorous level of physical activity for at least 40 minutes for a minimum of 3 to 4 days a week.   Manage stress  · Make time to relax and enjoy  Hi, please fax the order to [facility].  Thank you, Ramsey Tyler     life. Find time to laugh.  · Communicate your concerns with your loved ones and your healthcare provider.  · Visit with family and friends, and keep up with hobbies.  Limit alcohol and quit smoking  · Men should have no more than 2 drinks per day.  · Women should have no more than 1 drink per day.  · Talk with your healthcare provider about quitting smoking. Smoking significantly increases your risk for heart disease and stroke. Ask your healthcare provider about community smoking cessation programs and other options.  Medicines  If lifestyle changes arent enough, your healthcare provider may prescribe high blood pressure medicine. Take all medicines as prescribed. If you have any questions about your medicines, ask your healthcare provider before stopping or changing them.   Date Last Reviewed: 4/27/2016  © 7180-6497 [employer]. [address] [place] [address], [place] [address]. All rights reserved. This information is not intended as a substitute for professional medical care. Always follow your healthcare professional's instructions.

## 2025-06-19 ENCOUNTER — OFFICE VISIT (OUTPATIENT)
Dept: OPTOMETRY | Facility: CLINIC | Age: 70
End: 2025-06-19
Payer: MEDICARE

## 2025-06-19 DIAGNOSIS — G51.31 HEMIFACIAL SPASM OF RIGHT SIDE OF FACE: ICD-10-CM

## 2025-06-19 DIAGNOSIS — H16.141 SPK (SUPERFICIAL PUNCTATE KERATITIS), RIGHT: Primary | ICD-10-CM

## 2025-06-19 DIAGNOSIS — G51.4 EYELID MYOKYMIA: ICD-10-CM

## 2025-06-19 DIAGNOSIS — H25.13 NUCLEAR SCLEROSIS, BILATERAL: ICD-10-CM

## 2025-06-19 PROCEDURE — 1160F RVW MEDS BY RX/DR IN RCRD: CPT | Mod: CPTII,S$GLB,, | Performed by: OPTOMETRIST

## 2025-06-19 PROCEDURE — 3044F HG A1C LEVEL LT 7.0%: CPT | Mod: CPTII,S$GLB,, | Performed by: OPTOMETRIST

## 2025-06-19 PROCEDURE — 1101F PT FALLS ASSESS-DOCD LE1/YR: CPT | Mod: CPTII,S$GLB,, | Performed by: OPTOMETRIST

## 2025-06-19 PROCEDURE — 99214 OFFICE O/P EST MOD 30 MIN: CPT | Mod: S$GLB,,, | Performed by: OPTOMETRIST

## 2025-06-19 PROCEDURE — 1159F MED LIST DOCD IN RCRD: CPT | Mod: CPTII,S$GLB,, | Performed by: OPTOMETRIST

## 2025-06-19 PROCEDURE — 99999 PR PBB SHADOW E&M-EST. PATIENT-LVL III: CPT | Mod: PBBFAC,,, | Performed by: OPTOMETRIST

## 2025-06-19 PROCEDURE — 3288F FALL RISK ASSESSMENT DOCD: CPT | Mod: CPTII,S$GLB,, | Performed by: OPTOMETRIST

## 2025-06-19 PROCEDURE — 1126F AMNT PAIN NOTED NONE PRSNT: CPT | Mod: CPTII,S$GLB,, | Performed by: OPTOMETRIST

## 2025-06-19 NOTE — PROGRESS NOTES
"HPI    Pt here today for 1 week f/u SPK - OD.      States eye is doing slightly better, "passable".      Still getting some discharge but very little now.    (+) lid twitching    Has not resume cls wear.    PF - OD bid-tid only    Last edited by Barrett Davalos, OD on 6/19/2025 10:45 AM.            Assessment /Plan     For exam results, see Encounter Report.    SPK (superficial punctate keratitis), right    Hemifacial spasm of right side of face  -     MRI Brain W WO Contrast; Future; Expected date: 06/19/2025  -     Creatinine, serum; Future; Expected date: 06/19/2025    Eyelid myokymia    Nuclear sclerosis, bilateral      1. SPK (superficial punctate keratitis), right (Primary)  Inferior SPK   Discussed importance of gtt compliance  D/c cls wear until resolved  Increase PF 1%: 1 gt qid OD -- shake well before use  Start otc artificial tear: 1 gt qid OD -- wait 5 minutes after PF use  F/u in 2 weeks, sooner if any worsening     2. Hemifacial spasm of right side of face  3. Eyelid myokymia  No improvement with myokymia  Notes began around Feb/March after a fall and has slowly gotten worse and more constant  Has had episodes of quivering/twitching lip in past few months  Notes frequent headaches   Discussed options   Scheduled for MRI brain w/wo contrast (discussed GBCAs, weary of iodine contrast secondary to fam hx)  Continue f/u with orthopedic for back/neck therapy  Consider neuro prn     - MRI Brain W WO Contrast; Future  - Creatinine, serum; Future    4. Nuclear sclerosis, bilateral  Moderate OU, not VS  Not affecting ADLs  Continue to monitor                    "

## 2025-06-20 ENCOUNTER — TELEPHONE (OUTPATIENT)
Dept: PODIATRY | Facility: CLINIC | Age: 70
End: 2025-06-20
Payer: MEDICARE

## 2025-06-20 NOTE — TELEPHONE ENCOUNTER
Earlier appointments offered to patient for 06/25/2025. Patient declined and decided to keep appt on 06/26. JIM Najera 06/20/2025

## 2025-06-24 ENCOUNTER — LAB VISIT (OUTPATIENT)
Dept: LAB | Facility: HOSPITAL | Age: 70
End: 2025-06-24
Attending: FAMILY MEDICINE
Payer: MEDICARE

## 2025-06-24 DIAGNOSIS — I10 ESSENTIAL HYPERTENSION: ICD-10-CM

## 2025-06-24 DIAGNOSIS — Z12.5 SCREENING PSA (PROSTATE SPECIFIC ANTIGEN): ICD-10-CM

## 2025-06-24 DIAGNOSIS — G51.31 HEMIFACIAL SPASM OF RIGHT SIDE OF FACE: ICD-10-CM

## 2025-06-24 DIAGNOSIS — N52.8 OTHER MALE ERECTILE DYSFUNCTION: ICD-10-CM

## 2025-06-24 DIAGNOSIS — R73.03 PREDIABETES: ICD-10-CM

## 2025-06-24 DIAGNOSIS — Z13.6 ENCOUNTER FOR LIPID SCREENING FOR CARDIOVASCULAR DISEASE: ICD-10-CM

## 2025-06-24 DIAGNOSIS — Z13.220 ENCOUNTER FOR LIPID SCREENING FOR CARDIOVASCULAR DISEASE: ICD-10-CM

## 2025-06-24 LAB
ABSOLUTE EOSINOPHIL (OHS): 0.19 K/UL
ABSOLUTE MONOCYTE (OHS): 0.56 K/UL (ref 0.3–1)
ABSOLUTE NEUTROPHIL COUNT (OHS): 3.38 K/UL (ref 1.8–7.7)
ALBUMIN SERPL BCP-MCNC: 3.8 G/DL (ref 3.5–5.2)
ALP SERPL-CCNC: 105 UNIT/L (ref 40–150)
ALT SERPL W/O P-5'-P-CCNC: 67 UNIT/L (ref 10–44)
ANION GAP (OHS): 11 MMOL/L (ref 8–16)
AST SERPL-CCNC: 55 UNIT/L (ref 11–45)
BASOPHILS # BLD AUTO: 0.03 K/UL
BASOPHILS NFR BLD AUTO: 0.5 %
BILIRUB SERPL-MCNC: 1 MG/DL (ref 0.1–1)
BUN SERPL-MCNC: 12 MG/DL (ref 8–23)
CALCIUM SERPL-MCNC: 9 MG/DL (ref 8.7–10.5)
CHLORIDE SERPL-SCNC: 105 MMOL/L (ref 95–110)
CHOLEST SERPL-MCNC: 208 MG/DL (ref 120–199)
CHOLEST/HDLC SERPL: 6.5 {RATIO} (ref 2–5)
CO2 SERPL-SCNC: 26 MMOL/L (ref 23–29)
CREAT SERPL-MCNC: 1.1 MG/DL (ref 0.5–1.4)
EAG (OHS): 108 MG/DL (ref 68–131)
ERYTHROCYTE [DISTWIDTH] IN BLOOD BY AUTOMATED COUNT: 12.6 % (ref 11.5–14.5)
GFR SERPLBLD CREATININE-BSD FMLA CKD-EPI: >60 ML/MIN/1.73/M2
GLUCOSE SERPL-MCNC: 121 MG/DL (ref 70–110)
HBA1C MFR BLD: 5.4 % (ref 4–5.6)
HCT VFR BLD AUTO: 44.1 % (ref 40–54)
HDLC SERPL-MCNC: 32 MG/DL (ref 40–75)
HDLC SERPL: 15.4 % (ref 20–50)
HGB BLD-MCNC: 15 GM/DL (ref 14–18)
IMM GRANULOCYTES # BLD AUTO: 0.05 K/UL (ref 0–0.04)
IMM GRANULOCYTES NFR BLD AUTO: 0.9 % (ref 0–0.5)
LDLC SERPL CALC-MCNC: 126.4 MG/DL (ref 63–159)
LYMPHOCYTES # BLD AUTO: 1.49 K/UL (ref 1–4.8)
MCH RBC QN AUTO: 30.2 PG (ref 27–31)
MCHC RBC AUTO-ENTMCNC: 34 G/DL (ref 32–36)
MCV RBC AUTO: 89 FL (ref 82–98)
NONHDLC SERPL-MCNC: 176 MG/DL
NUCLEATED RBC (/100WBC) (OHS): 0 /100 WBC
PLATELET # BLD AUTO: 137 K/UL (ref 150–450)
PMV BLD AUTO: 10.4 FL (ref 9.2–12.9)
POTASSIUM SERPL-SCNC: 3.6 MMOL/L (ref 3.5–5.1)
PROT SERPL-MCNC: 6.5 GM/DL (ref 6–8.4)
PSA SERPL-MCNC: 1.75 NG/ML
RBC # BLD AUTO: 4.97 M/UL (ref 4.6–6.2)
RELATIVE EOSINOPHIL (OHS): 3.3 %
RELATIVE LYMPHOCYTE (OHS): 26.1 % (ref 18–48)
RELATIVE MONOCYTE (OHS): 9.8 % (ref 4–15)
RELATIVE NEUTROPHIL (OHS): 59.4 % (ref 38–73)
SODIUM SERPL-SCNC: 142 MMOL/L (ref 136–145)
TRIGL SERPL-MCNC: 248 MG/DL (ref 30–150)
TSH SERPL-ACNC: 2.97 UIU/ML (ref 0.4–4)
WBC # BLD AUTO: 5.7 K/UL (ref 3.9–12.7)

## 2025-06-24 PROCEDURE — 83036 HEMOGLOBIN GLYCOSYLATED A1C: CPT

## 2025-06-24 PROCEDURE — 80053 COMPREHEN METABOLIC PANEL: CPT

## 2025-06-24 PROCEDURE — 84443 ASSAY THYROID STIM HORMONE: CPT

## 2025-06-24 PROCEDURE — 36415 COLL VENOUS BLD VENIPUNCTURE: CPT

## 2025-06-24 PROCEDURE — 84153 ASSAY OF PSA TOTAL: CPT

## 2025-06-24 PROCEDURE — 84478 ASSAY OF TRIGLYCERIDES: CPT

## 2025-06-24 PROCEDURE — 85025 COMPLETE CBC W/AUTO DIFF WBC: CPT

## 2025-06-26 ENCOUNTER — OFFICE VISIT (OUTPATIENT)
Dept: PODIATRY | Facility: CLINIC | Age: 70
End: 2025-06-26
Payer: MEDICARE

## 2025-06-26 VITALS
HEART RATE: 67 BPM | SYSTOLIC BLOOD PRESSURE: 174 MMHG | HEIGHT: 69 IN | WEIGHT: 200.38 LBS | DIASTOLIC BLOOD PRESSURE: 90 MMHG | BODY MASS INDEX: 29.68 KG/M2

## 2025-06-26 DIAGNOSIS — G60.9 IDIOPATHIC PERIPHERAL NEUROPATHY: ICD-10-CM

## 2025-06-26 DIAGNOSIS — L60.0 INGROWN NAIL: Primary | ICD-10-CM

## 2025-06-26 PROCEDURE — 1159F MED LIST DOCD IN RCRD: CPT | Mod: CPTII,S$GLB,, | Performed by: PODIATRIST

## 2025-06-26 PROCEDURE — 99213 OFFICE O/P EST LOW 20 MIN: CPT | Mod: S$GLB,,, | Performed by: PODIATRIST

## 2025-06-26 PROCEDURE — 3080F DIAST BP >= 90 MM HG: CPT | Mod: CPTII,S$GLB,, | Performed by: PODIATRIST

## 2025-06-26 PROCEDURE — 1126F AMNT PAIN NOTED NONE PRSNT: CPT | Mod: CPTII,S$GLB,, | Performed by: PODIATRIST

## 2025-06-26 PROCEDURE — 3008F BODY MASS INDEX DOCD: CPT | Mod: CPTII,S$GLB,, | Performed by: PODIATRIST

## 2025-06-26 PROCEDURE — 1160F RVW MEDS BY RX/DR IN RCRD: CPT | Mod: CPTII,S$GLB,, | Performed by: PODIATRIST

## 2025-06-26 PROCEDURE — 99999 PR PBB SHADOW E&M-EST. PATIENT-LVL IV: CPT | Mod: PBBFAC,,, | Performed by: PODIATRIST

## 2025-06-26 PROCEDURE — 1101F PT FALLS ASSESS-DOCD LE1/YR: CPT | Mod: CPTII,S$GLB,, | Performed by: PODIATRIST

## 2025-06-26 PROCEDURE — 3044F HG A1C LEVEL LT 7.0%: CPT | Mod: CPTII,S$GLB,, | Performed by: PODIATRIST

## 2025-06-26 PROCEDURE — 3077F SYST BP >= 140 MM HG: CPT | Mod: CPTII,S$GLB,, | Performed by: PODIATRIST

## 2025-06-26 PROCEDURE — 3288F FALL RISK ASSESSMENT DOCD: CPT | Mod: CPTII,S$GLB,, | Performed by: PODIATRIST

## 2025-06-26 RX ORDER — MELOXICAM 15 MG/1
15 TABLET ORAL
COMMUNITY
Start: 2025-06-16

## 2025-06-29 NOTE — PROGRESS NOTES
Subjective:       Patient ID: Elliott Gaspar is a 69 y.o. male.    Chief Complaint: Ingrown Toenail  Patient presents today he is complaining about an ingrown toenail on his left great toe he had does have numbness in the last 3 digits on the right foot he states the left big toes been bothering him.   Past Medical History:   Diagnosis Date    Contact lens/glasses fitting     wears contacts    Deviated septum     Fatty liver disease, nonalcoholic     Negative Hep A, B, C in past    Hypertension     Hypokalemia 2015    Kidney stone     Persistent headaches     related to sinus congestion    Peyronie's disease     Thrombocytopenia     Variable; low normal; Hematology eval in past and no specific f/u advised     Past Surgical History:   Procedure Laterality Date    CARDIAC CATHETERIZATION      NSA of coronaries    HERNIA REPAIR      groin bilat    HERNIA REPAIR      Dr. Kincaid    NASAL SEPTUM SURGERY      SINUS SURGERY       Family History   Problem Relation Name Age of Onset    Heart disease Mother      Atrial fibrillation Mother      Heart disease Father      Hypertension Father      Heart failure Father      Psoriasis Father      Psoriasis Paternal Grandmother      Alzheimer's disease Maternal Grandmother      Melanoma Neg Hx      Lupus Neg Hx      Eczema Neg Hx       Social History     Socioeconomic History    Marital status:    Tobacco Use    Smoking status: Former     Current packs/day: 0.00     Average packs/day: 3.0 packs/day for 3.0 years (9.0 ttl pk-yrs)     Types: Cigarettes, Cigars     Start date:      Quit date:      Years since quittin.5     Passive exposure: Past    Smokeless tobacco: Never    Tobacco comments:     quit age 18   Substance and Sexual Activity    Alcohol use: No    Drug use: No    Sexual activity: Yes     Partners: Female   Social History Narrative    ** Merged History Encounter **         ** Merged History Encounter **          Social Drivers of  Health     Financial Resource Strain: Low Risk  (2/26/2024)    Overall Financial Resource Strain (CARDIA)     Difficulty of Paying Living Expenses: Not hard at all   Food Insecurity: No Food Insecurity (2/26/2024)    Hunger Vital Sign     Worried About Running Out of Food in the Last Year: Never true     Ran Out of Food in the Last Year: Never true   Transportation Needs: No Transportation Needs (2/26/2024)    PRAPARE - Transportation     Lack of Transportation (Medical): No     Lack of Transportation (Non-Medical): No   Physical Activity: Insufficiently Active (2/26/2024)    Exercise Vital Sign     Days of Exercise per Week: 1 day     Minutes of Exercise per Session: 10 min   Stress: No Stress Concern Present (2/26/2024)    [employer] - Occupational Stress Questionnaire     Feeling of Stress : Not at all   Housing Stability: Low Risk  (2/26/2024)    Housing Stability Vital Sign     Unable to Pay for Housing in the Last Year: No     Number of Places Lived in the Last Year: 1     Unstable Housing in the Last Year: No       Current Outpatient Medications   Medication Sig Dispense Refill    azelastine (ASTELIN) 137 mcg (0.1 %) nasal spray 2 sprays (274 mcg total) by Nasal route once daily. 60 mL 3    b complex vitamins capsule Take 1 capsule by mouth once daily.      colchicine (COLCRYS) 0.6 mg tablet Use as directed 60 tablet 3    famotidine (PEPCID) 20 MG tablet Take 1 tablet (20 mg total) by mouth 2 (two) times daily. 60 tablet 11    fluticasone propionate (FLONASE) 50 mcg/actuation nasal spray 2 sprays (100 mcg total) by Each Nostril route once daily. 32 g 6    glucosamine-chondroitin 500-400 mg tablet Take 1 tablet by mouth 2 (two) times daily.      hydrALAZINE (APRESOLINE) 50 MG tablet Take 50 mg by mouth 2 (two) times daily.      hydroCHLOROthiazide (HYDRODIURIL) 25 MG tablet Take 25 mg by mouth.      meloxicam (MOBIC) 15 MG tablet Take 15 mg by mouth.      nebivoloL (BYSTOLIC) 20  "mg Tab Take 1 tablet (20 mg total) by mouth 2 (two) times a day. 180 tablet 3    nystatin-triamcinolone (MYCOLOG II) cream APPLY  CREAM TOPICALLY TWICE DAILY AS NEEDED FOR RASH 60 g 2    potassium chloride SA (K-DUR,KLOR-CON) 20 MEQ tablet TAKE 1 TABLET (20 MEQ TOTAL) BY MOUTH ONCE DAILY. ON DAYS THAT YOU TAKE LASIX 90 tablet 3    prednisoLONE acetate (PRED FORTE) 1 % DrpS Place 1 drop into the right eye 4 (four) times daily. 5 mL 0    tadalafiL (CIALIS) 20 MG Tab Take 1 tablet (20 mg total) by mouth once daily. 30 tablet 11    tamsulosin (FLOMAX) 0.4 mg Cap Take 1 capsule (0.4 mg total) by mouth once daily. 30 capsule 11    traMADoL (ULTRAM) 50 mg tablet Take 1 tablet (50 mg total) by mouth every 6 (six) hours as needed for Pain. 20 tablet 0    albuterol (PROAIR HFA) 90 mcg/actuation inhaler Inhale 2 puffs into the lungs every 6 (six) hours as needed for Wheezing. Rescue 18 g 0     No current facility-administered medications for this visit.     Review of patient's allergies indicates:   Allergen Reactions    Morphine Other (See Comments)     Family HX-mother went into anaphylactic shock    Contrast media      Family history of renal failure with iodinated contrast    Demerol [meperidine]     Iodinated contrast media Other (See Comments)     Family history of renal failure with iodinated contrast    Morphine Other (See Comments)     pts mother had anaphylaxis from Morphine so he does not want to take       Review of Systems   Neurological:  Positive for numbness.   All other systems reviewed and are negative.      Objective:      Vitals:    06/26/25 1300   BP: (!) 174/90   Pulse: 67   Weight: 90.9 kg (200 lb 6.4 oz)   Height: 5' 9" (1.753 m)     Physical Exam  Vitals and nursing note reviewed.   Constitutional:       Appearance: Normal appearance.   Cardiovascular:      Pulses:           Dorsalis pedis pulses are 2+ on the right side and 2+ on the left side.        Posterior tibial pulses are 1+ on the right side " and 1+ on the left side.   Pulmonary:      Effort: Pulmonary effort is normal.   Musculoskeletal:         General: Tenderness present.   Feet:      Right foot:      Protective Sensation: 3 sites tested.   1 site sensed.     Left foot:      Protective Sensation: 3 sites tested.  3 sites sensed.      Skin integrity: Erythema and warmth present.      Toenail Condition: Left toenails are ingrown.   Skin:     Capillary Refill: Capillary refill takes 2 to 3 seconds.      Findings: Erythema present.   Neurological:      Mental Status: He is alert.      Sensory: Sensory deficit present.   Psychiatric:         Mood and Affect: Mood normal.         Behavior: Behavior normal.                            Assessment:       1. Ingrown nail    2. Idiopathic peripheral neuropathy        Plan:       Patient presents today he is complaining about an ingrown toenail on his left great toe he had does have numbness in the last 3 digits on the right foot he states the left big toes been bothering him for about a month he is in the past been able to cut in trim and remove the nail himself.    Patient does relate a history of having crushed his right foot at the age of 15.  A comprehensive  patient evaluation was performed today patient does have some loss of sensitivity in the 3rd 4th and 5th digits on the right foot.   I did advised the patient certainly this is related to his neck and back problems he is had some falls because of this.  Patient did have significantly ingrown toenail left greater than right on both big toes this did require aggressive debridement the most ingrown nail appeared to be the lateral border of the left hallux which required deep aggressive removal of the nail but the patient did note relief after removal of this portion of the nail as well as others.  Patient did not require a nail avulsion the time this did relieve the patient's discomfort recommended follow-up as needed bacitracin ointment and a Band-Aid  applied to the areas where the nail was most ingrowing requiring removal.  This note was created using [employer] voice recognition software that occasionally misinterpreted phrases or words.

## 2025-06-30 ENCOUNTER — OFFICE VISIT (OUTPATIENT)
Dept: FAMILY MEDICINE | Facility: CLINIC | Age: 70
End: 2025-06-30
Payer: MEDICARE

## 2025-06-30 VITALS
BODY MASS INDEX: 30.7 KG/M2 | HEIGHT: 69 IN | HEART RATE: 70 BPM | SYSTOLIC BLOOD PRESSURE: 150 MMHG | RESPIRATION RATE: 18 BRPM | WEIGHT: 207.31 LBS | DIASTOLIC BLOOD PRESSURE: 80 MMHG | OXYGEN SATURATION: 98 %

## 2025-06-30 DIAGNOSIS — I10 ESSENTIAL HYPERTENSION: Primary | ICD-10-CM

## 2025-06-30 DIAGNOSIS — E78.2 MIXED HYPERLIPIDEMIA: ICD-10-CM

## 2025-06-30 DIAGNOSIS — K75.81 METABOLIC DYSFUNCTION-ASSOCIATED STEATOHEPATITIS (MASH): ICD-10-CM

## 2025-06-30 PROCEDURE — 3044F HG A1C LEVEL LT 7.0%: CPT | Mod: CPTII,S$GLB,, | Performed by: FAMILY MEDICINE

## 2025-06-30 PROCEDURE — 3079F DIAST BP 80-89 MM HG: CPT | Mod: CPTII,S$GLB,, | Performed by: FAMILY MEDICINE

## 2025-06-30 PROCEDURE — 3077F SYST BP >= 140 MM HG: CPT | Mod: CPTII,S$GLB,, | Performed by: FAMILY MEDICINE

## 2025-06-30 PROCEDURE — 1101F PT FALLS ASSESS-DOCD LE1/YR: CPT | Mod: CPTII,S$GLB,, | Performed by: FAMILY MEDICINE

## 2025-06-30 PROCEDURE — 99214 OFFICE O/P EST MOD 30 MIN: CPT | Mod: S$GLB,,, | Performed by: FAMILY MEDICINE

## 2025-06-30 PROCEDURE — 1159F MED LIST DOCD IN RCRD: CPT | Mod: CPTII,S$GLB,, | Performed by: FAMILY MEDICINE

## 2025-06-30 PROCEDURE — 3008F BODY MASS INDEX DOCD: CPT | Mod: CPTII,S$GLB,, | Performed by: FAMILY MEDICINE

## 2025-06-30 PROCEDURE — 1126F AMNT PAIN NOTED NONE PRSNT: CPT | Mod: CPTII,S$GLB,, | Performed by: FAMILY MEDICINE

## 2025-06-30 PROCEDURE — 4010F ACE/ARB THERAPY RXD/TAKEN: CPT | Mod: CPTII,S$GLB,, | Performed by: FAMILY MEDICINE

## 2025-06-30 PROCEDURE — 1160F RVW MEDS BY RX/DR IN RCRD: CPT | Mod: CPTII,S$GLB,, | Performed by: FAMILY MEDICINE

## 2025-06-30 PROCEDURE — 99999 PR PBB SHADOW E&M-EST. PATIENT-LVL IV: CPT | Mod: PBBFAC,,, | Performed by: FAMILY MEDICINE

## 2025-06-30 PROCEDURE — 3288F FALL RISK ASSESSMENT DOCD: CPT | Mod: CPTII,S$GLB,, | Performed by: FAMILY MEDICINE

## 2025-06-30 RX ORDER — ROSUVASTATIN CALCIUM 10 MG/1
10 TABLET, COATED ORAL DAILY
Qty: 90 TABLET | Refills: 3 | Status: SHIPPED | OUTPATIENT
Start: 2025-06-30 | End: 2026-06-30

## 2025-06-30 RX ORDER — TELMISARTAN 80 MG/1
80 TABLET ORAL DAILY
Qty: 90 TABLET | Refills: 3 | Status: SHIPPED | OUTPATIENT
Start: 2025-06-30 | End: 2026-06-30

## 2025-06-30 NOTE — PROGRESS NOTES
Subjective:       Patient ID: Elliott Gaspar is a 69 y.o. male.    Chief Complaint: check up on bp     History of Present Illness    CHIEF COMPLAINT:  Mr. Gaspar presents today for follow up    HYPERTENSION:  He is currently taking Hydrochlorothiazide (HCTZ), Nebivolol, and Telmisartan for blood pressure management. His most recent home reading was approximately 150/90. He intermittently checks blood pressure at home and reports that Telmisartan does not appear to be effectively managing his blood pressure. He experienced blood pressure elevation over 200 during a recent epidural procedure.    LABS / TEST RESULTS:  Lab results show resolution of pre-diabetes to normal range. PSA screening is normal. Thyroid function is normal. Cholesterol remains stable, though slightly elevated compared to previous years. Liver enzymes have improved, with ALT decreasing from 129 to 67, and subsequently to 55. He appears pleased with overall lab result improvements.    MEDICAL HISTORY:  He has a history of significant Excedrin migraine overuse in the late 1990s, consuming maximum daily doses for approximately six months due to severe headaches and stress. This prolonged medication use resulted in liver complications. He has not been prescribed cholesterol medication due to ongoing liver concerns. His liver enzymes have subsequently improved over time.    CURRENT SUPPLEMENTS:  He takes multiple supplements for liver and cardiovascular health including milk thistle drops daily and liver detox gummies from [employer], consuming two gummies per day. He also takes beet gummies for blood pressure management, though notes minimal perceived effectiveness. He previously took krill oil but discontinued and has recently restarted the supplement. He is interested in optimizing his supplement regimen for liver rejuvenation.    RECENT PROCEDURES:  He underwent an epidural procedure on Friday.      ROS:  Gastrointestinal: +diarrhea  Neurological:  "-headache         The 10-year ASCVD risk score (Sushma CHRISTIAN, et al., 2019) is: 34.1%    Values used to calculate the score:      Age: 69 years      Sex: Male      Is Non- : No      Diabetic: No      Tobacco smoker: No      Systolic Blood Pressure: 150 mmHg      Is BP treated: Yes      HDL Cholesterol: 32 mg/dL      Total Cholesterol: 208 mg/dL          Problem List[1]  Elliott has a current medication list which includes the following prescription(s): albuterol, azelastine, b complex vitamins, colchicine, famotidine, glucosamine-chondroitin, hydralazine, hydrochlorothiazide, meloxicam, nebivolol, nystatin-triamcinolone, potassium chloride sa, prednisolone acetate, tadalafil, tamsulosin, tramadol, fluticasone propionate, rosuvastatin, and telmisartan.        Health Maintenance Due   Topic Date Due    Shingles Vaccine (1 of 2) Never done    RSV Vaccine (Age 60+ and Pregnant patients) (1 - Risk 60-74 years 1-dose series) Never done    COVID-19 Vaccine (3 - 2024-25 season) 09/01/2024    Colorectal Cancer Screening  04/30/2025      Health Maintenance reviewed and discussed.   Objective:      Vitals:    06/30/25 1626 06/30/25 1657   BP: (!) 170/78 (!) 150/80   Pulse: 70    Resp: 18    SpO2: 98%    Weight: 94 kg (207 lb 4.8 oz)    Height: 5' 9" (1.753 m)    PainSc: 0-No pain      Body mass index is 30.61 kg/m².  Physical Exam  Vitals and nursing note reviewed.   Constitutional:       General: He is not in acute distress.     Appearance: He is not ill-appearing.   Cardiovascular:      Rate and Rhythm: Normal rate and regular rhythm.      Heart sounds: No murmur heard.  Pulmonary:      Effort: Pulmonary effort is normal.      Breath sounds: Normal breath sounds. No wheezing.   Skin:     General: Skin is warm and dry.      Findings: No rash.   Neurological:      Mental Status: He is alert.   Psychiatric:         Mood and Affect: Mood normal.         Behavior: Behavior normal.         Assessment:     "   Assessment & Plan    1. Prediabetes resolved, now in normal range.  2. Prostate cancer screening negative.  3. Thyroid function normal.  4. Cholesterol stable but elevated, with 10-year cardiovascular risk up to 40%.  5. Statin therapy appropriate despite history of liver issues; hepatology note indicates patient could tolerate up to 40 mg statin.  6. Liver enzymes improved (ALT down by ~50%).  7. BP management suboptimal.  8. Weight loss medication options limited; patient only candidate for injectable, not covered by insurance.           Plan:       1. Essential hypertension  Assessment & Plan:  Control varies.  150/90 at home. Uncontrolled in the office. Will increase telmisartan to 80mg.     Orders:  -     telmisartan (MICARDIS) 80 MG Tab; Take 1 tablet (80 mg total) by mouth once daily.  Dispense: 90 tablet; Refill: 3    2. Metabolic dysfunction-associated steatohepatitis (MASH)  -     Hepatic function panel; Future; Expected date: 07/30/2025  -     Gamma GT; Future; Expected date: 07/30/2025    3. Mixed hyperlipidemia  -     rosuvastatin (CRESTOR) 10 MG tablet; Take 1 tablet (10 mg total) by mouth once daily.  Dispense: 90 tablet; Refill: 3         This note was generated with the assistance of ambient listening technology. Verbal consent was obtained by the patient and accompanying visitor(s) for the recording of patient appointment to facilitate this note. I attest to having reviewed and edited the generated note for accuracy, though some syntax or spelling errors may persist. Please contact the author of this note for any clarification.         [1]   Patient Active Problem List  Diagnosis    BPH (benign prostatic hypertrophy)    Penile curvature, acquired    History of thrombocytopenia    Essential hypertension    Deviated septum    Peyronie's disease    Gouty arthritis    Ganglion cyst    Left retinal detachment    Left cataract    Hiatal hernia    History of Helicobacter pylori infection    Dupuytren's  contracture of both hands    Leg edema    Stiffness of finger joint of left hand    Colon cancer screening    Lumbar radiculopathy    Ingrown nail    Idiopathic peripheral neuropathy    Thrombocytopenia    Venous stasis dermatitis of both lower extremities    Nonrheumatic mitral valve regurgitation    Metabolic dysfunction-associated steatohepatitis (MASH)    Elevated liver enzymes    Hypertriglyceridemia    Third nerve palsy of left eye    Esotropia of left eye    Liver fibrosis    Class 1 obesity with body mass index (BMI) of 30.0 to 30.9 in adult    Right knee injury, initial encounter    Bilateral chronic knee pain    Fall    Bilateral hand pain    Musculoskeletal pain    Impaired functional mobility, balance, gait, and endurance    Prediabetes

## 2025-07-03 ENCOUNTER — OFFICE VISIT (OUTPATIENT)
Dept: OPTOMETRY | Facility: CLINIC | Age: 70
End: 2025-07-03
Payer: MEDICARE

## 2025-07-03 DIAGNOSIS — H16.141 SPK (SUPERFICIAL PUNCTATE KERATITIS), RIGHT: Primary | ICD-10-CM

## 2025-07-03 DIAGNOSIS — G51.4 EYELID MYOKYMIA: ICD-10-CM

## 2025-07-03 DIAGNOSIS — G51.31 HEMIFACIAL SPASM OF RIGHT SIDE OF FACE: ICD-10-CM

## 2025-07-03 PROCEDURE — 3288F FALL RISK ASSESSMENT DOCD: CPT | Mod: CPTII,S$GLB,, | Performed by: OPTOMETRIST

## 2025-07-03 PROCEDURE — 1126F AMNT PAIN NOTED NONE PRSNT: CPT | Mod: CPTII,S$GLB,, | Performed by: OPTOMETRIST

## 2025-07-03 PROCEDURE — 1160F RVW MEDS BY RX/DR IN RCRD: CPT | Mod: CPTII,S$GLB,, | Performed by: OPTOMETRIST

## 2025-07-03 PROCEDURE — 99999 PR PBB SHADOW E&M-EST. PATIENT-LVL III: CPT | Mod: PBBFAC,,, | Performed by: OPTOMETRIST

## 2025-07-03 PROCEDURE — 1159F MED LIST DOCD IN RCRD: CPT | Mod: CPTII,S$GLB,, | Performed by: OPTOMETRIST

## 2025-07-03 PROCEDURE — 4010F ACE/ARB THERAPY RXD/TAKEN: CPT | Mod: CPTII,S$GLB,, | Performed by: OPTOMETRIST

## 2025-07-03 PROCEDURE — 3044F HG A1C LEVEL LT 7.0%: CPT | Mod: CPTII,S$GLB,, | Performed by: OPTOMETRIST

## 2025-07-03 PROCEDURE — 1101F PT FALLS ASSESS-DOCD LE1/YR: CPT | Mod: CPTII,S$GLB,, | Performed by: OPTOMETRIST

## 2025-07-03 PROCEDURE — 99213 OFFICE O/P EST LOW 20 MIN: CPT | Mod: S$GLB,,, | Performed by: OPTOMETRIST

## 2025-07-03 NOTE — PROGRESS NOTES
HPI     Dry Eye     Additional comments: DLE 6-2025           Comments    Pt here today for 2 week f/u.   States symptoms are about the same as last   visit.   Still swelling around eye.    No longer getting any discharge.    PF - OD bid-tid // not able to use qid on most days  ATS bid-tid    Pt resumed cls wear x 10 days ago ( last Monday) -- does not have in   today.            Last edited by Barrett Davalos, OD on 7/3/2025 10:13 AM.            Assessment /Plan     For exam results, see Encounter Report.    SPK (superficial punctate keratitis), right    Hemifacial spasm of right side of face    Eyelid myokymia      1. SPK (superficial punctate keratitis), right (Primary)  SPK inferior remaining, resumed intermittent cls wear x 10 days ago  Currently using PF 2-3 times   Start taper: 1 gt bid OD x 1 week, then decrease to 1 gt qd OD x 1 week, then d/c drops  Continue otc artificial tears 2-4 times daily  Report back if worsening or no resolution     2. Hemifacial spasm of right side of face  3. Eyelid myokymia  Still constant inferior lid myokymia  MRI scheduled next week

## 2025-07-08 ENCOUNTER — HOSPITAL ENCOUNTER (OUTPATIENT)
Dept: RADIOLOGY | Facility: HOSPITAL | Age: 70
Discharge: HOME | End: 2025-07-08
Attending: OPTOMETRIST
Payer: MEDICARE

## 2025-07-08 DIAGNOSIS — G51.31 HEMIFACIAL SPASM OF RIGHT SIDE OF FACE: ICD-10-CM

## 2025-07-08 PROCEDURE — 70551 MRI BRAIN STEM W/O DYE: CPT | Mod: TC,PO

## 2025-07-08 PROCEDURE — 70551 MRI BRAIN STEM W/O DYE: CPT | Mod: 26,,, | Performed by: RADIOLOGY

## 2025-07-24 ENCOUNTER — PATIENT MESSAGE (OUTPATIENT)
Dept: ADMINISTRATIVE | Facility: HOSPITAL | Age: 70
End: 2025-07-24
Payer: MEDICARE

## 2025-08-21 ENCOUNTER — LAB VISIT (OUTPATIENT)
Dept: LAB | Facility: HOSPITAL | Age: 70
End: 2025-08-21
Payer: MEDICARE

## 2025-08-21 ENCOUNTER — OFFICE VISIT (OUTPATIENT)
Dept: FAMILY MEDICINE | Facility: CLINIC | Age: 70
End: 2025-08-21
Payer: MEDICARE

## 2025-08-21 VITALS
RESPIRATION RATE: 20 BRPM | HEART RATE: 61 BPM | BODY MASS INDEX: 30.12 KG/M2 | HEIGHT: 69 IN | OXYGEN SATURATION: 97 % | SYSTOLIC BLOOD PRESSURE: 138 MMHG | WEIGHT: 203.38 LBS | DIASTOLIC BLOOD PRESSURE: 80 MMHG

## 2025-08-21 DIAGNOSIS — N52.8 OTHER MALE ERECTILE DYSFUNCTION: ICD-10-CM

## 2025-08-21 DIAGNOSIS — Z12.11 SCREEN FOR COLON CANCER: ICD-10-CM

## 2025-08-21 DIAGNOSIS — I10 ESSENTIAL HYPERTENSION: ICD-10-CM

## 2025-08-21 LAB — OB PNL STL IA: POSITIVE

## 2025-08-21 PROCEDURE — 1159F MED LIST DOCD IN RCRD: CPT | Mod: CPTII,S$GLB,, | Performed by: FAMILY MEDICINE

## 2025-08-21 PROCEDURE — 1101F PT FALLS ASSESS-DOCD LE1/YR: CPT | Mod: CPTII,S$GLB,, | Performed by: FAMILY MEDICINE

## 2025-08-21 PROCEDURE — 3008F BODY MASS INDEX DOCD: CPT | Mod: CPTII,S$GLB,, | Performed by: FAMILY MEDICINE

## 2025-08-21 PROCEDURE — 1126F AMNT PAIN NOTED NONE PRSNT: CPT | Mod: CPTII,S$GLB,, | Performed by: FAMILY MEDICINE

## 2025-08-21 PROCEDURE — 3044F HG A1C LEVEL LT 7.0%: CPT | Mod: CPTII,S$GLB,, | Performed by: FAMILY MEDICINE

## 2025-08-21 PROCEDURE — 3075F SYST BP GE 130 - 139MM HG: CPT | Mod: CPTII,S$GLB,, | Performed by: FAMILY MEDICINE

## 2025-08-21 PROCEDURE — 1160F RVW MEDS BY RX/DR IN RCRD: CPT | Mod: CPTII,S$GLB,, | Performed by: FAMILY MEDICINE

## 2025-08-21 PROCEDURE — 3079F DIAST BP 80-89 MM HG: CPT | Mod: CPTII,S$GLB,, | Performed by: FAMILY MEDICINE

## 2025-08-21 PROCEDURE — 99999 PR PBB SHADOW E&M-EST. PATIENT-LVL IV: CPT | Mod: PBBFAC,,, | Performed by: FAMILY MEDICINE

## 2025-08-21 PROCEDURE — 3288F FALL RISK ASSESSMENT DOCD: CPT | Mod: CPTII,S$GLB,, | Performed by: FAMILY MEDICINE

## 2025-08-21 PROCEDURE — 4010F ACE/ARB THERAPY RXD/TAKEN: CPT | Mod: CPTII,S$GLB,, | Performed by: FAMILY MEDICINE

## 2025-08-21 PROCEDURE — 82274 ASSAY TEST FOR BLOOD FECAL: CPT

## 2025-08-21 PROCEDURE — 99213 OFFICE O/P EST LOW 20 MIN: CPT | Mod: S$GLB,,, | Performed by: FAMILY MEDICINE

## 2025-08-21 RX ORDER — NEBIVOLOL 20 MG/1
20 TABLET ORAL 2 TIMES DAILY
Qty: 180 TABLET | Refills: 3 | Status: SHIPPED | OUTPATIENT
Start: 2025-08-21

## 2025-08-21 RX ORDER — TADALAFIL 20 MG/1
20 TABLET ORAL DAILY
Qty: 30 TABLET | Refills: 11 | Status: SHIPPED | OUTPATIENT
Start: 2025-08-21